# Patient Record
Sex: FEMALE | Race: WHITE | Employment: OTHER
[De-identification: names, ages, dates, MRNs, and addresses within clinical notes are randomized per-mention and may not be internally consistent; named-entity substitution may affect disease eponyms.]

---

## 2018-06-07 ENCOUNTER — RECORDS - HEALTHEAST (OUTPATIENT)
Dept: ADMINISTRATIVE | Facility: OTHER | Age: 43
End: 2018-06-07

## 2018-06-11 ENCOUNTER — RECORDS - HEALTHEAST (OUTPATIENT)
Dept: ADMINISTRATIVE | Facility: OTHER | Age: 43
End: 2018-06-11

## 2018-06-11 ENCOUNTER — OFFICE VISIT (OUTPATIENT)
Dept: FAMILY MEDICINE | Facility: CLINIC | Age: 43
End: 2018-06-11
Payer: MEDICARE

## 2018-06-11 VITALS
WEIGHT: 215.6 LBS | RESPIRATION RATE: 22 BRPM | DIASTOLIC BLOOD PRESSURE: 72 MMHG | BODY MASS INDEX: 43.46 KG/M2 | HEIGHT: 59 IN | SYSTOLIC BLOOD PRESSURE: 110 MMHG | HEART RATE: 75 BPM | OXYGEN SATURATION: 100 % | TEMPERATURE: 98.6 F

## 2018-06-11 DIAGNOSIS — M25.531 RIGHT WRIST PAIN: Primary | ICD-10-CM

## 2018-06-11 DIAGNOSIS — K21.9 GASTROESOPHAGEAL REFLUX DISEASE WITHOUT ESOPHAGITIS: ICD-10-CM

## 2018-06-11 DIAGNOSIS — D32.9 MENINGIOMA (H): ICD-10-CM

## 2018-06-11 DIAGNOSIS — E87.6 HYPOKALEMIA: ICD-10-CM

## 2018-06-11 LAB
BUN SERPL-MCNC: 5 MG/DL (ref 5–24)
CALCIUM SERPL-MCNC: 9.7 MG/DL (ref 8.5–10.4)
CHLORIDE SERPLBLD-SCNC: 100 MMOL/L (ref 94–109)
CO2 SERPL-SCNC: 33 MMOL/L (ref 20–32)
CREAT SERPL-MCNC: 0.7 MG/DL (ref 0.6–1.3)
EGFR CALCULATED (BLACK REFERENCE): >90 ML/MIN
EGFR CALCULATED (NON BLACK REFERENCE): >90 ML/MIN
GLUCOSE SERPL-MCNC: 79 MG/DL (ref 60–109)
POTASSIUM SERPL-SCNC: 3.8 MMOL/L (ref 3.4–5.3)
SODIUM SERPL-SCNC: 140 MMOL/L (ref 133–144)

## 2018-06-11 NOTE — PROGRESS NOTES
"HPI    Rohan Prince is 42 year old yo female presenting for:    1.Wrist pain   - started 4 days ago  - was in midst of a lot of activities that day - doing dishes, fixing garage door, heavy lifting during the day, mowing the lawn   - patient states that somewhere in the middle of all these activities, she began having pain  - cannot recall whether or not she had any trauma, but states that \"I probably did\"  - description of pain:   - lateral surface of wrist   - sharp/shooting pain - 9/10 at its worst   - exacerbating factors: brushing her hair, cooking dinner, any type of activity    - alleviating factors: \"nothing has helped\"  - is unable to hold heavy objects - \"probably cannot hold anything over 3 lbs, which is unusual for me\"  - dorsal surface of hand and all 5 fingers feel numb, 2nd/3rd/4th feel the most numb      OBJECTIVE    Vitals  /72  Pulse 75  Temp 98.6  F (37  C)  Resp 22  Ht 4' 10.5\" (148.6 cm)  Wt 215 lb 9.6 oz (97.8 kg)  SpO2 100%  BMI 44.29 kg/m2      Physical Exam  General: No acute distress  CV: RRR  Respiratory: CTA bilaterally, no wheezes/rhonchi/rhales appreciated, no respiratory distress  Extremities: no cyanosis, no edema, capillary refill <2 seconds, well perfused   - right wrist: mild swelling on right wrist; ROM wnl; no bony tenderness; mild soreness to palpation     ASSESSMENT/PLAN    # Wrist pain  - no fracture noted on XR, will await official radiology read   - likely 2/2 to local soft tissue swelling from overuse  - naproxen 500 mg BID with food (patient already has some at home)  - follow up in 1-2 weeks     #Meningiomas  - mild increase in size noted on CT imaging  - MR referral + neurosurgery referral given     Precepted with Dr. Drew    "

## 2018-06-11 NOTE — PROGRESS NOTES
Preceptor Attestation:   Patient seen, evaluated and discussed with the resident. I have verified the content of the note, which accurately reflects my assessment of the patient and the plan of care.   Supervising Physician:  Campbell Drew MD

## 2018-06-11 NOTE — PATIENT INSTRUCTIONS
Referral for ( TEST )  :      MRI Brain w Contrast   LOCATION/PLACE/Provider :    [facility]   DATE & TIME :     6- at 6:30pm  PHONE :     [phone]  FAX :     [phone]  Appointment made by clinic staff/:    Nany     no

## 2018-06-11 NOTE — MR AVS SNAPSHOT
After Visit Summary   2018    Rohan Prince    MRN: [id number]           Patient Information     Date Of Birth          1975        Visit Information        Provider Department      2018 3:20 PM Palla, Misbah Yousuf, MD [facility]        Today's Diagnoses     Right wrist pain    -  1    Hypokalemia        Meningioma (H)           Follow-ups after your visit        Additional Services     NEUROSURGERY REFERRAL       Patient to stop at the [facility] Desk    Reason for Referral: Meningioma, increasing in size. Vision changes, increased frequency of headaches.      needed: No  Language: English    May leave message on voicemail: Yes    ([facility] Only) Referral should be tracked (Yes/No)?                  Future tests that were ordered for you today     Open Future Orders        Priority Expected Expires Ordered    NEUROSURGERY REFERRAL Routine  2019    MRI BRAIN W CONTRAST Routine  2019            Who to contact     Please call your clinic at [phone] to:    Ask questions about your health    Make or cancel appointments    Discuss your medicines    Learn about your test results    Speak to your doctor            Additional Information About Your Visit        [employer] Information     hyaqu is an electronic gateway that provides easy, online access to your medical records. With hyaqu, you can request a clinic appointment, read your test results, renew a prescription or communicate with your care team.     To sign up for hyaqu visit the website at [email or web address]   You will be asked to enter the access code listed below, as well as some personal information. Please follow the directions to create your username and password.     Your access code is: [email or web address]  Expires: 2018 10:59 AM     Your access code will  in 90 days. If you need help or a new code, please contact your [facility] or  "call [phone] for assistance.        Care EveryWhere ID     This is your Care EveryWhere ID. This could be used by other organizations to access your [place] medical records  [id number]        Your Vitals Were     Pulse Temperature Respirations Height Pulse Oximetry BMI (Body Mass Index)    75 98.6  F (37  C) 22 4' 10.5\" (148.6 cm) 100% 44.29 kg/m2       Blood Pressure from Last 3 Encounters:   06/11/18 110/72    Weight from Last 3 Encounters:   06/11/18 215 lb 9.6 oz (97.8 kg)              We Performed the Following     Basic Metabolic Panel ([facility]) - Results < 1 hr     XR Wrist Right 2 Views        Primary Care Provider Office Phone # Fax #    Ryder Yousuf Palla, [phone]       [address] [address]        Equal Access to Services     [facility] : Hadii aad ku hadasho Soomaali, waaxda luqadaha, qaybta kaalmada adeegyada, waxay idiin hayaan marci merinoaraarun springern . So [facility] [phone].    ATENCIÓN: Si habla español, tiene a aponte disposición servicios gratuitos de asistencia lingüística. Llame al [phone].    We comply with applicable federal civil rights laws and [place] laws. We do not discriminate on the basis of race, color, national origin, age, disability, sex, sexual orientation, or gender identity.            Thank you!     Thank you for choosing [facility]  for your care. Our goal is always to provide you with excellent care. Hearing back from our patients is one way we can continue to improve our services. Please take a few minutes to complete the written survey that you may receive in the mail after your visit with us. Thank you!             Your Updated Medication List - Protect others around you: Learn how to safely use, store and throw away your medicines at www.disposemymeds.org.      Notice  As of 6/11/2018  3:58 PM    You have not been prescribed any medications.      "

## 2018-06-12 ENCOUNTER — HEALTH MAINTENANCE LETTER (OUTPATIENT)
Age: 43
End: 2018-06-12

## 2018-06-14 ENCOUNTER — OFFICE VISIT - HEALTHEAST (OUTPATIENT)
Dept: NEUROSURGERY | Facility: CLINIC | Age: 43
End: 2018-06-14

## 2018-06-14 ENCOUNTER — HOSPITAL ENCOUNTER (OUTPATIENT)
Dept: PHYSICAL MEDICINE AND REHAB | Facility: CLINIC | Age: 43
Discharge: HOME | End: 2018-06-14
Attending: NEUROLOGICAL SURGERY

## 2018-06-14 DIAGNOSIS — M54.2 CERVICAL SPINE PAIN: ICD-10-CM

## 2018-06-14 DIAGNOSIS — M79.18 MYOFASCIAL PAIN: ICD-10-CM

## 2018-06-14 DIAGNOSIS — D32.9 MENINGIOMA (H): ICD-10-CM

## 2018-06-14 DIAGNOSIS — M79.7 FIBROMYALGIA: ICD-10-CM

## 2018-06-14 DIAGNOSIS — Z98.1 HISTORY OF FUSION OF CERVICAL SPINE: ICD-10-CM

## 2018-06-14 DIAGNOSIS — M54.16 LUMBAR RADICULITIS: ICD-10-CM

## 2018-06-14 DIAGNOSIS — M54.50 BILATERAL LOW BACK PAIN WITHOUT SCIATICA: ICD-10-CM

## 2018-06-14 DIAGNOSIS — M51.369 DEGENERATION OF LUMBAR INTERVERTEBRAL DISC: ICD-10-CM

## 2018-06-14 DIAGNOSIS — M47.816 LUMBAR FACET ARTHROPATHY: ICD-10-CM

## 2018-06-14 ASSESSMENT — MIFFLIN-ST. JEOR
SCORE: 1706.05
SCORE: 1706.05

## 2018-06-15 ENCOUNTER — HOSPITAL ENCOUNTER (OUTPATIENT)
Dept: MRI IMAGING | Facility: HOSPITAL | Age: 43
Discharge: HOME | End: 2018-06-15

## 2018-06-25 ENCOUNTER — OFFICE VISIT (OUTPATIENT)
Dept: FAMILY MEDICINE | Facility: CLINIC | Age: 43
End: 2018-06-25
Payer: MEDICARE

## 2018-06-25 VITALS
RESPIRATION RATE: 16 BRPM | WEIGHT: 219 LBS | HEART RATE: 65 BPM | OXYGEN SATURATION: 99 % | HEIGHT: 70 IN | DIASTOLIC BLOOD PRESSURE: 76 MMHG | TEMPERATURE: 97.6 F | SYSTOLIC BLOOD PRESSURE: 113 MMHG | BODY MASS INDEX: 31.35 KG/M2

## 2018-06-25 DIAGNOSIS — M51.369 DDD (DEGENERATIVE DISC DISEASE), LUMBAR: Primary | ICD-10-CM

## 2018-06-25 DIAGNOSIS — M79.7 FIBROMYALGIA: ICD-10-CM

## 2018-06-25 RX ORDER — AMITRIPTYLINE HYDROCHLORIDE 10 MG/1
TABLET ORAL
Qty: 90 TABLET | Refills: 0 | Status: SHIPPED | OUTPATIENT
Start: 2018-06-25 | End: 2018-06-25 | Stop reason: DRUGHIGH

## 2018-06-25 RX ORDER — TRAMADOL HYDROCHLORIDE 50 MG/1
50 TABLET ORAL EVERY 12 HOURS PRN
Qty: 14 TABLET | Refills: 0 | Status: SHIPPED | OUTPATIENT
Start: 2018-06-25 | End: 2018-07-03

## 2018-06-25 NOTE — MR AVS SNAPSHOT
"              After Visit Summary   6/25/2018    Rohan Prince    MRN: [id number]           Patient Information     Date Of Birth          1975        Visit Information        Provider Department      6/25/2018 2:20 PM Palla, Misbah Yousuf, MD [facility]        Today's Diagnoses     DDD (degenerative disc disease), lumbar    -  1    Fibromyalgia           Follow-ups after your visit        Your next 10 appointments already scheduled     Jul 03, 2018  9:20 AM CDT   RETURN EXTENDED with Misbah Yousuf Palla, MD   [facility] ([facility])    [address] [place] [id number]   [phone]              Who to contact     Please call your clinic at [phone] to:    Ask questions about your health    Make or cancel appointments    Discuss your medicines    Learn about your test results    Speak to your doctor            Additional Information About Your Visit        MyChart Information     The[employer]ance gives you secure access to your electronic health record. If you see a primary care provider, you can also send messages to your care team and make appointments. If you have questions, please call your primary care clinic.  If you do not have a primary care provider, please call [phone] and they will assist you.      Theravance is an electronic gateway that provides easy, online access to your medical records. With [employer], you can request a clinic appointment, read your test results, renew a prescription or communicate with your care team.     To access your existing account, please contact your [facility] or call [phone] for assistance.        Care EveryWhere ID     This is your Care EveryWhere ID. This could be used by other organizations to access your [place] medical records  [id number]        Your Vitals Were     Pulse Temperature Respirations Height Pulse Oximetry BMI (Body Mass Index)    65 97.6  F (36.4  C) (Oral) 16 5' 10.08\" (178 cm) " 99% 31.35 kg/m2       Blood Pressure from Last 3 Encounters:   06/25/18 113/76   06/11/18 110/72    Weight from Last 3 Encounters:   06/25/18 219 lb (99.3 kg)   06/11/18 215 lb 9.6 oz (97.8 kg)              Today, you had the following     No orders found for display         Today's Medication Changes          These changes are accurate as of 6/25/18  3:48 PM.  If you have any questions, ask your nurse or doctor.               Start taking these medicines.        Dose/Directions    amitriptyline 25 MG tablet   Commonly known as:  ELAVIL   Used for:  DDD (degenerative disc disease), lumbar, Fibromyalgia   Started by:  Palla, Misbah Yousuf, MD        Dose:  25 mg   Take 1 tablet (25 mg) by mouth At Bedtime   Quantity:  90 tablet   Refills:  1       traMADol 50 MG tablet   Commonly known as:  ULTRAM   Used for:  DDD (degenerative disc disease), lumbar   Started by:  Palla, Misbah Yousuf, MD        Dose:  50 mg   Take 1 tablet (50 mg) by mouth every 12 hours as needed for severe pain   Quantity:  14 tablet   Refills:  0            Where to get your medicines      [facility] [address] [place] [address] [place]  AT [facility] & [address], [place] [address]     Phone:  [phone]     amitriptyline 25 MG tablet         Some of these will need a paper prescription and others can be bought over the counter.  Ask your nurse if you have questions.     Bring a paper prescription for each of these medications     traMADol 50 MG tablet               Information about OPIOIDS     PRESCRIPTION OPIOIDS: WHAT YOU NEED TO KNOW   We gave you an opioid (narcotic) pain medicine. It is important to manage your pain, but opioids are not always the best choice. You should first try all the other options your care team gave you. Take this medicine for as short a time (and as few doses) as possible.     These medicines have risks:    DO NOT drive when on new or  higher doses of pain medicine. These medicines can affect your alertness and reaction times, and you could be arrested for driving under the influence (DUI). If you need to use opioids long-term, talk to your care team about driving.    DO NOT operate heave machinery    DO NOT do any other dangerous activities while taking these medicines.     DO NOT drink any alcohol while taking these medicines.      If the opioid prescribed includes acetaminophen, DO NOT take with any other medicines that contain acetaminophen. Read all labels carefully. Look for the word  acetaminophen  or  Tylenol.  Ask your pharmacist if you have questions or are unsure.    You can get addicted to pain medicines, especially if you have a history of addiction (chemical, alcohol or substance dependence). Talk to your care team about ways to reduce this risk.    Store your pills in a secure place, locked if possible. We will not replace any lost or stolen medicine. If you don t finish your medicine, please throw away (dispose) as directed by your pharmacist. The [place] Pollution Control Agency has more information about safe disposal: https://www.pca.[facility].[place].us/living-green/managing-unwanted-medications.     All opioids tend to cause constipation. Drink plenty of water and eat foods that have a lot of fiber, such as fruits, vegetables, prune juice, apple juice and high-fiber cereal. Take a laxative (Miralax, milk of magnesia, Colace, Senna) if you don t move your bowels at least every other day.          Primary Care Provider Office Phone # Fax #    Ryder Yousuf Palla, [phone]       [address] [address]        Equal Access to Services     [facility] : Hadii justus vazquez Sobrie, waaxda luqadaha, qaybta kaalmalizbeth hermosillo. [facility] [phone].    ATENCIÓN: Si habla español, tiene a aponte disposición servicios gratuitos de asistencia  lingüística. Rigo al [phone].    We comply with applicable federal civil rights laws and [place] laws. We do not discriminate on the basis of race, color, national origin, age, disability, sex, sexual orientation, or gender identity.            Thank you!     Thank you for choosing [facility]  for your care. Our goal is always to provide you with excellent care. Hearing back from our patients is one way we can continue to improve our services. Please take a few minutes to complete the written survey that you may receive in the mail after your visit with us. Thank you!             Your Updated Medication List - Protect others around you: Learn how to safely use, store and throw away your medicines at www.disposemymeds.org.          This list is accurate as of 6/25/18  3:48 PM.  Always use your most recent med list.                   Brand Name Dispense Instructions for use Diagnosis    amitriptyline 25 MG tablet    ELAVIL    90 tablet    Take 1 tablet (25 mg) by mouth At Bedtime    DDD (degenerative disc disease), lumbar, Fibromyalgia       traMADol 50 MG tablet    ULTRAM    14 tablet    Take 1 tablet (50 mg) by mouth every 12 hours as needed for severe pain    DDD (degenerative disc disease), lumbar

## 2018-06-25 NOTE — PROGRESS NOTES
"HPI    Rohan Prince is 42 year old yo female presenting for:    1. Back pain  - has been having this for \"years, since I was a little girl\"  - description:   - low back, midline   - worst at night   - sharp, stabbing   - no radiation  - does have a history of DDD and disc herniations  - no red flag symptoms  - patient has been frustrated because \"everyone just says it is fibromyalgia, but I am in real pain and no one is doing anything to help\"  - patient states pain prevents her from sleeping and all she wants to be able to do is get a good night's sleep    OBJECTIVE    Vitals  /76  Pulse 65  Temp 97.6  F (36.4  C) (Oral)  Resp 16  Ht 5' 10.08\" (178 cm)  Wt 219 lb (99.3 kg)  SpO2 99%  BMI 31.35 kg/m2      Physical Exam  General: No acute distress  CV: RRR  Respiratory: CTA bilaterally, no wheezes/rhonchi/rhales appreciated, no respiratory distress  Chest wall: No chest wall tenderness  Back: paraspinal tenderness over lower back, mild; bilateral 30 degree leg raise test negative   Abdomen: soft, non-distended, non-tender  Extremities: no cyanosis, no edema, capillary refill <2 seconds, well perfused  Neuro: moves all 4 extremities, normal gate, sensation to touch in tact in BUE, BLE; 5/5 strength in BUE  Trigger point check: bilateral upper anterior chest wall pain on palpation, medial knee TTP, anterior elbow bilaterally TTP, anterior and posterior neck TTP    ASSESSMENT/PLAN      1. Fibromyalgia  - increase lyrica to 150 mg BID  - start amitryptyline 10 mg qHS    2. Degen disc disease pain  - trial of tramadol 50 mg q12h  - return in 1 week  - if helps signficantly, will need pain in take        Precepted with Dr. Donnelly      "

## 2018-06-28 ENCOUNTER — COMMUNICATION - HEALTHEAST (OUTPATIENT)
Dept: NEUROSURGERY | Facility: CLINIC | Age: 43
End: 2018-06-28

## 2018-06-28 DIAGNOSIS — D32.9 MENINGIOMA (H): ICD-10-CM

## 2018-07-03 ENCOUNTER — OFFICE VISIT (OUTPATIENT)
Dept: FAMILY MEDICINE | Facility: CLINIC | Age: 43
End: 2018-07-03
Payer: MEDICARE

## 2018-07-03 VITALS
WEIGHT: 212.4 LBS | DIASTOLIC BLOOD PRESSURE: 60 MMHG | TEMPERATURE: 97.6 F | HEART RATE: 70 BPM | BODY MASS INDEX: 30.41 KG/M2 | OXYGEN SATURATION: 99 % | SYSTOLIC BLOOD PRESSURE: 93 MMHG | HEIGHT: 70 IN

## 2018-07-03 DIAGNOSIS — M51.369 DEGENERATIVE DISC DISEASE, LUMBAR: Primary | ICD-10-CM

## 2018-07-03 DIAGNOSIS — M79.7 FIBROMYALGIA: ICD-10-CM

## 2018-07-03 DIAGNOSIS — G89.4 CHRONIC PAIN SYNDROME: ICD-10-CM

## 2018-07-03 DIAGNOSIS — M51.369 DDD (DEGENERATIVE DISC DISEASE), LUMBAR: ICD-10-CM

## 2018-07-03 RX ORDER — TRAMADOL HYDROCHLORIDE 50 MG/1
50 TABLET ORAL EVERY 12 HOURS PRN
Qty: 14 TABLET | Refills: 0 | Status: SHIPPED | OUTPATIENT
Start: 2018-07-03 | End: 2018-07-11

## 2018-07-03 NOTE — PROGRESS NOTES
Preceptor Attestation:  Patient's case reviewed and discussed with  Patient seen and discussed with the resident.  I agree with written assessment and plan of care.  Supervising Physician:  Nichole Hutnley MD  [facility]

## 2018-07-03 NOTE — MR AVS SNAPSHOT
After Visit Summary   7/3/2018    Rohan Prince    MRN: [id number]           Patient Information     Date Of Birth          1975        Visit Information        Provider Department      7/3/2018 9:20 AM Palla, Misbah Yousuf, MD [facility]        Today's Diagnoses     Degenerative disc disease, lumbar    -  1    DDD (degenerative disc disease), lumbar           Follow-ups after your visit        Additional Services     [facility] SPINE CARE REFERRAL       [facility] Referral  Phone:  [phone]  Fax:  [phone]     Patient name: Rohan Prince  Patient :  1975     needed: No  Language: English  May leave message on voicemail Yes    Condition/Diagnosis/Specific request:  Evaluation of medical vs surgical management?    2.  Marked bilateral L5-S1 foraminal narrowing.  3.  Right lateral recess narrowing L5-S1 may have progressed since the previous MRI, could impinge on the right S1 nerve.  4.  Marked L5-S1 degenerative disc disease.  5.  Mild to moderate thoracic spondylosis.        Referred to:   [facility]                  Future tests that were ordered for you today     Open Future Orders        Priority Expected Expires Ordered    [facility] SPINE CARE REFERRAL Routine  7/3/2019 7/3/2018            Who to contact     Please call your clinic at [phone] to:    Ask questions about your health    Make or cancel appointments    Discuss your medicines    Learn about your test results    Speak to your doctor            Additional Information About Your Visit        MyChart Information     Pogo[employer]hart gives you secure access to your electronic health record. If you see a primary care provider, you can also send messages to your care team and make appointments. If you have questions, please call your primary care clinic.  If you do not have a primary care provider, please call [phone] and they will assist you.      [employer] is an electronic  "gateway that provides easy, online access to your medical records. With [employer], you can request a clinic appointment, read your test results, renew a prescription or communicate with your care team.     To access your existing account, please contact your [facility] or call [phone] for assistance.        Care EveryWhere ID     This is your Care EveryWhere ID. This could be used by other organizations to access your [place] medical records  [id number]        Your Vitals Were     Pulse Temperature Height Pulse Oximetry BMI (Body Mass Index)       70 97.6  F (36.4  C) (Oral) 5' 9.92\" (177.6 cm) 99% 30.55 kg/m2        Blood Pressure from Last 3 Encounters:   07/03/18 93/60   06/25/18 113/76   06/11/18 110/72    Weight from Last 3 Encounters:   07/03/18 212 lb 6.4 oz (96.3 kg)   06/25/18 219 lb (99.3 kg)   06/11/18 215 lb 9.6 oz (97.8 kg)                 Where to get your medicines      Some of these will need a paper prescription and others can be bought over the counter.  Ask your nurse if you have questions.     Bring a paper prescription for each of these medications     traMADol 50 MG tablet         Information about OPIOIDS     PRESCRIPTION OPIOIDS: WHAT YOU NEED TO KNOW   We gave you an opioid (narcotic) pain medicine. It is important to manage your pain, but opioids are not always the best choice. You should first try all the other options your care team gave you. Take this medicine for as short a time (and as few doses) as possible.     These medicines have risks:    DO NOT drive when on new or higher doses of pain medicine. These medicines can affect your alertness and reaction times, and you could be arrested for driving under the influence (DUI). If you need to use opioids long-term, talk to your care team about driving.    DO NOT operate heave machinery    DO NOT do any other dangerous activities while taking these medicines.     DO NOT drink any alcohol while taking " these medicines.      If the opioid prescribed includes acetaminophen, DO NOT take with any other medicines that contain acetaminophen. Read all labels carefully. Look for the word  acetaminophen  or  Tylenol.  Ask your pharmacist if you have questions or are unsure.    You can get addicted to pain medicines, especially if you have a history of addiction (chemical, alcohol or substance dependence). Talk to your care team about ways to reduce this risk.    Store your pills in a secure place, locked if possible. We will not replace any lost or stolen medicine. If you don t finish your medicine, please throw away (dispose) as directed by your pharmacist. The [place] Pollution Control Agency has more information about safe disposal: https://www.pca.[facility].[place].us/living-green/managing-unwanted-medications.     All opioids tend to cause constipation. Drink plenty of water and eat foods that have a lot of fiber, such as fruits, vegetables, prune juice, apple juice and high-fiber cereal. Take a laxative (Miralax, milk of magnesia, Colace, Senna) if you don t move your bowels at least every other day.          Primary Care Provider Office Phone # Fax #    Ryder Yousuf Palla, [phone]       [place] [id number]        Equal Access to Services     RAE ROBERSON AH: Hadii aad ku hadasho Soomaali, waaxda luqadaha, qaybta kaalmada adeegyada, lizbeth gonsalez. So [facility] [phone].    ATENCIÓN: Si habla español, tiene a aponte disposición servicios gratuitos de asistencia lingüística. Llame al [phone].    We comply with applicable federal civil rights laws and [place] laws. We do not discriminate on the basis of race, color, national origin, age, disability, sex, sexual orientation, or gender identity.            Thank you!     Thank you for choosing [facility]  for your care. Our goal is always to provide you with excellent care.  Hearing back from our patients is one way we can continue to improve our services. Please take a few minutes to complete the written survey that you may receive in the mail after your visit with us. Thank you!             Your Updated Medication List - Protect others around you: Learn how to safely use, store and throw away your medicines at www.disposemymeds.org.          This list is accurate as of 7/3/18  9:45 AM.  Always use your most recent med list.                   Brand Name Dispense Instructions for use Diagnosis    amitriptyline 25 MG tablet    ELAVIL    90 tablet    Take 1 tablet (25 mg) by mouth At Bedtime    DDD (degenerative disc disease), lumbar, Fibromyalgia       traMADol 50 MG tablet    ULTRAM    14 tablet    Take 1 tablet (50 mg) by mouth every 12 hours as needed for severe pain    DDD (degenerative disc disease), lumbar

## 2018-07-03 NOTE — PROGRESS NOTES
"HPI    Rohan Prince is 42 year old yo female presenting for:    1. Fibromyalgia, degenerative disc disease follow up  - last seen on 6/25/2018 for chronic back pain - refer to note for detail   - was discussed that we will increase her fibromyalgia treatment but will also treat her pain from degenerative disc disease   - lyrica was increased to 150 mg BID, amitryptyline 10 mg qHS was started   - tramadaol 50 mg q12h was started  - today:   - pain has improved significantly   - \"I feel like a better mom, because I am able to get more done\"   - patient was able to take her kids to the beach, she was able to get more done around the house   - patient feels more functional now because pain is controlled   - patient is able to sleep much better        OBJECTIVE    Vitals  BP 93/60  Pulse 70  Temp 97.6  F (36.4  C) (Oral)  Ht 5' 9.92\" (177.6 cm)  Wt 212 lb 6.4 oz (96.3 kg)  SpO2 99%  BMI 30.55 kg/m2      Physical Exam  General: No acute distress  CV: RRR, distal/peripheral pulses in tact  Respiratory: CTA bilaterally, no wheezes/rhonchi/rhales appreciated, no respiratory distress  Abdomen: soft, non-distended, non-tender, normoactive bowel sounds  Extremities: no cyanosis, no edema, capillary refill <2 seconds, well perfused  Neuro: moves all 4 extremities, normal gate, sensation to touch in tact in BUE, BLE; 5/5 strength in BUE    ASSESSMENT/PLAN    # Fibromyalgia  - continue amitryptyline 10 mg qHS  - continue lyrica 150 mg BID    #Chronic pain 2/2 to degenerative disc disease   - has been offered surgery in the past and wanted to opt for medical therapy  - will give 1 more week of tramadol  - will schedule patient in 1 week for chronic pain intake  - patient now wanting to see spine care for evaluation of medical vs surgical management  - spine care referral sent     Precepted with Dr. Huntley     "

## 2018-07-11 ENCOUNTER — OFFICE VISIT (OUTPATIENT)
Dept: FAMILY MEDICINE | Facility: CLINIC | Age: 43
End: 2018-07-11
Payer: MEDICARE

## 2018-07-11 VITALS
SYSTOLIC BLOOD PRESSURE: 100 MMHG | DIASTOLIC BLOOD PRESSURE: 66 MMHG | WEIGHT: 210 LBS | OXYGEN SATURATION: 99 % | HEART RATE: 63 BPM | RESPIRATION RATE: 16 BRPM | HEIGHT: 70 IN | BODY MASS INDEX: 30.06 KG/M2 | TEMPERATURE: 97.5 F

## 2018-07-11 DIAGNOSIS — G89.4 CHRONIC PAIN SYNDROME: Primary | ICD-10-CM

## 2018-07-11 DIAGNOSIS — M51.369 DDD (DEGENERATIVE DISC DISEASE), LUMBAR: ICD-10-CM

## 2018-07-11 LAB
AMPHETAMINES QUAL: NEGATIVE
AMPHETAMINES QUAL: POSITIVE
BARBITURATES QUAL URINE: NEGATIVE
BARBITURATES QUAL URINE: NEGATIVE
BENZODIAZEPINE QUAL URINE: NEGATIVE
BENZODIAZEPINE QUAL URINE: NEGATIVE
BUPRENORPHINE QUAL URINE: NEGATIVE
BUPRENORPHINE QUAL URINE: NEGATIVE
CANNABINOIDS UR QL SCN: NEGATIVE
CANNABINOIDS UR QL SCN: NEGATIVE
COCAINE QUAL URINE: NEGATIVE
COCAINE QUAL URINE: NEGATIVE
METHAMPHETAMINE: NEGATIVE
METHAMPHETAMINE: POSITIVE
METHODONE QUAL: NEGATIVE
METHODONE QUAL: NEGATIVE
MORPHINE QUAL: NEGATIVE
MORPHINE QUAL: NEGATIVE
OXYCODONE QUAL: NEGATIVE
OXYCODONE QUAL: NEGATIVE
PHENCYCLIDINE: NEGATIVE
PHENCYCLIDINE: NEGATIVE
PROPOXYPHENE: NEGATIVE
PROPOXYPHENE: NEGATIVE
TEMPERATURE OF URINE WAS BETWEEN 90-100 DEGREES F: NO
TEMPERATURE OF URINE WAS BETWEEN 90-100 DEGREES F: YES
TRICYCLIC ANTIDEPRESSANTS: NEGATIVE
TRICYCLIC ANTIDEPRESSANTS: NEGATIVE

## 2018-07-11 RX ORDER — TRAMADOL HYDROCHLORIDE 50 MG/1
50 TABLET ORAL EVERY 12 HOURS PRN
Qty: 28 TABLET | Refills: 0 | Status: SHIPPED | OUTPATIENT
Start: 2018-07-11 | End: 2018-07-25

## 2018-07-11 NOTE — MR AVS SNAPSHOT
"              After Visit Summary   7/11/2018    Rohan Prince    MRN: [id number]           Patient Information     Date Of Birth          1975        Visit Information        Provider Department      7/11/2018 9:40 AM Palla, Misbah Yousuf, MD [facility]        Today's Diagnoses     Chronic pain syndrome    -  1    DDD (degenerative disc disease), lumbar           Follow-ups after your visit        Your next 10 appointments already scheduled     Jul 30, 2018 10:40 AM CDT   Return Visit with Misbah Yousuf Palla, MD   [facility] ([facility])    [address] [place] [address]   [phone]              Who to contact     Please call your clinic at [phone] to:    Ask questions about your health    Make or cancel appointments    Discuss your medicines    Learn about your test results    Speak to your doctor            Additional Information About Your Visit        MyChart Information     Monogram gives you secure access to your electronic health record. If you see a primary care provider, you can also send messages to your care team and make appointments. If you have questions, please call your primary care clinic.  If you do not have a primary care provider, please call [phone] and they will assist you.      Monogram is an electronic gateway that provides easy, online access to your medical records. With Monogram, you can request a clinic appointment, read your test results, renew a prescription or communicate with your care team.     To access your existing account, please contact your [facility] or call [phone] for assistance.        Care EveryWhere ID     This is your Care EveryWhere ID. This could be used by other organizations to access your [place] medical records  [id number]        Your Vitals Were     Pulse Temperature Respirations Height Pulse Oximetry BMI (Body Mass Index)    63 97.5  F (36.4  C) (Oral) 16 5' 9.88\" " (177.5 cm) 99% 30.23 kg/m2       Blood Pressure from Last 3 Encounters:   07/25/18 104/72   07/11/18 100/66   07/03/18 93/60    Weight from Last 3 Encounters:   07/25/18 208 lb 9.6 oz (94.6 kg)   07/11/18 210 lb (95.3 kg)   07/03/18 212 lb 6.4 oz (96.3 kg)              We Performed the Following     Rapid Urine Drug Screen ([facility])     Rapid Urine Drug Screen ([facility])          Where to get your medicines      Some of these will need a paper prescription and others can be bought over the counter.  Ask your nurse if you have questions.     Bring a paper prescription for each of these medications     traMADol 50 MG tablet          Primary Care Provider Office Phone # Fax #    Ryder Yousuf Palla, [phone]       [place] [address]        Equal Access to Services     RAE ROBERSON AH: Hadii aad ku hadasho Soomaali, waaxda luqadaha, qaybta kaalmada adeegyada, lizbeth chandra . So [facility] [phone].    ATENCIÓN: Si habla esprosi, tiene a aponte disposición servicios gratuitos de asistencia lingüística. Llame al [phone].    We comply with applicable federal civil rights laws and [place] laws. We do not discriminate on the basis of race, color, national origin, age, disability, sex, sexual orientation, or gender identity.            Thank you!     Thank you for choosing [facility]  for your care. Our goal is always to provide you with excellent care. Hearing back from our patients is one way we can continue to improve our services. Please take a few minutes to complete the written survey that you may receive in the mail after your visit with us. Thank you!             Your Updated Medication List - Protect others around you: Learn how to safely use, store and throw away your medicines at www.disposemymeds.org.          This list is accurate as of 7/11/18 11:59 PM.  Always use your most recent med list.                    Brand Name Dispense Instructions for use Diagnosis    amitriptyline 25 MG tablet    ELAVIL    90 tablet    Take 1 tablet (25 mg) by mouth At Bedtime    DDD (degenerative disc disease), lumbar, Fibromyalgia       traMADol 50 MG tablet    ULTRAM    28 tablet    Take 1 tablet (50 mg) by mouth every 12 hours as needed for severe pain    DDD (degenerative disc disease), lumbar

## 2018-07-17 NOTE — PROGRESS NOTES
Preceptor Attestation:   Patient seen, evaluated and discussed with the resident. I have verified the content of the note, which accurately reflects my assessment of the patient and the plan of care.    Supervising Physician:Campbell Donnelly MD    [facility]

## 2018-07-17 NOTE — PATIENT INSTRUCTIONS
Referral for [facility] along with OV notes have been faxed  [phone]  [phone]  New patient  will contact patient to set up appointment.

## 2018-07-18 ENCOUNTER — AMBULATORY - HEALTHEAST (OUTPATIENT)
Dept: PHYSICAL MEDICINE AND REHAB | Facility: CLINIC | Age: 43
End: 2018-07-18

## 2018-07-23 ENCOUNTER — HOSPITAL ENCOUNTER (OUTPATIENT)
Dept: PHYSICAL MEDICINE AND REHAB | Facility: CLINIC | Age: 43
Discharge: HOME | End: 2018-07-23
Attending: PHYSICIAN ASSISTANT

## 2018-07-23 DIAGNOSIS — M54.16 LUMBAR RADICULITIS: ICD-10-CM

## 2018-07-25 ENCOUNTER — OFFICE VISIT (OUTPATIENT)
Dept: FAMILY MEDICINE | Facility: CLINIC | Age: 43
End: 2018-07-25
Payer: MEDICARE

## 2018-07-25 VITALS
TEMPERATURE: 97.5 F | OXYGEN SATURATION: 98 % | HEIGHT: 71 IN | WEIGHT: 208.6 LBS | DIASTOLIC BLOOD PRESSURE: 72 MMHG | BODY MASS INDEX: 29.2 KG/M2 | SYSTOLIC BLOOD PRESSURE: 104 MMHG | HEART RATE: 62 BPM

## 2018-07-25 DIAGNOSIS — M79.7 FIBROMYALGIA: ICD-10-CM

## 2018-07-25 DIAGNOSIS — M51.369 DDD (DEGENERATIVE DISC DISEASE), LUMBAR: ICD-10-CM

## 2018-07-25 DIAGNOSIS — G89.4 CHRONIC PAIN SYNDROME: Primary | ICD-10-CM

## 2018-07-25 LAB
AMPHETAMINES QUAL: NEGATIVE
BARBITURATES QUAL URINE: NEGATIVE
BENZODIAZEPINE QUAL URINE: NEGATIVE
BUPRENORPHINE QUAL URINE: NEGATIVE
CANNABINOIDS UR QL SCN: NEGATIVE
COCAINE QUAL URINE: NEGATIVE
METHAMPHETAMINE: NEGATIVE
METHODONE QUAL: NEGATIVE
MORPHINE QUAL: NEGATIVE
OXYCODONE QUAL: NEGATIVE
PHENCYCLIDINE: NEGATIVE
PROPOXYPHENE: NEGATIVE
TEMPERATURE OF URINE WAS BETWEEN 90-100 DEGREES F: YES
TRICYCLIC ANTIDEPRESSANTS: NEGATIVE

## 2018-07-25 RX ORDER — PREGABALIN 75 MG/1
75 CAPSULE ORAL 4 TIMES DAILY
COMMUNITY
End: 2018-07-25

## 2018-07-25 RX ORDER — TRAMADOL HYDROCHLORIDE 50 MG/1
50 TABLET ORAL EVERY 8 HOURS PRN
Qty: 21 TABLET | Refills: 0 | Status: SHIPPED | OUTPATIENT
Start: 2018-07-25 | End: 2018-10-30

## 2018-07-25 RX ORDER — PREGABALIN 75 MG/1
150 CAPSULE ORAL 2 TIMES DAILY
Qty: 90 CAPSULE | Refills: 0 | Status: SHIPPED | OUTPATIENT
Start: 2018-07-25 | End: 2018-10-12

## 2018-07-25 NOTE — MR AVS SNAPSHOT
After Visit Summary   7/25/2018    Rohan Prince    MRN: [id number]           Patient Information     Date Of Birth          1975        Visit Information        Provider Department      7/25/2018 11:20 AM Palla, Misbah Yousuf, MD [facility]        Today's Diagnoses     Chronic pain syndrome    -  1    Fibromyalgia        DDD (degenerative disc disease), lumbar           Follow-ups after your visit        Your next 10 appointments already scheduled     Jul 30, 2018 10:40 AM CDT   Return Visit with Misbah Yousuf Palla, MD   [facility] ([facility])    [address] [place] [address]   [phone]              Who to contact     Please call your clinic at [phone] to:    Ask questions about your health    Make or cancel appointments    Discuss your medicines    Learn about your test results    Speak to your doctor            Additional Information About Your Visit        MyChart Information     riskmethodst gives you secure access to your electronic health record. If you see a primary care provider, you can also send messages to your care team and make appointments. If you have questions, please call your primary care clinic.  If you do not have a primary care provider, please call [phone] and they will assist you.      ReferralCandy is an electronic gateway that provides easy, online access to your medical records. With ReferralCandy, you can request a clinic appointment, read your test results, renew a prescription or communicate with your care team.     To access your existing account, please contact your [facility] or call [phone] for assistance.        Care EveryWhere ID     This is your Care EveryWhere ID. This could be used by other organizations to access your [place] medical records  [id number]        Your Vitals Were     Pulse Temperature Height Pulse Oximetry BMI (Body Mass Index)       62 97.5  F (36.4  C) (Oral) 5'  "10.5\" (179.1 cm) 98% 29.51 kg/m2        Blood Pressure from Last 3 Encounters:   07/25/18 104/72   07/11/18 100/66   07/03/18 93/60    Weight from Last 3 Encounters:   07/25/18 208 lb 9.6 oz (94.6 kg)   07/11/18 210 lb (95.3 kg)   07/03/18 212 lb 6.4 oz (96.3 kg)              We Performed the Following     Rapid Urine Drug Screen ([facility])          Today's Medication Changes          These changes are accurate as of 7/25/18 11:50 AM.  If you have any questions, ask your nurse or doctor.               These medicines have changed or have updated prescriptions.        Dose/Directions    pregabalin 75 MG capsule   Commonly known as:  LYRICA   This may have changed:    - how much to take  - when to take this   Used for:  Fibromyalgia, Chronic pain syndrome, DDD (degenerative disc disease), lumbar   Changed by:  Palla, Misbah Yousuf, MD        Dose:  150 mg   Take 2 capsules (150 mg) by mouth 2 times daily   Quantity:  90 capsule   Refills:  0       traMADol 50 MG tablet   Commonly known as:  ULTRAM   This may have changed:  when to take this   Used for:  DDD (degenerative disc disease), lumbar, Chronic pain syndrome, Fibromyalgia   Changed by:  Palla, Misbah Yousuf, MD        Dose:  50 mg   Take 1 tablet (50 mg) by mouth every 8 hours as needed for severe pain   Quantity:  21 tablet   Refills:  0            Where to get your medicines      Some of these will need a paper prescription and others can be bought over the counter.  Ask your nurse if you have questions.     Bring a paper prescription for each of these medications     pregabalin 75 MG capsule    traMADol 50 MG tablet               Information about OPIOIDS     PRESCRIPTION OPIOIDS: WHAT YOU NEED TO KNOW   We gave you an opioid (narcotic) pain medicine. It is important to manage your pain, but opioids are not always the best choice. You should first try all the other options your care team gave you. Take this medicine for as short a time (and as few doses) as " possible.     These medicines have risks:    DO NOT drive when on new or higher doses of pain medicine. These medicines can affect your alertness and reaction times, and you could be arrested for driving under the influence (DUI). If you need to use opioids long-term, talk to your care team about driving.    DO NOT operate heave machinery    DO NOT do any other dangerous activities while taking these medicines.     DO NOT drink any alcohol while taking these medicines.      If the opioid prescribed includes acetaminophen, DO NOT take with any other medicines that contain acetaminophen. Read all labels carefully. Look for the word  acetaminophen  or  Tylenol.  Ask your pharmacist if you have questions or are unsure.    You can get addicted to pain medicines, especially if you have a history of addiction (chemical, alcohol or substance dependence). Talk to your care team about ways to reduce this risk.    Store your pills in a secure place, locked if possible. We will not replace any lost or stolen medicine. If you don t finish your medicine, please throw away (dispose) as directed by your pharmacist. The [place] Pollution Control Agency has more information about safe disposal: https://www.pca.[facility].mn.us/living-green/managing-unwanted-medications.     All opioids tend to cause constipation. Drink plenty of water and eat foods that have a lot of fiber, such as fruits, vegetables, prune juice, apple juice and high-fiber cereal. Take a laxative (Miralax, milk of magnesia, Colace, Senna) if you don t move your bowels at least every other day.          Primary Care Provider Office Phone # Fax #    Ryder Yousuf Palla, [phone]       [address] [address]        Equal Access to Services     [facility] KAROL : [address], iron chavira, lizbeth castelan. So [facility] [phone].    ATENCIÓN:  Si habla raj, tiene a aponte disposición servicios gratuitos de asistencia lingüística. Rigo rosas [phone].    We comply with applicable federal civil rights laws and [place] laws. We do not discriminate on the basis of race, color, national origin, age, disability, sex, sexual orientation, or gender identity.            Thank you!     Thank you for choosing [facility]  for your care. Our goal is always to provide you with excellent care. Hearing back from our patients is one way we can continue to improve our services. Please take a few minutes to complete the written survey that you may receive in the mail after your visit with us. Thank you!             Your Updated Medication List - Protect others around you: Learn how to safely use, store and throw away your medicines at www.disposemymeds.org.          This list is accurate as of 7/25/18 11:50 AM.  Always use your most recent med list.                   Brand Name Dispense Instructions for use Diagnosis    amitriptyline 25 MG tablet    ELAVIL    90 tablet    Take 1 tablet (25 mg) by mouth At Bedtime    DDD (degenerative disc disease), lumbar, Fibromyalgia       pregabalin 75 MG capsule    LYRICA    90 capsule    Take 2 capsules (150 mg) by mouth 2 times daily    Fibromyalgia, Chronic pain syndrome, DDD (degenerative disc disease), lumbar       traMADol 50 MG tablet    ULTRAM    21 tablet    Take 1 tablet (50 mg) by mouth every 8 hours as needed for severe pain    DDD (degenerative disc disease), lumbar, Chronic pain syndrome, Fibromyalgia

## 2018-07-30 ENCOUNTER — OFFICE VISIT (OUTPATIENT)
Dept: FAMILY MEDICINE | Facility: CLINIC | Age: 43
End: 2018-07-30
Payer: MEDICARE

## 2018-07-30 VITALS
HEART RATE: 52 BPM | RESPIRATION RATE: 18 BRPM | DIASTOLIC BLOOD PRESSURE: 77 MMHG | BODY MASS INDEX: 29.99 KG/M2 | TEMPERATURE: 97.6 F | SYSTOLIC BLOOD PRESSURE: 117 MMHG | OXYGEN SATURATION: 99 % | WEIGHT: 212 LBS

## 2018-07-30 DIAGNOSIS — G89.4 CHRONIC PAIN SYNDROME: Primary | ICD-10-CM

## 2018-07-30 RX ORDER — TRAMADOL HYDROCHLORIDE 50 MG/1
50 TABLET ORAL EVERY 6 HOURS PRN
Qty: 90 TABLET | Refills: 0 | Status: SHIPPED | OUTPATIENT
Start: 2018-09-02 | End: 2018-10-02

## 2018-07-30 RX ORDER — TRAMADOL HYDROCHLORIDE 50 MG/1
50 TABLET ORAL EVERY 6 HOURS PRN
Qty: 90 TABLET | Refills: 0 | Status: SHIPPED | OUTPATIENT
Start: 2018-10-03 | End: 2018-10-30

## 2018-07-30 RX ORDER — TRAMADOL HYDROCHLORIDE 50 MG/1
50 TABLET ORAL 3 TIMES DAILY PRN
Qty: 90 TABLET | Refills: 0 | Status: SHIPPED | OUTPATIENT
Start: 2018-08-02 | End: 2018-09-01

## 2018-07-30 NOTE — PROGRESS NOTES
Preceptor Attestation:   Patient seen, evaluated and discussed with the resident. I have verified the content of the note, which accurately reflects my assessment of the patient and the plan of care.  Supervising Physician:Rick Thayer MD  [facility]

## 2018-07-30 NOTE — MR AVS SNAPSHOT
After Visit Summary   7/30/2018    Rohan Prince    MRN: [id number]           Patient Information     Date Of Birth          1975        Visit Information        Provider Department      7/30/2018 10:40 AM Palla, Misbah Yousuf, MD [facility]        Today's Diagnoses     Chronic pain syndrome    -  1      Care Instructions      OPIOID OVERDOSE SAFETY PLAN  Patients taking prescription opioids are at risk for accidental overdose. Overdose from prescription opioid pain medications is a national epidemic. Opioids include: Vicodin (hydrocodone), OxyContin (oxycodone), Dilaudid (hydromorphone), MS Contin (morphine), Fentanyl, Percocet, Methadone, Suboxone, heroin, and others.    Steps to Avoid Overdose  1. Only take medication prescribed to you  2. Don t take more medication than instructed  3. NEVER mix pain medications with alcohol  4. Avoid sleeping pills when taking pain medications  5. Dispose of unused medications  6. Store your medication in a secure place  7. Teach your family and friends how to respond to an overdose      Narcan is being prescribed as part of your opioid overdose safety plan. Narcan is a medication that reverses opioid overdose and saves lives. Opioid overdoses are life threatening and must be handled right away. Narcan reverses overdose for 30-90 minutes, and you must call 911 immediately if you suspect overdose.     STEP 1: RECOGNIZE OVERDOSE  Not breathing or breathing very slowly (less than 1 breath every 5 seconds)  Snoring, gasping, or gurgling sounds  Lips or fingertips turning blue  Very limp body and pale face  Not responding to hard rub of the chest or yelling their name     STEP 2: CALL FOR HELP (DIAL 911)  Always call 911 and tell them  someone is not breathing   You are legally protected when calling for help in [place]     STEP 3: SUPPORT BREATHING  1. Check airway - make sure there is nothing inside their mouth stopping breathing  2. One hand on  chin, tilt head back, pinch nose closed  3. Make a seal over mouth and give 2 slow breaths. You should see the chest rise, not stomach.  4. Keep going with one breath every 5 seconds     STEP 4: GIVE NARCAN  Give Narcan if you can give it quickly enough so that the person won t go for too long without your breathing assistance  Follow directions on the package  Spray Narcan into one nostril     STEP 5: MONITOR  Continue rescue breathing until they are breathing on their own  Give another Narcan spray if they are not breathing on their own or still unresponsive within 3 minutes of the first spray  Narcan wears off within 30-90 minutes and the person can overdose again once it wears off because the opioids are still in their system! Be sure to get them medical care right away.                Follow-ups after your visit        Your next 10 appointments already scheduled     Aug 06, 2018 11:40 AM CDT   Return Visit with CATHIE Berman   [facility] ([facility])    [address] [place] [address]   [phone]              Who to contact     Please call your clinic at [phone] to:    Ask questions about your health    Make or cancel appointments    Discuss your medicines    Learn about your test results    Speak to your doctor            Additional Information About Your Visit        Thotz Information     Thotz gives you secure access to your electronic health record. If you see a primary care provider, you can also send messages to your care team and make appointments. If you have questions, please call your primary care clinic.  If you do not have a primary care provider, please call [phone] and they will assist you.      Thotz is an electronic gateway that provides easy, online access to your medical records. With Thotz, you can request a clinic appointment, read your test results, renew a prescription or communicate with your care team.     To access your  existing account, please contact your [facility] or call [phone] for assistance.        Care EveryWhere ID     This is your Care EveryWhere ID. This could be used by other organizations to access your [facility] medical records  [id number]        Your Vitals Were     Pulse Temperature Respirations Pulse Oximetry BMI (Body Mass Index)       52 97.6  F (36.4  C) (Oral) 18 99% 29.99 kg/m2        Blood Pressure from Last 3 Encounters:   07/30/18 117/77   07/25/18 104/72   07/11/18 100/66    Weight from Last 3 Encounters:   07/30/18 212 lb (96.2 kg)   07/25/18 208 lb 9.6 oz (94.6 kg)   07/11/18 210 lb (95.3 kg)              We Performed the Following     Rapid Urine Drug Screen ([facility])          Today's Medication Changes          These changes are accurate as of 7/30/18 11:59 PM.  If you have any questions, ask your nurse or doctor.               These medicines have changed or have updated prescriptions.        Dose/Directions    * traMADol 50 MG tablet   Commonly known as:  ULTRAM   This may have changed:  Another medication with the same name was added. Make sure you understand how and when to take each.   Used for:  DDD (degenerative disc disease), lumbar, Chronic pain syndrome, Fibromyalgia   Changed by:  Palla, Misbah Yousuf, MD        Dose:  50 mg   Take 1 tablet (50 mg) by mouth every 8 hours as needed for severe pain   Quantity:  21 tablet   Refills:  0       * traMADol 50 MG tablet   Commonly known as:  ULTRAM   This may have changed:  You were already taking a medication with the same name, and this prescription was added. Make sure you understand how and when to take each.   Used for:  Chronic pain syndrome   Changed by:  Palla, Misbah Yousuf, MD        Dose:  50 mg   Take 1 tablet (50 mg) by mouth 3 times daily as needed for severe pain   Quantity:  90 tablet   Refills:  0       * traMADol 50 MG tablet   Commonly known as:  ULTRAM   This may have changed:  You were  already taking a medication with the same name, and this prescription was added. Make sure you understand how and when to take each.   Used for:  Chronic pain syndrome   Changed by:  Palla, Misbah Yousuf, MD        Dose:  50 mg   Start taking on:  9/2/2018   Take 1 tablet (50 mg) by mouth every 6 hours as needed for severe pain   Quantity:  90 tablet   Refills:  0       * traMADol 50 MG tablet   Commonly known as:  ULTRAM   This may have changed:  You were already taking a medication with the same name, and this prescription was added. Make sure you understand how and when to take each.   Used for:  Chronic pain syndrome   Changed by:  Palla, Misbah Yousuf, MD        Dose:  50 mg   Start taking on:  10/3/2018   Take 1 tablet (50 mg) by mouth every 6 hours as needed for severe pain   Quantity:  90 tablet   Refills:  0       * Notice:  This list has 4 medication(s) that are the same as other medications prescribed for you. Read the directions carefully, and ask your doctor or other care provider to review them with you.         Where to get your medicines      Some of these will need a paper prescription and others can be bought over the counter.  Ask your nurse if you have questions.     Bring a paper prescription for each of these medications     traMADol 50 MG tablet    traMADol 50 MG tablet    traMADol 50 MG tablet               Information about OPIOIDS     PRESCRIPTION OPIOIDS: WHAT YOU NEED TO KNOW   We gave you an opioid (narcotic) pain medicine. It is important to manage your pain, but opioids are not always the best choice. You should first try all the other options your care team gave you. Take this medicine for as short a time (and as few doses) as possible.     These medicines have risks:    DO NOT drive when on new or higher doses of pain medicine. These medicines can affect your alertness and reaction times, and you could be arrested for driving under the influence (DUI). If you need to use opioids  long-term, talk to your care team about driving.    DO NOT operate heave machinery    DO NOT do any other dangerous activities while taking these medicines.     DO NOT drink any alcohol while taking these medicines.      If the opioid prescribed includes acetaminophen, DO NOT take with any other medicines that contain acetaminophen. Read all labels carefully. Look for the word  acetaminophen  or  Tylenol.  Ask your pharmacist if you have questions or are unsure.    You can get addicted to pain medicines, especially if you have a history of addiction (chemical, alcohol or substance dependence). Talk to your care team about ways to reduce this risk.    Store your pills in a secure place, locked if possible. We will not replace any lost or stolen medicine. If you don t finish your medicine, please throw away (dispose) as directed by your pharmacist. The [place] Pollution Control Agency has more information about safe disposal: https://www.pca.[facility].[place].us/living-green/managing-unwanted-medications.     All opioids tend to cause constipation. Drink plenty of water and eat foods that have a lot of fiber, such as fruits, vegetables, prune juice, apple juice and high-fiber cereal. Take a laxative (Miralax, milk of magnesia, Colace, Senna) if you don t move your bowels at least every other day.          Primary Care Provider Office Phone # Fax #    Ryder Yousuf Palla, [phone]       [address] [address]        Equal Access to Services     RAE ROBERSON : Hadii aad ku hadasho Sobrie, waaxda luqadaha, qaybta kaalmada adesemajyada, lizbeth gonsalez. So [facility] [phone].    ATENCIÓN: Si habla español, tiene a aponte disposición servicios gratuitos de asistencia lingüística. Llame al [phone].    We comply with applicable federal civil rights laws and [place] laws. We do not discriminate on the basis of race, color, national origin, age,  disability, sex, sexual orientation, or gender identity.            Thank you!     Thank you for choosing [facility]  for your care. Our goal is always to provide you with excellent care. Hearing back from our patients is one way we can continue to improve our services. Please take a few minutes to complete the written survey that you may receive in the mail after your visit with us. Thank you!             Your Updated Medication List - Protect others around you: Learn how to safely use, store and throw away your medicines at www.disposemymeds.org.          This list is accurate as of 7/30/18 11:59 PM.  Always use your most recent med list.                   Brand Name Dispense Instructions for use Diagnosis    amitriptyline 25 MG tablet    ELAVIL    90 tablet    Take 1 tablet (25 mg) by mouth At Bedtime    DDD (degenerative disc disease), lumbar, Fibromyalgia       pregabalin 75 MG capsule    LYRICA    90 capsule    Take 2 capsules (150 mg) by mouth 2 times daily    Fibromyalgia, Chronic pain syndrome, DDD (degenerative disc disease), lumbar       * traMADol 50 MG tablet    ULTRAM    21 tablet    Take 1 tablet (50 mg) by mouth every 8 hours as needed for severe pain    DDD (degenerative disc disease), lumbar, Chronic pain syndrome, Fibromyalgia       * traMADol 50 MG tablet    ULTRAM    90 tablet    Take 1 tablet (50 mg) by mouth 3 times daily as needed for severe pain    Chronic pain syndrome       * traMADol 50 MG tablet   Start taking on:  9/2/2018    ULTRAM    90 tablet    Take 1 tablet (50 mg) by mouth every 6 hours as needed for severe pain    Chronic pain syndrome       * traMADol 50 MG tablet   Start taking on:  10/3/2018    ULTRAM    90 tablet    Take 1 tablet (50 mg) by mouth every 6 hours as needed for severe pain    Chronic pain syndrome       * Notice:  This list has 4 medication(s) that are the same as other medications prescribed for you. Read the directions carefully, and ask your doctor  or other care provider to review them with you.

## 2018-07-31 ENCOUNTER — AMBULATORY - HEALTHEAST (OUTPATIENT)
Dept: PHYSICAL MEDICINE AND REHAB | Facility: CLINIC | Age: 43
End: 2018-07-31

## 2018-07-31 ENCOUNTER — RECORDS - HEALTHEAST (OUTPATIENT)
Dept: ADMINISTRATIVE | Facility: OTHER | Age: 43
End: 2018-07-31

## 2018-08-02 ENCOUNTER — AMBULATORY - HEALTHEAST (OUTPATIENT)
Dept: PHYSICAL MEDICINE AND REHAB | Facility: CLINIC | Age: 43
End: 2018-08-02

## 2018-08-02 NOTE — PATIENT INSTRUCTIONS
OPIOID OVERDOSE SAFETY PLAN  Patients taking prescription opioids are at risk for accidental overdose. Overdose from prescription opioid pain medications is a national epidemic. Opioids include: Vicodin (hydrocodone), OxyContin (oxycodone), Dilaudid (hydromorphone), MS Contin (morphine), Fentanyl, Percocet, Methadone, Suboxone, heroin, and others.    Steps to Avoid Overdose  1. Only take medication prescribed to you  2. Don t take more medication than instructed  3. NEVER mix pain medications with alcohol  4. Avoid sleeping pills when taking pain medications  5. Dispose of unused medications  6. Store your medication in a secure place  7. Teach your family and friends how to respond to an overdose      Narcan is being prescribed as part of your opioid overdose safety plan. Narcan is a medication that reverses opioid overdose and saves lives. Opioid overdoses are life threatening and must be handled right away. Narcan reverses overdose for 30-90 minutes, and you must call 911 immediately if you suspect overdose.     STEP 1: RECOGNIZE OVERDOSE  Not breathing or breathing very slowly (less than 1 breath every 5 seconds)  Snoring, gasping, or gurgling sounds  Lips or fingertips turning blue  Very limp body and pale face  Not responding to hard rub of the chest or yelling their name     STEP 2: CALL FOR HELP (DIAL 911)  Always call 911 and tell them  someone is not breathing   You are legally protected when calling for help in [place]     STEP 3: SUPPORT BREATHING  1. Check airway - make sure there is nothing inside their mouth stopping breathing  2. One hand on chin, tilt head back, pinch nose closed  3. Make a seal over mouth and give 2 slow breaths. You should see the chest rise, not stomach.  4. Keep going with one breath every 5 seconds     STEP 4: GIVE NARCAN  Give Narcan if you can give it quickly enough so that the person won t go for too long without your breathing assistance  Follow directions on the  package  Spray Narcan into one nostril     STEP 5: MONITOR  Continue rescue breathing until they are breathing on their own  Give another Narcan spray if they are not breathing on their own or still unresponsive within 3 minutes of the first spray  Narcan wears off within 30-90 minutes and the person can overdose again once it wears off because the opioids are still in their system! Be sure to get them medical care right away.

## 2018-08-02 NOTE — PROGRESS NOTES
"  CHRONIC PAIN FOLLOW UP          Rohan Prince is a 42 year old year old who is  here for evaluation and treatment of chronic pain.     Rohan is here for evaluation and to develop a multifaceted chronic pain plan that may or may not include chronic opiate therapy.       Previously been on a pain contract? No  Previous pain diagnosis:No    Pain location: Lower back is where it is worst; Neck pain as well  Has history of L5-S1 foraminal narrowing  Has history of L5-S1 degenerative disc disease  Has history of modrate thoracic spondylosis   Has  History of C4-C5 fusion and C5-C6 fusion and narrow spinal canal   Has history of C3-C7 ACDF  Pain onset: Upper and lower back pain since age of 17  Pain is described as Burning, Numb, Sharp and Stabbing   Aggravating factors include: Prolonged household activities - cleaning, doing dishes; prolonged immobilization in one position   Relieving factors include: Physical therapy, meditation, Details      Previous medication treatments:  Opiates - none, oxycodone /acetaminophen (Percocet), oxycodone ER (Oxycontin) and tramadol ER (Ultram ER)  Antiepileptics - Neurontin (Gabapentin)  Antidepressants - Amitryptyline   NSAIDs - ibuprofen (Motrin) and naproxen (Anaprox, Naprosyn, Naprelan)  Muscle relaxants - none, baclofen and cyclobenzaprine (Flexeril)  Topicals - none      Past pain Treatments  Pain Clinic:  No  Physical Therapy:  Yes - last year was attempted  Psychologist:  Saw a psychologist when she was younger - is currently seeing a counselor at her [facility]  Relaxation techniques/biofeedback:  Yes - music, meditation   Chiropractor:  No  Acupuncture:  No  TENs Unit:  No  Injections:  Yes - medial branch block   Formal self-care education:  No    Current Exercise: Activity \"steps\" 3 times a day, tries to stay active everyday,  6-7 days/week for an average of less than 15 minutes        Substance Use History    reports that she has never smoked. She has never used smokeless " "tobacco.  Alcohol Use:Never used / No history  History of chemical dependency:  No   Current use of recreational substances N/A  Any substance abuse in household? No      Family History:Substance use  Family History of alcohol abuse? No   Family History of Illicit substance use? No   Family History of prescription medication  abuse? No       Social History  Who lives in your household? 14 year old daughter, 5 year old daughter   Recent family or social stressors:  No recent changes - single mom supporting both daughters has been chronic stress  Who is in your support network? [employer] - 1 block from patient's house      Are you currently working ? No, on disability   What do you or did you do? 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 Poor   How many hours do you need? 6-7 How many do you get? 2-3      Mental Health  Diagnosis of Depression : History of depression   Other MH Diagnosis?:  No   History of Preadolescent Sexual Abuse No           Legal Issues   Are you currently involved in litigation related to your pain complaint? No  Have you ever been arrested or had other legal problems? No  Have you filed a Workers' Compensation/SSI/MVA claim related to your pain complaint?  YES - is on SSI/disability income       --TOYA/CareEverywhere signed for other providers,  Yes  --[place] Prescriber s Database reviewed:Yes      What are you hoping to do when your pain is more controlled?   - \"I am hoping that I can sleep better\"  - \"I am hoping I can do more activities with the girls\"  - \"I am hoping I can not struggle to do the dishes\"                  Opioid Use Evaluation Tools         DIRE Score: 15 - done on initial evaluation  No flowsheet data found.       Source: [facility]   2005    Opioid Risk Tool Score: 1- done on " initial evaluation   No flowsheet data found.   Total Score Risk Category  0 - 3 = Low Risk  of future problems with Opioid     Functional Assessment  Questionnaire 45 - conducted on 7/25/2018   Problem, Medication and Allergy Lists were reviewed and updated if needed..    Patient is an established patient of this clinic..         Review of Systems:   CONSTITUTIONAL: no fatigue, no unexpected change in weight  SKIN: no worrisome rashes, no worrisome moles, no worrisome lesions  EYES: no acute vision problems or changes  ENT: no ear problems, no mouth problems, no throat problems  RESP: no significant cough, no shortness of breath  CV: no chest pain, no palpitations, no new or worsening peripheral edema  GI: no nausea, no vomiting, no constipation, no diarrhea         Physical Exam:     Vitals:    07/30/18 1042   BP: 117/77   Pulse: 52   Resp: 18   Temp: 97.6  F (36.4  C)   TempSrc: Oral   SpO2: 99%   Weight: 212 lb (96.2 kg)     Body mass index is 29.99 kg/(m^2).  Vitals were reviewed and were normal  GENERAL: healthy, alert, well nourished, well hydrated, no distress  RESP: lungs clear to auscultation - no rales, no rhonchi, no wheezes  CV: regular rates and rhythm, normal S1 S2, no S3 or S4 and no murmur, no click or rub -  ABDOMEN: soft, no tenderness, no  hepatosplenomegaly, no masses, normal bowel sounds        Results:      Results from the last 24 hoursNo results found for this or any previous visit (from the past 24 hour(s)).   Rapid urine drug screen obtained today: Yes    Assessment and Plan    Rohan Prince is here for follow up of chronic pain caused by foraminal stenosis and degenerative disc disease.  Pt currently has a functional status FAQ 5  of 45.  Patient is being prescribed 150 mg of tramadol IR per day. 15 mg MEDD   Naloxone WILL NOT be prescribed.  Opioid medical management: Tramadol, 50 mg TID PRN Opioid Treatment Agreement  completed? YES, 7/30/2018  Care Plan Date: August/2018    Exercise  discussed and patient     If opioids prescribed patient was asked to bring pill bottle to each appointment and was informed that refills would only be provided at office visits.   Asked patient to F/U in 3 month(s) with same provider for 20 minute  Visit.     Misbah Y. Palla, MD

## 2018-08-06 ENCOUNTER — OFFICE VISIT (OUTPATIENT)
Dept: PSYCHOLOGY | Facility: CLINIC | Age: 43
End: 2018-08-06
Payer: MEDICARE

## 2018-08-06 DIAGNOSIS — G89.4 CHRONIC PAIN SYNDROME: Primary | ICD-10-CM
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
## 2018-08-06 NOTE — MR AVS SNAPSHOT
After Visit Summary   8/6/2018    Rohan Prince    MRN: [id number]           Patient Information     Date Of Birth          1975        Visit Information        Provider Department      8/6/2018 11:40 AM Nany Klein, LMFT [facility]        Today's Diagnoses     Chronic pain syndrome    -  1       Follow-ups after your visit        Who to contact     Please call your clinic at [phone] to:    Ask questions about your health    Make or cancel appointments    Discuss your medicines    Learn about your test results    Speak to your doctor            Additional Information About Your Visit        MyChart Information     [employer] gives you secure access to your electronic health record. If you see a primary care provider, you can also send messages to your care team and make appointments. If you have questions, please call your primary care clinic.  If you do not have a primary care provider, please call [phone] and they will assist you.      [employer] is an electronic gateway that provides easy, online access to your medical records. With [employer], you can request a clinic appointment, read your test results, renew a prescription or communicate with your care team.     To access your existing account, please contact your [facility] or call [phone] for assistance.        Care EveryWhere ID     This is your Care EveryWhere ID. This could be used by other organizations to access your [facility] medical records  [id number]         Blood Pressure from Last 3 Encounters:   07/30/18 117/77   07/25/18 104/72   07/11/18 100/66    Weight from Last 3 Encounters:   07/30/18 212 lb (96.2 kg)   07/25/18 208 lb 9.6 oz (94.6 kg)   07/11/18 210 lb (95.3 kg)              Today, you had the following     No orders found for display       Primary Care Provider Office Phone # Fax #    Ryder Yousuf Palla, [phone]       [facility]  [place] [address] [facility] [address]        Equal Access to Services     KARENJAE KAROL : Hadii aad ku hadanamhoda Sobrie, waaxda luqadaha, qaybta kakristyyeimi duque, lizbeth merinokavonarun gonsalez. So [facility] [phone].    ATENCIÓN: Si habla español, tiene a aponte disposición servicios gratuitos de asistencia lingüística. Llame al [phone].    We comply with applicable federal civil rights laws and [place] laws. We do not discriminate on the basis of race, color, national origin, age, disability, sex, sexual orientation, or gender identity.            Thank you!     Thank you for choosing [facility]  for your care. Our goal is always to provide you with excellent care. Hearing back from our patients is one way we can continue to improve our services. Please take a few minutes to complete the written survey that you may receive in the mail after your visit with us. Thank you!             Your Updated Medication List - Protect others around you: Learn how to safely use, store and throw away your medicines at www.disposemymeds.org.          This list is accurate as of 8/6/18 11:59 PM.  Always use your most recent med list.                   Brand Name Dispense Instructions for use Diagnosis    amitriptyline 25 MG tablet    ELAVIL    90 tablet    Take 1 tablet (25 mg) by mouth At Bedtime    DDD (degenerative disc disease), lumbar, Fibromyalgia       pregabalin 75 MG capsule    LYRICA    90 capsule    Take 2 capsules (150 mg) by mouth 2 times daily    Fibromyalgia, Chronic pain syndrome, DDD (degenerative disc disease), lumbar       * traMADol 50 MG tablet    ULTRAM    21 tablet    Take 1 tablet (50 mg) by mouth every 8 hours as needed for severe pain    DDD (degenerative disc disease), lumbar, Chronic pain syndrome, Fibromyalgia       * traMADol 50 MG tablet    ULTRAM    90 tablet    Take 1 tablet (50 mg) by mouth 3 times daily as needed for severe pain    Chronic pain syndrome       *  traMADol 50 MG tablet   Start taking on:  9/2/2018    ULTRAM    90 tablet    Take 1 tablet (50 mg) by mouth every 6 hours as needed for severe pain    Chronic pain syndrome       * traMADol 50 MG tablet   Start taking on:  10/3/2018    ULTRAM    90 tablet    Take 1 tablet (50 mg) by mouth every 6 hours as needed for severe pain    Chronic pain syndrome       * Notice:  This list has 4 medication(s) that are the same as other medications prescribed for you. Read the directions carefully, and ask your doctor or other care provider to review them with you.

## 2018-08-07 ENCOUNTER — MYC REFILL (OUTPATIENT)
Dept: FAMILY MEDICINE | Facility: CLINIC | Age: 43
End: 2018-08-07

## 2018-08-07 DIAGNOSIS — G89.4 CHRONIC PAIN SYNDROME: ICD-10-CM

## 2018-08-07 DIAGNOSIS — M79.7 FIBROMYALGIA: ICD-10-CM

## 2018-08-07 DIAGNOSIS — M51.369 DDD (DEGENERATIVE DISC DISEASE), LUMBAR: ICD-10-CM

## 2018-08-10 DIAGNOSIS — M79.7 FIBROMYALGIA: Primary | ICD-10-CM

## 2018-08-10 RX ORDER — PREGABALIN 75 MG/1
150 CAPSULE ORAL 2 TIMES DAILY
Qty: 90 CAPSULE | Refills: 0 | OUTPATIENT
Start: 2018-08-10

## 2018-08-10 RX ORDER — PREGABALIN 75 MG/1
150 CAPSULE ORAL 2 TIMES DAILY
Qty: 120 CAPSULE | Refills: 0 | Status: SHIPPED | OUTPATIENT
Start: 2018-08-17 | End: 2018-09-12

## 2018-08-15 NOTE — PROGRESS NOTES
Preceptor Attestation:  Patient's case reviewed and discussed with Misbah Y. Palla, MD resident and I evaluated the patient. I agree with written assessment and plan of care.  Supervising Physician:  Nicola Serrano MD MD  [facility]

## 2018-08-27 ENCOUNTER — RECORDS - HEALTHEAST (OUTPATIENT)
Dept: RADIOLOGY | Facility: CLINIC | Age: 43
End: 2018-08-27

## 2018-09-11 DIAGNOSIS — M51.369 DDD (DEGENERATIVE DISC DISEASE), LUMBAR: ICD-10-CM

## 2018-09-11 DIAGNOSIS — G89.4 CHRONIC PAIN SYNDROME: ICD-10-CM

## 2018-09-11 DIAGNOSIS — M79.7 FIBROMYALGIA: ICD-10-CM

## 2018-09-14 RX ORDER — PREGABALIN 75 MG/1
150 CAPSULE ORAL 2 TIMES DAILY
Qty: 120 CAPSULE | OUTPATIENT
Start: 2018-09-14

## 2018-09-20 DIAGNOSIS — M51.369 DDD (DEGENERATIVE DISC DISEASE), LUMBAR: ICD-10-CM

## 2018-09-20 DIAGNOSIS — M79.7 FIBROMYALGIA: ICD-10-CM

## 2018-09-20 NOTE — TELEPHONE ENCOUNTER
Message to physician:     Date of last visit: 7/30/2018     Date of next visit if scheduled: Visit date 09/27/18    Last Comprehensive Metabolic Panel:  Sodium   Date Value Ref Range Status   06/11/2018 140.0 133.0 - 144.0 mmol/L Final     Potassium   Date Value Ref Range Status   06/11/2018 3.8 3.4 - 5.3 mmol/L Final     Chloride   Date Value Ref Range Status   06/11/2018 100.0 94.0 - 109.0 mmol/L Final     Carbon Dioxide   Date Value Ref Range Status   06/11/2018 33.0 (H) 20.0 - 32.0 mmol/L Final     Glucose   Date Value Ref Range Status   06/11/2018 79.0 60.0 - 109.0 mg/dL Final     Urea Nitrogen   Date Value Ref Range Status   06/11/2018 5.0 5.0 - 24.0 mg/dL Final     Creatinine   Date Value Ref Range Status   06/11/2018 0.7 0.6 - 1.3 mg/dL Final     Calcium   Date Value Ref Range Status   06/11/2018 9.7 8.5 - 10.4 mg/dL Final       BP Readings from Last 3 Encounters:   07/30/18 117/77   07/25/18 104/72   07/11/18 100/66       No results found for: A1C             Please complete refill and CLOSE ENCOUNTER.  Closing the encounter signifies the refill is complete.

## 2018-10-12 DIAGNOSIS — M79.7 FIBROMYALGIA: ICD-10-CM

## 2018-10-12 DIAGNOSIS — M51.369 DDD (DEGENERATIVE DISC DISEASE), LUMBAR: ICD-10-CM

## 2018-10-12 DIAGNOSIS — G89.4 CHRONIC PAIN SYNDROME: ICD-10-CM

## 2018-10-12 RX ORDER — PREGABALIN 75 MG/1
150 CAPSULE ORAL 2 TIMES DAILY
Qty: 120 CAPSULE | Refills: 0
Start: 2018-10-12 | End: 2019-01-14

## 2018-10-12 NOTE — TELEPHONE ENCOUNTER
Message to physician:     Date of last visit: 7/30/2018     Date of next visit if scheduled: Visit date 10/30/18    Last Comprehensive Metabolic Panel:  Sodium   Date Value Ref Range Status   06/11/2018 140.0 133.0 - 144.0 mmol/L Final     Potassium   Date Value Ref Range Status   06/11/2018 3.8 3.4 - 5.3 mmol/L Final     Chloride   Date Value Ref Range Status   06/11/2018 100.0 94.0 - 109.0 mmol/L Final     Carbon Dioxide   Date Value Ref Range Status   06/11/2018 33.0 (H) 20.0 - 32.0 mmol/L Final     Glucose   Date Value Ref Range Status   06/11/2018 79.0 60.0 - 109.0 mg/dL Final     Urea Nitrogen   Date Value Ref Range Status   06/11/2018 5.0 5.0 - 24.0 mg/dL Final     Creatinine   Date Value Ref Range Status   06/11/2018 0.7 0.6 - 1.3 mg/dL Final     Calcium   Date Value Ref Range Status   06/11/2018 9.7 8.5 - 10.4 mg/dL Final       BP Readings from Last 3 Encounters:   07/30/18 117/77   07/25/18 104/72   07/11/18 100/66       No results found for: A1C             Please complete refill and CLOSE ENCOUNTER.  Closing the encounter signifies the refill is complete.

## 2018-10-12 NOTE — TELEPHONE ENCOUNTER
[facility] phone call message- patient requesting a refill:    Full Medication Name: pregabalin (LYRICA) 75 MG    Dose:     Pharmacy confirmed as   [id number] - [place], [place] - [facility] [address] [facility] [address]  [address] [address]  Phone: [phone] Fax: [phone]  : Yes    Additional Comments: Pt called regarding a refill for medication listed above. Please call and advise.      OK to leave a message on voice mail? Yes    Primary language: English      needed? No    Call taken on October 12, 2018 at 4:02 PM by Danya Varma

## 2018-10-16 ENCOUNTER — TELEPHONE (OUTPATIENT)
Dept: FAMILY MEDICINE | Facility: CLINIC | Age: 43
End: 2018-10-16

## 2018-10-16 NOTE — TELEPHONE ENCOUNTER
RX called in to [facility] [phone] for Lyrica 75 mg was approved with local print 10/16/18 by DR Rosenstein and can not local the RX.Patient has appointment in 10/30/18 for follow up

## 2018-10-16 NOTE — TELEPHONE ENCOUNTER
[facility] phone call message- patient requesting a refill:    Full Medication Name: Lyrica    Dose: 75 MG     Pharmacy confirmed as   [id number] - [place], [place] - [address] [facility] [address]  [address]  [address] [place] [facility] [address]  Phone: [phone] Fax: [phone]  : Yes    Additional Comments: Pt stated needing a refill on medication listed above. Please call and advise once refill is available.      OK to leave a message on voice mail? Yes    Primary language: English      needed? No    Call taken on October 16, 2018 at 2:34 PM by Danya Varma

## 2018-10-30 ENCOUNTER — OFFICE VISIT (OUTPATIENT)
Dept: FAMILY MEDICINE | Facility: CLINIC | Age: 43
End: 2018-10-30
Payer: MEDICARE

## 2018-10-30 VITALS
WEIGHT: 199 LBS | TEMPERATURE: 97.5 F | BODY MASS INDEX: 28.15 KG/M2 | DIASTOLIC BLOOD PRESSURE: 70 MMHG | SYSTOLIC BLOOD PRESSURE: 108 MMHG | HEART RATE: 79 BPM | RESPIRATION RATE: 16 BRPM | OXYGEN SATURATION: 99 %

## 2018-10-30 DIAGNOSIS — R52 PAIN MANAGEMENT: Primary | ICD-10-CM

## 2018-10-30 DIAGNOSIS — M79.7 FIBROMYALGIA: ICD-10-CM

## 2018-10-30 DIAGNOSIS — G89.4 CHRONIC PAIN SYNDROME: ICD-10-CM

## 2018-10-30 DIAGNOSIS — M51.369 DDD (DEGENERATIVE DISC DISEASE), LUMBAR: ICD-10-CM

## 2018-10-30 LAB
AMPHETAMINES QUAL: NEGATIVE
BARBITURATES QUAL URINE: NEGATIVE
BENZODIAZEPINE QUAL URINE: NEGATIVE
BUPRENORPHINE QUAL URINE: NEGATIVE
CANNABINOIDS UR QL SCN: NEGATIVE
COCAINE QUAL URINE: NEGATIVE
METHAMPHETAMINE: NEGATIVE
METHODONE QUAL: NEGATIVE
MORPHINE QUAL: NEGATIVE
OXYCODONE QUAL: NEGATIVE
PHENCYCLIDINE: NEGATIVE
PROPOXYPHENE: NEGATIVE
TEMPERATURE OF URINE WAS BETWEEN 90-100 DEGREES F: NO
TRICYCLIC ANTIDEPRESSANTS: NEGATIVE

## 2018-10-30 RX ORDER — TRAMADOL HYDROCHLORIDE 50 MG/1
50 TABLET ORAL 3 TIMES DAILY PRN
Qty: 90 TABLET | Refills: 0 | Status: SHIPPED | OUTPATIENT
Start: 2018-10-30 | End: 2018-11-26

## 2018-10-30 NOTE — PROGRESS NOTES
CHRONIC PAIN FOLLOW UP         Rohan Prince is a 43 year old year old female who is  here treatment of chronic pain.   Rohan is here for evaluation and to develop a multifaceted chronic pain plan that may or may not include chronic opiate therapy.       Previously been on a pain contract? Completed 7/30/18  Previous pain diagnosis:No    Pain location: Lower back is where it is worst; Neck pain as well  Has history of L5-S1 foraminal narrowing  Has history of L5-S1 degenerative disc disease  Has history of modrate thoracic spondylosis   Has  History of C4-C5 fusion and C5-C6 fusion and narrow spinal canal   Has history of C3-C7 ACDF  Pain onset: Upper and lower back pain since age of 17  Pain is described as Burning, Numb, Sharp and Stabbing   Aggravating factors include: Prolonged household activities - cleaning, doing dishes; prolonged immobilization in one position   Relieving factors include: Physical therapy, meditation, Details      Previous medication treatments:  Opiates - oxycodone /acetaminophen (Percocet), oxycodone ER (Oxycontin) and tramadol ER (Ultram ER)  Antiepileptics - Neurontin (Gabapentin)  Antidepressants - Amitryptyline (now stopped) -- said caused hallucinations  NSAIDs - ibuprofen (Motrin) and naproxen (Anaprox, Naprosyn, Naprelan)  Muscle relaxants - none, baclofen and cyclobenzaprine (Flexeril)  Topicals - none      Past pain Treatments  Pain Clinic:  No  Physical Therapy:  Yes - last year was attempted  Psychologist:  Saw a psychologist when she was younger - is currently seeing a counselor at [facility]  Relaxation techniques/biofeedback:  Yes - music, meditation   Chiropractor:  No  Acupuncture:  No  TENs Unit:  No  Injections:  Yes - medial branch block   Formal self-care education:  No    Current Exercise: Activity: chores and excercises 3 times a day, tries to stay active everyday,  6-7 days/week for an average of 30 minutes        Substance Use History    reports that she has never  "smoked. She has never used smokeless tobacco.  Alcohol Use:Never used / No history  History of chemical dependency:  No   Current use of recreational substances N/A  Any substance abuse in household? No      Family History:Substance use  Family History of alcohol abuse? No   Family History of Illicit substance use? No   Family History of prescription medication  abuse? No       Social History  Who lives in your household? 14 year old daughter, 5 year old daughter   Recent family or social stressors:  No recent changes - single mom supporting both daughters has been chronic stress  Who is in your support network? Responsa - 1 block from patient's house      Are you currently working ? No, on disability   What do you or did you do? 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 Poor - used to work third shift, trouble with insomnia. Running thoughts when tries to fall asleep  How many hours do you need? 6-7 How many do you get? 3-4      Mental Health  Diagnosis of Depression : History of depression   Other MH Diagnosis?:  No   History of Preadolescent Sexual Abuse No           Legal Issues   Are you currently involved in litigation related to your pain complaint? No  Have you ever been arrested or had other legal problems? No  Have you filed a Workers' Compensation/SSI/MVA claim related to your pain complaint?  YES - is on SSI/disability income       --TOYA/CareEverywhere signed for other providers,  Yes  --[place] Prescriber s Database reviewed:Yes      What are you hoping to do when your pain is more controlled?   - \"I am hoping that I can sleep better\"  - \"I am hoping I can do more activities with the girls\"  - \"I am hoping I can not struggle to do the dishes\"                  Opioid Use Evaluation Tools     DIRE Score: 15 - done on initial evaluation  No flowsheet data " found.       Source: [facility]   2005    Opioid Risk Tool Score: 1- done on initial evaluation   No flowsheet data found.   Total Score Risk Category  0 - 3 = Low Risk  of future problems with Opioid     Functional Assessment  Questionnaire 45 - conducted on 7/25/2018  Problem, Medication and Allergy Lists were reviewed and updated if needed..    Patient is an established patient of this clinic..         Review of Systems:   CONSTITUTIONAL: no fatigue, no unexpected change in weight  SKIN: no worrisome rashes, no worrisome moles, no worrisome lesions  EYES: no acute vision problems or changes  ENT: no ear problems, no mouth problems, no throat problems  RESP: no significant cough, no shortness of breath  CV: no chest pain, no palpitations, no new or worsening peripheral edema  GI: no nausea, no vomiting, no constipation, no diarrhea         Physical Exam:     Vitals:    10/30/18 1403   BP: 108/70   Pulse: 79   Resp: 16   Temp: 97.5  F (36.4  C)   TempSrc: Oral   SpO2: 99%   Weight: 199 lb (90.3 kg)     Body mass index is 28.15 kg/(m^2).  Vitals were reviewed and were normal  GENERAL: healthy, alert, well nourished, well hydrated, no distress  RESP: lungs clear to auscultation - no rales, no rhonchi, no wheezes  CV: regular rates and rhythm, normal S1 S2, no S3 or S4 and no murmur, no click or rub -  ABDOMEN: Obese, soft, no tenderness, no  hepatosplenomegaly, no masses, normal bowel sounds  NEURO: Alert, oriented, answering questions appropriately. Gait normal.  PSYCH: Mood anxious with bright affect        Results:       Results from the last 24 hours  Results for orders placed or performed in visit on 10/30/18   Rapid Urine Drug Screen ([facility])   Result Value Ref Range    Phencyclidine NEGATIVE NEGATIVE    Propoxyphene NEGATIVE NEGATIVE    Tricyclic Antidepressants NEGATIVE NEGATIVE    Amphetamines Qual NEGATIVE NEGATIVE    Barbiturates Qual Urine NEGATIVE NEGATIVE     Buprenorphine Qual Urine NEGATIVE NEGATIVE    Benzodiazepine Qual Urine NEGATIVE NEGATIVE    Cocaine Qual Urine NEGATIVE NEGATIVE    Cannabinoids Qual Urine NEGATIVE NEGATIVE    Methamphetamine Qual NEGATIVE NEGATIVE    Methadone Qual NEGATIVE NEGATIVE    Morphine Qual NEGATIVE NEGATIVE    Oxycodone Qual NEGATIVE NEGATIVE    Temperature of Urine was Between  Degrees F NO (A) YES    Narrative    Uirne temp measured >100 degrees F, provider informed. KIRT Milton       Rapid urine drug screen obtained today: Yes    Assessment and Plan     Rohan Prince is here for follow up of chronic pain caused by foraminal stenosis, degenerative disc disease, and fibromyalgia.  -She has been using medications appropriately with good improvement in function.  She states she uses pregabalin for her fibromyalgia and tramadol for her other pains.  Pt currently has a functional status FAQ 5  of 45.    Patient is being prescribed 150 mg of tramadol IR per day. 15 MME/day  Naloxone WILL NOT be prescribed.  Opioid medical management: Tramadol, 50 mg TID PRN   Opioid Treatment Agreement  completed? YES, 7/30/2018  Care Plan Date: August/2018    Exercise discussed and patient willing to continue with activity  Plan to follow up in 1 month.  Has plan for knee surgery in January, will plan pre-op and discuss surgical pain management at next visit    If opioids prescribed patient was asked to bring pill bottle to each appointment and was informed that refills would only be provided at office visits.     Benjamin Rosenstein, MD, MA  [facility] [place]  P: [phone]    Precepted today with: Jane Falk MD

## 2018-10-30 NOTE — MR AVS SNAPSHOT
After Visit Summary   10/30/2018    Rohan Prince    MRN: [id number]           Patient Information     Date Of Birth          1975        Visit Information        Provider Department      10/30/2018 2:00 PM Rosenstein, Benjamin, MD [facility]        Today's Diagnoses     Pain management    -  1    DDD (degenerative disc disease), lumbar        Chronic pain syndrome        Fibromyalgia          Care Instructions      Chronic Pain Management Visit After Visit Summary     Goals of Chronic Pain therapy:     Increase function     Decrease suffering     This process may not relieve pain                          Chronic Pain Self-care Plan   Name: Rohan Prince   Date of Birth 1975  Date: 10/30/2018    My doctor: Rosenstein, Benjamin   My clinic: [facility] [address] [place] [address]  [phone]          GREEN    ZONE     Good Control    What it looks like:     Things are going generally well.    You have normal up s and down s in your pain level.    You have bad moods but they usually last less than a day, and overall you don t feel depressed or, if you have depression, it is well managed.     You are able to do all aspects of your self-care plan.   What you need to do:  1. Continue to care for yourself (see self-care plan).  2. Follow your physician s recommendations including any medication.  3. Do not stop taking medication unless you consult with your physician first.         YELLOW         ZONE     Getting Worse    What it looks like:     Your pain is starting to interfere more with your life:  you hesitate to plan leisure/fun activities and find yourself saying  no  to invitations from friends and/or family.    Most days your pain is worse than it used to be and your medications aren t working as well.    You feel like you are more irritable or discouraged in general and you wonder if you need help with depression.     You are having more trouble  completing your usual exercises and stretching program, daily relaxation or other parts of your self-care program.  OR    You are starting to notice a new kind of pain.     What you need to do:     1. Call your care team, your response to treatment will improve if you keep your care team informed of your progress. Yellow periods are signs an adjustment may need to be made.     2. Continue your self-care.    3. Talk to someone in your support network.    4. Do not adjust or stop taking medication unless you consult with your physician first.           RED    ZONE     Medical Alert - Get Help    What it looks like:     Pain is seriously interfering with your life: You are taking time off of work or cancelling other obligations, you need to spend extra time in bed, and/or your friends or family are urging you to seek medical care.    Your pain seems out of control.     You are feeling depressed or your depression is worsening.    You are doing hardly any self-care.     OR    You have a severe, NEW pain.     What you need to do:  1. Call your care team and request a same-day appointment. If they are not available (weekends or after hours) call your local crisis line, emergency room or 911.    2. Do not adjust or stop taking medication unless you consult with your physician first.        Electronically signed by: Benjamin Rosenstein, October 30, 2018        Chronic Pain Self Care Plan  There are many benefits of managing pain flares with self-care.  Self-care does not have the side effects found with medicines.  Medicines do not always help.  Some of their side effects may make you more sensitive to pain.  Pain can increase depression and self-care can help manage depression.  Depressed patients report more pain and report less pain when depression is managed.  Relaxation techniques can undo the harmful effects of stress on the body.  When you are stressed, your body releases a hormone called cortisol, which can increase  anxiety and disturb sleep.    How can you better manage pain flares?     Ways to relax:    Muscle stretching    Shoulder shrugs, head circles, shoulder rolls, arm and fist tightening and release, arms overhead with chest tightening, PT exercises    General muscle relaxation    Breathing exercises    To a set count:  breathe in, counting 1-2-3-4.  Breath out, counting 1-2-3-4.  OR:  Breathe in for 6 counts and breathe out for 3 counts.    Color breathing:  breath as a soft relaxing color you breathe into all parts of your body.    Music    To relax, listen to music slower than your heart rate; To energize, music faster than your heart rate.    Try to hear the sounds or patterns in music.    Use music with breathing or relaxation exercises.    Dance, sing, hum or play an instrument.    Visual imagery    Imagine your ideal place or a safe, peaceful place, relax.    Empty the sandbag:  fill it up, make a slit, and let the pain flow out with the sand.    Red ball:  gather pains and aches into a ball.  Change the size of the ball--from really big to the size of a grain of sand.  Move it out of your body and farther away each time you breath out.    Red light to green:  scan your body and see the areas of pain as red  stoplights.   Slowly scan again turning each red light to green, letting go of pain and tension.    Activity:    Exercise is good.  It will make you feel more cheerful, improve blood flow and muscle endurance, reduce pain, and help release emotions.  Talk to your provider about appropriate exercises.   In most instances, activity will not cause more harm even if it is painful.    Pace yourself.  Plan to take breaks, but stick to a schedule.  If you can t take a break, stop and change your position.  Change positions before you feel tired or sore.  Stop and stretch often.    Save energy.  Don t do what does not need to be done.  Avoid using shoulder bags and pack grocery bags lightly or use a rolling cart.   Keep items you use often within reach.    Move safely.  Be aware of your posture at rest and during activity.  Don t let one part of your body bear most of the force.  Break difficult activities into smaller movements.    Spiritual Living:    Thoughts can increase stress.  When you relive painful memories or have sad or angry thoughts, the body responds as if it is under threat.      Grow in spiritual awareness.   o Spiritual beliefs can change how you experience and respond to an event.  They can give hope and comfort that you are not alone.  A spiritual life is not necessarily about Latter day.  It is the connection to life energy.  o Ways to grow in spiritual awareness:  chanting, meditating, repetitious prayer, silent conversation or focusing on a sacred object.  Connecting to spirit helps us stay in the present and detach from past and future.  Pain will not take over.    Humor and Stress Reduction    Humor as medicine:  Collect funny TV programs or jokes.  Make daily laughs a habit.    Practice mindfulness:  be aware of the world around you--sunlight, movement of fish, leaves in the wind.  Connect to the world.  The world is not defined by pain or suffering.    Share your stress:  Talk with a friend, family, therapist and know when to ask for help.    Keep a journal:  Write about your dreams, disappointments, plans and concerns.  Let go of things that frustrate you.  Look for patterns of feelings and thoughts.  There is more to your life than pain.    Distraction:  When an activity causes your body discomfort, change what you re doing.  If your task requires sitting, do something active.  If you re moving about, sit and rest for a change.    You re Flaring.  Now what do you do?      Eat healthy meals.    Laugh, do an activity you enjoy.    Use cold and hot packs (a rice sock is one type).  Use cold for 24 to 48 hrs on red, hot or swollen joints and muscles.  Cold reduces inflammation, relaxes muscles and numbs  the area.  Use heat for 48 to 72 hrs on stiff joints, muscle spasms and pain.  Use either one for 15 to 20 minutes at a time.    Acupressure    Hand massage    Stop.  Take a break.  Lie down.    Distract yourself with music or by looking forward to something you enjoy.    Talk to your care team regarding other options that may work for you.    Follow up in 4 weeks.    Important Reminders about Opioid Medications    Opioid Medications are only prescribed during a clinic visit    Keep them locked up-we will not replace lost or stolen opioid medications     Bring all your opioid medications in their original bottles to each appointment   Thank you for coming to Clinic today.  Lab Testing:  **If you had lab testing today and your results are reassuring or normal they will be mailed to you or sent through [employer] within 7 days.   **If the lab tests need quick action we will call you with the results.  The phone number we will call with results is # [phone] (home) . If this is not the best number please call our clinic and change the number.   Medication Refills:  If you need any refills please call your pharmacy and they will contact us. Opioid refills will only be refilled at visits, any lost opioid prescriptions will not be replaced.  If you need to  your refill at a new pharmacy, please contact the new pharmacy directly. The new pharmacy will help you get your medications transferred faster.   Scheduling:  If you have any concerns about today's visit or wish to schedule another appointment please call our office during normal business hours   (8-5:00 M-F)    Medical Concerns:  If you have urgent medical concerns please call [phone] at any time of the day.  If you have a medical emergency please call [phone].  Again thank you for choosing [facility] [address] [place] [address] [phone]  and please let us know how we can best partner with you to  improve you and your family's health.            Follow-ups after your visit        Your next 10 appointments already scheduled     Nov 26, 2018  9:40 AM CST   Return Visit with Benjamin Rosenstein, MD   [facility] ([facility])    [address] [place] [id number]   [phone]              Who to contact     Please call your clinic at [phone] to:    Ask questions about your health    Make or cancel appointments    Discuss your medicines    Learn about your test results    Speak to your doctor            Additional Information About Your Visit        [employer] Information     GO[employer] gives you secure access to your electronic health record. If you see a primary care provider, you can also send messages to your care team and make appointments. If you have questions, please call your primary care clinic.  If you do not have a primary care provider, please call [phone] and they will assist you.      GOOD is an electronic gateway that provides easy, online access to your medical records. With GOOD, you can request a clinic appointment, read your test results, renew a prescription or communicate with your care team.     To access your existing account, please contact your [facility] or call [phone] for assistance.        Care EveryWhere ID     This is your Care EveryWhere ID. This could be used by other organizations to access your [facility] medical records  [id number]        Your Vitals Were     Pulse Temperature Respirations Pulse Oximetry BMI (Body Mass Index)       79 97.5  F (36.4  C) (Oral) 16 99% 28.15 kg/m2        Blood Pressure from Last 3 Encounters:   10/30/18 108/70   07/30/18 117/77   07/25/18 104/72    Weight from Last 3 Encounters:   10/30/18 199 lb (90.3 kg)   07/30/18 212 lb (96.2 kg)   07/25/18 208 lb 9.6 oz (94.6 kg)              We Performed the Following     Rapid Urine Drug Screen ([facility])          Today's Medication  Changes          These changes are accurate as of 10/30/18  3:08 PM.  If you have any questions, ask your nurse or doctor.               These medicines have changed or have updated prescriptions.        Dose/Directions    traMADol 50 MG tablet   Commonly known as:  ULTRAM   This may have changed:  when to take this   Used for:  DDD (degenerative disc disease), lumbar, Chronic pain syndrome, Fibromyalgia   Changed by:  Rosenstein, Benjamin, MD        Dose:  50 mg   Take 1 tablet (50 mg) by mouth 3 times daily as needed for severe pain   Quantity:  90 tablet   Refills:  0            Where to get your medicines      Some of these will need a paper prescription and others can be bought over the counter.  Ask your nurse if you have questions.     Bring a paper prescription for each of these medications     traMADol 50 MG tablet               Information about OPIOIDS     PRESCRIPTION OPIOIDS: WHAT YOU NEED TO KNOW   We gave you an opioid (narcotic) pain medicine. It is important to manage your pain, but opioids are not always the best choice. You should first try all the other options your care team gave you. Take this medicine for as short a time (and as few doses) as possible.    Some activities can increase your pain, such as bandage changes or therapy sessions. It may help to take your pain medicine 30 to 60 minutes before these activities. Reduce your stress by getting enough sleep, working on hobbies you enjoy and practicing relaxation or meditation. Talk to your care team about ways to manage your pain beyond prescription opioids.    These medicines have risks:    DO NOT drive when on new or higher doses of pain medicine. These medicines can affect your alertness and reaction times, and you could be arrested for driving under the influence (DUI). If you need to use opioids long-term, talk to your care team about driving.    DO NOT operate heavy machinery    DO NOT do any other dangerous activities while taking  these medicines.    DO NOT drink any alcohol while taking these medicines.     If the opioid prescribed includes acetaminophen, DO NOT take with any other medicines that contain acetaminophen. Read all labels carefully. Look for the word  acetaminophen  or  Tylenol.  Ask your pharmacist if you have questions or are unsure.    You can get addicted to pain medicines, especially if you have a history of addiction (chemical, alcohol or substance dependence). Talk to your care team about ways to reduce this risk.    All opioids tend to cause constipation. Drink plenty of water and eat foods that have a lot of fiber, such as fruits, vegetables, prune juice, apple juice and high-fiber cereal. Take a laxative (Miralax, milk of magnesia, Colace, Senna) if you don t move your bowels at least every other day. Other side effects include upset stomach, sleepiness, dizziness, throwing up, tolerance (needing more of the medicine to have the same effect), physical dependence and slowed breathing.    Store your pills in a secure place, locked if possible. We will not replace any lost or stolen medicine. If you don t finish your medicine, please throw away (dispose) as directed by your pharmacist. The [place] [employer]ution [employer] has more information about safe disposal: https://www.pca.[facility].[place]/living-green/managing-unwanted-medications         Primary Care Provider Office Phone # Fax #    Benjamin Rosenstein, [phone]       [facility] [address] [place] [address]        Equal Access to Services     RAE ROBERSON : Hadtim vazquez Sobrie, waaxda luqadaha, qaybta kaalmada dunia, lizbeth chandra . So [facility] [phone].    ATENCIÓN: Si habla español, tiene a aponte disposición servicios gratuitos de asistencia lingüística. Llame al [phone].    We comply with applicable federal civil rights laws and [place] laws. We do not discriminate on the basis of race, color,  national origin, age, disability, sex, sexual orientation, or gender identity.            Thank you!     Thank you for choosing [facility]  for your care. Our goal is always to provide you with excellent care. Hearing back from our patients is one way we can continue to improve our services. Please take a few minutes to complete the written survey that you may receive in the mail after your visit with us. Thank you!             Your Updated Medication List - Protect others around you: Learn how to safely use, store and throw away your medicines at www.disposemymeds.org.          This list is accurate as of 10/30/18  3:08 PM.  Always use your most recent med list.                   Brand Name Dispense Instructions for use Diagnosis    amitriptyline 25 MG tablet    ELAVIL    90 tablet    Take 1 tablet (25 mg) by mouth At Bedtime    DDD (degenerative disc disease), lumbar, Fibromyalgia       pregabalin 75 MG capsule    LYRICA    120 capsule    Take 2 capsules (150 mg) by mouth 2 times daily    Fibromyalgia, Chronic pain syndrome, DDD (degenerative disc disease), lumbar       traMADol 50 MG tablet    ULTRAM    90 tablet    Take 1 tablet (50 mg) by mouth 3 times daily as needed for severe pain    DDD (degenerative disc disease), lumbar, Chronic pain syndrome, Fibromyalgia

## 2018-10-30 NOTE — NURSING NOTE
10/29/2018 [facility] Previsit Plan   DUE FOR:  Tdap at pharmacy, medicare ins  HIV screen  Flu shot-declined  NELDA Ware    UDS on arrival  -Thanks, BR

## 2018-10-30 NOTE — PATIENT INSTRUCTIONS
Chronic Pain Management Visit After Visit Summary     Goals of Chronic Pain therapy:     Increase function     Decrease suffering     This process may not relieve pain                          Chronic Pain Self-care Plan   Name: Rohan Prince   Date of Birth 1975  Date: 10/30/2018    My doctor: Rosenstein, Benjamin   My clinic: [facility] [address] [place] [address]  [phone]          GREEN    ZONE     Good Control    What it looks like:     Things are going generally well.    You have normal up s and down s in your pain level.    You have bad moods but they usually last less than a day, and overall you don t feel depressed or, if you have depression, it is well managed.     You are able to do all aspects of your self-care plan.   What you need to do:  1. Continue to care for yourself (see self-care plan).  2. Follow your physician s recommendations including any medication.  3. Do not stop taking medication unless you consult with your physician first.         YELLOW         ZONE     Getting Worse    What it looks like:     Your pain is starting to interfere more with your life:  you hesitate to plan leisure/fun activities and find yourself saying  no  to invitations from friends and/or family.    Most days your pain is worse than it used to be and your medications aren t working as well.    You feel like you are more irritable or discouraged in general and you wonder if you need help with depression.     You are having more trouble completing your usual exercises and stretching program, daily relaxation or other parts of your self-care program.  OR    You are starting to notice a new kind of pain.     What you need to do:     1. Call your care team, your response to treatment will improve if you keep your care team informed of your progress. Yellow periods are signs an adjustment may need to be made.     2. Continue your self-care.    3. Talk to someone in your support  network.    4. Do not adjust or stop taking medication unless you consult with your physician first.           RED    ZONE     Medical Alert - Get Help    What it looks like:     Pain is seriously interfering with your life: You are taking time off of work or cancelling other obligations, you need to spend extra time in bed, and/or your friends or family are urging you to seek medical care.    Your pain seems out of control.     You are feeling depressed or your depression is worsening.    You are doing hardly any self-care.     OR    You have a severe, NEW pain.     What you need to do:  1. Call your care team and request a same-day appointment. If they are not available (weekends or after hours) call your local crisis line, emergency room or 911.    2. Do not adjust or stop taking medication unless you consult with your physician first.        Electronically signed by: Benjamin Rosenstein, October 30, 2018        Chronic Pain Self Care Plan  There are many benefits of managing pain flares with self-care.  Self-care does not have the side effects found with medicines.  Medicines do not always help.  Some of their side effects may make you more sensitive to pain.  Pain can increase depression and self-care can help manage depression.  Depressed patients report more pain and report less pain when depression is managed.  Relaxation techniques can undo the harmful effects of stress on the body.  When you are stressed, your body releases a hormone called cortisol, which can increase anxiety and disturb sleep.    How can you better manage pain flares?     Ways to relax:    Muscle stretching    Shoulder shrugs, head circles, shoulder rolls, arm and fist tightening and release, arms overhead with chest tightening, PT exercises    General muscle relaxation    Breathing exercises    To a set count:  breathe in, counting 1-2-3-4.  Breath out, counting 1-2-3-4.  OR:  Breathe in for 6 counts and breathe out for 3  counts.    Color breathing:  breath as a soft relaxing color you breathe into all parts of your body.    Music    To relax, listen to music slower than your heart rate; To energize, music faster than your heart rate.    Try to hear the sounds or patterns in music.    Use music with breathing or relaxation exercises.    Dance, sing, hum or play an instrument.    Visual imagery    Imagine your ideal place or a safe, peaceful place, relax.    Empty the sandbag:  fill it up, make a slit, and let the pain flow out with the sand.    Red ball:  gather pains and aches into a ball.  Change the size of the ball--from really big to the size of a grain of sand.  Move it out of your body and farther away each time you breath out.    Red light to green:  scan your body and see the areas of pain as red  stoplights.   Slowly scan again turning each red light to green, letting go of pain and tension.    Activity:    Exercise is good.  It will make you feel more cheerful, improve blood flow and muscle endurance, reduce pain, and help release emotions.  Talk to your provider about appropriate exercises.   In most instances, activity will not cause more harm even if it is painful.    Pace yourself.  Plan to take breaks, but stick to a schedule.  If you can t take a break, stop and change your position.  Change positions before you feel tired or sore.  Stop and stretch often.    Save energy.  Don t do what does not need to be done.  Avoid using shoulder bags and pack grocery bags lightly or use a rolling cart.  Keep items you use often within reach.    Move safely.  Be aware of your posture at rest and during activity.  Don t let one part of your body bear most of the force.  Break difficult activities into smaller movements.    Spiritual Living:    Thoughts can increase stress.  When you relive painful memories or have sad or angry thoughts, the body responds as if it is under threat.      Grow in spiritual awareness.   o Spiritual  beliefs can change how you experience and respond to an event.  They can give hope and comfort that you are not alone.  A spiritual life is not necessarily about Holiness.  It is the connection to life energy.  o Ways to grow in spiritual awareness:  chanting, meditating, repetitious prayer, silent conversation or focusing on a sacred object.  Connecting to spirit helps us stay in the present and detach from past and future.  Pain will not take over.    Humor and Stress Reduction    Humor as medicine:  Collect funny TV programs or jokes.  Make daily laughs a habit.    Practice mindfulness:  be aware of the world around you--sunlight, movement of fish, leaves in the wind.  Connect to the world.  The world is not defined by pain or suffering.    Share your stress:  Talk with a friend, family, therapist and know when to ask for help.    Keep a journal:  Write about your dreams, disappointments, plans and concerns.  Let go of things that frustrate you.  Look for patterns of feelings and thoughts.  There is more to your life than pain.    Distraction:  When an activity causes your body discomfort, change what you re doing.  If your task requires sitting, do something active.  If you re moving about, sit and rest for a change.    You re Flaring.  Now what do you do?      Eat healthy meals.    Laugh, do an activity you enjoy.    Use cold and hot packs (a rice sock is one type).  Use cold for 24 to 48 hrs on red, hot or swollen joints and muscles.  Cold reduces inflammation, relaxes muscles and numbs the area.  Use heat for 48 to 72 hrs on stiff joints, muscle spasms and pain.  Use either one for 15 to 20 minutes at a time.    Acupressure    Hand massage    Stop.  Take a break.  Lie down.    Distract yourself with music or by looking forward to something you enjoy.    Talk to your care team regarding other options that may work for you.    Follow up in 4 weeks.    Important Reminders about Opioid Medications    Opioid  Medications are only prescribed during a clinic visit    Keep them locked up-we will not replace lost or stolen opioid medications     Bring all your opioid medications in their original bottles to each appointment   Thank you for coming to Clinic today.  Lab Testing:  **If you had lab testing today and your results are reassuring or normal they will be mailed to you or sent through [employer] within 7 days.   **If the lab tests need quick action we will call you with the results.  The phone number we will call with results is # [phone] (home) . If this is not the best number please call our clinic and change the number.   Medication Refills:  If you need any refills please call your pharmacy and they will contact us. Opioid refills will only be refilled at visits, any lost opioid prescriptions will not be replaced.  If you need to  your refill at a new pharmacy, please contact the new pharmacy directly. The new pharmacy will help you get your medications transferred faster.   Scheduling:  If you have any concerns about today's visit or wish to schedule another appointment please call our office during normal business hours   (8-5:00 M-F)    Medical Concerns:  If you have urgent medical concerns please call [phone] at any time of the day.  If you have a medical emergency please call [phone].  Again thank you for choosing [facility] [address] [place] [address] [phone]  and please let us know how we can best partner with you to improve you and your family's health.

## 2018-11-04 PROBLEM — M51.369 LUMBAR DEGENERATIVE DISC DISEASE: Status: ACTIVE | Noted: 2017-02-28

## 2018-11-04 PROBLEM — Z12.4 SCREENING FOR CERVICAL CANCER: Status: ACTIVE | Noted: 2017-03-23

## 2018-11-13 NOTE — PROGRESS NOTES
Preceptor Attestation:   Patient seen, evaluated and discussed with the resident, Dr. Ben Rosenstein. I have verified the content of the note, which accurately reflects my assessment of the patient and the plan of care.  Supervising Physician:Jane Falk MD  [facility]

## 2018-11-26 ENCOUNTER — OFFICE VISIT (OUTPATIENT)
Dept: FAMILY MEDICINE | Facility: CLINIC | Age: 43
End: 2018-11-26
Payer: MEDICARE

## 2018-11-26 VITALS
HEIGHT: 70 IN | TEMPERATURE: 98 F | BODY MASS INDEX: 27 KG/M2 | HEART RATE: 91 BPM | SYSTOLIC BLOOD PRESSURE: 125 MMHG | RESPIRATION RATE: 20 BRPM | OXYGEN SATURATION: 100 % | DIASTOLIC BLOOD PRESSURE: 84 MMHG | WEIGHT: 188.6 LBS

## 2018-11-26 DIAGNOSIS — G93.89 BRAIN MASS: ICD-10-CM

## 2018-11-26 DIAGNOSIS — G89.4 CHRONIC PAIN SYNDROME: ICD-10-CM

## 2018-11-26 DIAGNOSIS — G89.4 CHRONIC PAIN DISORDER: Primary | ICD-10-CM

## 2018-11-26 DIAGNOSIS — M17.0 OSTEOARTHRITIS OF BOTH KNEES, UNSPECIFIED OSTEOARTHRITIS TYPE: ICD-10-CM

## 2018-11-26 DIAGNOSIS — M51.369 DDD (DEGENERATIVE DISC DISEASE), LUMBAR: ICD-10-CM

## 2018-11-26 DIAGNOSIS — M79.7 FIBROMYALGIA: ICD-10-CM

## 2018-11-26 RX ORDER — TRAMADOL HYDROCHLORIDE 50 MG/1
50 TABLET ORAL 3 TIMES DAILY PRN
Qty: 90 TABLET | Refills: 0 | Status: SHIPPED | OUTPATIENT
Start: 2018-11-26 | End: 2018-12-21

## 2018-11-26 NOTE — PROGRESS NOTES
I have personally reviewed the history and examination as documented by Dr. Rosenstein.  I was present during key portions of the visit and agree with the assessment and plan as documented for 43 yr old female with chronic pain here for follow-up.  Pt stable/ appropriate w/ chronic pain mgmt plan. Precautions given. Anticipatory guidance given.     Marco Bardales MD  November 26, 2018  10:17 AM

## 2018-11-26 NOTE — PROGRESS NOTES
CHRONIC PAIN FOLLOW UP         Rohan Prince is a 43 year old year old female who is  here treatment of chronic pain.   Rohan is here for evaluation and to develop a multifaceted chronic pain plan that may or may not include chronic opiate therapy.       Previously been on a pain contract? Completed 7/30/18  Previous pain diagnosis:No    Pain location: Lower back is where it is worst; Neck pain as well  Has history of L5-S1 foraminal narrowing  Has history of L5-S1 degenerative disc disease  Has history of modrate thoracic spondylosis   Has  History of C4-C5 fusion and C5-C6 fusion and narrow spinal canal   Has history of C3-C7 ACDF  Pain onset: Upper and lower back pain since age of 17  Pain is described as Burning, Numb, Sharp and Stabbing   Aggravating factors include: Prolonged household activities - cleaning, doing dishes; prolonged immobilization in one position   Relieving factors include: Physical therapy, meditation, Details      Previous medication treatments:  Opiates - oxycodone /acetaminophen (Percocet), oxycodone ER (Oxycontin) and tramadol ER (Ultram ER)  Antiepileptics - Neurontin (Gabapentin)  Antidepressants - Amitryptyline (now stopped) -- said caused hallucinations  NSAIDs - ibuprofen (Motrin) and naproxen (Anaprox, Naprosyn, Naprelan)  Muscle relaxants - none, baclofen and cyclobenzaprine (Flexeril)  Topicals - none      Past pain Treatments  Pain Clinic:  No  Physical Therapy:  Yes - last year was attempted  Psychologist:  Saw a psychologist when she was younger - is currently seeing a counselor at her [facility]  Relaxation techniques/biofeedback:  Yes - music, meditation   Chiropractor:  No  Acupuncture:  No  TENs Unit:  No  Injections:  Yes - medial branch block   Formal self-care education:  No    Current Exercise: Activity: Trying to walk every day. Is painful, but is able to get through it     Changing diet. Eating much more vegetables, getting more daily and with more meals. Cutting back on  "sodas. Had a good thanksgiving holiday.     Substance Use History    reports that she has never smoked. She has never used smokeless tobacco.  Alcohol Use:Never used / No history  History of chemical dependency:  No   Current use of recreational substances N/A  Any substance abuse in household? No      Family History:Substance use  Family History of alcohol abuse? No   Family History of Illicit substance use? No   Family History of prescription medication  abuse? No       Social History  Who lives in your household? 14 year old daughter, 5 year old daughter   Recent family or social stressors:  No recent changes - single mom supporting both daughters has been chronic stress  Who is in your support network? [employer] - 1 block from patient's house      Are you currently working ? No, currently on disability   What do you or did you do? 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 Poor - used to work third shift, trouble with insomnia. Running thoughts when tries to fall asleep  How many hours do you need? 6-7 How many do you get? 3-4      Mental Health  Diagnosis of Depression : History of depression   Other MH Diagnosis?:  No   History of Preadolescent Sexual Abuse No           Legal Issues   Are you currently involved in litigation related to your pain complaint? No  Have you ever been arrested or had other legal problems? No  Have you filed a Workers' Compensation/SSI/MVA claim related to your pain complaint?  YES - is on SSI/disability income       --TOYA/CareEverywhere signed for other providers,  Yes  --[place] Prescriber s Database reviewed:Yes      What are you hoping to do when your pain is more controlled?   - \"I am hoping that I can sleep better\"  - \"I am hoping I can do more activities with the girls\"  - \"I am hoping I can not struggle to do the " "dishes\"                  Opioid Use Evaluation Tools     DIRE Score: 15 - done on initial evaluation  No flowsheet data found.       Source: [facility]   2005    Opioid Risk Tool Score: 1- done on initial evaluation   No flowsheet data found.   Total Score Risk Category  0 - 3 = Low Risk  of future problems with Opioid     Functional Assessment  Questionnaire 45 - conducted on 7/25/2018  Problem, Medication and Allergy Lists were reviewed and updated if needed.    Patient is an established patient of this clinic.         Review of Systems:   CONSTITUTIONAL: no fatigue, no unexpected change in weight  SKIN: no worrisome rashes, no worrisome moles, no worrisome lesions  EYES: Some worsening vision, and Headaches (meningiomas)  ENT: no ear problems, no mouth problems, no throat problems  RESP: no significant cough, no shortness of breath  CV: no chest pain, no palpitations, no new or worsening peripheral edema  GI: no nausea, no vomiting, no constipation, no diarrhea         Physical Exam:     Vitals:    11/26/18 0903   BP: 125/84   Pulse: 91   Resp: 20   Temp: 98  F (36.7  C)   TempSrc: Oral   SpO2: 100%   Weight: 188 lb 9.6 oz (85.5 kg)   Height: 5' 10\" (177.8 cm)     Body mass index is 27.06 kg/(m^2).  Vitals were reviewed and were normal  GENERAL: healthy, alert, well nourished, well hydrated, no distress  RESP: lungs clear to auscultation - no rales, no rhonchi, no wheezes  CV: regular rates and rhythm, normal S1 S2, no S3 or S4 and no murmur, no click or rub -  ABDOMEN: Obese, soft, no tenderness, no  hepatosplenomegaly, no masses, normal bowel sounds  MSK: No gross deformities, crepitus of both knees  NEURO: Alert, oriented, answering questions appropriately. Gait normal.  PSYCH: Mood anxious with bright affect        Results:       Results from the last 24 hours  Results for orders placed or performed in visit on 11/26/18   Rapid Urine Drug Screen ([facility])   Result " Value Ref Range    Phencyclidine NEGATIVE NEGATIVE    Propoxyphene NEGATIVE NEGATIVE    Tricyclic Antidepressants NEGATIVE NEGATIVE    Amphetamines Qual NEGATIVE NEGATIVE    Barbiturates Qual Urine NEGATIVE NEGATIVE    Buprenorphine Qual Urine NEGATIVE NEGATIVE    Benzodiazepine Qual Urine NEGATIVE NEGATIVE    Cocaine Qual Urine NEGATIVE NEGATIVE    Cannabinoids Qual Urine NEGATIVE NEGATIVE    Methamphetamine Qual NEGATIVE NEGATIVE    Methadone Qual NEGATIVE NEGATIVE    Morphine Qual NEGATIVE NEGATIVE    Oxycodone Qual NEGATIVE NEGATIVE    Temperature of Urine was Between  Degrees F YES YES       Rapid urine drug screen obtained today: Yes    Assessment and Plan     Rohan Prince is here for follow up of chronic pain caused by foraminal stenosis, degenerative disc disease, and fibromyalgia.    1. Chronic pain disorder  2. Chronic pain syndrome  3. DDD (degenerative disc disease), lumbar  4. Fibromyalgia  -She has been using medications appropriately with good improvement in function. Has been increasing her activity and exercise capacity which is encouraging and congratulated on this. She states she uses pregabalin for her fibromyalgia and tramadol for her other pains, particularly her bck.  Pt currently has a functional status FAQ 5  of 45.    Patient is being prescribed 150 mg of tramadol IR per day. 15 MME/day  Naloxone WILL NOT be prescribed.  Opioid medical management: Tramadol, 50 mg TID PRN   Opioid Treatment Agreement  completed? YES, 7/30/2018  Care Plan Date: August/2018    Exercise discussed and patient willing to continue with activity  Plan to follow up in 1 month.    5. Osteoarthritis of both knees, unspecified osteoarthritis type  Has plans for TKA of left knee in January. Will see for pre-op visit in approximately 1 month. Sheet from surgeons provided for required lab work.     6. Brain mass  Notes increased frequency of headaches and recent imaging showed increasing size of known meningiomas.  Plans to see Neurosurgery in Jan 2019.     If opioids prescribed patient was asked to bring pill bottle to each appointment and was informed that refills would only be provided at office visits.     Benjamin Rosenstein, MD, MA  [facility]  P: [phone]    Precepted today with: Marco Bardales MD

## 2018-11-26 NOTE — MR AVS SNAPSHOT
After Visit Summary   11/26/2018    Rohan Prince    MRN: [id number]           Patient Information     Date Of Birth          1975        Visit Information        Provider Department      11/26/2018 9:40 AM Rosenstein, Benjamin, MD [facility]        Today's Diagnoses     Chronic pain disorder    -  1    Chronic pain syndrome        DDD (degenerative disc disease), lumbar        Fibromyalgia        Osteoarthritis of both knees, unspecified osteoarthritis type        Brain mass          Care Instructions    I have refilled your prescription  We'll see you back in about 1 month for your pre-op exam          Follow-ups after your visit        Follow-up notes from your care team     Return in about 4 weeks (around 12/24/2018) for Pre-op (CPP).      Your next 10 appointments already scheduled     Dec 21, 2018  9:20 AM CST   PRE-OP with Benjamin Rosenstein, MD   [facility] ([facility])    [address] [place] [id number]   [phone]              Who to contact     Please call your clinic at [phone] to:    Ask questions about your health    Make or cancel appointments    Discuss your medicines    Learn about your test results    Speak to your doctor            Additional Information About Your Visit        MyChart Information     [employer] gives you secure access to your electronic health record. If you see a primary care provider, you can also send messages to your care team and make appointments. If you have questions, please call your primary care clinic.  If you do not have a primary care provider, please call [phone] and they will assist you.      [employer] is an electronic gateway that provides easy, online access to your medical records. With [employer], you can request a clinic appointment, read your test results, renew a prescription or communicate with your care team.     To access your existing account, please contact your [facility]  [facility] or call [phone] for assistance.        Care EveryWhere ID     This is your Care EveryWhere ID. This could be used by other organizations to access your [facility] medical records  [id number]        Your Vitals Were     Pulse Temperature Respirations Height Pulse Oximetry BMI (Body Mass Index)    91 98  F (36.7  C) (Oral) 20 5' 10\" (177.8 cm) 100% 27.06 kg/m2       Blood Pressure from Last 3 Encounters:   11/26/18 125/84   10/30/18 108/70   07/30/18 117/77    Weight from Last 3 Encounters:   11/26/18 188 lb 9.6 oz (85.5 kg)   10/30/18 199 lb (90.3 kg)   07/30/18 212 lb (96.2 kg)              We Performed the Following     Rapid Urine Drug Screen ([facility])          Where to get your medicines      Some of these will need a paper prescription and others can be bought over the counter.  Ask your nurse if you have questions.     Bring a paper prescription for each of these medications     traMADol 50 MG tablet         Information about OPIOIDS     PRESCRIPTION OPIOIDS: WHAT YOU NEED TO KNOW   We gave you an opioid (narcotic) pain medicine. It is important to manage your pain, but opioids are not always the best choice. You should first try all the other options your care team gave you. Take this medicine for as short a time (and as few doses) as possible.    Some activities can increase your pain, such as bandage changes or therapy sessions. It may help to take your pain medicine 30 to 60 minutes before these activities. Reduce your stress by getting enough sleep, working on hobbies you enjoy and practicing relaxation or meditation. Talk to your care team about ways to manage your pain beyond prescription opioids.    These medicines have risks:    DO NOT drive when on new or higher doses of pain medicine. These medicines can affect your alertness and reaction times, and you could be arrested for driving under the influence (DUI). If you need to use opioids long-term, talk to your care team about " driving.    DO NOT operate heavy machinery    DO NOT do any other dangerous activities while taking these medicines.    DO NOT drink any alcohol while taking these medicines.     If the opioid prescribed includes acetaminophen, DO NOT take with any other medicines that contain acetaminophen. Read all labels carefully. Look for the word  acetaminophen  or  Tylenol.  Ask your pharmacist if you have questions or are unsure.    You can get addicted to pain medicines, especially if you have a history of addiction (chemical, alcohol or substance dependence). Talk to your care team about ways to reduce this risk.    All opioids tend to cause constipation. Drink plenty of water and eat foods that have a lot of fiber, such as fruits, vegetables, prune juice, apple juice and high-fiber cereal. Take a laxative (Miralax, milk of magnesia, Colace, Senna) if you don t move your bowels at least every other day. Other side effects include upset stomach, sleepiness, dizziness, throwing up, tolerance (needing more of the medicine to have the same effect), physical dependence and slowed breathing.    Store your pills in a secure place, locked if possible. We will not replace any lost or stolen medicine. If you don t finish your medicine, please throw away (dispose) as directed by your pharmacist. The [place] Pollution Control Agency has more information about safe disposal: https://www.pca.[facility].mn.us/living-green/managing-unwanted-medications         Primary Care Provider Office Phone # Fax #    Benjamin Rosenstein, [phone]       [facility] [place] [address]        Equal Access to Services     RAE ROBERSON : Abilio vazquez Sobrie, waaxda luqadaha, qaybta kaalmada lizbeth duque . [facility] [phone].    ATENCIÓN: Si habla español, tiene a aponte disposición servicios gratuitos de asistencia lingüística. Llame al [phone].    We comply with applicable  federal civil rights laws and [place] laws. We do not discriminate on the basis of race, color, national origin, age, disability, sex, sexual orientation, or gender identity.            Thank you!     Thank you for choosing [facility]  for your care. Our goal is always to provide you with excellent care. Hearing back from our patients is one way we can continue to improve our services. Please take a few minutes to complete the written survey that you may receive in the mail after your visit with us. Thank you!             Your Updated Medication List - Protect others around you: Learn how to safely use, store and throw away your medicines at www.disposemymeds.org.          This list is accurate as of 11/26/18 11:59 PM.  Always use your most recent med list.                   Brand Name Dispense Instructions for use Diagnosis    amitriptyline 25 MG tablet    ELAVIL    90 tablet    Take 1 tablet (25 mg) by mouth At Bedtime    DDD (degenerative disc disease), lumbar, Fibromyalgia       pregabalin 75 MG capsule    LYRICA    120 capsule    Take 2 capsules (150 mg) by mouth 2 times daily    Fibromyalgia, Chronic pain syndrome, DDD (degenerative disc disease), lumbar       traMADol 50 MG tablet    ULTRAM    90 tablet    Take 1 tablet (50 mg) by mouth 3 times daily as needed for severe pain    DDD (degenerative disc disease), lumbar, Chronic pain syndrome, Fibromyalgia

## 2018-11-30 NOTE — PROGRESS NOTES
I have reviewed the completed documentation and agree with the original attestation on the day of the visit without change.     Marco Bardales MD  November 30, 2018  9:03 AM

## 2018-12-17 ENCOUNTER — HOSPITAL ENCOUNTER (OUTPATIENT)
Dept: MRI IMAGING | Facility: HOSPITAL | Age: 43
Discharge: HOME | End: 2018-12-17
Attending: NEUROLOGICAL SURGERY

## 2018-12-17 DIAGNOSIS — D32.9 MENINGIOMA (H): ICD-10-CM

## 2018-12-17 DIAGNOSIS — M79.7 FIBROMYALGIA: ICD-10-CM

## 2018-12-19 ENCOUNTER — COMMUNICATION - HEALTHEAST (OUTPATIENT)
Dept: NEUROSURGERY | Facility: CLINIC | Age: 43
End: 2018-12-19

## 2018-12-19 DIAGNOSIS — D32.9 MENINGIOMA (H): ICD-10-CM

## 2018-12-20 RX ORDER — PREGABALIN 75 MG/1
150 CAPSULE ORAL 2 TIMES DAILY
Qty: 120 CAPSULE | Refills: 0 | Status: SHIPPED | OUTPATIENT
Start: 2018-12-20 | End: 2019-05-31

## 2018-12-21 ENCOUNTER — OFFICE VISIT (OUTPATIENT)
Dept: FAMILY MEDICINE | Facility: CLINIC | Age: 43
End: 2018-12-21
Payer: MEDICARE

## 2018-12-21 VITALS
HEART RATE: 69 BPM | SYSTOLIC BLOOD PRESSURE: 119 MMHG | OXYGEN SATURATION: 100 % | BODY MASS INDEX: 27.28 KG/M2 | HEIGHT: 68 IN | DIASTOLIC BLOOD PRESSURE: 74 MMHG | RESPIRATION RATE: 22 BRPM | WEIGHT: 180 LBS | TEMPERATURE: 97.8 F

## 2018-12-21 DIAGNOSIS — M51.369 DDD (DEGENERATIVE DISC DISEASE), LUMBAR: ICD-10-CM

## 2018-12-21 DIAGNOSIS — Z01.818 PRE-OP EXAM: Primary | ICD-10-CM

## 2018-12-21 DIAGNOSIS — Z01.818 PREOP GENERAL PHYSICAL EXAM: ICD-10-CM

## 2018-12-21 DIAGNOSIS — G89.29 ENCOUNTER FOR CHRONIC PAIN MANAGEMENT: ICD-10-CM

## 2018-12-21 DIAGNOSIS — M79.7 FIBROMYALGIA: ICD-10-CM

## 2018-12-21 DIAGNOSIS — G89.4 CHRONIC PAIN SYNDROME: ICD-10-CM

## 2018-12-21 LAB
AMPHETAMINES QUAL: NEGATIVE
BARBITURATES QUAL URINE: NEGATIVE
BENZODIAZEPINE QUAL URINE: NEGATIVE
BUN SERPL-MCNC: 5 MG/DL (ref 5–24)
BUPRENORPHINE QUAL URINE: NEGATIVE
CALCIUM SERPL-MCNC: 9.5 MG/DL (ref 8.5–10.4)
CANNABINOIDS UR QL SCN: NEGATIVE
CHLORIDE SERPLBLD-SCNC: 100 MMOL/L (ref 94–109)
CO2 SERPL-SCNC: 31 MMOL/L (ref 20–32)
COCAINE QUAL URINE: NEGATIVE
CREAT SERPL-MCNC: 0.6 MG/DL (ref 0.6–1.3)
EGFR CALCULATED (BLACK REFERENCE): >90 ML/MIN
EGFR CALCULATED (NON BLACK REFERENCE): >90 ML/MIN
GLUCOSE SERPL-MCNC: 95 MG/DL (ref 60–109)
HCT VFR BLD AUTO: 45.1 % (ref 35–47)
HEMOGLOBIN: 14.6 G/DL (ref 11.7–15.7)
INR PPP: 1.09 (ref 0.9–1.1)
MCH RBC QN AUTO: 30.2 PG (ref 26.5–35)
MCHC RBC AUTO-ENTMCNC: 32.4 G/DL (ref 32–36)
MCV RBC AUTO: 93.2 FL (ref 78–100)
METHAMPHETAMINE: NEGATIVE
METHODONE QUAL: NEGATIVE
MORPHINE QUAL: NEGATIVE
OXYCODONE QUAL: NEGATIVE
PHENCYCLIDINE: NEGATIVE
PLATELET # BLD AUTO: 248 K/UL (ref 150–450)
POTASSIUM SERPL-SCNC: 3.4 MMOL/L (ref 3.4–5.3)
PROPOXYPHENE: NEGATIVE
RBC # BLD AUTO: 4.84 M/UL (ref 3.8–5.2)
SODIUM SERPL-SCNC: 142 MMOL/L (ref 133–144)
TEMPERATURE OF URINE WAS BETWEEN 90-100 DEGREES F: YES
TRICYCLIC ANTIDEPRESSANTS: NEGATIVE
WBC # BLD AUTO: 5.9 K/UL (ref 4–11)

## 2018-12-21 RX ORDER — TRAMADOL HYDROCHLORIDE 50 MG/1
50 TABLET ORAL 3 TIMES DAILY PRN
Qty: 90 TABLET | Refills: 0 | Status: SHIPPED | OUTPATIENT
Start: 2018-12-26 | End: 2019-04-18

## 2018-12-21 ASSESSMENT — MIFFLIN-ST. JEOR: SCORE: 1524.97

## 2018-12-21 NOTE — PATIENT INSTRUCTIONS
Pre-op faxed to fax number : [phone]  Location : [facility]  Date of Surgery : 1/17/19  By: NELDA Disla      Prior to your surgery, you can take the lyrica. Hold your tramadol day of surgery  Presurgery Checklist  You are scheduled to have surgery. The healthcare staff will try to make your stay comfortable. Use the guidelines below to remind yourself what to do before surgery. Be sure to follow any specific pre-op instructions from your surgeon or nurse.   Preparing for Surgery  Ask your surgeon if you ll need a blood transfusion during surgery and if so, how to prepare for it. In some cases, you can donate blood before surgery. If needed, this blood can be given back (transfused) to you during or after surgery.  If you are having abdominal surgery, ask what you need to do to clear your bowel.  Tell your surgeon if you have allergies to any medications or foods.  Arrange for an adult family member or friend to drive you home after surgery. If possible, have someone ready to help you at home as you recover.  Call the surgeon if you get a cold, fever, sore throat, diarrhea, or other health problem just before surgery. Your surgeon can decide whether or not to postpone the surgery.  Medications  Tell your surgeon about all medications you take, including prescription and over-the-counter products such as herbal remedies and vitamins. Ask if you should continue taking them.  If you take ibuprofen, naproxen, or  blood thinners  such as aspirin, clopidogrel (Plavix), or warfarin (Coumadin), ask your surgeon whether you should stop taking them and how long before surgery you should stop.  You may be told to take antibiotics just before surgery to prevent infection. If so, follow instructions carefully on how to take them.  If you are told to take medications called anticoagulants to prevent blood clots after surgery, be sure to follow the instructions on how to take them.  Stop Smoking  If you smoke,  healing may take longer. So at least 2 week(s) before surgery, stop smoking.  Bathing or Showering Before Surgery  If instructed, wash with antibacterial soap. Afterward, do not use lotions or powders.  If you are having surgery on the head, you may be asked to shampoo with antibacterial soap. Follow instructions for doing so.  Do Not Remove Hair from the Surgery Site  Do not shave hair from the incision site, unless you are given specific instructions to do so. Usually, if hair needs to be removed, it will be done at the hospital right before surgery.  Don t Eat or Drink  Your doctor will tell you when to stop eating and drinking. If you do not follow your doctor's instructions, your procedure may be postponed or rescheduled for another day.  If your surgeon tells you to continue any medications, take them with small sips of water.  You can brush your teeth and rinse your mouth, but don t swallow any water.  Day of Surgery  Do not wear makeup. Do not use perfume, deodorant, or hairspray. Remove nail polish and artificial nails.  Leave jewelry (including rings), watches, and other valuables at home.  Be sure to bring health insurance cards or forms and a photo ID.  Bring a list of your medications (include the name, dose, how often you take them, and the time last dose was taken).  Arrive on time at the hospital or surgery facility.

## 2018-12-21 NOTE — PROGRESS NOTES
[facility] [address] [place] [address]  Phone: [phone]  Fax: [phone]    12/21/2018    Adult PRE-OP Evaluation:    Rohan Prince, 1975 presents for pre-operative evaluation and assessment as requested by Dr. Moss, prior to undergoing surgery/procedure for treatment of  Knee pain .    Proposed procedure: TKA or right knee    Date of Surgery/ Procedure: Jan 17, 2019  Hospital/Surgical Facility: [facility], Fax: [phone]; Team Ajay Fax: [phone]     Primary Physician: Rosenstein, Benjamin  Type of Anesthesia Anticipated: General  History of anesthesia complications: NONE  History of  abnormal bleeding: NONE   History of blood transfusions: NO  Patient has a Health Care Directive or Living Will:  NO  -Desires Full Code Status    Preoperative Questions   1. NO - Do you have a history of heart attack, stroke, stent, bypass or surgery on an artery in the head, neck, heart or legs?   -Did have C1-C7 fusion  2. NO - Do you ever have any pain or discomfort in your chest?  3. NO - Have you ever had a severe pain across the front of your chest lasting for half an hour or more?  4. NO - Do you have a history of Congestive Heart Failure?  5. YES - Are you troubled by shortness of breath when: walking on the level/ up a slight hill/ at night?   -Related to pain, deconditioning. No associated chest discomfort  6. NO - Does your chest ever sound wheezy or whistling?  7. NO - Do you currently have a cold, bronchitis or other respiratory infection?  8. NO - Have you had a cold, bronchitis or other respiratory infection within the last 2 weeks?  9. NO - Do you usually have a cough?  10. YES - Do you sometimes get pains in the calves of your legs when you walk?   -Chronic pain related to fibromyalgia  11. NO - Do you or anyone in your family have previous history of blood clots?  12. NO - Do you or does anyone in your family have a serious bleeding problem such as  prolonged bleeding following surgeries or cuts?  13. NO - Have you ever had problems with anemia or been told to take iron pills?  14. NO - Have you had any abnormal blood loss such as black, tarry or bloody stools, or abnormal vaginal bleeding?  15. NO - Have you ever had a blood transfusion?  16. NO - Have you or any of your relatives ever had problems with anesthesia?  17. NO - Do you have sleep apnea, excessive snoring or daytime drowsiness?  18. NO - Do you have any prosthetic heart valves?  19. NO - Do you have prosthetic joints?  20. NO - Is there any chance that you may be pregnant?    -NO - total hysterectomy    Patient Active Problem List   Diagnosis     Chronic pain disorder     Lumbar degenerative disc disease     Screening for cervical cancer     Chronic low back pain     Anxiety disorder, unspecified     Brain mass     Chronic pain of right knee     Osteoarthritis of both knees, unspecified osteoarthritis type         Current Outpatient Medications on File Prior to Visit:  pregabalin (LYRICA) 75 MG capsule Take 2 capsules (150 mg) by mouth 2 times daily   pregabalin (LYRICA) 75 MG capsule Take 2 capsules (150 mg) by mouth 2 times daily   traMADol (ULTRAM) 50 MG tablet Take 1 tablet (50 mg) by mouth 3 times daily as needed for severe pain   amitriptyline (ELAVIL) 25 MG tablet Take 1 tablet (25 mg) by mouth At Bedtime (Patient not taking: Reported on 11/26/2018)     No current facility-administered medications on file prior to visit.     OTC products: Advil occasionally but not recently    Allergies   Allergen Reactions     Chlorhexidine Rash     Latex Allergy: NO    Social History     Socioeconomic History     Marital status: Single     Spouse name: None     Number of children: None     Years of education: None     Highest education level: None   Social Needs     Financial resource strain: None     Food insecurity - worry: None     Food insecurity - inability: None     Transportation needs - medical:  "None     Transportation needs - non-medical: None   Occupational History     None   Tobacco Use     Smoking status: Never Smoker     Smokeless tobacco: Never Used   Substance and Sexual Activity     Alcohol use: No     Drug use: No     Sexual activity: None   Other Topics Concern     Parent/sibling w/ CABG, MI or angioplasty before 65F 55M? Not Asked   Social History Narrative     None       REVIEW OF SYSTEMS:   Complete 10 point review of systems Constitutional, HEENT, cardiovascular, pulmonary, gi, MSK, Skin, gu, and neuro systems are negative, except as otherwise noted.    EXAM:     Patient Vitals for the past 24 hrs:   BP Temp Pulse Resp SpO2 Height Weight   12/21/18 0950 119/74 97.8  F (36.6  C) 69 22 100 % 1.727 m (5' 8\") 81.6 kg (180 lb)     Body mass index is 27.37 kg/m .  GENERAL: healthy, alert and no distress  EYES: Eyes grossly normal to inspection, extraocular movements - intact, and PERRL  HENT: ear canals- normal; TMs- normal; Nose- normal; Mouth- no ulcers, no lesions  NECK: Mild tenderness r/t chronic pain, no adenopathy, no asymmetry, no masses, no stiffness; thyroid- normal to palpation  RESP: lungs clear to auscultation - no rales, no rhonchi, no wheezes  CV: regular rates and rhythm, normal S1 S2, no S3 or S4 and no murmur, no click or rub -  ABDOMEN: soft, no tenderness, no  hepatosplenomegaly, no masses, normal bowel sounds  MS: extremities- no gross deformities noted, no edema  SKIN: no suspicious lesions, no rashes  NEURO: strength and tone- normal, sensory exam- grossly normal, mentation- intact, speech- normal, reflexes- symmetric  BACK: no CVA tenderness, mild paralumbar tenderness (chronic)  PSYCH: Alert and oriented times 3; speech- coherent , normal rate and volume; able to articulate logical thoughts  LYMPHATICS: ant. cervical- normal, post. cervical- normal    DIAGNOSTICS:      EKG: Normal Sinus Rhythm, normal axis, normal intervals,nonspecific ST-T changes V3-V4 not seen in previous " EKG 1/2017, no acute ST/T changes c/w ischemia, no LVH by voltage criteria    Results for orders placed or performed in visit on 12/21/18   Basic Metabolic Panel ([facility]) - Results < 1 hr   Result Value Ref Range    Glucose 95.0 60.0 - 109.0 mg/dL    Urea Nitrogen 5.0 5.0 - 24.0 mg/dL    Creatinine 0.6 0.6 - 1.3 mg/dL    Sodium 142.0 133.0 - 144.0 mmol/L    Potassium 3.4 3.4 - 5.3 mmol/L    Chloride 100.0 94.0 - 109.0 mmol/L    Carbon Dioxide 31.0 20.0 - 32.0 mmol/L    Calcium 9.5 8.5 - 10.4 mg/dL    eGFR Calculated (Non Black Reference) >90 >60.0 mL/min    eGFR Calculated (Black Reference) >90 >60.0 mL/min   CBC with Plt ([facility])   Result Value Ref Range    WBC 5.9 4.0 - 11.0 K/uL    RBC 4.84 3.80 - 5.20 M/uL    Hemoglobin 14.6 11.7 - 15.7 g/dL    Hematocrit 45.1 35.0 - 47.0 %    MCV 93.2 78.0 - 100.0 fL    MCH 30.2 26.5 - 35.0 pg    MCHC 32.4 32.0 - 36.0 g/dL    Platelets 248.0 150.0 - 450.0 K/uL   Rapid Urine Drug Screen ([facility])   Result Value Ref Range    Phencyclidine NEGATIVE NEGATIVE    Propoxyphene NEGATIVE NEGATIVE    Tricyclic Antidepressants NEGATIVE NEGATIVE    Amphetamines Qual NEGATIVE NEGATIVE    Barbiturates Qual Urine NEGATIVE NEGATIVE    Buprenorphine Qual Urine NEGATIVE NEGATIVE    Benzodiazepine Qual Urine NEGATIVE NEGATIVE    Cocaine Qual Urine NEGATIVE NEGATIVE    Cannabinoids Qual Urine NEGATIVE NEGATIVE    Methamphetamine Qual NEGATIVE NEGATIVE    Methadone Qual NEGATIVE NEGATIVE    Morphine Qual NEGATIVE NEGATIVE    Oxycodone Qual NEGATIVE NEGATIVE    Temperature of Urine was Between  Degrees F YES YES        RISK ASSESSMENT:     Cardiovascular Risk:  -Patient is able to participate in strenuous activities without chest pain.  -The patient does not have chest pain with exertion.  -Patient does not have a history of congestive heart failure.    -The patient does not have a history of stroke and does not have a history of valvular disease.    Pulmonary Risk:  -In terms of risk factors  for pulmonary complication, the patient has no risk factors    Perioperative Complications:  -The patient does not have a history of bleeding or clotting problems in the past.    -The patient has not had complications from surgeries.    -The patient does not have a family history of any anesthesia or surgical complications.      IMPRESSION:   Rohan Prince is a 43 year old female with pmhx of chronic pain syndromes with primary care by Rosenstein, Benjamin who presents today for pre-op physical for TKA of right knee.    Reason for surgery/procedure: History of chronic pain with osteoarthritis of the right knee    The proposed surgical procedure is considered INTERMEDIATE risk.    For above listed surgery and anesthesia:   Patient is at moderate risk for surgery/procedure and perioperative/procedure complications.    RECOMMENDATIONS:     Labs: Completed today  -CBC  -BMP  -INR  -UPT not needed with history of total hysterectomy  -EKG    No concerning findings with BMP, CBC, or EKG. INR pending.    Fasting:  NPO for 12 hours prior to surgery    Preop Plan:  --Approval given to proceed with proposed procedure, without further diagnostic evaluation  --Hold tramadol day of surgery, may take pregabalin  --No NSAIDs or ASA within 1 week prior to surgery. Patient has not been taking these recently    Encounter for chronic pain management  - Rapid Urine Drug Screen ([facility])      DDD (degenerative disc disease), lumbar  Chronic pain syndrome  Fibromyalgia   reviewed and medications filled appropriately. Will see again in 1 month, after surgery, for regular chronic pain follow up. Pregabalin called in prior and tramadol refill for 1 month today.   - traMADol (ULTRAM) 50 MG tablet; Take 1 tablet (50 mg) by mouth 3 times daily as needed for severe pain  Dispense: 90 tablet; Refill: 0      Benjamin Rosenstein, MD, MA  [facility]  P: [phone]    Precepted today with: Karolina Bustillos MD     Please contact our  office if there are any further questions or information required about this patient.

## 2018-12-21 NOTE — PROGRESS NOTES
Preceptor Attestation:   Patient seen, evaluated and discussed with the resident. I personally viewed the EKG and agree with the interpretation documented by the resident. I have verified the content of the note, which accurately reflects my assessment of the patient and the plan of care.  Supervising Physician:Karolina Bustillos MD  [facility]

## 2018-12-21 NOTE — RESULT ENCOUNTER NOTE
Results discussed directly with patient while patient was present. Any further details documented in the note.   Benjamin Rosenstein, MD

## 2018-12-26 DIAGNOSIS — G89.4 CHRONIC PAIN SYNDROME: ICD-10-CM

## 2018-12-26 DIAGNOSIS — M51.369 DDD (DEGENERATIVE DISC DISEASE), LUMBAR: ICD-10-CM

## 2018-12-26 DIAGNOSIS — M79.7 FIBROMYALGIA: ICD-10-CM

## 2018-12-26 RX ORDER — PREGABALIN 75 MG/1
150 CAPSULE ORAL 2 TIMES DAILY
Qty: 120 CAPSULE | OUTPATIENT
Start: 2018-12-26

## 2019-01-14 DIAGNOSIS — G89.4 CHRONIC PAIN SYNDROME: ICD-10-CM

## 2019-01-14 DIAGNOSIS — M79.7 FIBROMYALGIA: ICD-10-CM

## 2019-01-14 DIAGNOSIS — M51.369 DDD (DEGENERATIVE DISC DISEASE), LUMBAR: ICD-10-CM

## 2019-01-14 RX ORDER — PREGABALIN 75 MG/1
150 CAPSULE ORAL 2 TIMES DAILY
Qty: 120 CAPSULE | Refills: 2
Start: 2019-01-14 | End: 2019-05-31

## 2019-01-14 ASSESSMENT — MIFFLIN-ST. JEOR: SCORE: 1546.72

## 2019-01-14 NOTE — TELEPHONE ENCOUNTER
Message to physician:     Date of last visit: 12/21/2018     Date of next visit if scheduled: Visit date 01/23/2019    Last Comprehensive Metabolic Panel:  Sodium   Date Value Ref Range Status   12/21/2018 142.0 133.0 - 144.0 mmol/L Final     Potassium   Date Value Ref Range Status   12/21/2018 3.4 3.4 - 5.3 mmol/L Final     Chloride   Date Value Ref Range Status   12/21/2018 100.0 94.0 - 109.0 mmol/L Final     Carbon Dioxide   Date Value Ref Range Status   12/21/2018 31.0 20.0 - 32.0 mmol/L Final     Glucose   Date Value Ref Range Status   12/21/2018 95.0 60.0 - 109.0 mg/dL Final     Urea Nitrogen   Date Value Ref Range Status   12/21/2018 5.0 5.0 - 24.0 mg/dL Final     Creatinine   Date Value Ref Range Status   12/21/2018 0.6 0.6 - 1.3 mg/dL Final     Calcium   Date Value Ref Range Status   12/21/2018 9.5 8.5 - 10.4 mg/dL Final       BP Readings from Last 3 Encounters:   12/21/18 119/74   11/26/18 125/84   10/30/18 108/70       No results found for: A1C             Please complete refill and CLOSE ENCOUNTER.  Closing the encounter signifies the refill is complete.

## 2019-01-15 NOTE — TELEPHONE ENCOUNTER
Rx Called in for Pregabalin (LYRICA) 75 mg to [facility] [phone] was approved by DR Rosenstein 01/14/2019 with no print out.

## 2019-01-16 ENCOUNTER — ANESTHESIA - HEALTHEAST (OUTPATIENT)
Dept: SURGERY | Facility: CLINIC | Age: 44
End: 2019-01-16

## 2019-01-17 ENCOUNTER — SURGERY - HEALTHEAST (OUTPATIENT)
Dept: SURGERY | Facility: CLINIC | Age: 44
End: 2019-01-17

## 2019-01-17 ASSESSMENT — MIFFLIN-ST. JEOR
SCORE: 1546.72
SCORE: 1546.72

## 2019-01-21 ENCOUNTER — OFFICE VISIT - HEALTHEAST (OUTPATIENT)
Dept: GERIATRICS | Facility: CLINIC | Age: 44
End: 2019-01-21

## 2019-01-21 ENCOUNTER — RECORDS - HEALTHEAST (OUTPATIENT)
Dept: LAB | Facility: CLINIC | Age: 44
End: 2019-01-21

## 2019-01-21 DIAGNOSIS — G89.4 CHRONIC PAIN SYNDROME: ICD-10-CM

## 2019-01-21 DIAGNOSIS — Z96.651 STATUS POST TOTAL RIGHT KNEE REPLACEMENT: ICD-10-CM

## 2019-01-21 DIAGNOSIS — M17.9 OSTEOARTHRITIS OF KNEE, UNSPECIFIED LATERALITY, UNSPECIFIED OSTEOARTHRITIS TYPE: ICD-10-CM

## 2019-01-21 DIAGNOSIS — M79.7 FIBROMYALGIA: ICD-10-CM

## 2019-01-21 DIAGNOSIS — R52 PAIN MANAGEMENT: ICD-10-CM

## 2019-01-21 LAB
ANION GAP SERPL CALCULATED.3IONS-SCNC: 11 MMOL/L (ref 5–18)
BUN SERPL-MCNC: 9 MG/DL (ref 8–22)
CALCIUM SERPL-MCNC: 8.5 MG/DL (ref 8.5–10.5)
CHLORIDE BLD-SCNC: 100 MMOL/L (ref 98–107)
CO2 SERPL-SCNC: 26 MMOL/L (ref 22–31)
CREAT SERPL-MCNC: 0.56 MG/DL (ref 0.6–1.1)
ERYTHROCYTE [DISTWIDTH] IN BLOOD BY AUTOMATED COUNT: 12.9 % (ref 11–14.5)
GFR SERPL CREATININE-BSD FRML MDRD: >60 ML/MIN/1.73M2
GLUCOSE BLD-MCNC: 81 MG/DL (ref 70–125)
HCT VFR BLD AUTO: 30.9 % (ref 35–47)
HGB BLD-MCNC: 10.2 G/DL (ref 12–16)
MCH RBC QN AUTO: 29.5 PG (ref 27–34)
MCHC RBC AUTO-ENTMCNC: 33 G/DL (ref 32–36)
MCV RBC AUTO: 89 FL (ref 80–100)
PLATELET # BLD AUTO: 353 THOU/UL (ref 140–440)
PMV BLD AUTO: 10.7 FL (ref 8.5–12.5)
POTASSIUM BLD-SCNC: 4.3 MMOL/L (ref 3.5–5)
RBC # BLD AUTO: 3.46 MILL/UL (ref 3.8–5.4)
SODIUM SERPL-SCNC: 137 MMOL/L (ref 136–145)
WBC: 8.2 THOU/UL (ref 4–11)

## 2019-01-22 ENCOUNTER — OFFICE VISIT - HEALTHEAST (OUTPATIENT)
Dept: GERIATRICS | Facility: CLINIC | Age: 44
End: 2019-01-22

## 2019-01-22 DIAGNOSIS — Z96.651 STATUS POST TOTAL RIGHT KNEE REPLACEMENT: ICD-10-CM

## 2019-01-22 DIAGNOSIS — M17.9 OSTEOARTHRITIS OF KNEE, UNSPECIFIED LATERALITY, UNSPECIFIED OSTEOARTHRITIS TYPE: ICD-10-CM

## 2019-01-22 DIAGNOSIS — R30.0 DYSURIA: ICD-10-CM

## 2019-01-22 DIAGNOSIS — M79.7 FIBROMYALGIA: ICD-10-CM

## 2019-01-22 DIAGNOSIS — G89.4 CHRONIC PAIN SYNDROME: ICD-10-CM

## 2019-01-23 ENCOUNTER — RECORDS - HEALTHEAST (OUTPATIENT)
Dept: LAB | Facility: CLINIC | Age: 44
End: 2019-01-23

## 2019-01-23 ENCOUNTER — OFFICE VISIT - HEALTHEAST (OUTPATIENT)
Dept: GERIATRICS | Facility: CLINIC | Age: 44
End: 2019-01-23

## 2019-01-23 DIAGNOSIS — G89.4 CHRONIC PAIN SYNDROME: ICD-10-CM

## 2019-01-23 DIAGNOSIS — K59.03 DRUG-INDUCED CONSTIPATION: ICD-10-CM

## 2019-01-23 DIAGNOSIS — D62 ACUTE BLOOD LOSS ANEMIA: ICD-10-CM

## 2019-01-23 DIAGNOSIS — R30.0 DYSURIA: ICD-10-CM

## 2019-01-23 DIAGNOSIS — Z96.651 STATUS POST TOTAL RIGHT KNEE REPLACEMENT: ICD-10-CM

## 2019-01-23 LAB
ALBUMIN UR-MCNC: ABNORMAL MG/DL
APPEARANCE UR: ABNORMAL
BACTERIA #/AREA URNS HPF: ABNORMAL HPF
BILIRUB UR QL STRIP: NEGATIVE
COLOR UR AUTO: YELLOW
GLUCOSE UR STRIP-MCNC: NEGATIVE MG/DL
HGB UR QL STRIP: ABNORMAL
KETONES UR STRIP-MCNC: ABNORMAL MG/DL
LEUKOCYTE ESTERASE UR QL STRIP: ABNORMAL
MUCOUS THREADS #/AREA URNS LPF: ABNORMAL LPF
NITRATE UR QL: POSITIVE
PH UR STRIP: 6 [PH] (ref 4.5–8)
RBC #/AREA URNS AUTO: ABNORMAL HPF
SP GR UR STRIP: 1.03 (ref 1–1.03)
SQUAMOUS #/AREA URNS AUTO: ABNORMAL LPF
UROBILINOGEN UR STRIP-ACNC: ABNORMAL
WBC #/AREA URNS AUTO: >100 HPF
WBC CLUMPS #/AREA URNS HPF: PRESENT /[HPF]

## 2019-01-24 ENCOUNTER — OFFICE VISIT - HEALTHEAST (OUTPATIENT)
Dept: GERIATRICS | Facility: CLINIC | Age: 44
End: 2019-01-24

## 2019-01-24 DIAGNOSIS — Z96.651 STATUS POST TOTAL RIGHT KNEE REPLACEMENT: ICD-10-CM

## 2019-01-24 DIAGNOSIS — M79.7 FIBROMYALGIA: ICD-10-CM

## 2019-01-24 DIAGNOSIS — M17.9 OSTEOARTHRITIS OF KNEE, UNSPECIFIED LATERALITY, UNSPECIFIED OSTEOARTHRITIS TYPE: ICD-10-CM

## 2019-01-24 DIAGNOSIS — F41.9 ANXIETY: ICD-10-CM

## 2019-01-24 DIAGNOSIS — R13.10 DYSPHAGIA, UNSPECIFIED TYPE: ICD-10-CM

## 2019-01-24 DIAGNOSIS — G89.4 CHRONIC PAIN SYNDROME: ICD-10-CM

## 2019-01-24 DIAGNOSIS — N39.0 URINARY TRACT INFECTION WITHOUT HEMATURIA, SITE UNSPECIFIED: ICD-10-CM

## 2019-01-24 LAB
ANION GAP SERPL CALCULATED.3IONS-SCNC: 10 MMOL/L (ref 5–18)
BNP SERPL-MCNC: 50 PG/ML (ref 0–64)
BUN SERPL-MCNC: 11 MG/DL (ref 8–22)
CALCIUM SERPL-MCNC: 9.4 MG/DL (ref 8.5–10.5)
CHLORIDE BLD-SCNC: 100 MMOL/L (ref 98–107)
CO2 SERPL-SCNC: 29 MMOL/L (ref 22–31)
CREAT SERPL-MCNC: 0.64 MG/DL (ref 0.6–1.1)
ERYTHROCYTE [DISTWIDTH] IN BLOOD BY AUTOMATED COUNT: 13.2 % (ref 11–14.5)
GFR SERPL CREATININE-BSD FRML MDRD: >60 ML/MIN/1.73M2
GLUCOSE BLD-MCNC: 91 MG/DL (ref 70–125)
HCT VFR BLD AUTO: 32.5 % (ref 35–47)
HGB BLD-MCNC: 10.3 G/DL (ref 12–16)
MCH RBC QN AUTO: 29.3 PG (ref 27–34)
MCHC RBC AUTO-ENTMCNC: 31.7 G/DL (ref 32–36)
MCV RBC AUTO: 92 FL (ref 80–100)
PLATELET # BLD AUTO: 481 THOU/UL (ref 140–440)
PMV BLD AUTO: 10.4 FL (ref 8.5–12.5)
POTASSIUM BLD-SCNC: 4.2 MMOL/L (ref 3.5–5)
RBC # BLD AUTO: 3.52 MILL/UL (ref 3.8–5.4)
SODIUM SERPL-SCNC: 139 MMOL/L (ref 136–145)
WBC: 7.6 THOU/UL (ref 4–11)

## 2019-01-25 LAB — BACTERIA SPEC CULT: ABNORMAL

## 2019-01-29 ENCOUNTER — OFFICE VISIT - HEALTHEAST (OUTPATIENT)
Dept: GERIATRICS | Facility: CLINIC | Age: 44
End: 2019-01-29

## 2019-01-29 DIAGNOSIS — S82.891A CLOSED FRACTURE OF RIGHT ANKLE, INITIAL ENCOUNTER: ICD-10-CM

## 2019-01-29 DIAGNOSIS — M79.7 FIBROMYALGIA: ICD-10-CM

## 2019-01-29 DIAGNOSIS — R13.10 DYSPHAGIA, UNSPECIFIED TYPE: ICD-10-CM

## 2019-01-29 DIAGNOSIS — F41.9 ANXIETY: ICD-10-CM

## 2019-01-29 DIAGNOSIS — R52 PAIN MANAGEMENT: ICD-10-CM

## 2019-01-29 DIAGNOSIS — M17.9 OSTEOARTHRITIS OF KNEE, UNSPECIFIED LATERALITY, UNSPECIFIED OSTEOARTHRITIS TYPE: ICD-10-CM

## 2019-01-29 DIAGNOSIS — N39.0 URINARY TRACT INFECTION WITHOUT HEMATURIA, SITE UNSPECIFIED: ICD-10-CM

## 2019-01-29 DIAGNOSIS — G89.4 CHRONIC PAIN SYNDROME: ICD-10-CM

## 2019-01-29 DIAGNOSIS — Z96.651 STATUS POST TOTAL RIGHT KNEE REPLACEMENT: ICD-10-CM

## 2019-01-30 ENCOUNTER — AMBULATORY - HEALTHEAST (OUTPATIENT)
Dept: GERIATRICS | Facility: CLINIC | Age: 44
End: 2019-01-30

## 2019-01-31 ENCOUNTER — DOCUMENTATION ONLY (OUTPATIENT)
Dept: FAMILY MEDICINE | Facility: CLINIC | Age: 44
End: 2019-01-31

## 2019-01-31 NOTE — PROGRESS NOTES
01/31/19 9:41 AM     Returned call to Chantal Cheng RN for home care update and orders. She called my direct number, I also gave her our clinic's phone number.    She will be receiving the following:  -Nursing care 1x/week for the next 6 weeks  -Will be evaluated by PT/OT this week for both post-operative rehab as well as fall at TCU that resulted in right ankle fracture. She is currently in a boot for this.    I have received and reviewed [facility] documentation via inpatient chart and noted the increased pain control needs (she was switched to oxycodone from Tramadol) as well as increased anxiety for which she was given short course of alprazolam.     Will need tapering plan when seen in clinical follow up. Has not been on medication for anxiety recently and will review this as well.    Benjamin Rosenstein, MD, MA  [facility]  P: [phone]

## 2019-02-14 ENCOUNTER — MEDICAL CORRESPONDENCE (OUTPATIENT)
Dept: HEALTH INFORMATION MANAGEMENT | Facility: CLINIC | Age: 44
End: 2019-02-14

## 2019-02-20 ENCOUNTER — MEDICAL CORRESPONDENCE (OUTPATIENT)
Dept: HEALTH INFORMATION MANAGEMENT | Facility: CLINIC | Age: 44
End: 2019-02-20

## 2019-03-13 ENCOUNTER — MEDICAL CORRESPONDENCE (OUTPATIENT)
Dept: HEALTH INFORMATION MANAGEMENT | Facility: CLINIC | Age: 44
End: 2019-03-13

## 2019-03-20 DIAGNOSIS — M79.7 FIBROMYALGIA: ICD-10-CM

## 2019-03-20 DIAGNOSIS — M51.369 DDD (DEGENERATIVE DISC DISEASE), LUMBAR: ICD-10-CM

## 2019-03-20 DIAGNOSIS — G89.4 CHRONIC PAIN SYNDROME: ICD-10-CM

## 2019-03-20 RX ORDER — TRAMADOL HYDROCHLORIDE 50 MG/1
50 TABLET ORAL 3 TIMES DAILY PRN
Qty: 90 TABLET | OUTPATIENT
Start: 2019-03-20

## 2019-03-20 NOTE — TELEPHONE ENCOUNTER
Patient being seen with home care and currently receiving Norco. Would need to be seen prior to refill.

## 2019-04-18 ENCOUNTER — OFFICE VISIT (OUTPATIENT)
Dept: FAMILY MEDICINE | Facility: CLINIC | Age: 44
End: 2019-04-18
Payer: MEDICARE

## 2019-04-18 VITALS
BODY MASS INDEX: 26 KG/M2 | OXYGEN SATURATION: 99 % | HEIGHT: 70 IN | TEMPERATURE: 97.7 F | DIASTOLIC BLOOD PRESSURE: 85 MMHG | WEIGHT: 181.6 LBS | SYSTOLIC BLOOD PRESSURE: 123 MMHG | RESPIRATION RATE: 18 BRPM | HEART RATE: 77 BPM

## 2019-04-18 DIAGNOSIS — G89.4 CHRONIC PAIN SYNDROME: ICD-10-CM

## 2019-04-18 DIAGNOSIS — F41.9 ANXIETY: ICD-10-CM

## 2019-04-18 DIAGNOSIS — T74.91XA DOMESTIC VIOLENCE OF ADULT, INITIAL ENCOUNTER: Primary | ICD-10-CM

## 2019-04-18 DIAGNOSIS — M79.7 FIBROMYALGIA: ICD-10-CM

## 2019-04-18 DIAGNOSIS — M51.369 DDD (DEGENERATIVE DISC DISEASE), LUMBAR: ICD-10-CM

## 2019-04-18 RX ORDER — TRAMADOL HYDROCHLORIDE 50 MG/1
50 TABLET ORAL 3 TIMES DAILY PRN
Qty: 30 TABLET | Refills: 0 | Status: SHIPPED | OUTPATIENT
Start: 2019-04-18 | End: 2019-05-09

## 2019-04-18 RX ORDER — ESCITALOPRAM OXALATE 10 MG/1
10 TABLET ORAL DAILY
Qty: 30 TABLET | Refills: 1 | Status: SHIPPED | OUTPATIENT
Start: 2019-04-18 | End: 2019-05-31

## 2019-04-18 ASSESSMENT — MIFFLIN-ST. JEOR: SCORE: 1563.98

## 2019-04-18 NOTE — PROGRESS NOTES
Preceptor Attestation:   Patient seen, evaluated and discussed with the resident. I have verified the content of the note, which accurately reflects my assessment of the patient and the plan of care.  Supervising Physician:Arley Bose MD  [facility]

## 2019-04-19 LAB
C TRACH RRNA SPEC QL NAA+PROBE: NEGATIVE
N GONORRHOEA RRNA SPEC QL NAA+PROBE: NEGATIVE

## 2019-05-09 ENCOUNTER — OFFICE VISIT (OUTPATIENT)
Dept: FAMILY MEDICINE | Facility: CLINIC | Age: 44
End: 2019-05-09
Payer: MEDICARE

## 2019-05-09 VITALS
OXYGEN SATURATION: 98 % | HEART RATE: 91 BPM | WEIGHT: 180 LBS | BODY MASS INDEX: 25.83 KG/M2 | RESPIRATION RATE: 16 BRPM | SYSTOLIC BLOOD PRESSURE: 110 MMHG | DIASTOLIC BLOOD PRESSURE: 72 MMHG | TEMPERATURE: 97.6 F

## 2019-05-09 DIAGNOSIS — M51.369 DDD (DEGENERATIVE DISC DISEASE), LUMBAR: ICD-10-CM

## 2019-05-09 DIAGNOSIS — G89.4 CHRONIC PAIN SYNDROME: ICD-10-CM

## 2019-05-09 DIAGNOSIS — F41.9 ANXIETY: Primary | ICD-10-CM

## 2019-05-09 DIAGNOSIS — M79.7 FIBROMYALGIA: ICD-10-CM

## 2019-05-09 RX ORDER — TRAMADOL HYDROCHLORIDE 50 MG/1
50 TABLET ORAL 3 TIMES DAILY PRN
Qty: 90 TABLET | Refills: 0 | Status: SHIPPED | OUTPATIENT
Start: 2019-05-09 | End: 2019-05-31

## 2019-05-09 RX ORDER — HYDROXYZINE PAMOATE 25 MG/1
25 CAPSULE ORAL AT BEDTIME
Qty: 90 CAPSULE | Refills: 1 | Status: SHIPPED | OUTPATIENT
Start: 2019-05-09 | End: 2019-05-17 | Stop reason: ALTCHOICE

## 2019-05-09 ASSESSMENT — PATIENT HEALTH QUESTIONNAIRE - PHQ9
SUM OF ALL RESPONSES TO PHQ QUESTIONS 1-9: 3
5. POOR APPETITE OR OVEREATING: SEVERAL DAYS

## 2019-05-09 ASSESSMENT — ANXIETY QUESTIONNAIRES
1. FEELING NERVOUS, ANXIOUS, OR ON EDGE: NOT AT ALL
IF YOU CHECKED OFF ANY PROBLEMS ON THIS QUESTIONNAIRE, HOW DIFFICULT HAVE THESE PROBLEMS MADE IT FOR YOU TO DO YOUR WORK, TAKE CARE OF THINGS AT HOME, OR GET ALONG WITH OTHER PEOPLE: SOMEWHAT DIFFICULT
5. BEING SO RESTLESS THAT IT IS HARD TO SIT STILL: NOT AT ALL
2. NOT BEING ABLE TO STOP OR CONTROL WORRYING: SEVERAL DAYS
6. BECOMING EASILY ANNOYED OR IRRITABLE: NOT AT ALL
GAD7 TOTAL SCORE: 2
7. FEELING AFRAID AS IF SOMETHING AWFUL MIGHT HAPPEN: NOT AT ALL
3. WORRYING TOO MUCH ABOUT DIFFERENT THINGS: NOT AT ALL

## 2019-05-09 NOTE — PROGRESS NOTES
Assessment and Plan       Rohan Prince is a 43 year old female with past medical hx including chronic pain syndrome, anxiety, IPV who presented to clinic today for follow up of anxiety and pain syndrome.    Anxiety  Doing well with Lexapro overall, would like to continue at her current dose.  Add Vistaril at night to help with racing thoughts.  - hydrOXYzine (VISTARIL) 25 MG capsule  Dispense: 90 capsule; Refill: 1    Chronic pain syndrome  DDD (degenerative disc disease), lumbar  Knee pain is improving, continues to experience significant pain of her back.   reviewed and appropriate, drug screen negative. Continues to maintain good function with medication. Refill tramadol today, plan for focus chronic pain visit at next follow-up appointment.  - Rapid Urine Drug Screen ([facility])  - traMADol (ULTRAM) 50 MG tablet  Dispense: 90 tablet; Refill: 0    Options for treatment and follow-up care were reviewed with the patient. Rohan Prince engaged in the decision making process and verbalized understanding of the options discussed and agreed with the final plan.    Benjamin Rosenstein, MD, MA  [facility] [place]  P: [phone]    Precepted today with: Campbell Donnelly MD         HPI:       Chief Complaint   Patient presents with     Refill Request     tramadol,      Medication Reconciliation     complete       Rohan Prince is a 43 year old  female with pmhx of chronic pain syndrome, anxiety, recent IPV who presents for follow up:    Follow up anxiety  Since starting the Lexapro, she feels her anxiety is doing better.  She does note she still having significant anxiety at night when she tries to sleep.  She states she has thoughts racing through her head and is unable to get to sleep.  She does states she feels like she may have PTSD related to historical events from her childhood, not just most recent events.  She does have a counselor coming out for her family this week which she is looking forward to.  She does  have her own counselor who has been helpful.  She denies any significant side effects from the Lexapro including dizziness, lightheadedness, feeling drowsy.lexapro    Pain  She continues to have significant pain in her knee though it is improving with the PT exercises at home.  She continues to do chores around her house.  She does feel like the mobility in her knee is improving.  She does still deal with significant back pain.  She is agreeable to follow-up again for chronic pain visit focus on this further.           Review of Systems:     5 point ROS negative except as noted above in HPI, including Gen., Resp, CV, GI &  system review.             PFSH:   Problem List   Patient Active Problem List   Diagnosis     Chronic pain disorder     Lumbar degenerative disc disease     Screening for cervical cancer     Chronic low back pain     Anxiety disorder, unspecified     Brain mass     Chronic pain of right knee     Osteoarthritis of both knees, unspecified osteoarthritis type        Medications   Current Outpatient Medications   Medication Sig Dispense Refill     escitalopram (LEXAPRO) 10 MG tablet Take 1 tablet (10 mg) by mouth daily 30 tablet 1     pregabalin (LYRICA) 75 MG capsule Take 2 capsules (150 mg) by mouth 2 times daily 120 capsule 2     pregabalin (LYRICA) 75 MG capsule Take 2 capsules (150 mg) by mouth 2 times daily 120 capsule 0     traMADol (ULTRAM) 50 MG tablet Take 1 tablet (50 mg) by mouth 3 times daily as needed for severe pain 30 tablet 0        Social History      History   Smoking Status     Never Smoker   Smokeless Tobacco     Never Used             Allergies   Allergen Reactions     Chlorhexidine Rash     Physical Exam        Physical Exam:     Vitals:    05/09/19 0951   BP: 110/72   Pulse: 91   Resp: 16   Temp: 97.6  F (36.4  C)   TempSrc: Oral   SpO2: 98%   Weight: 81.6 kg (180 lb)     Body mass index is 25.83 kg/m .    She continues to have significant pain in her knee though it is  improving with the PT exercises at home.  She continues to do chores around her house.  She does feel like the mobility in her knee is improving.  She does still deal with significant back pain.  She is agreeable to follow-up again for chronic pain visit focus on this further. in case he is sitting comfortably appears well, no acute distress.  Her heart regular rate and rhythm, normal S1-S2, no murmur/rub/gallops.  Her lungs are clear to auscultation bilaterally.  Abdomen is soft, nontender, with active bowel sounds.  She has a scar over her right knee from recent surgery that is healing.  Her mood is anxious, with congruent affect.  Her speech is rapid and at times tangential though is usually able to return to topics at hand.    Results for orders placed or performed in visit on 05/09/19   Rapid Urine Drug Screen ([facility])   Result Value Ref Range    Phencyclidine NEGATIVE -ATIVE    Propoxyphene NEGATIVE -ATIVE    Tricyclic Antidepressants NEGATIVE -ATIVE    Amphetamines Qual NEGATIVE -ATIVE    Barbiturates Qual Urine NEGATIVE -ATIVE    Buprenorphine Qual Urine NEGATIVE -ATIVE    Benzodiazepine Qual Urine NEGATIVE -ATIVE    Cocaine Qual Urine NEGATIVE -ATIVE    Cannabinoids Qual Urine NEGATIVE -ATIVE    Methamphetamine Qual NEGATIVE -ATIVE    Methadone Qual NEGATIVE -ATIVE    Morphine Qual NEGATIVE -ATIVE    Oxycodone Qual NEGATIVE -ATIVE    Temperature of Urine was Between  Degrees F YES YES       Medications Discontinued During This Encounter   Medication Reason     traMADol (ULTRAM) 50 MG tablet Reorder       Patient Instructions   There are no Patient Instructions on file for this visit.        This note was completed with the assistance of dictation software. Typos and word substitution-errors are expected and unintended.

## 2019-05-10 ASSESSMENT — ANXIETY QUESTIONNAIRES: GAD7 TOTAL SCORE: 2

## 2019-05-15 NOTE — TELEPHONE ENCOUNTER
Message to physician:HYDROXYZINE CAPSULE IS BACK ORDER. PLEASE SWITCH TO TABLETS INSTEAD     Date of last visit: 5/9/2019     Date of next visit if scheduled: Visit date 05/31/2019    Last Comprehensive Metabolic Panel:  Sodium   Date Value Ref Range Status   12/21/2018 142.0 133.0 - 144.0 mmol/L Final     Potassium   Date Value Ref Range Status   12/21/2018 3.4 3.4 - 5.3 mmol/L Final     Chloride   Date Value Ref Range Status   12/21/2018 100.0 94.0 - 109.0 mmol/L Final     Carbon Dioxide   Date Value Ref Range Status   12/21/2018 31.0 20.0 - 32.0 mmol/L Final     Glucose   Date Value Ref Range Status   12/21/2018 95.0 60.0 - 109.0 mg/dL Final     Urea Nitrogen   Date Value Ref Range Status   12/21/2018 5.0 5.0 - 24.0 mg/dL Final     Creatinine   Date Value Ref Range Status   12/21/2018 0.6 0.6 - 1.3 mg/dL Final     Calcium   Date Value Ref Range Status   12/21/2018 9.5 8.5 - 10.4 mg/dL Final       BP Readings from Last 3 Encounters:   05/09/19 110/72   04/18/19 123/85   12/21/18 119/74       No results found for: A1C             Please complete refill and CLOSE ENCOUNTER.  Closing the encounter signifies the refill is complete.

## 2019-05-17 DIAGNOSIS — F41.9 ANXIETY: Primary | ICD-10-CM

## 2019-05-17 RX ORDER — HYDROXYZINE HYDROCHLORIDE 25 MG/1
25 TABLET, FILM COATED ORAL AT BEDTIME
Qty: 90 TABLET | Refills: 1 | Status: SHIPPED | OUTPATIENT
Start: 2019-05-17 | End: 2019-11-08

## 2019-05-17 RX ORDER — HYDROXYZINE HYDROCHLORIDE 25 MG/1
25 TABLET, FILM COATED ORAL 3 TIMES DAILY PRN
Qty: 90 TABLET | OUTPATIENT
Start: 2019-05-17
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
## 2019-05-31 ENCOUNTER — OFFICE VISIT (OUTPATIENT)
Dept: FAMILY MEDICINE | Facility: CLINIC | Age: 44
End: 2019-05-31
Payer: MEDICARE

## 2019-05-31 VITALS
WEIGHT: 181.8 LBS | TEMPERATURE: 98.3 F | RESPIRATION RATE: 18 BRPM | HEIGHT: 69 IN | BODY MASS INDEX: 26.93 KG/M2 | SYSTOLIC BLOOD PRESSURE: 115 MMHG | OXYGEN SATURATION: 99 % | HEART RATE: 78 BPM | DIASTOLIC BLOOD PRESSURE: 75 MMHG

## 2019-05-31 DIAGNOSIS — M51.369 DDD (DEGENERATIVE DISC DISEASE), LUMBAR: ICD-10-CM

## 2019-05-31 DIAGNOSIS — M25.561 CHRONIC PAIN OF RIGHT KNEE: ICD-10-CM

## 2019-05-31 DIAGNOSIS — F41.9 ANXIETY: ICD-10-CM

## 2019-05-31 DIAGNOSIS — M79.7 FIBROMYALGIA: ICD-10-CM

## 2019-05-31 DIAGNOSIS — G89.4 CHRONIC PAIN SYNDROME: Primary | ICD-10-CM

## 2019-05-31 DIAGNOSIS — G89.29 CHRONIC PAIN OF RIGHT KNEE: ICD-10-CM

## 2019-05-31 LAB
AMPHETAMINES QUAL: NEGATIVE
BARBITURATES QUAL URINE: NEGATIVE
BENZODIAZEPINE QUAL URINE: NEGATIVE
BUPRENORPHINE QUAL URINE: NEGATIVE
CANNABINOIDS UR QL SCN: NEGATIVE
COCAINE QUAL URINE: NEGATIVE
METHAMPHETAMINE: NEGATIVE
METHODONE QUAL: NEGATIVE
MORPHINE QUAL: NEGATIVE
OXYCODONE QUAL: NEGATIVE
PHENCYCLIDINE: NEGATIVE
PROPOXYPHENE: NEGATIVE
TEMPERATURE OF URINE WAS BETWEEN 90-100 DEGREES F: YES
TRICYCLIC ANTIDEPRESSANTS: POSITIVE

## 2019-05-31 RX ORDER — PREGABALIN 75 MG/1
150 CAPSULE ORAL 2 TIMES DAILY
Qty: 148 CAPSULE | Refills: 0 | Status: SHIPPED | OUTPATIENT
Start: 2019-06-04 | End: 2019-09-09

## 2019-05-31 RX ORDER — TRAMADOL HYDROCHLORIDE 50 MG/1
50 TABLET ORAL 3 TIMES DAILY PRN
Qty: 180 TABLET | Refills: 0 | Status: SHIPPED | OUTPATIENT
Start: 2019-06-11 | End: 2019-05-31

## 2019-05-31 RX ORDER — TRAMADOL HYDROCHLORIDE 50 MG/1
50 TABLET ORAL 3 TIMES DAILY PRN
Qty: 90 TABLET | Refills: 0 | Status: SHIPPED | OUTPATIENT
Start: 2019-07-11 | End: 2019-07-10

## 2019-05-31 RX ORDER — TRAMADOL HYDROCHLORIDE 50 MG/1
50 TABLET ORAL 3 TIMES DAILY PRN
Qty: 90 TABLET | Refills: 0 | Status: SHIPPED | OUTPATIENT
Start: 2019-06-11 | End: 2019-07-10

## 2019-05-31 RX ORDER — ESCITALOPRAM OXALATE 20 MG/1
20 TABLET ORAL DAILY
Qty: 60 TABLET | Refills: 1 | Status: SHIPPED | OUTPATIENT
Start: 2019-05-31 | End: 2019-06-20

## 2019-05-31 RX ORDER — PREGABALIN 75 MG/1
150 CAPSULE ORAL 2 TIMES DAILY
Qty: 268 CAPSULE | Refills: 0 | Status: SHIPPED | OUTPATIENT
Start: 2019-06-04 | End: 2019-05-31

## 2019-05-31 RX ORDER — PREGABALIN 75 MG/1
150 CAPSULE ORAL 2 TIMES DAILY
Qty: 120 CAPSULE | Refills: 0 | Status: SHIPPED | OUTPATIENT
Start: 2019-07-11 | End: 2019-10-08

## 2019-05-31 ASSESSMENT — MIFFLIN-ST. JEOR: SCORE: 1541.14

## 2019-05-31 NOTE — PROGRESS NOTES
Preceptor Attestation:   Patient seen, evaluated and discussed with the resident. I have verified the content of the note, which accurately reflects my assessment of the patient and the plan of care.  Supervising Physician:Karolina Bustillos MD  [facility]

## 2019-05-31 NOTE — PATIENT INSTRUCTIONS
See me again by August 10    Take 2 tabs of lexapro until they are gone and refill will be a higher dose

## 2019-06-17 ENCOUNTER — TELEPHONE (OUTPATIENT)
Dept: FAMILY MEDICINE | Facility: CLINIC | Age: 44
End: 2019-06-17

## 2019-06-18 ENCOUNTER — HOSPITAL ENCOUNTER (OUTPATIENT)
Dept: MRI IMAGING | Facility: CLINIC | Age: 44
Discharge: HOME | End: 2019-06-18

## 2019-06-18 DIAGNOSIS — D32.9 MENINGIOMA (H): ICD-10-CM

## 2019-06-20 DIAGNOSIS — F41.9 ANXIETY: ICD-10-CM

## 2019-06-20 RX ORDER — ESCITALOPRAM OXALATE 20 MG/1
20 TABLET ORAL DAILY
Qty: 60 TABLET | Refills: 1 | Status: SHIPPED | OUTPATIENT
Start: 2019-06-20 | End: 2021-07-23

## 2019-07-08 ENCOUNTER — COMMUNICATION - HEALTHEAST (OUTPATIENT)
Dept: NEUROSURGERY | Facility: CLINIC | Age: 44
End: 2019-07-08

## 2019-07-10 ENCOUNTER — TELEPHONE (OUTPATIENT)
Dept: FAMILY MEDICINE | Facility: CLINIC | Age: 44
End: 2019-07-10

## 2019-07-10 DIAGNOSIS — G89.4 CHRONIC PAIN SYNDROME: ICD-10-CM

## 2019-07-10 DIAGNOSIS — M79.7 FIBROMYALGIA: ICD-10-CM

## 2019-07-10 DIAGNOSIS — M51.369 DDD (DEGENERATIVE DISC DISEASE), LUMBAR: ICD-10-CM

## 2019-07-10 RX ORDER — TRAMADOL HYDROCHLORIDE 50 MG/1
50 TABLET ORAL 3 TIMES DAILY PRN
Qty: 90 TABLET | Refills: 1 | Status: SHIPPED | OUTPATIENT
Start: 2019-07-11 | End: 2019-08-09

## 2019-07-10 NOTE — TELEPHONE ENCOUNTER
Tramadol Rx written , to fill tomorrow  now considered  and will be unable to be filled at pharmacy. Need new prescription. Routed Rx to Dr. Rosenstein to sign so can be signed and faxed.     Green team - please call and inform patient that prescription will be available for  and to bring prescription dated 19 to be shredded. Encourage patient to schedule follow up appointment for August refills.     Michelle Sommers, PharmD, CDE  [facility]  Phone: [phone]  July 10, 2019 at 9:46 AM

## 2019-07-10 NOTE — TELEPHONE ENCOUNTER
New prescription printed and signed.     Benjamin Rosenstein, MD, MA  [facility]  P: [phone]

## 2019-08-08 ENCOUNTER — OFFICE VISIT - HEALTHEAST (OUTPATIENT)
Dept: NEUROSURGERY | Facility: CLINIC | Age: 44
End: 2019-08-08

## 2019-08-08 DIAGNOSIS — D32.9 MENINGIOMA (H): ICD-10-CM

## 2019-08-08 ASSESSMENT — MIFFLIN-ST. JEOR: SCORE: 1601.16

## 2019-08-09 ENCOUNTER — OFFICE VISIT (OUTPATIENT)
Dept: FAMILY MEDICINE | Facility: CLINIC | Age: 44
End: 2019-08-09
Payer: MEDICARE

## 2019-08-09 VITALS
HEIGHT: 69 IN | SYSTOLIC BLOOD PRESSURE: 107 MMHG | BODY MASS INDEX: 27.08 KG/M2 | WEIGHT: 182.8 LBS | HEART RATE: 74 BPM | DIASTOLIC BLOOD PRESSURE: 67 MMHG | OXYGEN SATURATION: 97 % | RESPIRATION RATE: 18 BRPM | TEMPERATURE: 97.7 F

## 2019-08-09 DIAGNOSIS — M51.369 DDD (DEGENERATIVE DISC DISEASE), LUMBAR: ICD-10-CM

## 2019-08-09 DIAGNOSIS — D42.9 MENINGIOMA, MULTIPLE (H): ICD-10-CM

## 2019-08-09 DIAGNOSIS — G89.4 CHRONIC PAIN SYNDROME: Primary | ICD-10-CM

## 2019-08-09 DIAGNOSIS — M79.7 FIBROMYALGIA: ICD-10-CM

## 2019-08-09 DIAGNOSIS — F41.1 GENERALIZED ANXIETY DISORDER: ICD-10-CM

## 2019-08-09 RX ORDER — TRAMADOL HYDROCHLORIDE 50 MG/1
50 TABLET ORAL 3 TIMES DAILY PRN
Qty: 90 TABLET | Refills: 1 | Status: SHIPPED | OUTPATIENT
Start: 2019-08-09 | End: 2019-09-10

## 2019-08-09 ASSESSMENT — MIFFLIN-ST. JEOR: SCORE: 1553.56

## 2019-08-09 NOTE — PROGRESS NOTES
Assessment and Plan       Rohan Prince is a 43 year old female with past medical hx including chronic pain syndrome, DDD, fibromyalgia who presented to clinic today for follow up    Chronic pain syndrome  DDD (degenerative disc disease), lumbar  Continues to have improved function with appropriate use of medication and decreased function without.  reviewed and appropriate. Tramadol refilled for 1 month.   - Rapid Urine Drug Screen ([facility])  - traMADol (ULTRAM) 50 MG tablet  Dispense: 90 tablet; Refill: 1    Fibromyalgia  Symptoms well controlled with lyrica. Not needing refill at this time    Generalized anxiety disorder  Stable. Continues of 10mg lexapro. Also receiving counseling and using other modalities for therapy.     Meningioma, multiple (H)  Recently seen by neurosurgery for repeat evaluation. Most recent MRI reviewed and shows slight interval increase in likely meningiomas. Referred to radiation oncology and neurology for plans of possible radiation therapy.     Options for treatment and follow-up care were reviewed with the patient. Rohan Prince engaged in the decision making process and verbalized understanding of the options discussed and agreed with the final plan.    Benjamin Rosenstein, MD, MA  [facility]  P: [phone]      Precepted today with: Patricia Hwang MD             HPI:       Chief Complaint   Patient presents with     Refill Request     tramadol,     Medication Reconciliation     needs attention       Rohan Prince is a 43 year old  female with pmhx of chronic pain due to multiple joints who presents for follow up today    -Prior prescription  after she had lost it. Found it but had .  -Has had lots of pain because has been without medication.  -Multiple days she was unable to do usual activities and things around the house  -Had to have older daughter watch younger daughter for a few days with this also  -That was mainly due to her back pain. Lyrica  still helpful with burning pain from fibromyalgia.    Anxiety  -Taking lyrica most of the time, misses it once in a while. Less than once a week  -Anxiety is doing ok, was more anxious with court proceedings recently  -Trying to do more music for her anxiety  -Still seeing a counselor as well which is helpuf    Saw neurosurgery yesterday due to headaches and her meningioma hx. Says they are considering surgery vs radiation for the meningiomas as they have grown in size. Also referred from them to neurology and oncology (for possible of radiation).         Review of Systems:     5 point ROS negative except as noted above in HPI, including Gen., Resp, CV, GI &  system review.             PFSH:   Problem List   Patient Active Problem List   Diagnosis     Chronic pain syndrome     Lumbar degenerative disc disease     Screening for cervical cancer     Chronic low back pain     Anxiety disorder, unspecified     Meningioma, multiple (H)     Chronic pain of right knee     Osteoarthritis of both knees, unspecified osteoarthritis type        Medications   Current Outpatient Medications   Medication Sig Dispense Refill     escitalopram (LEXAPRO) 20 MG tablet Take 1 tablet (20 mg) by mouth daily 60 tablet 1     pregabalin (LYRICA) 75 MG capsule Take 2 capsules (150 mg) by mouth 2 times daily 120 capsule 0     traMADol (ULTRAM) 50 MG tablet Take 1 tablet (50 mg) by mouth 3 times daily as needed for severe pain 90 tablet 1     hydrOXYzine (ATARAX) 25 MG tablet Take 1 tablet (25 mg) by mouth At Bedtime (Patient not taking: Reported on 8/9/2019) 90 tablet 1     pregabalin (LYRICA) 75 MG capsule Take 2 capsules (150 mg) by mouth 2 times daily 148 capsule 0        Social History      History   Smoking Status     Never Smoker   Smokeless Tobacco     Never Used             Allergies   Allergen Reactions     Chlorhexidine Rash     Physical Exam        Physical Exam:     Vitals:    08/09/19 1627   BP: 107/67   Pulse: 74   Resp: 18  "  Temp: 97.7  F (36.5  C)   TempSrc: Oral   SpO2: 97%   Weight: 82.9 kg (182 lb 12.8 oz)   Height: 1.753 m (5' 9\")     Body mass index is 26.99 kg/m .    General: Awake, seated comfortably, appears well and NAD   CV: RRR, normal S1/S2, no murmurs/rubs/gallops, Radial and DP pulses 2/4   Pulm: Normal WOB, lungs CTAB, no wheezes or crackles, good air movement   GI: Abdomen soft, not tender, not distended, BS normal and active throughout  Pscyh: Mood anxious, affect bright. Speech rapid but not pressured. Thoughts can be circuitous.  Neuro: Gait normal    Results for orders placed or performed in visit on 08/09/19   Rapid Urine Drug Screen ([facility])   Result Value Ref Range    Phencyclidine NEGATIVE NEGATIVE    Propoxyphene NEGATIVE NEGATIVE    Tricyclic Antidepressants NEGATIVE NEGATIVE    Amphetamines Qual NEGATIVE NEGATIVE    Barbiturates Qual Urine NEGATIVE NEGATIVE    Buprenorphine Qual Urine NEGATIVE NEGATIVE    Benzodiazepine Qual Urine NEGATIVE NEGATIVE    Cocaine Qual Urine NEGATIVE NEGATIVE    Cannabinoids Qual Urine NEGATIVE NEGATIVE    Methamphetamine Qual NEGATIVE NEGATIVE    Methadone Qual NEGATIVE NEGATIVE    Morphine Qual NEGATIVE NEGATIVE    Oxycodone Qual NEGATIVE NEGATIVE    Temperature of Urine was Between  Degrees F YES YES       Medications Discontinued During This Encounter   Medication Reason     traMADol (ULTRAM) 50 MG tablet Reorder       Patient Instructions   There are no Patient Instructions on file for this visit.        This note was completed with the assistance of dictation software. Typos and word substitution-errors are expected and unintended.          "

## 2019-08-12 NOTE — PROGRESS NOTES
Preceptor Attestation:  Patient's case reviewed and discussed with Benjamin Rosenstein, MD resident and I evaluated the patient. I agree with written assessment and plan of care.  Supervising Physician:  ELIE ROSAS MD  [facility]

## 2019-08-16 ENCOUNTER — COMMUNICATION - HEALTHEAST (OUTPATIENT)
Dept: ADMINISTRATIVE | Facility: HOSPITAL | Age: 44
End: 2019-08-16

## 2019-08-19 ENCOUNTER — OFFICE VISIT - HEALTHEAST (OUTPATIENT)
Dept: RADIATION ONCOLOGY | Facility: CLINIC | Age: 44
End: 2019-08-19

## 2019-08-19 DIAGNOSIS — D32.9 MENINGIOMA (H): ICD-10-CM

## 2019-08-23 ENCOUNTER — AMBULATORY - HEALTHEAST (OUTPATIENT)
Dept: RADIATION ONCOLOGY | Facility: HOSPITAL | Age: 44
End: 2019-08-23

## 2019-08-27 ENCOUNTER — RECORDS - HEALTHEAST (OUTPATIENT)
Dept: ADMINISTRATIVE | Facility: OTHER | Age: 44
End: 2019-08-27

## 2019-08-28 ENCOUNTER — RECORDS - HEALTHEAST (OUTPATIENT)
Dept: ADMINISTRATIVE | Facility: OTHER | Age: 44
End: 2019-08-28

## 2019-08-28 ENCOUNTER — AMBULATORY - HEALTHEAST (OUTPATIENT)
Dept: NEUROLOGY | Facility: CLINIC | Age: 44
End: 2019-08-28

## 2019-09-03 ENCOUNTER — AMBULATORY - HEALTHEAST (OUTPATIENT)
Dept: NEUROLOGY | Facility: CLINIC | Age: 44
End: 2019-09-03

## 2019-09-03 ENCOUNTER — AMBULATORY - HEALTHEAST (OUTPATIENT)
Dept: RADIATION ONCOLOGY | Facility: HOSPITAL | Age: 44
End: 2019-09-03

## 2019-09-03 DIAGNOSIS — D32.0 BENIGN MENINGIOMA OF BRAIN (H): ICD-10-CM

## 2019-09-03 DIAGNOSIS — R25.2 SPASTICITY: ICD-10-CM

## 2019-09-03 DIAGNOSIS — Z82.0 FAMILY HISTORY OF TIAS: ICD-10-CM

## 2019-09-03 DIAGNOSIS — Z82.0 FAMILY HISTORY OF EPILEPSY IN MOTHER: ICD-10-CM

## 2019-09-03 DIAGNOSIS — R41.3 MEMORY LOSS: ICD-10-CM

## 2019-09-03 DIAGNOSIS — R29.898 LEFT LEG WEAKNESS: ICD-10-CM

## 2019-09-03 DIAGNOSIS — R29.810 LOWER FACIAL WEAKNESS: ICD-10-CM

## 2019-09-06 ENCOUNTER — AMBULATORY - HEALTHEAST (OUTPATIENT)
Dept: RADIATION ONCOLOGY | Facility: HOSPITAL | Age: 44
End: 2019-09-06

## 2019-09-09 ENCOUNTER — AMBULATORY - HEALTHEAST (OUTPATIENT)
Dept: RADIATION ONCOLOGY | Facility: HOSPITAL | Age: 44
End: 2019-09-09

## 2019-09-09 DIAGNOSIS — G89.4 CHRONIC PAIN SYNDROME: ICD-10-CM

## 2019-09-09 DIAGNOSIS — M51.369 DDD (DEGENERATIVE DISC DISEASE), LUMBAR: ICD-10-CM

## 2019-09-09 DIAGNOSIS — M79.7 FIBROMYALGIA: ICD-10-CM

## 2019-09-10 RX ORDER — TRAMADOL HYDROCHLORIDE 50 MG/1
50 TABLET ORAL 3 TIMES DAILY PRN
Qty: 90 TABLET | Refills: 0 | Status: SHIPPED | OUTPATIENT
Start: 2019-09-10 | End: 2019-10-08

## 2019-09-10 RX ORDER — PREGABALIN 75 MG/1
150 CAPSULE ORAL 2 TIMES DAILY
Qty: 120 CAPSULE | Refills: 0 | Status: SHIPPED | OUTPATIENT
Start: 2019-09-10 | End: 2019-11-08

## 2019-09-10 NOTE — TELEPHONE ENCOUNTER
Rx has been filed on September 10, 2019 4:16 PM  By: Joselyn Lechuga CMA  Patient has been notified Rx ready for . And will  09/11/19 by a friend

## 2019-09-11 ENCOUNTER — AMBULATORY - HEALTHEAST (OUTPATIENT)
Dept: RADIATION ONCOLOGY | Facility: HOSPITAL | Age: 44
End: 2019-09-11

## 2019-09-11 ENCOUNTER — OFFICE VISIT - HEALTHEAST (OUTPATIENT)
Dept: RADIATION ONCOLOGY | Facility: HOSPITAL | Age: 44
End: 2019-09-11

## 2019-09-11 DIAGNOSIS — D32.9 MENINGIOMA (H): ICD-10-CM

## 2019-09-11 NOTE — TELEPHONE ENCOUNTER
Rx picked up on September 11, 2019 11:02 AM by Krissy Frausto (friend)  Rx given by Dallin Saxena  Photo ID verified and signature obtained?: Yes

## 2019-09-13 ENCOUNTER — AMBULATORY - HEALTHEAST (OUTPATIENT)
Dept: RADIATION ONCOLOGY | Facility: HOSPITAL | Age: 44
End: 2019-09-13

## 2019-09-16 ENCOUNTER — AMBULATORY - HEALTHEAST (OUTPATIENT)
Dept: RADIATION ONCOLOGY | Facility: HOSPITAL | Age: 44
End: 2019-09-16

## 2019-09-24 ENCOUNTER — COMMUNICATION - HEALTHEAST (OUTPATIENT)
Dept: RADIATION ONCOLOGY | Facility: HOSPITAL | Age: 44
End: 2019-09-24

## 2019-09-25 ENCOUNTER — HOSPITAL ENCOUNTER (OUTPATIENT)
Dept: NEUROLOGY | Facility: HOSPITAL | Age: 44
Discharge: HOME | End: 2019-09-25
Attending: PSYCHIATRY & NEUROLOGY

## 2019-09-25 DIAGNOSIS — R41.3 MEMORY DIFFICULTY: ICD-10-CM

## 2019-10-07 ENCOUNTER — HOSPITAL ENCOUNTER (OUTPATIENT)
Dept: NEUROLOGY | Facility: CLINIC | Age: 44
Setting detail: THERAPIES SERIES
Discharge: STILL ADMITTED OR ADMINISTRATIVE | End: 2019-10-07
Attending: PSYCHIATRY & NEUROLOGY

## 2019-10-07 ENCOUNTER — TRANSFERRED RECORDS (OUTPATIENT)
Dept: HEALTH INFORMATION MANAGEMENT | Facility: CLINIC | Age: 44
End: 2019-10-07

## 2019-10-07 DIAGNOSIS — R41.3 MEMORY LOSS: ICD-10-CM

## 2019-10-07 DIAGNOSIS — R25.2 SPASTICITY: ICD-10-CM

## 2019-10-07 DIAGNOSIS — D32.9 MENINGIOMA (H): ICD-10-CM

## 2019-10-07 DIAGNOSIS — G89.4 CHRONIC PAIN SYNDROME: ICD-10-CM

## 2019-10-07 DIAGNOSIS — Z82.0 FAMILY HISTORY OF TIAS: ICD-10-CM

## 2019-10-07 DIAGNOSIS — R29.898 LEFT LEG WEAKNESS: ICD-10-CM

## 2019-10-07 DIAGNOSIS — Z82.0 FAMILY HISTORY OF EPILEPSY IN MOTHER: ICD-10-CM

## 2019-10-07 DIAGNOSIS — D32.0 BENIGN MENINGIOMA OF BRAIN (H): ICD-10-CM

## 2019-10-07 DIAGNOSIS — R29.810 LOWER FACIAL WEAKNESS: ICD-10-CM

## 2019-10-07 DIAGNOSIS — M79.7 FIBROMYALGIA: ICD-10-CM

## 2019-10-07 DIAGNOSIS — G45.9 TIA (TRANSIENT ISCHEMIC ATTACK): ICD-10-CM

## 2019-10-08 ENCOUNTER — OFFICE VISIT (OUTPATIENT)
Dept: FAMILY MEDICINE | Facility: CLINIC | Age: 44
End: 2019-10-08
Payer: MEDICARE

## 2019-10-08 VITALS
SYSTOLIC BLOOD PRESSURE: 115 MMHG | OXYGEN SATURATION: 99 % | RESPIRATION RATE: 18 BRPM | DIASTOLIC BLOOD PRESSURE: 74 MMHG | HEART RATE: 58 BPM | BODY MASS INDEX: 30.48 KG/M2 | TEMPERATURE: 97.6 F | HEIGHT: 69 IN | WEIGHT: 205.8 LBS

## 2019-10-08 DIAGNOSIS — G89.4 CHRONIC PAIN SYNDROME: Primary | ICD-10-CM

## 2019-10-08 DIAGNOSIS — M51.369 DDD (DEGENERATIVE DISC DISEASE), LUMBAR: ICD-10-CM

## 2019-10-08 DIAGNOSIS — D42.9 MENINGIOMA, MULTIPLE (H): ICD-10-CM

## 2019-10-08 RX ORDER — TRAMADOL HYDROCHLORIDE 50 MG/1
50 TABLET ORAL 3 TIMES DAILY PRN
Qty: 90 TABLET | Refills: 0 | Status: SHIPPED | OUTPATIENT
Start: 2019-10-10 | End: 2019-10-08

## 2019-10-08 RX ORDER — TRAMADOL HYDROCHLORIDE 50 MG/1
50 TABLET ORAL 3 TIMES DAILY PRN
Qty: 90 TABLET | Refills: 1 | Status: SHIPPED | OUTPATIENT
Start: 2019-10-10 | End: 2019-11-09

## 2019-10-08 ASSESSMENT — MIFFLIN-ST. JEOR: SCORE: 1652.88

## 2019-10-08 NOTE — PATIENT INSTRUCTIONS
Thank you for seeing us at [facility] for your care!     See me again by Dec 10  Request a refill before November 9, that should be ok    Call the neurosurgeons about your side effects     We will help you arrange a referral to the pain clinic    Referral for :     Pain and interventional    LOCATION/PLACE/Provider :    [facility]   DATE & TIME : Faxed referral, location will call patient.      PHONE :     [phone]  FAX :    [phone]  Appointment made by clinic staff/:    Rhea

## 2019-10-08 NOTE — PROGRESS NOTES
Assessment and Plan       Rohan Prince is a 44 year old female with past medical hx including chronic pain syndrome r/t DDD and fibromyalgia, meningiomas, and PTSD who presented to clinic today for chronic pain follow-up    Chronic pain syndrome  DDD (degenerative disc disease), lumbar  Does continue to have improved function with regimen of tramadol and lyrica. Breakthrough headaches may be related to recent radiation therapy. However, she does continue to have multiple days of significant decreased function though uses good effort to continue to do some regular activities. She would like to avoid increasing her tramadol dose which I feel is appropriate. Also with hydroxyzine and serotonin specific reuptake inhibitor to assist with mood and pain. We discussed referral to pain center for recommendations to augment her current regimen but that I would continue to follow her primarily.   -  reviewed and appropriate, UDS as expected  - Rapid Urine Drug Screen ([facility])  [facility]  - traMADol (ULTRAM) 50 MG tablet  Dispense: 90 tablet; Refill: 1    Meningioma, multiple (H)  Recent evaluation with neurosurgery and oncology and received radiation therapy. She is also being evaluated by neurology given memory difficulties, suspect related to history of anxiety and trauma, possibly directly mass related. Worsening headaches with migraine character likely radiation side-effect and/or effect of therapy on masses. No weakness, no visual changes, no vertigo to suggest more concerning etiology. Advised to call oncology clinic given her side-effects.     Options for treatment and follow-up care were reviewed with the patient. Rohan Prince engaged in the decision making process and verbalized understanding of the options discussed and agreed with the final plan.    Benjamin Rosenstein, MD, MA  [facility]  P: [phone]    Precepted today with: Rick Thayer MD          HPI:       Chief Complaint   Patient presents with     Medication Reconciliation     Completed Need refills       Rohan Prince is a 44 year old  female with pmhx of chronic pain syndrome, fibromyalgia, meningiomas who presents for follow-up    Since last time, has been evaluated by neurosurgery and oncology due to worsening headaches and hx of meningiomas. Given evaluation, she received and completed radiation therapy. Completed on 9/25. Having some side effects including worsening headaches, nausea, dizziness, and fatigue. Will drop things from right hand, though this has been ongoing issue.     Headaches have been worse and taking generic version of excedrin. Does use ibuprofen 800mg at times when joints swells, doesn't believe she's ever told me this, but it upsets her stomach.    Also saw neurology recently and underwent EEG and neuropsych testing (results not yet available) due to memory difficulties.     She has had more difficulty getting out of bed at times due to fatigue, especially in past week, which has worsened pain. Tramadol continues to assist with back pain and being able to function. She is able to complete activities at home but will take breaks fairly often. Lyrica helpful with burning symptoms related to fibromyalgia.    Continues with home exercises from PT, has not been to physical therapy since surgery. Does not feel like she would be able to take time to do formal therapy again but she emphasizes she is diligent with her home exercises. She also has been doing more walking since prior visit with taking daughter to school. She notes she has gained weight recently. Says often fluctuates.    Refilled lyrica yesterday. Still has few tramadol         Review of Systems:       5 point ROS negative except as noted above in HPI, including Gen., Resp, CV, GI &  system review.             PFSH:   Problem List   Patient Active Problem List   Diagnosis     Chronic pain syndrome     Lumbar degenerative  "disc disease     Screening for cervical cancer     Chronic low back pain     Anxiety disorder, unspecified     Meningioma, multiple (H)     Chronic pain of right knee     Osteoarthritis of both knees, unspecified osteoarthritis type        Medications   Current Outpatient Medications   Medication Sig Dispense Refill     escitalopram (LEXAPRO) 20 MG tablet Take 1 tablet (20 mg) by mouth daily 60 tablet 1     pregabalin (LYRICA) 75 MG capsule Take 2 capsules (150 mg) by mouth 2 times daily 120 capsule 0     traMADol (ULTRAM) 50 MG tablet Take 1 tablet (50 mg) by mouth 3 times daily as needed for severe pain 90 tablet 0     hydrOXYzine (ATARAX) 25 MG tablet Take 1 tablet (25 mg) by mouth At Bedtime (Patient not taking: Reported on 8/9/2019) 90 tablet 1     pregabalin (LYRICA) 75 MG capsule Take 2 capsules (150 mg) by mouth 2 times daily 120 capsule 0        Social History      History   Smoking Status     Never Smoker   Smokeless Tobacco     Never Used             Allergies   Allergen Reactions     Chlorhexidine Rash     Physical Exam        Physical Exam:     Vitals:    10/08/19 1416   BP: 115/74   Pulse: 58   Resp: 18   Temp: 97.6  F (36.4  C)   TempSrc: Oral   SpO2: 99%   Weight: 93.4 kg (205 lb 12.8 oz)   Height: 1.753 m (5' 9\")     Body mass index is 30.39 kg/m .    General: Awake, seated comfortably, appears well and NAD   CV: RRR, normal S1/S2, no murmurs/rubs/gallops, Radial and DP pulses 2/4   Pulm: Normal WOB, lungs CTAB, no wheezes or crackles, good air movement   GI: Abdomen overweight, soft, not tender, not distended, no organomegaly, BS normal and active throughout   Neuro: Alert, answering questions appropriately, normal thought processes; Stiff gait with short step  Psych: Mood normal, affect appropriate, thoughts circuitous but redirectable    Results for orders placed or performed in visit on 10/08/19 (from the past 24 hour(s))   Rapid Urine Drug Screen ([facility])   Result Value Ref Range    " Cannabinoids Qual Urine Negative NEGATIVE    Phencyclidine Negative NEGATIVE    Cocaine Qual Urine Negative NEGATIVE    Methamphetamine Qual Negative NEGATIVE    Morphine Qual Negative NEGATIVE    Amphetamines Qual Negative NEGATIVE    Benzodiazepine Qual Urine Negative NEGATIVE    Tricyclic Antidepressants Negative NEGATIVE    Methadone Qual Negative NEGATIVE    Barbiturates Qual Urine Negative NEGATIVE    Oxycodone Qual Negative NEGATIVE    Propoxyphene Negative NEGATIVE    Buprenorphine Qual Urine Negative NEGATIVE    Temperature of Urine was Between  Degrees F YES YES     Medications Discontinued During This Encounter   Medication Reason     pregabalin (LYRICA) 75 MG capsule Therapy completed     traMADol (ULTRAM) 50 MG tablet Reorder     traMADol (ULTRAM) 50 MG tablet Reorder       Patient Instructions   There are no Patient Instructions on file for this visit.        This note was completed with the assistance of dictation software. Typos and word substitution-errors are expected and unintended.

## 2019-10-09 ENCOUNTER — AMBULATORY - HEALTHEAST (OUTPATIENT)
Dept: PALLIATIVE MEDICINE | Facility: OTHER | Age: 44
End: 2019-10-09

## 2019-10-09 DIAGNOSIS — G93.32 CHRONIC FATIGUE SYNDROME WITH FIBROMYALGIA: ICD-10-CM

## 2019-10-09 DIAGNOSIS — M51.369 DDD (DEGENERATIVE DISC DISEASE), LUMBAR: ICD-10-CM

## 2019-10-09 DIAGNOSIS — M79.7 CHRONIC FATIGUE SYNDROME WITH FIBROMYALGIA: ICD-10-CM

## 2019-10-09 DIAGNOSIS — G89.4 CHRONIC PAIN SYNDROME: ICD-10-CM

## 2019-10-10 ENCOUNTER — COMMUNICATION - HEALTHEAST (OUTPATIENT)
Dept: RADIATION ONCOLOGY | Facility: HOSPITAL | Age: 44
End: 2019-10-10

## 2019-10-25 ENCOUNTER — HOSPITAL ENCOUNTER (OUTPATIENT)
Dept: NEUROLOGY | Facility: CLINIC | Age: 44
Setting detail: THERAPIES SERIES
Discharge: STILL ADMITTED OR ADMINISTRATIVE | End: 2019-10-25
Attending: PSYCHIATRY & NEUROLOGY

## 2019-10-25 DIAGNOSIS — F02.80 MAJOR NEUROCOGNITIVE DISORDER DUE TO MULTIPLE ETIOLOGIES WITHOUT BEHAVIORAL DISTURBANCE (H): ICD-10-CM

## 2019-10-25 DIAGNOSIS — F41.1 GAD (GENERALIZED ANXIETY DISORDER): ICD-10-CM

## 2019-10-25 DIAGNOSIS — D32.9 MENINGIOMA (H): ICD-10-CM

## 2019-10-25 DIAGNOSIS — R29.810 LOWER FACIAL WEAKNESS: ICD-10-CM

## 2019-10-25 DIAGNOSIS — F43.10 POSTTRAUMATIC STRESS DISORDER: ICD-10-CM

## 2019-10-25 DIAGNOSIS — F32.A DEPRESSION, UNSPECIFIED DEPRESSION TYPE: ICD-10-CM

## 2019-10-25 DIAGNOSIS — G45.9 TIA (TRANSIENT ISCHEMIC ATTACK): ICD-10-CM

## 2019-10-25 DIAGNOSIS — D32.0 BENIGN MENINGIOMA OF BRAIN (H): ICD-10-CM

## 2019-10-25 DIAGNOSIS — M79.7 FIBROMYALGIA: ICD-10-CM

## 2019-10-25 DIAGNOSIS — R25.2 SPASTICITY: ICD-10-CM

## 2019-10-25 DIAGNOSIS — G89.4 CHRONIC PAIN SYNDROME: ICD-10-CM

## 2019-11-04 ENCOUNTER — HOSPITAL ENCOUNTER (OUTPATIENT)
Dept: SPEECH THERAPY | Age: 44
Setting detail: THERAPIES SERIES
Discharge: STILL ADMITTED OR ADMINISTRATIVE | End: 2019-11-04
Attending: PSYCHOLOGIST

## 2019-11-04 ENCOUNTER — HOSPITAL ENCOUNTER (OUTPATIENT)
Dept: NEUROLOGY | Facility: CLINIC | Age: 44
Setting detail: THERAPIES SERIES
Discharge: STILL ADMITTED OR ADMINISTRATIVE | End: 2019-11-04
Attending: PSYCHOLOGIST

## 2019-11-04 DIAGNOSIS — R41.89 IMPAIRED COGNITION: ICD-10-CM

## 2019-11-04 DIAGNOSIS — F41.1 GAD (GENERALIZED ANXIETY DISORDER): ICD-10-CM

## 2019-11-08 ENCOUNTER — OFFICE VISIT (OUTPATIENT)
Dept: FAMILY MEDICINE | Facility: CLINIC | Age: 44
End: 2019-11-08
Payer: MEDICARE

## 2019-11-08 VITALS
SYSTOLIC BLOOD PRESSURE: 117 MMHG | HEART RATE: 73 BPM | RESPIRATION RATE: 18 BRPM | OXYGEN SATURATION: 98 % | BODY MASS INDEX: 31.4 KG/M2 | WEIGHT: 207.2 LBS | DIASTOLIC BLOOD PRESSURE: 75 MMHG | HEIGHT: 68 IN | TEMPERATURE: 98 F

## 2019-11-08 DIAGNOSIS — M79.7 FIBROMYALGIA: ICD-10-CM

## 2019-11-08 DIAGNOSIS — D24.1 PHYLLODES TUMOR, BENIGN, RIGHT: ICD-10-CM

## 2019-11-08 DIAGNOSIS — M51.369 DDD (DEGENERATIVE DISC DISEASE), LUMBAR: ICD-10-CM

## 2019-11-08 DIAGNOSIS — G89.4 CHRONIC PAIN SYNDROME: Primary | ICD-10-CM

## 2019-11-08 PROBLEM — N83.202 LEFT OVARIAN CYST: Status: ACTIVE | Noted: 2017-04-19

## 2019-11-08 PROBLEM — Z12.4 SCREENING FOR CERVICAL CANCER: Status: RESOLVED | Noted: 2017-03-23 | Resolved: 2019-11-08

## 2019-11-08 PROBLEM — N63.10 BREAST MASS, RIGHT: Status: ACTIVE | Noted: 2017-03-24

## 2019-11-08 PROBLEM — N87.1 CIN II (CERVICAL INTRAEPITHELIAL NEOPLASIA II): Status: ACTIVE | Noted: 2019-01-21

## 2019-11-08 PROBLEM — G45.9 TIA (TRANSIENT ISCHEMIC ATTACK): Status: ACTIVE | Noted: 2019-01-21

## 2019-11-08 PROBLEM — N80.9 ENDOMETRIOSIS: Status: ACTIVE | Noted: 2017-03-13

## 2019-11-08 PROBLEM — Z96.651 STATUS POST TOTAL KNEE REPLACEMENT, RIGHT: Status: ACTIVE | Noted: 2019-01-17

## 2019-11-08 PROBLEM — C53.9 CERVICAL CANCER (H): Status: ACTIVE | Noted: 2019-01-21

## 2019-11-08 PROBLEM — N80.9 ENDOMETRIOSIS: Status: RESOLVED | Noted: 2017-03-13 | Resolved: 2019-11-08

## 2019-11-08 RX ORDER — PREGABALIN 75 MG/1
150 CAPSULE ORAL 2 TIMES DAILY
Qty: 128 CAPSULE | Refills: 0 | Status: SHIPPED | OUTPATIENT
Start: 2019-11-08 | End: 2021-07-23

## 2019-11-08 ASSESSMENT — MIFFLIN-ST. JEOR: SCORE: 1647.32

## 2019-11-08 NOTE — NURSING NOTE
Due: Flu shot-declined, get more sick with flu shot  Tdap-pharmacy to get it due to Medicare  Mammogram-partial mastectomy on right breast 2017, do she need mammogram anymore  HIV screening-done per pt, not at this office

## 2019-11-08 NOTE — PATIENT INSTRUCTIONS
~Tdap, can get at pharmacy at next prescription     Thank you for coming in    We'll see you next month for your usual pain management. Keep your appointment with the pain clinic    We will start yearly mammograms when you are 46 unless you want to start sooner.
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
## 2019-11-18 ENCOUNTER — HOSPITAL ENCOUNTER (OUTPATIENT)
Dept: NEUROLOGY | Facility: CLINIC | Age: 44
Setting detail: THERAPIES SERIES
Discharge: STILL ADMITTED OR ADMINISTRATIVE | End: 2019-11-18
Attending: PSYCHOLOGIST

## 2019-11-18 ENCOUNTER — HOSPITAL ENCOUNTER (OUTPATIENT)
Dept: SPEECH THERAPY | Age: 44
Setting detail: THERAPIES SERIES
Discharge: STILL ADMITTED OR ADMINISTRATIVE | End: 2019-11-18
Attending: PSYCHOLOGIST

## 2019-11-18 DIAGNOSIS — R41.89 IMPAIRED COGNITION: ICD-10-CM

## 2019-11-18 DIAGNOSIS — F40.01 PANIC DISORDER WITH AGORAPHOBIA: ICD-10-CM

## 2019-11-25 ENCOUNTER — HOSPITAL ENCOUNTER (OUTPATIENT)
Dept: NEUROLOGY | Facility: CLINIC | Age: 44
Setting detail: THERAPIES SERIES
Discharge: STILL ADMITTED OR ADMINISTRATIVE | End: 2019-11-25
Attending: PSYCHOLOGIST

## 2019-11-25 ENCOUNTER — HOSPITAL ENCOUNTER (OUTPATIENT)
Dept: SPEECH THERAPY | Age: 44
Setting detail: THERAPIES SERIES
Discharge: STILL ADMITTED OR ADMINISTRATIVE | End: 2019-11-25
Attending: PSYCHOLOGIST

## 2019-11-25 DIAGNOSIS — R41.89 IMPAIRED COGNITION: ICD-10-CM

## 2019-11-25 DIAGNOSIS — F31.81 BIPOLAR II DISORDER (H): ICD-10-CM

## 2019-11-26 ENCOUNTER — RECORDS - HEALTHEAST (OUTPATIENT)
Dept: ADMINISTRATIVE | Facility: OTHER | Age: 44
End: 2019-11-26

## 2019-11-26 ENCOUNTER — HOSPITAL ENCOUNTER (OUTPATIENT)
Dept: PALLIATIVE MEDICINE | Facility: OTHER | Age: 44
Discharge: HOME | End: 2019-11-26
Attending: FAMILY MEDICINE

## 2019-11-26 DIAGNOSIS — G93.32 CHRONIC FATIGUE SYNDROME WITH FIBROMYALGIA: ICD-10-CM

## 2019-11-26 DIAGNOSIS — M51.369 DDD (DEGENERATIVE DISC DISEASE), LUMBAR: ICD-10-CM

## 2019-11-26 DIAGNOSIS — G89.4 CHRONIC PAIN SYNDROME: ICD-10-CM

## 2019-11-26 DIAGNOSIS — M79.7 CHRONIC FATIGUE SYNDROME WITH FIBROMYALGIA: ICD-10-CM

## 2019-11-26 ASSESSMENT — MIFFLIN-ST. JEOR: SCORE: 1671.92

## 2019-11-28 LAB

## 2019-12-09 ENCOUNTER — HOSPITAL ENCOUNTER (OUTPATIENT)
Dept: NEUROLOGY | Facility: CLINIC | Age: 44
Setting detail: THERAPIES SERIES
Discharge: STILL ADMITTED OR ADMINISTRATIVE | End: 2019-12-09
Attending: PSYCHOLOGIST

## 2019-12-09 ENCOUNTER — HOSPITAL ENCOUNTER (OUTPATIENT)
Dept: SPEECH THERAPY | Age: 44
Setting detail: THERAPIES SERIES
Discharge: STILL ADMITTED OR ADMINISTRATIVE | End: 2019-12-09
Attending: PSYCHOLOGIST

## 2019-12-09 DIAGNOSIS — R41.89 IMPAIRED COGNITION: ICD-10-CM

## 2019-12-09 DIAGNOSIS — F31.81 BIPOLAR II DISORDER, MOST RECENT EPISODE MAJOR DEPRESSIVE (H): ICD-10-CM

## 2019-12-11 ENCOUNTER — HOSPITAL ENCOUNTER (OUTPATIENT)
Dept: PALLIATIVE MEDICINE | Facility: OTHER | Age: 44
Discharge: HOME | End: 2019-12-11
Attending: NURSE PRACTITIONER

## 2019-12-11 DIAGNOSIS — G89.29 CHRONIC MIDLINE LOW BACK PAIN, UNSPECIFIED WHETHER SCIATICA PRESENT: ICD-10-CM

## 2019-12-11 DIAGNOSIS — M54.50 CHRONIC MIDLINE LOW BACK PAIN, UNSPECIFIED WHETHER SCIATICA PRESENT: ICD-10-CM

## 2019-12-11 DIAGNOSIS — E55.9 VITAMIN D DEFICIENCY, UNSPECIFIED: ICD-10-CM

## 2019-12-11 DIAGNOSIS — G89.4 CHRONIC PAIN SYNDROME: ICD-10-CM

## 2019-12-11 DIAGNOSIS — M79.7 FIBROMYALGIA: ICD-10-CM

## 2019-12-11 ASSESSMENT — MIFFLIN-ST. JEOR: SCORE: 1660.12

## 2019-12-12 LAB
25(OH)D3 SERPL-MCNC: 15 NG/ML (ref 30–80)
25(OH)D3 SERPL-MCNC: 15 NG/ML (ref 30–80)

## 2019-12-13 ENCOUNTER — HOSPITAL ENCOUNTER (OUTPATIENT)
Dept: MRI IMAGING | Facility: CLINIC | Age: 44
Discharge: HOME | End: 2019-12-13
Attending: RADIOLOGY

## 2019-12-13 DIAGNOSIS — D32.9 MENINGIOMA (H): ICD-10-CM

## 2019-12-16 ENCOUNTER — HOSPITAL ENCOUNTER (OUTPATIENT)
Dept: SPEECH THERAPY | Age: 44
Setting detail: THERAPIES SERIES
Discharge: STILL ADMITTED OR ADMINISTRATIVE | End: 2019-12-16
Attending: PSYCHOLOGIST

## 2019-12-16 DIAGNOSIS — R41.89 IMPAIRED COGNITION: ICD-10-CM

## 2019-12-17 ENCOUNTER — OFFICE VISIT - HEALTHEAST (OUTPATIENT)
Dept: RADIATION ONCOLOGY | Facility: HOSPITAL | Age: 44
End: 2019-12-17

## 2019-12-17 DIAGNOSIS — D32.9 MENINGIOMA (H): ICD-10-CM

## 2019-12-18 ENCOUNTER — HOSPITAL ENCOUNTER (OUTPATIENT)
Dept: NEUROLOGY | Facility: CLINIC | Age: 44
Setting detail: THERAPIES SERIES
Discharge: STILL ADMITTED OR ADMINISTRATIVE | End: 2019-12-18
Attending: PSYCHOLOGIST

## 2019-12-18 DIAGNOSIS — F31.81 BIPOLAR II DISORDER (H): ICD-10-CM

## 2019-12-30 ENCOUNTER — HOSPITAL ENCOUNTER (OUTPATIENT)
Dept: NEUROLOGY | Facility: CLINIC | Age: 44
Setting detail: THERAPIES SERIES
Discharge: STILL ADMITTED OR ADMINISTRATIVE | End: 2019-12-30
Attending: PSYCHOLOGIST

## 2019-12-30 DIAGNOSIS — F31.81 BIPOLAR II DISORDER (H): ICD-10-CM

## 2020-01-06 ENCOUNTER — HOSPITAL ENCOUNTER (OUTPATIENT)
Dept: NEUROLOGY | Facility: CLINIC | Age: 45
Setting detail: THERAPIES SERIES
Discharge: STILL ADMITTED OR ADMINISTRATIVE | End: 2020-01-06
Attending: PSYCHOLOGIST

## 2020-01-06 DIAGNOSIS — F31.81 BIPOLAR II DISORDER (H): ICD-10-CM

## 2020-01-13 ENCOUNTER — HOSPITAL ENCOUNTER (OUTPATIENT)
Dept: NEUROLOGY | Facility: CLINIC | Age: 45
Setting detail: THERAPIES SERIES
Discharge: STILL ADMITTED OR ADMINISTRATIVE | End: 2020-01-13
Attending: PSYCHOLOGIST

## 2020-01-13 DIAGNOSIS — F31.81 BIPOLAR II DISORDER (H): ICD-10-CM

## 2020-01-20 ENCOUNTER — HOSPITAL ENCOUNTER (OUTPATIENT)
Dept: NEUROLOGY | Facility: CLINIC | Age: 45
Setting detail: THERAPIES SERIES
Discharge: STILL ADMITTED OR ADMINISTRATIVE | End: 2020-01-20
Attending: PSYCHOLOGIST

## 2020-01-20 DIAGNOSIS — F31.81 BIPOLAR II DISORDER (H): ICD-10-CM

## 2020-01-22 ENCOUNTER — HOSPITAL ENCOUNTER (OUTPATIENT)
Dept: PALLIATIVE MEDICINE | Facility: OTHER | Age: 45
Discharge: HOME | End: 2020-01-22
Attending: NURSE PRACTITIONER

## 2020-01-22 DIAGNOSIS — E55.9 VITAMIN D DEFICIENCY, UNSPECIFIED: ICD-10-CM

## 2020-01-22 DIAGNOSIS — G89.4 CHRONIC PAIN SYNDROME: ICD-10-CM

## 2020-01-22 DIAGNOSIS — M54.50 CHRONIC MIDLINE LOW BACK PAIN, UNSPECIFIED WHETHER SCIATICA PRESENT: ICD-10-CM

## 2020-01-22 DIAGNOSIS — M79.7 FIBROMYALGIA: ICD-10-CM

## 2020-01-22 DIAGNOSIS — G89.29 CHRONIC MIDLINE LOW BACK PAIN, UNSPECIFIED WHETHER SCIATICA PRESENT: ICD-10-CM

## 2020-01-22 ASSESSMENT — MIFFLIN-ST. JEOR: SCORE: 1702.76

## 2020-01-27 ENCOUNTER — HOSPITAL ENCOUNTER (OUTPATIENT)
Dept: NEUROLOGY | Facility: CLINIC | Age: 45
Setting detail: THERAPIES SERIES
Discharge: STILL ADMITTED OR ADMINISTRATIVE | End: 2020-01-27
Attending: PSYCHOLOGIST

## 2020-01-27 DIAGNOSIS — F31.81 BIPOLAR II DISORDER (H): ICD-10-CM

## 2020-02-03 ENCOUNTER — HOSPITAL ENCOUNTER (OUTPATIENT)
Dept: NEUROLOGY | Facility: CLINIC | Age: 45
Setting detail: THERAPIES SERIES
Discharge: STILL ADMITTED OR ADMINISTRATIVE | End: 2020-02-03
Attending: PSYCHOLOGIST

## 2020-02-03 DIAGNOSIS — F31.81 BIPOLAR II DISORDER (H): ICD-10-CM

## 2020-02-10 ENCOUNTER — HOSPITAL ENCOUNTER (OUTPATIENT)
Dept: NEUROLOGY | Facility: CLINIC | Age: 45
Setting detail: THERAPIES SERIES
Discharge: STILL ADMITTED OR ADMINISTRATIVE | End: 2020-02-10
Attending: PSYCHOLOGIST

## 2020-02-10 DIAGNOSIS — F31.81 BIPOLAR II DISORDER (H): ICD-10-CM

## 2020-02-17 ENCOUNTER — HOSPITAL ENCOUNTER (OUTPATIENT)
Dept: NEUROLOGY | Facility: CLINIC | Age: 45
Setting detail: THERAPIES SERIES
Discharge: STILL ADMITTED OR ADMINISTRATIVE | End: 2020-02-17
Attending: PSYCHOLOGIST

## 2020-02-17 DIAGNOSIS — F31.81 BIPOLAR II DISORDER (H): ICD-10-CM

## 2020-02-18 ENCOUNTER — COMMUNICATION - HEALTHEAST (OUTPATIENT)
Dept: PALLIATIVE MEDICINE | Facility: OTHER | Age: 45
End: 2020-02-18

## 2020-02-18 DIAGNOSIS — M54.50 CHRONIC MIDLINE LOW BACK PAIN, UNSPECIFIED WHETHER SCIATICA PRESENT: ICD-10-CM

## 2020-02-18 DIAGNOSIS — G89.29 CHRONIC MIDLINE LOW BACK PAIN, UNSPECIFIED WHETHER SCIATICA PRESENT: ICD-10-CM

## 2020-02-18 DIAGNOSIS — M79.7 FIBROMYALGIA: ICD-10-CM

## 2020-02-18 DIAGNOSIS — G89.4 CHRONIC PAIN SYNDROME: ICD-10-CM

## 2020-02-18 DIAGNOSIS — E55.9 VITAMIN D DEFICIENCY, UNSPECIFIED: ICD-10-CM

## 2020-02-24 ENCOUNTER — HOSPITAL ENCOUNTER (OUTPATIENT)
Dept: NEUROLOGY | Facility: CLINIC | Age: 45
Setting detail: THERAPIES SERIES
Discharge: STILL ADMITTED OR ADMINISTRATIVE | End: 2020-02-24
Attending: PSYCHOLOGIST

## 2020-02-24 DIAGNOSIS — F31.81 BIPOLAR II DISORDER (H): ICD-10-CM

## 2020-03-02 ENCOUNTER — HOSPITAL ENCOUNTER (OUTPATIENT)
Dept: NEUROLOGY | Facility: CLINIC | Age: 45
Setting detail: THERAPIES SERIES
Discharge: STILL ADMITTED OR ADMINISTRATIVE | End: 2020-03-02
Attending: PSYCHOLOGIST

## 2020-03-02 DIAGNOSIS — F41.1 GAD (GENERALIZED ANXIETY DISORDER): ICD-10-CM

## 2020-03-09 ENCOUNTER — HOSPITAL ENCOUNTER (OUTPATIENT)
Dept: NEUROLOGY | Facility: CLINIC | Age: 45
Setting detail: THERAPIES SERIES
Discharge: STILL ADMITTED OR ADMINISTRATIVE | End: 2020-03-09
Attending: PSYCHOLOGIST

## 2020-03-09 DIAGNOSIS — F41.1 GAD (GENERALIZED ANXIETY DISORDER): ICD-10-CM

## 2020-03-11 ENCOUNTER — HEALTH MAINTENANCE LETTER (OUTPATIENT)
Age: 45
End: 2020-03-11

## 2020-03-16 ENCOUNTER — HOSPITAL ENCOUNTER (OUTPATIENT)
Dept: NEUROLOGY | Facility: CLINIC | Age: 45
Setting detail: THERAPIES SERIES
Discharge: STILL ADMITTED OR ADMINISTRATIVE | End: 2020-03-16
Attending: PSYCHOLOGIST

## 2020-03-16 DIAGNOSIS — F41.1 GAD (GENERALIZED ANXIETY DISORDER): ICD-10-CM

## 2020-03-18 ENCOUNTER — COMMUNICATION - HEALTHEAST (OUTPATIENT)
Dept: PALLIATIVE MEDICINE | Facility: OTHER | Age: 45
End: 2020-03-18

## 2020-03-18 DIAGNOSIS — E55.9 VITAMIN D DEFICIENCY, UNSPECIFIED: ICD-10-CM

## 2020-03-18 DIAGNOSIS — G89.4 CHRONIC PAIN SYNDROME: ICD-10-CM

## 2020-03-18 DIAGNOSIS — G89.29 CHRONIC MIDLINE LOW BACK PAIN, UNSPECIFIED WHETHER SCIATICA PRESENT: ICD-10-CM

## 2020-03-18 DIAGNOSIS — M79.7 FIBROMYALGIA: ICD-10-CM

## 2020-03-18 DIAGNOSIS — M54.50 CHRONIC MIDLINE LOW BACK PAIN, UNSPECIFIED WHETHER SCIATICA PRESENT: ICD-10-CM

## 2020-03-23 ENCOUNTER — HOSPITAL ENCOUNTER (OUTPATIENT)
Dept: NEUROLOGY | Facility: CLINIC | Age: 45
Setting detail: THERAPIES SERIES
Discharge: STILL ADMITTED OR ADMINISTRATIVE | End: 2020-03-23
Attending: PSYCHOLOGIST

## 2020-03-23 DIAGNOSIS — F41.1 GAD (GENERALIZED ANXIETY DISORDER): ICD-10-CM

## 2020-03-24 ENCOUNTER — HOSPITAL ENCOUNTER (OUTPATIENT)
Dept: PALLIATIVE MEDICINE | Facility: OTHER | Age: 45
Discharge: HOME | End: 2020-03-24
Attending: NURSE PRACTITIONER

## 2020-03-24 DIAGNOSIS — G89.29 CHRONIC MIDLINE LOW BACK PAIN, UNSPECIFIED WHETHER SCIATICA PRESENT: ICD-10-CM

## 2020-03-24 DIAGNOSIS — M79.7 FIBROMYALGIA: ICD-10-CM

## 2020-03-24 DIAGNOSIS — G89.4 CHRONIC PAIN SYNDROME: ICD-10-CM

## 2020-03-24 DIAGNOSIS — E55.9 VITAMIN D DEFICIENCY, UNSPECIFIED: ICD-10-CM

## 2020-03-24 DIAGNOSIS — M54.50 CHRONIC MIDLINE LOW BACK PAIN, UNSPECIFIED WHETHER SCIATICA PRESENT: ICD-10-CM

## 2020-03-25 ENCOUNTER — AMBULATORY - HEALTHEAST (OUTPATIENT)
Dept: NEUROLOGY | Facility: CLINIC | Age: 45
End: 2020-03-25

## 2020-03-30 ENCOUNTER — HOSPITAL ENCOUNTER (OUTPATIENT)
Dept: NEUROLOGY | Facility: CLINIC | Age: 45
Setting detail: THERAPIES SERIES
Discharge: STILL ADMITTED OR ADMINISTRATIVE | End: 2020-03-30
Attending: PSYCHOLOGIST

## 2020-03-30 DIAGNOSIS — F41.1 GAD (GENERALIZED ANXIETY DISORDER): ICD-10-CM

## 2020-04-06 ENCOUNTER — AMBULATORY - HEALTHEAST (OUTPATIENT)
Dept: NEUROLOGY | Facility: CLINIC | Age: 45
End: 2020-04-06

## 2020-04-06 ENCOUNTER — HOSPITAL ENCOUNTER (OUTPATIENT)
Dept: NEUROLOGY | Facility: CLINIC | Age: 45
Setting detail: THERAPIES SERIES
Discharge: STILL ADMITTED OR ADMINISTRATIVE | End: 2020-04-06
Attending: PSYCHOLOGIST

## 2020-04-06 DIAGNOSIS — F41.1 GENERALIZED ANXIETY DISORDER: ICD-10-CM

## 2020-04-13 ENCOUNTER — HOSPITAL ENCOUNTER (OUTPATIENT)
Dept: NEUROLOGY | Facility: CLINIC | Age: 45
Setting detail: THERAPIES SERIES
Discharge: STILL ADMITTED OR ADMINISTRATIVE | End: 2020-04-13
Attending: PSYCHOLOGIST

## 2020-04-13 DIAGNOSIS — F41.1 GAD (GENERALIZED ANXIETY DISORDER): ICD-10-CM

## 2020-04-27 ENCOUNTER — HOSPITAL ENCOUNTER (OUTPATIENT)
Dept: NEUROLOGY | Facility: CLINIC | Age: 45
Setting detail: THERAPIES SERIES
Discharge: STILL ADMITTED OR ADMINISTRATIVE | End: 2020-04-27
Attending: PSYCHOLOGIST

## 2020-04-27 DIAGNOSIS — F41.1 GAD (GENERALIZED ANXIETY DISORDER): ICD-10-CM

## 2020-04-29 ENCOUNTER — HOSPITAL ENCOUNTER (OUTPATIENT)
Dept: NEUROLOGY | Facility: CLINIC | Age: 45
Setting detail: THERAPIES SERIES
Discharge: STILL ADMITTED OR ADMINISTRATIVE | End: 2020-04-29
Attending: PSYCHOLOGIST

## 2020-04-29 DIAGNOSIS — F41.1 GAD (GENERALIZED ANXIETY DISORDER): ICD-10-CM

## 2020-05-04 ENCOUNTER — COMMUNICATION - HEALTHEAST (OUTPATIENT)
Dept: PALLIATIVE MEDICINE | Facility: OTHER | Age: 45
End: 2020-05-04

## 2020-05-04 DIAGNOSIS — G89.29 CHRONIC MIDLINE LOW BACK PAIN, UNSPECIFIED WHETHER SCIATICA PRESENT: ICD-10-CM

## 2020-05-04 DIAGNOSIS — G89.4 CHRONIC PAIN SYNDROME: ICD-10-CM

## 2020-05-04 DIAGNOSIS — M79.7 FIBROMYALGIA: ICD-10-CM

## 2020-05-04 DIAGNOSIS — E55.9 VITAMIN D DEFICIENCY, UNSPECIFIED: ICD-10-CM

## 2020-05-04 DIAGNOSIS — M54.50 CHRONIC MIDLINE LOW BACK PAIN, UNSPECIFIED WHETHER SCIATICA PRESENT: ICD-10-CM

## 2020-05-08 ENCOUNTER — HOSPITAL ENCOUNTER (OUTPATIENT)
Dept: NEUROLOGY | Facility: CLINIC | Age: 45
Setting detail: THERAPIES SERIES
Discharge: STILL ADMITTED OR ADMINISTRATIVE | End: 2020-05-08
Attending: PSYCHOLOGIST

## 2020-05-08 DIAGNOSIS — F41.1 GAD (GENERALIZED ANXIETY DISORDER): ICD-10-CM

## 2020-05-19 ENCOUNTER — HOSPITAL ENCOUNTER (OUTPATIENT)
Dept: PALLIATIVE MEDICINE | Facility: OTHER | Age: 45
Discharge: HOME | End: 2020-05-19
Attending: NURSE PRACTITIONER

## 2020-05-19 DIAGNOSIS — M54.50 CHRONIC MIDLINE LOW BACK PAIN, UNSPECIFIED WHETHER SCIATICA PRESENT: ICD-10-CM

## 2020-05-19 DIAGNOSIS — M79.7 FIBROMYALGIA: ICD-10-CM

## 2020-05-19 DIAGNOSIS — G89.29 CHRONIC MIDLINE LOW BACK PAIN, UNSPECIFIED WHETHER SCIATICA PRESENT: ICD-10-CM

## 2020-05-19 DIAGNOSIS — G89.4 CHRONIC PAIN SYNDROME: ICD-10-CM

## 2020-05-19 DIAGNOSIS — E55.9 VITAMIN D DEFICIENCY, UNSPECIFIED: ICD-10-CM

## 2020-05-20 ENCOUNTER — HOSPITAL ENCOUNTER (OUTPATIENT)
Dept: NEUROLOGY | Facility: CLINIC | Age: 45
Setting detail: THERAPIES SERIES
Discharge: STILL ADMITTED OR ADMINISTRATIVE | End: 2020-05-20
Attending: PSYCHOLOGIST

## 2020-05-20 DIAGNOSIS — F41.1 GAD (GENERALIZED ANXIETY DISORDER): ICD-10-CM

## 2020-05-27 ENCOUNTER — HOSPITAL ENCOUNTER (OUTPATIENT)
Dept: NEUROLOGY | Facility: CLINIC | Age: 45
Setting detail: THERAPIES SERIES
Discharge: STILL ADMITTED OR ADMINISTRATIVE | End: 2020-05-27
Attending: PSYCHOLOGIST

## 2020-05-27 DIAGNOSIS — F41.1 GAD (GENERALIZED ANXIETY DISORDER): ICD-10-CM

## 2020-06-03 ENCOUNTER — HOSPITAL ENCOUNTER (OUTPATIENT)
Dept: NEUROLOGY | Facility: CLINIC | Age: 45
Setting detail: THERAPIES SERIES
Discharge: STILL ADMITTED OR ADMINISTRATIVE | End: 2020-06-03
Attending: PSYCHOLOGIST

## 2020-06-03 DIAGNOSIS — F41.1 GAD (GENERALIZED ANXIETY DISORDER): ICD-10-CM

## 2020-06-24 ENCOUNTER — HOSPITAL ENCOUNTER (OUTPATIENT)
Dept: NEUROLOGY | Facility: CLINIC | Age: 45
Setting detail: THERAPIES SERIES
Discharge: STILL ADMITTED OR ADMINISTRATIVE | End: 2020-06-24
Attending: PSYCHOLOGIST

## 2020-06-24 DIAGNOSIS — F41.1 GAD (GENERALIZED ANXIETY DISORDER): ICD-10-CM

## 2020-07-01 ENCOUNTER — HOSPITAL ENCOUNTER (OUTPATIENT)
Dept: NEUROLOGY | Facility: CLINIC | Age: 45
Setting detail: THERAPIES SERIES
Discharge: STILL ADMITTED OR ADMINISTRATIVE | End: 2020-07-01
Attending: PSYCHOLOGIST

## 2020-07-01 DIAGNOSIS — F41.1 GAD (GENERALIZED ANXIETY DISORDER): ICD-10-CM

## 2020-07-06 ENCOUNTER — COMMUNICATION - HEALTHEAST (OUTPATIENT)
Dept: PALLIATIVE MEDICINE | Facility: OTHER | Age: 45
End: 2020-07-06

## 2020-07-06 DIAGNOSIS — G89.29 CHRONIC MIDLINE LOW BACK PAIN, UNSPECIFIED WHETHER SCIATICA PRESENT: ICD-10-CM

## 2020-07-06 DIAGNOSIS — E55.9 VITAMIN D DEFICIENCY, UNSPECIFIED: ICD-10-CM

## 2020-07-06 DIAGNOSIS — M79.7 FIBROMYALGIA: ICD-10-CM

## 2020-07-06 DIAGNOSIS — G89.4 CHRONIC PAIN SYNDROME: ICD-10-CM

## 2020-07-06 DIAGNOSIS — M54.50 CHRONIC MIDLINE LOW BACK PAIN, UNSPECIFIED WHETHER SCIATICA PRESENT: ICD-10-CM

## 2020-07-07 ENCOUNTER — HOSPITAL ENCOUNTER (OUTPATIENT)
Dept: PALLIATIVE MEDICINE | Facility: OTHER | Age: 45
Discharge: HOME | End: 2020-07-07
Attending: NURSE PRACTITIONER

## 2020-07-07 DIAGNOSIS — E55.9 VITAMIN D DEFICIENCY, UNSPECIFIED: ICD-10-CM

## 2020-07-07 DIAGNOSIS — G89.29 CHRONIC MIDLINE LOW BACK PAIN, UNSPECIFIED WHETHER SCIATICA PRESENT: ICD-10-CM

## 2020-07-07 DIAGNOSIS — M54.50 CHRONIC MIDLINE LOW BACK PAIN, UNSPECIFIED WHETHER SCIATICA PRESENT: ICD-10-CM

## 2020-07-07 DIAGNOSIS — M79.7 FIBROMYALGIA: ICD-10-CM

## 2020-07-07 DIAGNOSIS — G89.4 CHRONIC PAIN SYNDROME: ICD-10-CM

## 2020-07-08 ENCOUNTER — HOSPITAL ENCOUNTER (OUTPATIENT)
Dept: NEUROLOGY | Facility: CLINIC | Age: 45
Setting detail: THERAPIES SERIES
Discharge: STILL ADMITTED OR ADMINISTRATIVE | End: 2020-07-08
Attending: PSYCHOLOGIST

## 2020-07-08 DIAGNOSIS — F41.1 GAD (GENERALIZED ANXIETY DISORDER): ICD-10-CM

## 2020-07-22 ENCOUNTER — HOSPITAL ENCOUNTER (OUTPATIENT)
Dept: NEUROLOGY | Facility: CLINIC | Age: 45
Setting detail: THERAPIES SERIES
Discharge: STILL ADMITTED OR ADMINISTRATIVE | End: 2020-07-22
Attending: PSYCHOLOGIST

## 2020-07-22 DIAGNOSIS — F41.1 GAD (GENERALIZED ANXIETY DISORDER): ICD-10-CM

## 2020-07-29 ENCOUNTER — HOSPITAL ENCOUNTER (OUTPATIENT)
Dept: NEUROLOGY | Facility: CLINIC | Age: 45
Setting detail: THERAPIES SERIES
Discharge: STILL ADMITTED OR ADMINISTRATIVE | End: 2020-07-29
Attending: PSYCHOLOGIST

## 2020-07-29 DIAGNOSIS — F31.5 BIPOLAR I DISORDER, MOST RECENT EPISODE (OR CURRENT) DEPRESSED, SEVERE, SPECIFIED AS WITH PSYCHOTIC BEHAVIOR (H): ICD-10-CM

## 2020-08-03 ENCOUNTER — HOSPITAL ENCOUNTER (OUTPATIENT)
Dept: NEUROLOGY | Facility: CLINIC | Age: 45
Setting detail: THERAPIES SERIES
Discharge: STILL ADMITTED OR ADMINISTRATIVE | End: 2020-08-03
Attending: PSYCHOLOGIST

## 2020-08-03 DIAGNOSIS — F31.30 BIPOLAR I DISORDER, MOST RECENT EPISODE DEPRESSED (H): ICD-10-CM

## 2020-08-07 ENCOUNTER — COMMUNICATION - HEALTHEAST (OUTPATIENT)
Dept: PALLIATIVE MEDICINE | Facility: OTHER | Age: 45
End: 2020-08-07

## 2020-08-07 DIAGNOSIS — G89.4 CHRONIC PAIN SYNDROME: ICD-10-CM

## 2020-08-07 DIAGNOSIS — M79.7 FIBROMYALGIA: ICD-10-CM

## 2020-08-07 DIAGNOSIS — M54.50 CHRONIC MIDLINE LOW BACK PAIN, UNSPECIFIED WHETHER SCIATICA PRESENT: ICD-10-CM

## 2020-08-07 DIAGNOSIS — G89.29 CHRONIC MIDLINE LOW BACK PAIN, UNSPECIFIED WHETHER SCIATICA PRESENT: ICD-10-CM

## 2020-08-07 DIAGNOSIS — E55.9 VITAMIN D DEFICIENCY, UNSPECIFIED: ICD-10-CM

## 2020-08-12 ENCOUNTER — HOSPITAL ENCOUNTER (OUTPATIENT)
Dept: NEUROLOGY | Facility: CLINIC | Age: 45
Setting detail: THERAPIES SERIES
Discharge: STILL ADMITTED OR ADMINISTRATIVE | End: 2020-08-12
Attending: PSYCHOLOGIST

## 2020-08-12 DIAGNOSIS — F31.30 BIPOLAR I DISORDER, MOST RECENT EPISODE DEPRESSED (H): ICD-10-CM

## 2020-08-19 ENCOUNTER — HOSPITAL ENCOUNTER (OUTPATIENT)
Dept: NEUROLOGY | Facility: CLINIC | Age: 45
Setting detail: THERAPIES SERIES
Discharge: STILL ADMITTED OR ADMINISTRATIVE | End: 2020-08-19
Attending: PSYCHOLOGIST

## 2020-08-19 DIAGNOSIS — F31.30 BIPOLAR I DISORDER, MOST RECENT EPISODE DEPRESSED (H): ICD-10-CM

## 2020-08-26 ENCOUNTER — HOSPITAL ENCOUNTER (OUTPATIENT)
Dept: NEUROLOGY | Facility: CLINIC | Age: 45
Setting detail: THERAPIES SERIES
Discharge: STILL ADMITTED OR ADMINISTRATIVE | End: 2020-08-26
Attending: PSYCHOLOGIST

## 2020-08-26 DIAGNOSIS — F31.30 BIPOLAR I DISORDER, MOST RECENT EPISODE DEPRESSED (H): ICD-10-CM

## 2020-09-08 ENCOUNTER — HOSPITAL ENCOUNTER (OUTPATIENT)
Dept: PALLIATIVE MEDICINE | Facility: OTHER | Age: 45
Discharge: HOME | End: 2020-09-08
Attending: NURSE PRACTITIONER

## 2020-09-08 DIAGNOSIS — M79.7 FIBROMYALGIA: ICD-10-CM

## 2020-09-08 DIAGNOSIS — R60.9 EDEMA, UNSPECIFIED TYPE: ICD-10-CM

## 2020-09-08 DIAGNOSIS — G89.4 CHRONIC PAIN SYNDROME: ICD-10-CM

## 2020-09-08 DIAGNOSIS — E55.9 VITAMIN D DEFICIENCY, UNSPECIFIED: ICD-10-CM

## 2020-09-08 DIAGNOSIS — M54.50 CHRONIC MIDLINE LOW BACK PAIN, UNSPECIFIED WHETHER SCIATICA PRESENT: ICD-10-CM

## 2020-09-08 DIAGNOSIS — G89.29 CHRONIC MIDLINE LOW BACK PAIN, UNSPECIFIED WHETHER SCIATICA PRESENT: ICD-10-CM

## 2020-09-09 ENCOUNTER — HOSPITAL ENCOUNTER (OUTPATIENT)
Dept: ADMINISTRATIVE | Facility: OTHER | Age: 45
Discharge: HOME | End: 2020-09-09

## 2020-09-09 ENCOUNTER — HOSPITAL ENCOUNTER (OUTPATIENT)
Dept: NEUROLOGY | Facility: CLINIC | Age: 45
Setting detail: THERAPIES SERIES
Discharge: STILL ADMITTED OR ADMINISTRATIVE | End: 2020-09-09
Attending: PSYCHOLOGIST

## 2020-09-09 DIAGNOSIS — F31.30 BIPOLAR I DISORDER, MOST RECENT EPISODE DEPRESSED (H): ICD-10-CM

## 2020-09-12 LAB

## 2020-09-17 ENCOUNTER — OFFICE VISIT - HEALTHEAST (OUTPATIENT)
Dept: VASCULAR SURGERY | Facility: CLINIC | Age: 45
End: 2020-09-17

## 2020-09-17 DIAGNOSIS — M79.606 LEG PAIN: ICD-10-CM

## 2020-09-17 DIAGNOSIS — R60.9 EDEMA, UNSPECIFIED TYPE: ICD-10-CM

## 2020-09-17 DIAGNOSIS — E55.9 VITAMIN D DEFICIENCY: ICD-10-CM

## 2020-09-17 DIAGNOSIS — I73.9 PAD (PERIPHERAL ARTERY DISEASE) (H): ICD-10-CM

## 2020-09-17 DIAGNOSIS — M79.89 LEG SWELLING: ICD-10-CM

## 2020-09-18 LAB — 25(OH)D3 SERPL-MCNC: 18.2 NG/ML (ref 30–80)

## 2020-09-30 ENCOUNTER — HOSPITAL ENCOUNTER (OUTPATIENT)
Dept: NEUROLOGY | Facility: CLINIC | Age: 45
Setting detail: THERAPIES SERIES
Discharge: STILL ADMITTED OR ADMINISTRATIVE | End: 2020-09-30
Attending: PSYCHOLOGIST

## 2020-09-30 DIAGNOSIS — F31.4 SEVERE BIPOLAR I DISORDER, CURRENT OR MOST RECENT EPISODE DEPRESSED (H): ICD-10-CM

## 2020-10-05 ENCOUNTER — RECORDS - HEALTHEAST (OUTPATIENT)
Dept: VASCULAR ULTRASOUND | Facility: CLINIC | Age: 45
End: 2020-10-05

## 2020-10-05 DIAGNOSIS — I73.9 PERIPHERAL VASCULAR DISEASE, UNSPECIFIED (H): ICD-10-CM

## 2020-10-05 DIAGNOSIS — R60.9 EDEMA, UNSPECIFIED: ICD-10-CM

## 2020-10-05 DIAGNOSIS — M79.606 PAIN IN LEG, UNSPECIFIED: ICD-10-CM

## 2020-10-05 DIAGNOSIS — M79.89 OTHER SPECIFIED SOFT TISSUE DISORDERS: ICD-10-CM

## 2020-10-07 ENCOUNTER — HOSPITAL ENCOUNTER (OUTPATIENT)
Dept: NEUROLOGY | Facility: CLINIC | Age: 45
Setting detail: THERAPIES SERIES
Discharge: STILL ADMITTED OR ADMINISTRATIVE | End: 2020-10-07
Attending: PSYCHOLOGIST

## 2020-10-07 DIAGNOSIS — F31.5 BIPOLAR I DISORDER, MOST RECENT EPISODE (OR CURRENT) DEPRESSED, SEVERE, SPECIFIED AS WITH PSYCHOTIC BEHAVIOR (H): ICD-10-CM

## 2020-10-08 ENCOUNTER — OFFICE VISIT - HEALTHEAST (OUTPATIENT)
Dept: VASCULAR SURGERY | Facility: CLINIC | Age: 45
End: 2020-10-08

## 2020-10-08 DIAGNOSIS — M79.89 LEG SWELLING: ICD-10-CM

## 2020-10-14 ENCOUNTER — HOSPITAL ENCOUNTER (OUTPATIENT)
Dept: NEUROLOGY | Facility: CLINIC | Age: 45
Setting detail: THERAPIES SERIES
Discharge: STILL ADMITTED OR ADMINISTRATIVE | End: 2020-10-14
Attending: PSYCHOLOGIST

## 2020-10-14 DIAGNOSIS — F31.4 SEVERE BIPOLAR I DISORDER, CURRENT OR MOST RECENT EPISODE DEPRESSED (H): ICD-10-CM

## 2020-11-06 ENCOUNTER — COMMUNICATION - HEALTHEAST (OUTPATIENT)
Dept: PALLIATIVE MEDICINE | Facility: OTHER | Age: 45
End: 2020-11-06

## 2020-11-06 DIAGNOSIS — M79.7 FIBROMYALGIA: ICD-10-CM

## 2020-11-06 DIAGNOSIS — G89.4 CHRONIC PAIN SYNDROME: ICD-10-CM

## 2020-11-06 DIAGNOSIS — M54.50 CHRONIC MIDLINE LOW BACK PAIN, UNSPECIFIED WHETHER SCIATICA PRESENT: ICD-10-CM

## 2020-11-06 DIAGNOSIS — E55.9 VITAMIN D DEFICIENCY, UNSPECIFIED: ICD-10-CM

## 2020-11-06 DIAGNOSIS — G89.29 CHRONIC MIDLINE LOW BACK PAIN, UNSPECIFIED WHETHER SCIATICA PRESENT: ICD-10-CM

## 2020-11-11 ENCOUNTER — HOSPITAL ENCOUNTER (OUTPATIENT)
Dept: NEUROLOGY | Facility: CLINIC | Age: 45
Setting detail: THERAPIES SERIES
Discharge: STILL ADMITTED OR ADMINISTRATIVE | End: 2020-11-11
Attending: PSYCHOLOGIST

## 2020-11-11 DIAGNOSIS — F31.30 BIPOLAR I DISORDER, MOST RECENT EPISODE DEPRESSED (H): ICD-10-CM

## 2020-11-25 ENCOUNTER — HOSPITAL ENCOUNTER (OUTPATIENT)
Dept: NEUROLOGY | Facility: CLINIC | Age: 45
Setting detail: THERAPIES SERIES
Discharge: STILL ADMITTED OR ADMINISTRATIVE | End: 2020-11-25
Attending: PSYCHOLOGIST

## 2020-11-25 DIAGNOSIS — F31.30 BIPOLAR I DISORDER, MOST RECENT EPISODE DEPRESSED (H): ICD-10-CM

## 2020-11-27 ENCOUNTER — COMMUNICATION - HEALTHEAST (OUTPATIENT)
Dept: SCHEDULING | Facility: CLINIC | Age: 45
End: 2020-11-27

## 2020-12-01 ENCOUNTER — HOSPITAL ENCOUNTER (OUTPATIENT)
Dept: PALLIATIVE MEDICINE | Facility: OTHER | Age: 45
Discharge: HOME | End: 2020-12-01
Attending: NURSE PRACTITIONER

## 2020-12-01 DIAGNOSIS — M79.7 FIBROMYALGIA: ICD-10-CM

## 2020-12-01 DIAGNOSIS — E55.9 VITAMIN D DEFICIENCY, UNSPECIFIED: ICD-10-CM

## 2020-12-01 DIAGNOSIS — G89.29 CHRONIC MIDLINE LOW BACK PAIN, UNSPECIFIED WHETHER SCIATICA PRESENT: ICD-10-CM

## 2020-12-01 DIAGNOSIS — G89.29 OTHER CHRONIC PAIN: ICD-10-CM

## 2020-12-01 DIAGNOSIS — G89.4 CHRONIC PAIN SYNDROME: ICD-10-CM

## 2020-12-01 DIAGNOSIS — M54.50 CHRONIC MIDLINE LOW BACK PAIN, UNSPECIFIED WHETHER SCIATICA PRESENT: ICD-10-CM

## 2020-12-02 ENCOUNTER — AMBULATORY - HEALTHEAST (OUTPATIENT)
Dept: NEUROLOGY | Facility: CLINIC | Age: 45
End: 2020-12-02

## 2020-12-09 ENCOUNTER — HOSPITAL ENCOUNTER (OUTPATIENT)
Dept: NEUROLOGY | Facility: CLINIC | Age: 45
Setting detail: THERAPIES SERIES
Discharge: STILL ADMITTED OR ADMINISTRATIVE | End: 2020-12-09
Attending: PSYCHOLOGIST

## 2020-12-09 DIAGNOSIS — F31.4 SEVERE BIPOLAR I DISORDER, CURRENT OR MOST RECENT EPISODE DEPRESSED (H): ICD-10-CM

## 2020-12-23 ENCOUNTER — HOSPITAL ENCOUNTER (OUTPATIENT)
Dept: NEUROLOGY | Facility: CLINIC | Age: 45
Setting detail: THERAPIES SERIES
Discharge: STILL ADMITTED OR ADMINISTRATIVE | End: 2020-12-23
Attending: PSYCHOLOGIST

## 2020-12-23 DIAGNOSIS — F31.30 BIPOLAR I DISORDER, MOST RECENT EPISODE DEPRESSED (H): ICD-10-CM

## 2021-01-03 ENCOUNTER — HEALTH MAINTENANCE LETTER (OUTPATIENT)
Age: 46
End: 2021-01-03

## 2021-01-20 ENCOUNTER — HOSPITAL ENCOUNTER (OUTPATIENT)
Dept: NEUROLOGY | Facility: CLINIC | Age: 46
Setting detail: THERAPIES SERIES
Discharge: STILL ADMITTED OR ADMINISTRATIVE | End: 2021-01-20
Attending: PSYCHOLOGIST

## 2021-01-20 DIAGNOSIS — F31.4 SEVERE BIPOLAR I DISORDER, CURRENT OR MOST RECENT EPISODE DEPRESSED (H): ICD-10-CM

## 2021-01-25 ENCOUNTER — HOSPITAL ENCOUNTER (OUTPATIENT)
Dept: PALLIATIVE MEDICINE | Facility: OTHER | Age: 46
Discharge: HOME | End: 2021-01-25
Attending: NURSE PRACTITIONER

## 2021-01-25 DIAGNOSIS — M54.50 CHRONIC MIDLINE LOW BACK PAIN, UNSPECIFIED WHETHER SCIATICA PRESENT: ICD-10-CM

## 2021-01-25 DIAGNOSIS — G89.4 CHRONIC PAIN SYNDROME: ICD-10-CM

## 2021-01-25 DIAGNOSIS — M79.7 FIBROMYALGIA: ICD-10-CM

## 2021-01-25 DIAGNOSIS — E55.9 VITAMIN D DEFICIENCY, UNSPECIFIED: ICD-10-CM

## 2021-01-25 DIAGNOSIS — G89.29 CHRONIC MIDLINE LOW BACK PAIN, UNSPECIFIED WHETHER SCIATICA PRESENT: ICD-10-CM

## 2021-02-05 ENCOUNTER — HOSPITAL ENCOUNTER (OUTPATIENT)
Dept: NEUROLOGY | Facility: CLINIC | Age: 46
Setting detail: THERAPIES SERIES
Discharge: STILL ADMITTED OR ADMINISTRATIVE | End: 2021-02-05
Attending: PSYCHOLOGIST

## 2021-02-05 DIAGNOSIS — F31.30 BIPOLAR I DISORDER, MOST RECENT EPISODE DEPRESSED (H): ICD-10-CM

## 2021-02-19 ENCOUNTER — COMMUNICATION - HEALTHEAST (OUTPATIENT)
Dept: PALLIATIVE MEDICINE | Facility: OTHER | Age: 46
End: 2021-02-19

## 2021-02-19 DIAGNOSIS — G89.29 CHRONIC MIDLINE LOW BACK PAIN, UNSPECIFIED WHETHER SCIATICA PRESENT: ICD-10-CM

## 2021-02-19 DIAGNOSIS — G89.4 CHRONIC PAIN SYNDROME: ICD-10-CM

## 2021-02-19 DIAGNOSIS — M54.50 CHRONIC MIDLINE LOW BACK PAIN, UNSPECIFIED WHETHER SCIATICA PRESENT: ICD-10-CM

## 2021-02-19 DIAGNOSIS — E55.9 VITAMIN D DEFICIENCY, UNSPECIFIED: ICD-10-CM

## 2021-02-19 DIAGNOSIS — M79.7 FIBROMYALGIA: ICD-10-CM

## 2021-02-24 ENCOUNTER — HOSPITAL ENCOUNTER (OUTPATIENT)
Dept: NEUROLOGY | Facility: CLINIC | Age: 46
Setting detail: THERAPIES SERIES
Discharge: STILL ADMITTED OR ADMINISTRATIVE | End: 2021-02-24
Attending: PSYCHOLOGIST

## 2021-02-24 DIAGNOSIS — F31.4 SEVERE BIPOLAR I DISORDER, CURRENT OR MOST RECENT EPISODE DEPRESSED (H): ICD-10-CM

## 2021-03-03 ENCOUNTER — AMBULATORY - HEALTHEAST (OUTPATIENT)
Dept: NEUROLOGY | Facility: CLINIC | Age: 46
End: 2021-03-03

## 2021-03-17 ENCOUNTER — HOSPITAL ENCOUNTER (OUTPATIENT)
Dept: NEUROLOGY | Facility: CLINIC | Age: 46
Setting detail: THERAPIES SERIES
Discharge: STILL ADMITTED OR ADMINISTRATIVE | End: 2021-03-17
Attending: PSYCHOLOGIST

## 2021-03-17 DIAGNOSIS — F31.5 BIPOLAR I DISORDER, MOST RECENT EPISODE (OR CURRENT) DEPRESSED, SEVERE, SPECIFIED AS WITH PSYCHOTIC BEHAVIOR (H): ICD-10-CM

## 2021-03-26 ENCOUNTER — HOSPITAL ENCOUNTER (OUTPATIENT)
Dept: PALLIATIVE MEDICINE | Facility: OTHER | Age: 46
Discharge: HOME | End: 2021-03-26
Attending: NURSE PRACTITIONER

## 2021-03-26 DIAGNOSIS — M79.7 FIBROMYALGIA: ICD-10-CM

## 2021-03-26 DIAGNOSIS — G89.29 CHRONIC MIDLINE LOW BACK PAIN, UNSPECIFIED WHETHER SCIATICA PRESENT: ICD-10-CM

## 2021-03-26 DIAGNOSIS — G89.4 CHRONIC PAIN SYNDROME: ICD-10-CM

## 2021-03-26 DIAGNOSIS — M54.50 CHRONIC MIDLINE LOW BACK PAIN, UNSPECIFIED WHETHER SCIATICA PRESENT: ICD-10-CM

## 2021-03-26 DIAGNOSIS — E55.9 VITAMIN D DEFICIENCY, UNSPECIFIED: ICD-10-CM

## 2021-03-31 ENCOUNTER — AMBULATORY - HEALTHEAST (OUTPATIENT)
Dept: NEUROLOGY | Facility: CLINIC | Age: 46
End: 2021-03-31

## 2021-04-08 ENCOUNTER — HOSPITAL ENCOUNTER (OUTPATIENT)
Dept: NEUROLOGY | Facility: CLINIC | Age: 46
Setting detail: THERAPIES SERIES
Discharge: STILL ADMITTED OR ADMINISTRATIVE | End: 2021-04-08
Attending: PSYCHOLOGIST

## 2021-04-08 DIAGNOSIS — F31.5 BIPOLAR I DISORDER, MOST RECENT EPISODE (OR CURRENT) DEPRESSED, SEVERE, SPECIFIED AS WITH PSYCHOTIC BEHAVIOR (H): ICD-10-CM

## 2021-04-21 ENCOUNTER — HOSPITAL ENCOUNTER (OUTPATIENT)
Dept: NEUROLOGY | Facility: CLINIC | Age: 46
Setting detail: THERAPIES SERIES
Discharge: STILL ADMITTED OR ADMINISTRATIVE | End: 2021-04-21
Attending: PSYCHOLOGIST

## 2021-04-21 DIAGNOSIS — F31.5 BIPOLAR I DISORDER, MOST RECENT EPISODE (OR CURRENT) DEPRESSED, SEVERE, SPECIFIED AS WITH PSYCHOTIC BEHAVIOR (H): ICD-10-CM

## 2021-04-25 ENCOUNTER — HEALTH MAINTENANCE LETTER (OUTPATIENT)
Age: 46
End: 2021-04-25

## 2021-05-05 ENCOUNTER — HOSPITAL ENCOUNTER (OUTPATIENT)
Dept: NEUROLOGY | Facility: CLINIC | Age: 46
Setting detail: THERAPIES SERIES
Discharge: STILL ADMITTED OR ADMINISTRATIVE | End: 2021-05-05
Attending: PSYCHOLOGIST
Payer: MEDICARE

## 2021-05-05 DIAGNOSIS — F31.30 BIPOLAR I DISORDER, MOST RECENT EPISODE DEPRESSED (H): ICD-10-CM

## 2021-05-20 ENCOUNTER — OFFICE VISIT - HEALTHEAST (OUTPATIENT)
Dept: NEUROLOGY | Facility: CLINIC | Age: 46
End: 2021-05-20

## 2021-05-20 DIAGNOSIS — F31.30 BIPOLAR I DISORDER, MOST RECENT EPISODE DEPRESSED (H): ICD-10-CM

## 2021-05-21 ENCOUNTER — HOSPITAL ENCOUNTER (OUTPATIENT)
Dept: PALLIATIVE MEDICINE | Facility: OTHER | Age: 46
Discharge: HOME | End: 2021-05-21
Attending: NURSE PRACTITIONER
Payer: MEDICARE

## 2021-05-21 DIAGNOSIS — G89.4 CHRONIC PAIN SYNDROME: ICD-10-CM

## 2021-05-21 DIAGNOSIS — G89.29 CHRONIC MIDLINE LOW BACK PAIN, UNSPECIFIED WHETHER SCIATICA PRESENT: ICD-10-CM

## 2021-05-21 DIAGNOSIS — E55.9 VITAMIN D DEFICIENCY, UNSPECIFIED: ICD-10-CM

## 2021-05-21 DIAGNOSIS — M54.50 CHRONIC MIDLINE LOW BACK PAIN, UNSPECIFIED WHETHER SCIATICA PRESENT: ICD-10-CM

## 2021-05-21 DIAGNOSIS — M79.7 FIBROMYALGIA: ICD-10-CM

## 2021-05-30 NOTE — TELEPHONE ENCOUNTER
Former Rosalio had her 6 month annual MRI on 6/18/19 (results in Epic) and she would like someone to explain the results. Also, does she need to come in to have a consult with another surgeon, and who would be recommend based on her history?    Please call patient at [phone].

## 2021-05-30 NOTE — TELEPHONE ENCOUNTER
I let her know she should establish care with new provider. She states she has no preference.  Scheduled her with Dr. Luna on 8-1-19.  Her MRI shows the left middle cranial fossa meningioma is staple in size and appearance.  There has been slight interval increase in size of th avidly enhancing extra axial masll compatible with meningioma along the lateral inferior convexity.  Will present her imaging at [facility] on 8-1-19 and she will have appt with Dr. Luna after.   Michelle Dominguez RN   [facility]surgery Nurse Navigator

## 2021-05-31 NOTE — PROGRESS NOTES
Pt arrived ambulatory by herself for consultation with Dr. Galvez for SRS to a meningioma. Pt was given new patient folder which included welcome letter, Marge Understanding RT, SRS pathway and s/e sheet. Pathway was explained in detail and pt verbalized their understanding. We discussed the routine for RT including today's consult, simulation CT and mask at [facility] and likely treatment at [facility] as well. She was encouraged to call with any questions, concerns, or decisions. Pt has fibromyalgia and c/o body pain at best is 5/10, today 7/10. Headaches daily which she rates 8/10 currently, MD made aware. See flowsheet for rest of assessment.

## 2021-05-31 NOTE — PATIENT INSTRUCTIONS - HE
Orders Placed This Encounter   Procedures     Ambulatory referral to Neurology     Referral Priority:   Routine     Referral Type:   Consultation     Referral Reason:   Evaluation and Treatment     [facility] Location[facility]   [facility]     Requested Specialty:   Neurology     Number of Visits Requested:   1     Ambulatory referral to Radiation Oncology     Referral Priority:   Routine     Referral Type:   Consultation     Referral Reason:   Evaluation and Treatment     Referred to Provider:   Sandy Galvez MD     Requested Specialty:   Radiation Oncology     Number of Visits Requested:   1

## 2021-05-31 NOTE — PROGRESS NOTES
Rohan is here to establish care. She is a former pt of Dr. Santamaria, who was following her brain tumors. Pt states her HA/migraines have become worse in severity and more frequent.   Katie,KIRT

## 2021-05-31 NOTE — PROGRESS NOTES
[facility] Radiation Oncology Consult Note    Patient: Rohan Prince  MRN: [id number]  Date of Service: 08/19/2019    Assessment:     1. Meningioma (H)        Impression/Plan:   43 y.o. female with interval increase in right frontal meningioma, 10 x 5 x 8 mm. Stable anteromedial left middle cranial fossa meningioma.    1. We discussed risks and benefits, in detail, of radiation therapy including side effects as described below. The patient voices understanding of the information discussed and wishes to proceed forward with treatment. We will obtain CT sim to begin radiation planning. All questions and concerns addressed.    2. Patient has had a hysterectomy and not sexually active, so pregnancy not a risk during treatment.   3. She has fibromyalgia, and the radiation may make her fatigue temporarily worse.     Face to face time  60 minutes with > 75% spent on consultation, education and coordination of care.  Intent of Therapy: Curative     Side effects that may occur during or within weeks after Radiation Therapy      Fatigue and general weakness    Headache and scalp irritation    Loss of hair    Nausea, vomiting, and decrease in appetite    Darkening, irritation, itchiness, redness, dryness, peeling, thickening, scabbing, and ulceration of the scalp, neck and forehead    Radiation necrosis which may require urgent surgical intervention or short/long term steroid use    Side effects that may occur months or years after Radiation Therapy      Development of another tumor or cancer           Dizziness and balance problems    Decrease in hormone production    Brain inflammation or necrosis that may cause various neurologic symptoms    Seizures    Decrease in memory and thinking abilities    Decrease in hearing and feeling of ear congestion    Eye dryness and irritation    Loss of vision    Cataracts in the eyes    Poor healing after a trauma or surgery in the irradiated area    Facial numbness, pain and  weakness      The risks, benefits and alternatives to radiation therapy were outlined with the patient. All questions were answered and a consent was signed.      Subjective:      HPI: Rohan Prince is a 43 y.o. female with an enlarging meningioma along the lateral inferior right frontal convexity, 10 x 5 x 8 mm, and stable anteromedial left middle cranial fossa meningioma.     The patient initially presented to the ED on 5/29/2018 after a fall. CTA head and neck was performed which revealed a small left middle cranial fossa and right frontal convexity presumed meningiomas. She has been watched with imaging since that time. Slight interval increase noted on 6/15/2018 and neuro tumor board committee consensus to follow with short term imaging. MRI December 2018 showing stable meningiomas. Her most recent MRI head on 6/18/2019 shows stable left meningioma, measuring 11 x 7 x 9 mm, however the right sided meningioma now measures 10 x 5 x 8 mm, previously measured 4 x 1.5 x 5 mm in June 2018.     The patient presents today to discuss radiation treatment. She describes fatigue at baseline that she feels is due to taking care of her active 6 year old. The patient reports previous history of some kind of excisional biopsy for a large mass on her neck when she was a child but denies any previous radiation she can remember. Baseline right sided lip droop since TIA in 2016, no worsening. Baseline spastic gait which is stable. History of chronic pain and fibromyalgia for which she sees pain clinic, managed on Tramadol and Lyrica. History of migraines and headaches, currently experiencing headache which is not new and managed on OTC medications. History of hysterectomy + left salpingo oophorectomy, currently not sexually active.     Prior Radiation: No  Concurrent Chemotherapy: No    Current Outpatient Medications   Medication Sig Dispense Refill     escitalopram oxalate (LEXAPRO) 20 MG tablet Take 20 mg by mouth daily.        hydrOXYzine pamoate (VISTARIL) 25 MG capsule Take 1-2 capsules (25-50 mg total) by mouth every 6 (six) hours as needed for anxiety or other (pain). 60 capsule 0     pregabalin (LYRICA) 75 MG capsule Take 150 mg by mouth 2 (two) times a day. x2 capsules             traMADol (ULTRAM) 50 mg tablet Take 1 tablet by mouth 3 (three) times a day as needed.  1     No current facility-administered medications for this visit.      Past Medical History:   Diagnosis Date     Cancer (H)     cervical cancer     Chronic pain disorder      DJD (degenerative joint disease)      Endometriosis      Fibromyalgia      Kidney infection      Meningioma, recurrent of brain (H)      Migraine      TIA (transient ischemic attack)      Past Surgical History:   Procedure Laterality Date     CERVICAL FUSION      2016 C3-C7      HYSTERECTOMY       KNEE SURGERY       MASTECTOMY Right     partial     partial masetectomy       DE TOTAL KNEE ARTHROPLASTY Right 1/17/2019    Procedure: RIGHT TOTAL KNEE ARTHROPLASTY;  Surgeon: Pedro Moss DO;  Location: [facility] Main OR;  Service: Orthopedics     RIGHT OOPHORECTOMY       Chlorhexidine  Family History   Problem Relation Age of Onset     Cancer Mother      Stroke Mother      Other Mother         swelling     Bipolar disorder Mother      Social History     Socioeconomic History     Marital status: Single     Spouse name: Not on file     Number of children: Not on file     Years of education: Not on file     Highest education level: Not on file   Occupational History     Not on file   Social Needs     Financial resource strain: Not on file     Food insecurity:     Worry: Not on file     Inability: Not on file     Transportation needs:     Medical: Not on file     Non-medical: Not on file   Tobacco Use     Smoking status: Never Smoker     Smokeless tobacco: Never Used   Substance and Sexual Activity     Alcohol use: No     Drug use: No     Sexual activity: Not Currently   Lifestyle     Physical  activity:     Days per week: Not on file     Minutes per session: Not on file     Stress: Not on file   Relationships     Social connections:     Talks on phone: Not on file     Gets together: Not on file     Attends Taoist service: Not on file     Active member of club or organization: Not on file     Attends meetings of clubs or organizations: Not on file     Relationship status: Not on file     Intimate partner violence:     Fear of current or ex partner: Not on file     Emotionally abused: Not on file     Physically abused: Not on file     Forced sexual activity: Not on file   Other Topics Concern     Not on file   Social History Narrative     Not on file        Review of Systems:        General  Constitutional (WDL): Exceptions to WDL  Fatigue: Fatigue relieved by rest  EENT  Eye Disorder (WDL): Exceptions to WDL  Blurred Vision: Intervention not indicated  Ear Disorder (WDL): Exceptions to WDL(daughter claimes mom has hearing loss)  Respiratory       Respiratory (WDL): Within Defined Limits  Cardiovascular  Cardiovascular (WDL): All cardiovascular elements are within defined limits  Endocrine     Gastrointestinal  Gastrointestinal (WDL): Exceptions to WDL  Constipation: Occasional or intermittent symptoms, occasional use of stool softeners, laxatives, dietary modification, or enema  Musculoskeletal  Musculoskeletal and Connetive Tissue Disorders (WDL): Exceptions to WDL  Arthralgia: Mild pain  Muscle Weakness : Symptomatic, perceived by patient but not evident on physical exam  Myalgia: Moderate pain, limiting instrumental ADL(fibromyalgia pain)  Integumentary               Integumentary (WDL): All integumentary elements are within defined limits  Neurological  Neurosensory (WDL): Exceptions to WDL(regular migraines and increased HAs daily now)  Ataxia: Asymptomatic, clinical or diagnostic observations only, intervention not indicated  Dizziness: Mild unsteadiness or sensation of  "movement  Psychological/Emotional   Patient Coping: Accepting;Open/discussion  Hematological/Lymphatic  Lymph (WDL): All lymph disorder elements are within defined limits  Dermatologic     Genitourinary/Reproductive  Genitourinary (WDL): All genitourinary elements are within defined limits  Reproductive     Pain              Currently in Pain: Yes  Pain Score (Initial OR Reassessment): 7  Pain Frequency: Constant/continuous  Location: body pain 7/10, headache 8/10. \"Pain never goes below a 5/10\"  Pain Intervention(s): Home medication  Response to Interventions: usually meds keep pain under control (5/10)   AUA Assessment                    Accompanied by  Accompanied by: Alone      Objective:     Physical Exam    Vitals:    08/19/19 1326   BP: 112/67   Pulse: 77   Temp: 98.7  F (37.1  C)   TempSrc: Oral   SpO2: 97%       GENERAL: No acute distress. Cooperative in conversation.   HEENT: Pupils are equal, round and reactive. Oromucosa is clean and intact. No ulcerations or mucositis noted. No bleeding noted. Scars on right lateral neck from \"benign tumor\" removed when she was 4, and cervical fixation.   RESP: Normal respiratory rate.  CV: Regular, rate and rhythm.  ABD: Soft, nontender.  MUSCULOSKELETAL: No palpable points of tenderness.   PSYCH: Within normal limits. No depression or anxiety.  SKIN: Warm dry intact.   LYMPH: No cervical, supraclavicular lymphadenopathy.  EXTREMITIES: No edema .  NEUROLOGIC:Right facial asymmetry, otherwise, CNIII-XII symmetric, normal strength, gait impaired by knee discomfort from TKA       Recent Labs: No results found for this or any previous visit (from the past 168 hour(s)).    Imaging:   EXAM: MR BRAIN WITHOUT AND WITH CONTRAST  LOCATION: [facility]  DATE/TIME: 06/18/2019, 10:55 AM     INDICATION: Six-month follow-up meningiomas, due June 2019, CC Dr. Santamaria.  COMPARISON: 12/17/2010 brain MRI.  CONTRAST: Gadavist 8.7 mL IV.  TECHNIQUE: Multiplanar multisequence head " MRI without and with intravenous contrast including dynamic susceptibility contrast perfusion imaging.     FINDINGS:  INTRACRANIAL CONTENTS: Avidly enhancing extra-axial mass with an adjacent dural tail compatible with a small meningioma along the anteromedial left middle cranial fossa measures 11 x 7 x 9 mm in oblique AP by oblique transverse by craniocaudal   dimensions, respectively. When remeasured in the same planes, this is grossly stable. Mild mass effect on the anterior/medial left temporal lobe is unchanged. No associated parenchymal edema.     Avidly enhancing extra-axial mass compatible with meningioma along the lateral inferior right frontal convexity measures 10 x 5 x 8 mm. This has increased in size slightly since previous, when it measured 9 x 4 x 7 mm in the same plane. Mass effect on   the adjacent brain parenchyma is minimal with no parenchymal signal abnormality. No additional abnormally enhancing masses elsewhere. No associated perfusion abnormality.     No finding for acute infarct or intracranial hemorrhage. Nonspecific nonenhancing cystic focus at the anterior right temporal lobe is unchanged. No additional parenchymal signal abnormality elsewhere. Normal ventricular size. Major intracranial vascular   flow-voids are unchanged. Brainstem and cerebellum are unremarkable. Cerebellar tonsils are normally positioned. Prominence of the right endolymphatic sac is unchanged.     SELLA: No significant abnormality accounting for technique.     BONES/SOFT TISSUES: No aggressive osseous lesion involving the calvarium, skull base, or visualized upper cervical spine.     ORBITS: No significant abnormality accounting for technique.      SINUSES/MASTOIDS: Mild mucosal thickening in the ethmoid air cells. No significant middle ear or mastoid effusion.      IMPRESSION:   CONCLUSION:  1.  Avidly enhancing extra-axial mass compatible with meningioma along the anteromedial left middle cranial fossa is stable in  size and appearance.  2.  Slight interval increase in size of the avidly enhancing extra-axial mass compatible with meningioma along the lateral inferior right frontal convexity, now measuring 10 x 5 x 8 mm.  3.  Stable nonenhancing nonspecific cystic focus in the anterior right temporal lobe.  4.  Remainder unchanged.    Pathology:   No results found for this or any previous visit (from the past 8760 hour(s)).      I, Sandy Galvez MD personally performed the services described in this documentation, as scribed by All Fritz in my presence, and it is both accurate and complete.    Signed by: Sandy Galvez MD, MPH

## 2021-06-01 VITALS — HEIGHT: 70 IN | BODY MASS INDEX: 30.92 KG/M2 | WEIGHT: 216 LBS

## 2021-06-01 VITALS — WEIGHT: 216 LBS | HEIGHT: 70 IN | BODY MASS INDEX: 30.92 KG/M2

## 2021-06-01 VITALS — BODY MASS INDEX: 31.1 KG/M2 | WEIGHT: 216 LBS

## 2021-06-01 NOTE — PROGRESS NOTES
Out patient  EEG was performed that included photic stimulation; hyperventilation was deferred. The patient was awake and asleep throughout the recording.  EEG #   EEG [id number] system was used for this recording.

## 2021-06-01 NOTE — PROGRESS NOTES
Pt ambulatory to radiation for last tx. D/C instructions given. Informed to call with any questions or concerns. Follow up to be made on discharge.

## 2021-06-01 NOTE — PROGRESS NOTES
RADIATION ONCOLOGY WEEKLY TREATMENT VISIT NOTE      Assessment / Impression       1. Meningioma (H)  MR Brain With Without Contrast     Cancer Staging  No matching staging information was found for the patient.       Impression/Plan:   43 y.o. female with interval increase in right frontal meningioma, 10 x 5 x 8 mm. Stable anteromedial left middle cranial fossa meningioma. Scheduled to finish 2500 cGy/5 fx on 9/16/2019.     1. Continue radiation treatment as prescribed.  2. Follow up in 3 months for MRI brain w/wo contrast and routine office visit.  3. Activity modification PRN fatigue.   4. Work up with neurology for spasticity and memory loss. Seen by Dr Bashir at [facility]. Scheduled for EEG and neuropsych evaluation.      Radiation: Site: right frontal  Stereotactic Radiosurgery: Yes  Stereotactic Radiosurgery date: 09/11/19  Today's Dose: 1500  Total Dose for Brain: 2500  Today's Fraction/Total Fraction Brain: 3/5      Subjective:      Radiation Treatment Summary    HISTORY: Rohan Prince is a 44 y.o. female who was treated with radiation therapy for a lateral inferior right frontal meningioma.     The patient initially presented to the ED on 5/29/2018 after a fall. CTA head and neck was performed which revealed a small left middle cranial fossa and right frontal convexity presumed meningiomas. She has been watched with imaging since that time. Slight interval increase noted on 6/15/2018 and neuro tumor board committee consensus to follow with short term imaging. MRI December 2018 showing stable meningiomas. Her most recent MRI head on 6/18/2019 shows stable left meningioma, measuring 11 x 7 x 9 mm, however the right sided meningioma now measures 10 x 5 x 8 mm, previously measured 4 x 1.5 x 5 mm in June 2018.     SITE TREATED: Right frontal   TOTAL DOSE: 2500  NUMBER OF FRACTIONS: 5  DATES COMPLETED: 9/16/2019  CONCURRENT CHEMOTHERAPY: No  ADJUVANT THERAPY:No    She tolerated the treatment without  unexpected side effects.       The following portions of the patient's history were reviewed and updated as appropriate: allergies, current medications, past family history, past medical history, past social history, past surgical history and problem list.    Assessment                  Body Site: Abdomen                              Stereotactic Radiosurgery: Yes  Stereotactic Radiosurgery date: 19  Urinary Incontinence: 0: None                                            Sexuality Alteration                 Emotional Alteration Copin: Effective  Comfort Alteration KPS: 90% Can perform normal activity, minor signs of disease  Fatigue (ONS scale) : 3: Mild Fatigue  Pain Location: head, back, knee,  Pain Intensity. Rate degree of pain ranging from 0 (no pain) to 10 (severe pain) : 8  Pain Description: Ache - Muscular type ache  Pain Intervention: 2: Nonsteroidal anti-inflammatory agents or non-opiods;4: Adjunctant medications  Effectiveness of pain intervention: 2: Pain relieved 50%   Nutrition Alteration Anorexia: 1: Loss of appetite  Nausea: 1: Able to eat  Vomitin: None  Dyspepsia and/or Heartburn: 0: None  Skin Alteration Skin Sensation: 0: No problem  Skin Reaction: 0: None  Alopecia: 0: Normal  AUA Assessment                                  Accompanied by       Objective:     Exam:     Vitals:    19 1057   BP: 116/67   Pulse: 78   Temp: 97.8  F (36.6  C)   TempSrc: Oral   SpO2: 100%   Weight: 199 lb 3.2 oz (90.4 kg)       Wt Readings from Last 8 Encounters:   19 199 lb 3.2 oz (90.4 kg)   19 192 lb (87.1 kg)   19 192 lb (87.1 kg)   19 180 lb (81.6 kg)   18 216 lb (98 kg)   18 216 lb (98 kg)   18 216 lb (98 kg)   18 (!) 230 lb (104.3 kg)       General: Alert and oriented, in no acute distress  Rohan has no Erythema.    Treatment Summary to Date    Aria chart and setup information reviewed    Sandy BRIGHT MD personally  performed the services described in this documentation, as scribed by All Fritz in my presence, and it is both accurate and complete.    Signed by: Sandy Galvez MD, MPH

## 2021-06-01 NOTE — TELEPHONE ENCOUNTER
Called patient for routine follow up call s/p radiation for her meningioma.  Patient did not answer and unable to leave a message.  Patient is scheduled for follow up scans and appointments in December.

## 2021-06-02 VITALS — HEIGHT: 70 IN | WEIGHT: 180 LBS | BODY MASS INDEX: 25.77 KG/M2

## 2021-06-02 NOTE — PROGRESS NOTES
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 errors, severely impaired).    Visual attention and spatial localization skills were moderately impaired. Visual reasoning and pattern identification were average on a task that required her to identify the missing component part in a series of geometric designs. Her ability to recreate a series of 2-dimensional designs via 3-dimensional block arrays fell int he borderline impaired range. Two dimensional visuoconstruction of a geometric design was notable for an extremely disorganized approach and difficulties appreciating the spatial relationships between elements of the design. Her copy of the figure was severely impaired relative to peers.     With regard to learning and memory, Ms. Prince was also administered measure of rote auditory verbal list learning that required her to learn a series of 16 words over 5 trials and retain and recall them over a long (20 minute) delay.  Her initial rate of learning fell in the mildly impaired range of functioning (raw score recall over trials = 5, 6, 7, 9, 7).  Ms. Prince retained and recalled approximately 77% of the previously learned information over the long delay (mildly impaired performance), but her recall  improved to average with semantic cues (10 words recalled).  Recognition memory was notable for 9 hits (consistent wtih the amount originally recalled on learning trials and an elevated number of false positive errors. As a result, her discriminability was moderately impaired.  Contextual auditory verbal learning abilities were mildly impaired and she retained and recalled 67% of the previously learned information over a delay (mildly impaired).  Recognition memory performance was likewise mildly impaired.  Visual incidental learning was severely impaired, with essentially no benefit over learning trials, and severely impaired delayed recall.  Recognition memory discriminability was severely impaired as well.      Ms. Prince was also administered a task that required her to generate and flexibly alternate between card sorting strategies based on the feedback she received from the examiner.  She initially caught on to the task and generated an effective approach, but made an impulsive/inattentive error and then struggled to get back on track. For the remainder of the task, she struggled to use the examiner's feedback to refine her approach or use deductive reasoning to generate the desired strategy (or apply it consistently).  She flexibly and somewhat impulsively alternated between both effective and ineffective sorting approaches, but failed to consistently apply a principle to achieve a solution (nonperseverative errors, < 1st percentile).  She was not perseverative in her response style (14th percentile), but failed to generate an adequate solution on the task (0 categories, <1st percentile).    Motor testing was not clearly lateralizing. Gross motor speed (dominant hand) was average. Fine motor speed was bilaterally average; however, her performance declined to severely impaired in her dominant(right) hand and mildly impaired in her nondominant (left) hand on a measure of speeded motor dexterity.    Ms. Prince was  administered the Symptom Checklist - 90 Revised, a 90 item self-report inventory, which is designed to reflect the psychological symptom patterns of psychiatric and medical patients. The Global Severity Index is the most sensitive single indicator of the patient s distress level, combining information about numbers of symptoms and intensity of distress. On this measure, Ms. Prince obtained a t score of greater than 80, which indicated the presence of severe psychological distress. Ms. Prince also obtained elevated scores on all of the clinical dimensions: somatization, obsessive-compulsive symptoms, interpersonal sensitivity, depression, anxiety, hostility, phobic-anxiety, paranoia, and psychoticism.     EVALUATION SERVICES & TIME:   A clinical interview/neurobehavioral status examination was conducted with the patient and documented. I thoroughly reviewed the medical record, selected the neuropsychological test battery, provided supervision to the trained examiner/technician, interpreted/integrated patient data and test results, engaged in clinical decision making, treatment planning, report writing/preparation and provision of interactive feedback of test results on 10/25/19. I directly administered/scored 2+ of the neuropsychological tests.  A trained examiner/technician administered and scored the remainder of the neuropsychological tests (2 + tests).  Please note, all charges are filed at the completion of the Episode of Care and associated with the final encounter date (feedback session on 10/25/19). Please see below for a breakdown of time spent and the associated codes billed for these services. Any discrepancy between the codes listed below, and those filed on my behalf, reflect changes made by the [facility] billing/coding department.    Services   Time Spent  CPT Codes   Neurobehavioral Status Exam:  (e.g., face-to-face, interpretation, report)   71 minutes 1 x 96116  0 x 96121   Neuropsychological Evaluation  Services:   (e.g., integration, interpretation, report preparation, treatment planning, clinical decision making, feedback)   286 minutes   1 x 96132  4 x 96133   Neuropsychological Testing by Psychologist:  (e.g., test administration, scoring, 2+ tests administered)   16 minutes   1 x 96136  0 x 96137   Neuropsychological Testing by Trained Examiner/Technician:  (e.g., test administration, scoring, 2+ tests administered)   245 minutes   1 x 96138  7 x 96139     For diagnostic and coding purposes, Ms. Prince has a history of meningioma and stroke  and was referred for an evaluation of mild neurocognitive disorder.       Thank you for the opportunity to assist in the evaluation and care of Ms. Prinec.    Please do not hesitate to contact me with any questions regarding my findings or recommendations.    Shweta Phelan, PhD, LP, ABPP  Board Certified in Clinical [place] [address]  Phone: [phone]

## 2021-06-02 NOTE — PROGRESS NOTES
The patient was seen for a neuropsychological evaluation for the purposes of diagnostic clarification and treatment planning. 180 minutes of face-to-face testing were provided by this writer. An additional 65 minutes were spent scoring and compiling test results.The patient was cooperative with testing. No concerns were brought to my attention. Please see Dr. Phelan's report for a detailed description of the charges and interpretation and integration of the findings.

## 2021-06-02 NOTE — TELEPHONE ENCOUNTER
Pt called with continued headaches, nausea and extreme fatigue. Informed that fatigue is related to tx and will gradually improve over time. Pt has had migraines with nausea for a long time, not a lot of difference from before treatment. Informed she needed to follow up with neurology. Told pt if headaches unbearable to go to ER. Pt agreeable to plan.

## 2021-06-02 NOTE — PROGRESS NOTES
NEUROPSYCHOLOGY FEEDBACK NOTE    NAME: Rohan Prince  YOB: 1975     DATE OF EVALUATION: 10/25/2019      SUMMARY OF SESSION:  Rohan Prince is a 44 y.o.,  female with a history of stroke and meningioma, who was referred for a cognitive evaluation by Claudia Bashir MD.  Ms. Prince arrived early and unaccompanied. We began the session by discussing her experience during the neuropsychometric evaluation.  I provided Ms. Prince with detailed feedback regarding her performance on cognitive testing and her pattern of cognitive strengths and weaknesses.  I discussed my overall impressions and recommendations and provided the opportunity for Ms. Prince to ask any questions that she had about the evaluation.  At the end of the session, she indicated that she understood the results and that I had answered all of her questions.  She was provided with my contact information, should any further questions or concerns arise in the future.    Please contact me with any questions regarding the content of this note.     Shweta Phelan, PhD, LP, ABPP  Board Certified in [place] [address]  Phone: [phone]    For diagnostic and coding purposes, Ms. Prince was referred for an evaluation of Mild Neurocognitive Disorder.  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 Please see the 10/7/2019 evaluation for a detailed description of codes and  services, including services provided today.   In brief:   1 x 96116  0 x 96121  1 x 96132  4 x 96133  1 x 96136  0 x 96137  1 x 96138  7 x 96139

## 2021-06-03 VITALS — WEIGHT: 192 LBS | BODY MASS INDEX: 27.49 KG/M2 | HEIGHT: 70 IN

## 2021-06-03 VITALS — BODY MASS INDEX: 29.72 KG/M2 | WEIGHT: 207.6 LBS | HEIGHT: 70 IN

## 2021-06-03 VITALS
DIASTOLIC BLOOD PRESSURE: 67 MMHG | HEART RATE: 78 BPM | OXYGEN SATURATION: 100 % | BODY MASS INDEX: 29.42 KG/M2 | WEIGHT: 199.2 LBS | SYSTOLIC BLOOD PRESSURE: 116 MMHG | TEMPERATURE: 97.8 F

## 2021-06-03 VITALS — WEIGHT: 205 LBS | BODY MASS INDEX: 29.35 KG/M2 | HEIGHT: 70 IN

## 2021-06-03 VITALS
TEMPERATURE: 98 F | SYSTOLIC BLOOD PRESSURE: 119 MMHG | DIASTOLIC BLOOD PRESSURE: 71 MMHG | WEIGHT: 206.6 LBS | HEART RATE: 58 BPM | BODY MASS INDEX: 29.64 KG/M2 | OXYGEN SATURATION: 100 %

## 2021-06-03 NOTE — PATIENT INSTRUCTIONS - HE
Plan Interventions recommended today:  Assessment tool-        Follow up in 2-3 weeks, we will discuss a pain treatment plan at that time, which may include narcotics and alternative therapies. OVL at the next visit.       Sign a TOYA at the  so we can obtain your records for our next visit      Continue your current regimen of alleviating factors      Please see your current provider for any continued prescription, they may choose to provide you prescriptions of your narcotics until we have your urine screen back. They will continue to manage your general health and have requested you see the pain center for pain management. Please discuss any health concerns with your PCP       [facility] Precautions are taken with every patient which includes a  report and drug screen. A UA will be taken today for baseline screening.       Behavioral Therapy- continue what you are doing currently at [facility].       Physical and Occupational Therapy- will likely need to have myofascial release.       Injections- if needed        Acupuncture- you will need to check with your insurance for coverage, get an intake packet from the  to schedule an appointment.      Will take the UDT today as well and review for the next appointment to continue the use of the opioids. Historically you respond to percocet.       Non-opoid medical management includes- bring in your vitamins next time to look at the ingredients.       Some folks want to use CBD oil for pain control- it is made from the Hemp plant, that is ok, however it is difficult to find a reputable source, currently [employer] is a good resource. If you are employed check with your employer prior to starting.     Education was provided to the patient today in regards to their specific diagnosis.    As a reminder- chronic pain is generally stable, if you experience a new injury or new different pain it is expected that you present to the ED or  urgent care for evaluation. DO NOT use your chronic pain medications to treat any new or different pain other then what it is intended for. We do not replace any lost or stolen prescriptions or provide early refills for overtaking your medications.     Orders placed today   Orders Placed This Encounter   Procedures     Pain Management Drug Panel, Urine     Standing Status:   Standing     Number of Occurrences:   1       Patient reminders:   Diagnostics: UDT/SWAB collected 11/26/2019 and results are pending.  UDT/SWAB:  Patient required a random Urine Drug Testing, due to the need to comply with [facility] Model Policy Guidelines and CDC Guideline for the use of any controlled substances. This is to ensure that patient is compliant with treatment, and monitor for risks such as diversion, abuse, or any other aberrant behaviors. Patient is either being considered for or taking a controlled substance. Unexpected findings will be discussed and treatment decision may be adjusted. Testing is being implemented across the board randomly w/o bias related to age, race, gender, socioeconomic status or Catholic affiliation.     SAFETY REMINDERS  No alcohol while taking controlled substances. Alcohol is not an illegal substance, it is unsafe to use in combination. It is a build up of substances in the body that can be extremely hazardous and may cause respirations to slow to a dangerous rate resulting in hospitalization, brain damage, or death.    Opioid medications have been associated with sharp rise in unintentional overdose and death.  Overdose is a condition characterized by the consumption in excess of a particular drug causing adverse effects. This can happen b/c you are sick, accidentally or intentionally took an extra dose, are on multiple medication that can interact. Someone took your medication and they are not use to the medication.  Symptoms of overdose include:   !breathing slow and shallow, erratic or not at  all  !pinpoint pupils, hallucinations  !confusion  !muscle jerks, slack muscles   !extreme sleepiness or loss of alertness   !awake but not able to talk   !face pale or clammy, vomiting, for lighter skinned people, the skin tone turns bluish purple, for darker skinned people, it turns grayish or ashen   If in a situation where overdose is a concern engage the emergency response system (dial 911).    In one study it was noted that 80% of unintentional overdoses occurred in people who were taking a combination of opioids and benzodiazepines.    Do not sell, loan, borrow or share your opioid medication with anyone. Deaths have occurred as a result of this practice. It is illegal and patients are being prosecuted.     Prevent unexpected access/loss of medication: Keep medication locked. Only carry what you need with you.    The patient agrees to the plan and has no further questions, if questions arise the patient knows to call [phone].     11:05 AM        Thank you for this consult and opportunity to assist with this patients care.    Mary Muniz, [facility]  [address]  [place] [address]  Ph: [phone]  Fax: [phone]

## 2021-06-03 NOTE — PROGRESS NOTES
Psychology Progress Note    Date: November 25, 2019    Time length and type of treatment: 52 minutes (100 2 PM to 1:54 PM), individual therapy    Necessity: This session is necessary to explain the diagnostic assessment.    Intervention: This writer utilized motivational interviewing, active listening, reassurance and support in the context of cognitive behavioral therapy to address the above.      Mental Status:   Grooming: Within normal limits  Attire: Appropriate  Age: Appears Stated  Behavior Towards Examiner: Cooperative  Motor Activity: Within normal   Eye Contact: Appropriate  Mood: Sad  Affect: Blunted  Speech/Language: Soft  Attention: Within normal  Concentration: Within normal  Thought Process: Tangential  Thought Content: No evidence of delusions or hallucinations but patient reports hallucinations during episodes of hypomania  Orientation: Fully oriented to person, place, date, and time  Memory: No Evidence of Impairment  Judgement: No Evidence of Impairment  Estimated Intelligence: Average  Demonstrated Insight: Adequate  Fund of Knowledge: adequate      Progress:   The diagnostic assessment was explained, the patient's questions were answered, and she agreed that listed diagnoses were accurate.  However, she provided some additional information she believed might be relevant.  As noted in the diagnostic assessment, the patient's mother had Bipolar Disorder.  The patient stated that while her behavior was not as extreme as her mothers, she has had episodes in which she got very little sleep but would still have a lot of energy.  She stated these episodes have lasted as long as 5 days, but episodes of a couple of days are more common.  During these days she has inflated self-esteem, wears makeup, and feels very beautiful.  She described racing thoughts, pressured speech, distractibility, an increase in goal directed activity, and psychomotor agitation.  She denied engaging in high risk behavior, noting  that although this can be tempting, she always remembers that she is a single mom and her children need her.  She admitted that her oldest daughter can become concerned about her during these periods and will encourage her to go to bed.  She has never been hospitalized during one of these episodes, but admitted that she sometimes hears voices or sees white images during these times.  Given this new information and patient report of past episodes that meet criteria for a depressive episode, an diagnosis of Bipolar II Disorder will be added for the patient.  Her old diagnosis of an Adjustment Disorder with depressed mood will be deleted since her Bipolar II Disorder diagnosis adequately captures depressive elements of the patient's profile.  These changes will be explained to the patient at her next appointment and a treatment plan will be developed.      Plan: We will meet again in 2 weeks to explain the changes in the patient's diagnoses and develop a treatment plan.      Diagnosis:     Bipolar II Disorder  Panic Disorder  Agoraphobia  Generalized Anxiety Disorder  Posttraumatic Stress Disorder

## 2021-06-03 NOTE — PROGRESS NOTES
"Speech Language/Pathology  Speech Therapy Outpatient Daily Visit Note    Rohan Prince  YOB: 1975     [id number]    Session 3 of 7    Medicare Patient   [id number]  Certification Dates: 11/4/19 to 1/4/20     Subjective  Patient presents as alert and cooperative during this session.  An  was not applicable for this session.  Patient reports pain located in headache    Objective  Long term goals:   Patient will manage cognitive function by acquiring and implementing compensatory strategies      Short term goals:      Patient will recall x2 compensatory strategies to manage attention, memory, and executive function independently by end of POC   -Therapist extensively re-educated about compensatory strategy of \"stop-think-do\" with min verbal cues. Therapist educated and modeled about using an alarm to schedule breaks to maintain routing and manage fatigue.      Patient will achieve 80% accuracy independently with use of compensatory strategies during moderate level cognitive tasks to improve cognitive function by end of POC         Patient will report x2 functional use of compensatory strategies upon inquiry to improve use of compensatory strategies outside of therapy by end of POC  -Patient returns to therapy session with completed external aid with targeted goals to complete each day independently. Patient reviews with therapist how she utilizes the daily planner and managing her day.       Assessment  Patient is motivated to utilize compensatory strategies to manage executive function. She demonstrates ability to manage executive function via compensatory strategies post training. Patient continues to be appropriate for speech therapy to target training and implementation of cognitive compensatory strategies.        Plan   Current goals remain appropriate    Time: 48 speech/language minutes    Cheryl Miller MS, CCC-SLP    "

## 2021-06-03 NOTE — PROGRESS NOTES
[facility]  New patient consultation        CC-  Chief Complaint   Patient presents with     Consult     fibromyalgia, DDD     Rohanteto Prince 44 y.o. is here today, sent to me by Dr.Rosenstein to discuss the patients pain.  Associated symptoms with pain include to treat her current pain that is related to her low back as well as discuss her options. She presents from her primary for evaluation. She also has meningiomas that are present and being treated. She notes that she has anxiety and uses music to relax.     Alleviating factors: Include- music, PT and mindfulness techniques, hot and cold   Aggravating factors: activity including bending, standing, laying down or lifting  The patient denies using any assistive devices to help with mobilization.  Pain level today: On a scale of 1-10, the patient rates their pain at a 7/10 on average   Function Rating: patient is on disability but notes that she is limited in regards to function, she is currently raising her small children at home but notes that she is supposed to have an [employer] worker but she has declined this due to worries about them taking her children away.       HPI  Past Medical History:   Diagnosis Date     Anxiety      Cancer (H)     cervical cancer     Chronic pain disorder      Depression      DJD (degenerative joint disease)      Endometriosis      Fibromyalgia      Kidney infection      Meningioma, recurrent of brain (H)      Migraine      Osteoporosis      Stroke (H)      TIA (transient ischemic attack)      Past Surgical History:   Procedure Laterality Date     BREAST SURGERY       CERVICAL FUSION      2016 C3-C7      HYSTERECTOMY       JOINT REPLACEMENT       KNEE SURGERY       MASTECTOMY Right     partial     partial masetectomy       ND TOTAL KNEE ARTHROPLASTY Right 1/17/2019    Procedure: RIGHT TOTAL KNEE ARTHROPLASTY;  Surgeon: Pedro Moss DO;  Location: [facility];  Service: Orthopedics     RIGHT OOPHORECTOMY       Family  History   Problem Relation Age of Onset     Cancer Mother      Stroke Mother      Other Mother         swelling     Bipolar disorder Mother      Social History     Tobacco Use   Smoking Status Never Smoker   Smokeless Tobacco Never Used     Social History     Substance and Sexual Activity   Alcohol Use No     Social History     Substance and Sexual Activity   Drug Use No     Social History     Substance and Sexual Activity   Sexual Activity Not Currently       Chemical Dependency History: Patient Denies tobacco use, alcohol use, illicit substance use including THC.  Denies any chemical dependency evaluation or treatment in the past.  Denies any legal issues related to substance use. She notes that she does drinks mountain due voltage.     Psychiatric History:  Patient reports no current psychiatrist or psychologist.  Denies any suicidal ideation.  Denies any hospitalizations for mental illness.currently she sees a provider which is new- 3 sessions. Sees Cheyenne Sanchez at [facility].     ORT     low risk     Pertinent Pain Medications:    She currently takes tramadol currently but notes that this is not helpful like she used to. She used to take  mg, she takes excedrin migraine is assistive in reducing her migraines since was a young lady at 14 years of age.         Takes pregablin 75 mg     Current Outpatient Medications:      aspirin-acetaminophen-caffeine (EXCEDRIN MIGRAINE) 250-250-65 mg per tablet, Take 1 tablet by mouth every 6 (six) hours as needed for pain., Disp: , Rfl:      pregabalin (LYRICA) 75 MG capsule, Take 150 mg by mouth 2 (two) times a day. x2 capsules   , Disp: , Rfl:      traMADol (ULTRAM) 50 mg tablet, Take 1 tablet by mouth 3 (three) times a day as needed., Disp: , Rfl: 1    Therapeutic interventions previously tried-   Knee surgery    Dr. Luna is looking at cyber knife verses surgical intervention, she has undergone radiation therapy      Review of Systems:  12 point systems were  "reviewed with pt as documented on pt health form of 11/26/2019. ROS was positive follow back pain and head pain with small joint pain and fibromyalgia the rest of the systems were pertinent negative.    Exam  Vitals:    11/26/19 1041   BP: 126/84   Patient Site: Left Arm   Patient Position: Sitting   Pulse: 71   Resp: 18   Weight: 207 lb 9.6 oz (94.2 kg)   Height: 5' 10\" (1.778 m)     Constitutional-General:  Pleasant, straightforward, healthy appearing individual.   Psych-Mental Status: A & O in no acute distress. Speech is fluent.  Recent and remote memory are intact.  Attention span and concentration are normal. Displays appropriate mood and affect.   H,E,N,T- Symmetrical, Eyes- Perrla, Nares- patents bilaterally, throat- trachea is midline, airway is patent, unlabored respiratory effort.  Lymph-cervical lymph system (anterior and posterior) without palpable nodules or tenderness throughout. Supraclavicular chain without palpable nodules or tenderness throughout.   Cardiovascular- Regular S1, S2 rhythm without murmurs, gallops, clicks or rubs. legs and feet are warm.  Pulses are palpable and grade 2 at the posterior tibial arteries bilaterally, no edema noted.  Pulmonary- lungs are clear to auscultation throughout   Musckuloskeletal  Gait:  Gait is normal.   Gross Motor: Toe walking, heel walking, and Romberg tests are normal.   Strength:  Bilateral upper and lower extremity strength is normal and symmetric 5/5. No atrophy or tone abnormalities noted.   Sensation:  No loss of sensation noted to light touch in both upper and lower extremities. No dermatomal abnormal distributions noted.  Spine ROM:  Normal range of motion with no pain reproduction in the cervical, thoracic and lumbar spine.   Provocative Maneuvers:  Upper extremity, Hip, sacroiliac, and knee provocative maneuvers are negative,Tinel's test negative bilaterally, Facet loading test negative bilaterally. Impingement tests of rotator cuff pathology, " negative bilaterally. Knee exam: Ligaments test was negative, Anitra test was negative. SLR test was negative bilaterally.  Palpation:  Inspection and palpatory examination of the spine and upper/lower extremities is unremarkable. Tenderness is noted in the midlines lumbar spine as well as myalgia type pain throughout.   Neuro:  Bilateral upper and lower extremity coordination and muscle stretch reflexes are physiologic and symmetric 2/4.  Babinski responses are downgoing.  No clonus bilaterally. Negative hoffmans sign bilaterally.   Integumentary: no rashes or breaks in the skin, no open wounds.     Lab:  Last UDS on 11/26/2019 was taken today and is pending.      Imaging:  No new imaging available to review  INDICATION: Pain.  TECHNIQUE: Helical thin-section CT scan of the spine was performed using bone reconstruction algorithm. From the axial source data, sagittal and coronal thin reformats. Dose reduction techniques were used.   IV CONTRAST: None.  COMPARISON: MRI lumbar spine 01/30/2018.     FINDINGS: 5 lumbar-type vertebral bodies. Slight retrolisthesis L5 on S1 is stable. Marked degenerative disc disease L5-S1 with vacuum disc phenomenon. No acute fracture. No high-grade canal narrowing. Right paracentral disc osteophyte complex L5-S1   could impinge on the right S1 nerve and may be progressive in the interval. Moderate L5-S1 facet arthropathy. Small disc osteophyte complexes in each L5-S1 foramen results in marked bilateral foraminal narrowing. Visualized soft tissues are grossly   Normal.       IMPRESSION:   CONCLUSION:  1.  No acute fracture or high-grade canal narrowing.     2.  Marked bilateral L5-S1 foraminal narrowing.     3.  Right lateral recess narrowing L5-S1 may have progressed since the previous MRI, could impinge on the right S1 nerve.     4.  Marked L5-S1 degenerative disc disease    :  Dated 11/26/2019 was reviewed with the patient  Paper copy reviewed     Assessment -  Todays diagnosis  includes   1. Chronic fatigue syndrome with fibromyalgia    2. Chronic pain syndrome    3. DDD (degenerative disc disease), lumbar        After reviewing the patients chart and physical findings I agree that the patient would benefit from what the pain center has to offer. I have discussed with the  patient today the diagnosis and treatment recommendations.  We reviewed the natural progression of this problem at great length.  Some possible benefits and detriments of physical therapy, chiropractic care, medication management, behavorial therapy, anti-inflammatory diet and other alternative treatments were discussed.  We also discussed future possible treatment options in a stepwise fashion, including interventional pain procedures and injections and possible surgical referral if needed.      The patient understands that pain medication is not prescribed during the initial patient consultation at the pain center. If the patient is on pain medications for chronic pain, the PCP or prescribing provider may choose  to prescribe until the patient presents for follow up at the pain center. Medication prescriptions provided by the pain center, will depend on the UA results, safe prescribing practices established by the [facility] and patient response to their current treatment regimen at the follow up visit.      She has a PMH of TIA, meningiomas, fibromyalgia, low back pain, knee replacement, anxiety as well as ADHD. Patient notes that she is currently working with Dr. Luna through the spine center as well as her primary care provider and radiology oncology for the meningiomas x2.  Patient also notes that she is been taking tramadol for her low back pain and reports that it is no longer effective she is exquisitely tender to very light palpation midline lumbar spine, bilateral SI joints as well as fascia throughout demonstrating myalgia type pain likely related to her fibromyalgia as well as arthritic changes noted in the  joints previously the lumbar spine.  Patient also notes that the medial branch blocks previously done did not is her pain enough to proceed to the RFA.  Previous imaging has been reviewed patient is also established with mental health through [facility] and will continue to participate in her care with them.  Patient may benefit from myofascial release through physical therapy after better pain control.    Patient set goals today in the office to achieve with the pain centers help. They are:  1.reduce your pain so that you can increase your house cleaning and ADLS    Plan Interventions recommended today:  Assessment tool-        Follow up in 2-3 weeks, we will discuss a pain treatment plan at that time, which may include narcotics and alternative therapies. OVL at the next visit.       Sign a TOYA at the  so we can obtain your records for our next visit      Continue your current regimen of alleviating factors      Please see your current provider for any continued prescription, they may choose to provide you prescriptions of your narcotics until we have your urine screen back. They will continue to manage your general health and have requested you see the pain center for pain management. Please discuss any health concerns with your PCP       [place] Precautions are taken with every patient which includes a  report and drug screen. A UA will be taken today for baseline screening.       Behavioral Therapy- continue what you are doing currently at [facility].       Physical and Occupational Therapy- will likely need to have myofascial release.       Injections- if needed       Imaging may be ordered depending on your physical exam and symptoms, if this has been ordered obtain this prior to your next visit.     Acupuncture- you will need to check with your insurance for coverage, get an intake packet from the  to schedule an appointment.      Will take the UDT today as well and review for the next  appointment to continue the use of the opioids. Historically you respond to percocet.       Non-opoid medical management includes- bring in your vitamins next time to look at the ingredients.       Some folks want to use CBD oil for pain control- it is made from the Hemp plant, that is ok, however it is difficult to find a reputable source, currently [facility] is a good resource. If you are employed check with your employer prior to starting.     Education was provided to the patient today in regards to their specific diagnosis.    As a reminder- chronic pain is generally stable, if you experience a new injury or new different pain it is expected that you present to the ED or urgent care for evaluation. DO NOT use your chronic pain medications to treat any new or different pain other then what it is intended for. We do not replace any lost or stolen prescriptions or provide early refills for overtaking your medications.     Orders placed today   Orders Placed This Encounter   Procedures     Pain Management Drug Panel, Urine     Standing Status:   Standing     Number of Occurrences:   1       Patient reminders:   Diagnostics: UDT/SWAB collected 11/26/2019 and results are pending.  UDT/SWAB:  Patient required a random Urine Drug Testing, due to the need to comply with [place] [employer] Policy Guidelines and CDC Guideline for the use of any controlled substances. This is to ensure that patient is compliant with treatment, and monitor for risks such as diversion, abuse, or any other aberrant behaviors. Patient is either being considered for or taking a controlled substance. Unexpected findings will be discussed and treatment decision may be adjusted. Testing is being implemented across the board randomly w/o bias related to age, race, gender, socioeconomic status or Sikhism affiliation.     SAFETY REMINDERS  No alcohol while taking controlled substances. Alcohol is not an illegal substance, it is  unsafe to use in combination. It is a build up of substances in the body that can be extremely hazardous and may cause respirations to slow to a dangerous rate resulting in hospitalization, brain damage, or death.    Opioid medications have been associated with sharp rise in unintentional overdose and death.  Overdose is a condition characterized by the consumption in excess of a particular drug causing adverse effects. This can happen b/c you are sick, accidentally or intentionally took an extra dose, are on multiple medication that can interact. Someone took your medication and they are not use to the medication.  Symptoms of overdose include:   !breathing slow and shallow, erratic or not at all  !pinpoint pupils, hallucinations  !confusion  !muscle jerks, slack muscles   !extreme sleepiness or loss of alertness   !awake but not able to talk   !face pale or clammy, vomiting, for lighter skinned people, the skin tone turns bluish purple, for darker skinned people, it turns grayish or ashen   If in a situation where overdose is a concern engage the emergency response system (dial 911).    In one study it was noted that 80% of unintentional overdoses occurred in people who were taking a combination of opioids and benzodiazepines.    Do not sell, loan, borrow or share your opioid medication with anyone. Deaths have occurred as a result of this practice. It is illegal and patients are being prosecuted.     Prevent unexpected access/loss of medication: Keep medication locked. Only carry what you need with you.    The patient agrees to the plan and has no further questions, if questions arise the patient knows to call [phone].     11:05 AM        Thank you for this consult and opportunity to assist with this patients care.    Mary Muniz, [facility]  [address]  [place] [address]  Ph: [phone]  Fax: [phone]

## 2021-06-03 NOTE — PROGRESS NOTES
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 version   H1 = not completed by patient  H2 = not completed by patient  H3 = not completed by patient  Scores presented in qualifiers to represent level of disability.  NO problem - (none, absent, negligible,  ) - 0-4 %   MILD problem - (slight, low, ) - 5-24 %   MODERATE problem - (medium, fair,...) - 25-49 %   SEVERE problem - (high, extreme,  ) - 50-95 %   COMPLETE problem - (total, ) -  %    Assessment of client resolving presenting mental health concerns:  Ability  [] low     [x] average     [] high  Motivation [] low     [x] average     [] high  Willingness [] low     [x] average     [] high    Sources/references used in completing this assessment:   Individual interview  Medical record  Adult intake questionnaire  Measures completed: WHODAS, C-SSRS, CAGE, PHQ-9, ZAKIA-7, and PCL-5       Initial Therapy Plan     1. Patient and therapist will develop therapeutic relationship.  2. Patient to present for follow up appointment to initiate psychotherapy services.  3. Develop comprehensive treatment plan.       Is patient's family involved in the treatment?  [x] No     [] Yes    If no, Why?  Patient's family was not available at the time of this appointment and the patient did not express a desire to have family involved in her care.      Thank you,  Dr. Phelan, for requesting the participation of psychology service in the care of this patient.

## 2021-06-03 NOTE — PROGRESS NOTES
"Speech Language/Pathology  Speech Therapy Outpatient Daily Visit Note    Rohan Prince  YOB: 1975     [id number]    Session 2 of 7    Medicare Patient   [id number]  Certification Dates: 11/4/19 to 1/4/20     Subjective  Patient presents as alert and cooperative during this session.  An  was not applicable for this session.  Patient reports no pain   Patient reports difficulties with starting a task and staying organized.    Objective  Long term goals:   Patient will manage cognitive function by acquiring and implementing compensatory strategies      Short term goals:     Patient will recall x2 compensatory strategies to manage attention, memory, and executive function independently by end of POC   -Therapist extensively educated and modeled how to use an external aid and to use x2 strategies for executive function and organization; specifically \"stop-think-do\" and making a list of top 3 tasks in external aid to complete for the day. By the end of the session, patient recalled strategy of \"stop-think-do\" independently. Patient to target using an external aid to practice -stop-think-do strategy and making lists for next session.      Patient will achieve 80% accuracy independently with use of compensatory strategies during moderate level cognitive tasks to improve cognitive function by end of POC        Patient will report x2 functional use of compensatory strategies upon inquiry to improve use of compensatory strategies outside of therapy by end of POC      Assessment  Patient appears to be receptive and motivated to learn and implement cognitive compensatory strategies to manage executive function. She demonstrates ability to recall strategy by end of training. Patient continues to be appropriate for speech therapy to target training and implementation of cognitive compensatory strategies.     Plan   Current goals remain appropriate    Time: 47 speech/language " minutes    Chreyl Miller, MS, CCC-SLP

## 2021-06-03 NOTE — PROGRESS NOTES
Patient presents to the clinic today for a follow up with Mary Muniz CNP regarding DDD, and fibromyalgia. Patient describes their pain as sharp stabbing, aching, and burning. Her/His function score is 8.
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
## 2021-06-04 VITALS — BODY MASS INDEX: 30.85 KG/M2 | WEIGHT: 215 LBS

## 2021-06-04 VITALS
SYSTOLIC BLOOD PRESSURE: 100 MMHG | BODY MASS INDEX: 33.58 KG/M2 | DIASTOLIC BLOOD PRESSURE: 60 MMHG | RESPIRATION RATE: 16 BRPM | HEART RATE: 77 BPM | WEIGHT: 234 LBS

## 2021-06-04 VITALS — BODY MASS INDEX: 31.57 KG/M2 | WEIGHT: 220 LBS

## 2021-06-04 VITALS — BODY MASS INDEX: 30.69 KG/M2 | WEIGHT: 214.4 LBS | HEIGHT: 70 IN

## 2021-06-04 NOTE — PROGRESS NOTES
Psychology Progress Note    Date: December 30, 2019    Time length and type of treatment: 52 minutes (1:30 PM to 2:22 PM), individual therapy    Necessity: This session is necessary to address the patient's anxiety, PTSD, panic disorder, and bipolar II disorder.  Today we completed a new treatment plan for the patient due to her original treatment plan being missing.  The patient was provided some psychoeducation on PTSD and we explored thoughts and expectations about self and others. The reader is invited to review the patient's full treatment plan in the Media section of the patient's Epic medical record.    Intervention: This writer utilized motivational interviewing, active listening, reassurance and support in the context of cognitive behavioral therapy to address the above.      Mental Status:   Grooming: Within normal limits  Attire: Appropriate  Age: Appears Stated  Behavior Towards Examiner: Cooperative  Motor Activity: Within normal   Eye Contact: Appropriate  Mood: Sad  Affect: Blunted  Speech/Language: Soft  Attention: Within normal  Concentration: Within normal  Thought Process: Within normal  Thought Content: No evidence of delusions or hallucinations  Orientation: Appeared oriented to person, place, and time, though not formally established  Memory: No Evidence of Impairment  Judgement: No Evidence of Impairment  Estimated Intelligence: Average  Demonstrated Insight: Adequate  Fund of Knowledge: adequate      Progress:  Patient continues to struggle with feeling unworthy of love, and we explored how the patient's perception has influenced how she interacts with her daughters, with her family, and with romantic partners.  Patient was asked to practice acting as if she felt worthy and to see how this affects her interactions with others.  Patient expressed distress that she's not able to remember a lot of her childhood, including a couple of years of high school. This experience was normalized and she  was provided some psychoeducation regarding the impact of trauma.      Plan: We will meet again in 1 week to address the patient's bipolar II disorder, anxiety, panic, and PTSD.  Estimated duration of treatment is 10+ individual therapy sessions (34703) at weekly intervals. Treatment is expected to be completed by December of 2020.     Diagnosis:    Bipolar II Disorder  Panic Disorder  Agoraphobia  Generalized Anxiety Disorder  Posttraumatic Stress Disorder

## 2021-06-04 NOTE — PATIENT INSTRUCTIONS - HE
Plan:   Interventions-    Follow up in 4 weeks to evaluate the effectiveness of the treatment interventions ordered today. Scheduling your appointment prior to leaving assists in reduction of running out of medication prior to the next appointment.     Lab draw the vitamin D for increase in fibromyalgia    Good job on the fresh veggie intake increase- frozen is ok too.     Ok to start the Norco 5 mg up to 3 per day as needed, today as the tramadol is not effective for your back pain     Will continue the use of the lyrica 75 mg two times a day      As a reminder- chronic pain is generally stable, if you experience a new injury or new different pain it is expected that you present to the ED or urgent care for evaluation. DO NOT use your chronic pain medications to treat any new or different pain other then what it is intended for. We do not replace any lost or stolen prescriptions or provide early refills for overtaking your medications.        Orders placed today  Medications that were ordered today   Requested Prescriptions     Signed Prescriptions Disp Refills     pregabalin (LYRICA) 150 MG capsule 60 capsule 1     Sig: Take 1 capsule (150 mg total) by mouth 2 (two) times a day. x2 capsules     HYDROcodone-acetaminophen (NORCO) 5-325 mg per tablet 84 tablet 0     Sig: Take 1 tablet by mouth 3 (three) times a day as needed for pain.     Orders Placed This Encounter   Procedures     Vitamin D, Total (25-Hydroxy)       UDT/SWAB:  Patient required a random Urine Drug Testing, due to the need to comply with [place] Model Policy Guidelines and CDC Guideline for the use of any controlled substances. This is to ensure that patient is compliant with treatment, and monitor for risks such as diversion, abuse, or any other aberrant behaviors. Patient is either being considered for or taking a controlled substance. Unexpected findings will be discussed and treatment decision may be adjusted. Testing is being implemented  across the board randomly w/o bias related to age, race, gender, socioeconomic status or Jewish affiliation.    The patient understand todays plan and has their questions answered in regards to expectations and current treatment plan.     SAFETY REMINDERS  No alcohol while taking controlled substances. Alcohol is not an illegal substance, it is unsafe to use in combination. It is a build up of substances in the body that can be extremely hazardous and may cause respirations to slow to a dangerous rate resulting in hospitalization, brain damage, or death.    Opioid medications have been associated with sharp rise in unintentional overdose and death.  Overdose is a condition characterized by the consumption in excess of a particular drug causing adverse effects. This can happen b/c you are sick, accidentally or intentionally took an extra dose, are on multiple medication that can interact. Someone took your medication and they are not use to the medication.  Symptoms of overdose include:   !breathing slow and shallow, erratic or not at all  !pinpoint pupils, hallucinations  !confusion  !muscle jerks, slack muscles   !extreme sleepiness or loss of alertness   !awake but not able to talk   !face pale or clammy, vomiting, for lighter skinned people, the skin tone turns bluish purple, for darker skinned people, it turns grayish or ashen   If in a situation where overdose is a concern engage the emergency response system (dial 911).    In one study it was noted that 80% of unintentional overdoses occurred in people who were taking a combination of opioids and benzodiazepines.    Do not sell, loan, borrow or share your opioid medication with anyone. Deaths have occurred as a result of this practice. It is illegal and patients are being prosecuted.     Prevent unexpected access/loss of medication: Keep medication locked. Only carry what you need with you.    Mary Muniz, [facility]  [address]  [place] [address]  Ph: [phone]  Fax: [phone]

## 2021-06-04 NOTE — PROGRESS NOTES
Patient presents to the clinic today for a follow up with Mary Muniz CNP regarding back pain. Patient describes their pain as achy, burning, stabbing and throbbing. Her/His function score is 7.

## 2021-06-04 NOTE — PROGRESS NOTES
[facility] Radiation Oncology Follow Up Note    Patient: Rohan Prince  MRN: [id number]  Date of Service: 12/17/2019    Assessment:     No diagnosis found.   Cancer Staging  No matching staging information was found for the patient.  ECOG Peformance Status  ECOG Performance Status: 1     Body site: Brain    Impressed/Plan:   44 y.o. female with interval increase in right frontal meningioma, 10 x 5 x 8 mm. Finished 2500 cGy/5 fx on 9/16/2019. Stable anteromedial left middle cranial fossa meningioma    1. MRI brain w/wo contrast 12/13/2019 showing no change in previously treated right frontal meningioma.   2. Follow up in 1 year for F/U MRI head w/wo contrast.     Face to face time  25 minutes with > 75% spent on consultation, education and coordination of care.    Subjective:      HPI: Rohan Prince is a 44 y.o. female who was treated with radiation therapy for a lateral inferior right frontal meningioma.     The patient initially presented to the ED on 5/29/2018 after a fall. CTA head and neck was performed which revealed a small left middle cranial fossa and right frontal convexity presumed meningiomas. She has been watched with imaging since that time. Slight interval increase noted on 6/15/2018 and neuro tumor board committee consensus to follow with short term imaging. MRI December 2018 showing stable meningiomas. Her most recent MRI head on 6/18/2019 shows stable left meningioma, measuring 11 x 7 x 9 mm, however the right sided meningioma now measures 10 x 5 x 8 mm, previously measured 4 x 1.5 x 5 mm in June 2018.      SITE TREATED: Right frontal   TOTAL DOSE: 2500  NUMBER OF FRACTIONS: 5  DATES COMPLETED: 9/16/2019  CONCURRENT CHEMOTHERAPY: No  ADJUVANT THERAPY:No     She tolerated the treatment without unexpected side effects.     The patient presents today for routine office visit. She has been following up with her psychologist very closely. Otherwise she has no complaints.     Current Outpatient  Medications   Medication Sig Dispense Refill     aspirin-acetaminophen-caffeine (EXCEDRIN MIGRAINE) 250-250-65 mg per tablet Take 1 tablet by mouth every 6 (six) hours as needed for pain.       HYDROcodone-acetaminophen (NORCO) 5-325 mg per tablet Take 1 tablet by mouth 3 (three) times a day as needed for pain. 84 tablet 0     pregabalin (LYRICA) 150 MG capsule Take 1 capsule (150 mg total) by mouth 2 (two) times a day. x2 capsules 60 capsule 1     No current facility-administered medications for this visit.        The following portions of the patient's history were reviewed and updated as appropriate: allergies, current medications, past family history, past medical history, past social history, past surgical history and problem list.    Review of Systems    General  Constitutional (WDL): Exceptions to WDL  Fatigue: Fatigue relieved by rest  Hot flashes/Night Sweats: Mild symptoms, no intervention needed  EENT  Eye Disorder (WDL): Exceptions to WDL  Blurred Vision: Intervention not indicated  Ear Disorder (WDL): Exceptions to WDL(deaf in right ear)  Respiratory       Respiratory (WDL): Exceptions to WDL  Dyspnea: Shortness of breath with moderate exertion  Cardiovascular  Cardiovascular (WDL): All cardiovascular elements are within defined limits  Endocrine     Gastrointestinal  Gastrointestinal (WDL): Exceptions to WDL  Constipation: Occasional or intermittent symptoms, occasional use of stool softeners, laxatives, dietary modification, or enema  Musculoskeletal  Musculoskeletal and Connetive Tissue Disorders (WDL): Exceptions to WDL  Arthralgia: Mild pain  Muscle Weakness : Symptomatic, perceived by patient but not evident on physical exam  Myalgia: Moderate pain, limiting instrumental ADL(fibromyalgia)  Integumentary               Integumentary (WDL): All integumentary elements are within defined limits  Neurological  Neurosensory (WDL): Exceptions to WDL(regular migraines)  Ataxia: Asymptomatic, clinical or  "diagnostic observations only, intervention not indicated  Peripheral Sensory Neuropathy: Asymptomatic, loss of deep tendon reflexes or paresthesia(fingers and toes)  Dizziness: Mild unsteadiness or sensation of movement  Psychological/Emotional   Patient Coping: Open/discussion  Hematological/Lymphatic  Lymph (WDL): All lymph disorder elements are within defined limits  Dermatologic     Genitourinary/Reproductive  Genitourinary (WDL): All genitourinary elements are within defined limits  Reproductive     Pain              Currently in Pain: Yes  Pain Score (Initial OR Reassessment): 8  Pain Frequency: Constant/continuous  Location: \"all over\"  Pain Intervention(s): Medication (See MAR)  Response to Interventions: helps  AUA Assessment                                  Accompanied by  Accompanied by: Alone      Objective:     Physical Exam    Vitals:    12/17/19 1053   BP: 119/71   Pulse: (!) 58   Temp: 98  F (36.7  C)   TempSrc: Oral   SpO2: 100%   Weight: 206 lb 9.6 oz (93.7 kg)        GENERAL: No acute distress. Cooperative in conversation.   HEENT: Pupils are equal, round and reactive. Oromucosa is clean and intact. No ulcerations or mucositis noted. No bleeding noted.  RESP: Normal respiratory rate.  CV: Regular, rate and rhythm.  ABD: Soft, nontender.  MUSCULOSKELETAL: No palpable points of tenderness.   PSYCH: Within normal limits. No depression or anxiety.  SKIN: Warm dry intact.   LYMPH: No cervical, supraclavicular lymphadenopathy.  EXTREMITIES: No edema .  NEUROLOGIC: Stable facial asymmetry normal strength, sensation and reflexes throughout, gait normal     Recent Labs:   Recent Results (from the past 168 hour(s))   Vitamin D, Total (25-Hydroxy)   Result Value Ref Range    Vitamin D, Total (25-Hydroxy) 15.0 (L) 30.0 - 80.0 ng/mL       Imaging: Imaging results 30 days: Mr Brain With Without Contrast    Result Date: 12/13/2019  EXAM: MR BRAIN W WO CONTRAST LOCATION: [facility] DATE/TIME: " 12/13/2019 1:41 PM INDICATION: Right frontal meningioma. COMPARISON: 06/18/2019, 12/17/2018 and 12/19/2016. Head CT 12/10/2016. CONTRAST: Gadavist 10 mL. TECHNIQUE: Routine multiplanar multisequence head MRI without and with intravenous contrast. FINDINGS: INTRACRANIAL CONTENTS: No acute or subacute infarct. Again seen is a small enhancing meningioma along the squamosal portion of the right temporal bone, measuring 2.1 x 0.6 x 1.2 cm. This has no significant mass effect on the adjacent structures and is stable in size and appearance compared to the most recent study. No edema in the adjacent brain parenchyma. There is a paraclinoid meningioma along the medial and anterior aspect of the middle cranial fossa on the left side, measuring 1.4 x 0.7 x 1.1 cm.  This is along the outer aspect of the orbital apex and does not appear to involve the optic nerve or skull base foramina. No edema in the adjacent brain parenchyma. This is also stable in size compared to the most recent study. No new meningiomas identified. There is a small cyst in the anterior right temporal lobe measuring up to 11 mm and a small arachnoid cyst in the anterior cranial fossa on the right side measuring 6 mm in width. No significant mass effect on the adjacent structures. These are stable compared to the prior studies. There is enlargement of the vestibular aqueduct on the right side measuring up to 4 mm in width. Normal brain parenchymal signal. Normal ventricles and sulci. Normal position of the cerebellar tonsils. SELLA: No abnormality accounting for technique. OSSEOUS STRUCTURES/SOFT TISSUES: Normal marrow signal. The major intracranial vascular flow voids are maintained. ORBITS: No abnormality accounting for technique. SINUSES/MASTOIDS: No paranasal sinus mucosal disease. No middle ear or mastoid effusion.     1.  Small frontotemporal meningioma on the right side and small paraclinoid meningioma on the left side. These are stable compared to  the prior study with no significant mass effect upon the adjacent structures. 2.  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 3.  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 4.  Small arachnoid cyst in the middle cranial fossa on the right side. No significant mass effect.       I, Sandy Galvez MD personally performed the services described in this documentation, as scribed by All Fritz in my presence, and it is both accurate and complete.    Signed by: Sandy Galvez MD, MPH

## 2021-06-04 NOTE — PROGRESS NOTES
"Speech Language/Pathology  Speech Therapy Outpatient Daily Visit Note    Rohan Prince  YOB: 1975     [id number]    Session 4 of 7    Medicare Patient   [id number]  Certification Dates: 11/4/19 to 1/4/20     Subjective  Patient presents as alert and cooperative during this session.  An  was not applicable for this session.  Patient reports pain located in back and neck    Objective  Long term goals:   Patient will manage cognitive function by acquiring and implementing compensatory strategies      Short term goals:      Patient will recall x2 compensatory strategies to manage attention, memory, and executive function independently by end of POC   -Patient recalls compensatory strategy of \"stop-think-do\" upon inquiry. Therapist re-educated and modeled about using an alarm to schedule breaks to maintain routing and manage fatigue.   -Educated and provided functional examples of additional strategy      Patient will achieve 80% accuracy independently with use of compensatory strategies during moderate level cognitive tasks to improve cognitive function by end of POC   -not addressed this session       Patient will report x2 functional use of compensatory strategies upon inquiry to improve use of compensatory strategies outside of therapy by end of POC  -Patient returns to therapy session with a new daily planner and completed target goals to complete each day independently. Patient reviews and educates with therapist how she utilizes the daily planner and managing her day. Further education and models of how patient can you skip the due date for bills in monthly portion of aid. She is observed to write down a reminder in monthly portion of calendar independently during session.     Assessment  Patient continues to demonstrate ability to generalize trained strategies to functional tasks independently. Patient continues to be motivated to improve and learn new " compensatory strategies to manage cognitive function. Patient continues to be appropriate for speech therapy to target training and implementation of cognitive compensatory strategies.     Plan   Current goals remain appropriate    Time: 42 speech/language minutes    Cheryl Miller MS, CCC-SLP

## 2021-06-04 NOTE — PROGRESS NOTES
"Speech Language/Pathology  Speech Therapy Outpatient Daily Visit Note    Rohan Prince  YOB: 1975     [id number]    Session 5 of 7    Medicare Patient   [facility]   [id number]  Certification Dates: 11/4/19 to 1/4/20     Subjective  Patient presents as alert and cooperative during this session.  An  was not applicable for this session.  Patient reports no pain    Objective  Long term goals:   Patient will manage cognitive function by acquiring and implementing compensatory strategies      Short term goals:      Patient will recall x2 compensatory strategies to manage attention, memory, and executive function independently by end of POC   -Patient recalls compensatory strategy of \"stop-think-do\" and setting an alarm for breaks for management of fatigue upon inquiry.     Patient will achieve 80% accuracy independently with use of compensatory strategies during moderate level cognitive tasks to improve cognitive function by end of POC   -not addressed this session       Patient will report x2 functional use of compensatory strategies upon inquiry to improve use of compensatory strategies outside of therapy by end of POC  -Patient returns to therapy session with daily planner and is observed to complete target goals to complete each day independently. Patient reviews and educates with therapist how she utilizes the daily planner and managing her day. She is observed to return to therapy with appointments and upcoming events in the monthly portion of planner.   -Patient reports ongoing use of alarms last week to manage organization and fatigue. She reports that she feels that the alarms help her because it supports her to stop and ask herself how she is feeling.        Assessment  Patient has met goals that have a focus on managing cognitive function in functional, day to day basis. Patient has acquired and actively participated in education regarding compensatory strategies that " focus on executive function. Patient has generalized use of strategies independently to outside of therapy as evidenced by patient bringing daily planner to therapy with it filled out.     Plan    Discontinue speech therapy due to met goals    Time: 56 speech/language minutes    Cheryl Miller MS, CCC-SLP
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
## 2021-06-04 NOTE — PROGRESS NOTES
"Speech Language/Pathology   Discharge Summary    Rohan Prince  YOB: 1975      [id number]   Referring Provider: Shweta Phelan     Date of Onset: order received in [facility] in October 2019  Start of Care: 11/4/19   Date of Last Visit: 12/16/19    Medical Diagnosis: Major neurocognitive disorder  Treatment Diagnosis: cognition     Medicare Patient   Provider #:   [facility]ICN #: [id number]  Certification Dates: 11/4/19 to 1/4/20   The patient was seen for a total of 4 visits with 1 canceled/no show visits since the start of care.    Summary of Goals and Functional Progress  Long term goals:   Patient will manage cognitive function by acquiring and implementing compensatory strategies   -GOAL MET: Patient has met goals that have a focus on managing cognitive function in functional, day to day basis. Patient has acquired and actively participated in education regarding compensatory strategies that focus on executive function. Patient has generalized use of strategies independently      Short term goals:      Patient will recall x2 compensatory strategies to manage attention, memory, and executive function independently by end of POC   -GOAL MET: Patient has actively participated in education regarding functional compensatory strategies for executive function. At evaluation, patient was minimally aware of compensatory strategies. Now, patient recalls x2 strategies of \"stop-think-do\" and setting an alarm for breaks to manage executive function specifically organization and self-monitoring skills.     -12/16/19:Patient recalls compensatory strategy of \"stop-think-do\" and setting an alarm for breaks for management of fatigue upon inquiry.     Patient will achieve 80% accuracy independently with use of compensatory strategies during moderate level cognitive tasks to improve cognitive function by end of POC   -DC GOAL: goal not addressed as focused on calendar/daily planner use rather than structured " cognitive tasks.     -not addressed this session       Patient will report x2 functional use of compensatory strategies upon inquiry to improve use of compensatory strategies outside of therapy by end of POC  -GOAL MET: At initial evaluation, patient presented with minimal compensation for cognitive impairments. Now, patient demonstrates ability to implement compensatory strategy of stop, think and do by writing top 3 tasks in daily portion of planner each day independently as evidenced by patient bringing in daily planner consistently.     12/6/19:  -Patient returns to therapy session with daily planner and is observed to complete target goals to complete each day independently. Patient reviews and educates with therapist how she utilizes the daily planner and managing her day. She is observed to return to therapy with appointments and upcoming events in the monthly portion of planner.   -Patient reports ongoing use of alarms last week to manage organization and fatigue. She reports that she feels that the alarms help her because it supports her to stop and ask herself how she is feeling.       Patient participated in education regarding home program and discharge process and verbalized understanding.    Plan  Discontinue speech therapy  goals met and transfer to home program      Cheryl Miller MS, CCC-SLP      Physician Recommendation:  1. I certify the need for these services furnished within this jplan and while under my care.  I agree with the therapist's recommendtion for plan of care.  2. If there is any recommendation for modification of therapy plan, please indicate below.    Physician Signature: ____________________________________________
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
## 2021-06-05 NOTE — PROGRESS NOTES
Psychology Progress Note    Date: February 3, 2020    Time length and type of treatment: 50 minutes (10:40 AM to 11:30 AM), individual therapy    Necessity: This session is necessary to address the patient's Bipolar 2 Disorder, Panic Disorder, Agoraphobia, anxiety, and PTSD.  Today we focus on the patient's treatment plan, specifically exploring thoughts and expectations about self and others.  The reader is invited to review the patient's full treatment plan in the Media section of the patient's Epic medical record.    Intervention: This writer utilized motivational interviewing, active listening, reassurance and support in the context of cognitive behavioral therapy to address the above.      Mental Status:   Grooming: Within normal limits  Attire: Appropriate  Age: Appears Stated  Behavior Towards Examiner: Cooperative  Motor Activity: Patient ambulates slowly and with a limp  Eye Contact: Appropriate  Mood: Sad  Affect: Congruent w/content of speech  Speech/Language: Soft  Attention: Distractible  Concentration: Brief  Thought Process: Tangential  Thought Content: No evidence of delusions or hallucinations  Orientation: Appeared oriented to person, place, and time, though not formally established  Memory: No Evidence of Impairment  Judgement: No Evidence of Impairment  Estimated Intelligence: Average  Demonstrated Insight: Adequate  Fund of Knowledge: adequate      Progress:   Patient's stepfather called the patient and was very critical of the patient and compared the patient unfavorably to his biological children.  Patient found this conversation very painful and questioned why she continues to allow her stepfather into her life when he so frequently behaves like this.  Patient was able to identify that her mother was almost always critical of the patient, while her stepfather's behavior vacillated between critical and kind.  Because he was the only father figure the patient had ever known and because he was  kind more often than the patient's mom, he became an important source of validation for the patient.  Patient calmed a little as her own behavior began to make more sense to her. We also discussed the role frequent criticism played in contributing to the patient's anxiety.  Not wanting her children to struggle with anxiety is a significant motivating factor for the patient, and we committed to working on anxiety management strategies at our next appointment.    Plan: We will meet again in 1 week to address the patient's Bipolar II Disorder, anxiety, Panic Disorder, Agoraphobia, and PTSD.  Estimated duration of treatment is 10+ individual therapy sessions (69418) at weekly intervals. Treatment is expected to be completed by December 2020.     Diagnosis:   Bipolar II Disorder  Panic Disorder  Agoraphobia  Generalized Anxiety Disorder  Posttraumatic Stress Disorder

## 2021-06-05 NOTE — PROGRESS NOTES
PAIN CLINIC FOLLOW UP PROGRESS NOTE    CC:  Chief Complaint   Patient presents with     Fibromyalgia       HPI  Rohan Prince is a 44 y.o. female who presents for evaluation   Chief Complaint   Patient presents with     Fibromyalgia    that is causing continued pain. Since the last visit the patient denies any trips to the urgent care or ED specifically for their pain. The patient denies any new medications, diagnoses since the last visit. Specific questions indicated the patient wanted addressed today include: to follow up on her ongoing chronic pain as well medication management       Major issues:  1. Chronic pain syndrome    2. Fibromyalgia    3. Vitamin D deficiency, unspecified     4. Chronic midline low back pain, unspecified whether sciatica present        Alleviating factors: Include- music, PT and mindfulness techniques, hot and cold   Aggravating factors: activity including bending, standing, laying down or lifting  The patient denies using any assistive devices to help with mobilization.  Pain level today: On a scale of 1-10, the patient rates their pain at a 9/10 on average   Function Rating: patient is on disability but notes that she is limited in regards to function, she is currently raising her small children at home but notes that she is supposed to have an [employer] worker but she has declined this due to worries about them taking her children away.      Previous Medical History  Social History     Substance and Sexual Activity   Alcohol Use No     Social History     Substance and Sexual Activity   Drug Use No     Social History     Tobacco Use   Smoking Status Never Smoker   Smokeless Tobacco Never Used       Pertinent Pain Medications/interventions:    She currently takes tramadol currently but notes that this is not helpful like she used to. She used to take  mg, she takes excedrin migraine is assistive in reducing her migraines since was a young lady at 14 years of age.         Takes  "pregablin 75 mg     Sees Cheyenne Sanchez at [facility].     Review of Systems:  12 point systems were reviewed with pt as documented on pt health form and the patient denies any new diagnosis or changes in 12 point system review since the last visit.     Physical Exam  Vitals:    01/22/20 0843   BP: 108/59   Patient Site: Left Arm   Patient Position: Sitting   Pulse: 66   Resp: 18   Weight: 214 lb 6.4 oz (97.3 kg)   Height: 5' 10\" (1.778 m)     General- patient is alert and oriented, in NAD, well-groomed, well-nourished  Psych- Judgment and insight normal, AOx4, recent and remote memory normal, mood and affect normal  Eyes- pupils are equal and reactive, conjunctiva is clear bilaterally, no ptosis is noted.   Respiratory- breathing is non-labored  Cardiovascular- extremities warm and well perfused, no peripheral edema or varicosities.  Musculoskeletal- gait is normal, extremities with no joint swelling, erythema, or warmth. Lumbago with myalgia pain throughout.   Neuro- normal strength, no gait abnormalities, normal sensation to pain, temperature, light touch.  Integumentary- no rashes, dermatitis or discolorations noted throughout, no open wounds noted.       Medications    Current Outpatient Medications:      aspirin-acetaminophen-caffeine (EXCEDRIN MIGRAINE) 250-250-65 mg per tablet, Take 1 tablet by mouth every 6 (six) hours as needed for pain., Disp: , Rfl:      ergocalciferol (VITAMIN D2) 1,250 mcg (50,000 unit) capsule, Take 1 capsule (50,000 Units total) by mouth every 7 days., Disp: 4 capsule, Rfl: 11     HYDROcodone-acetaminophen (NORCO) 5-325 mg per tablet, Take 1 tablet by mouth 3 (three) times a day as needed for pain., Disp: 84 tablet, Rfl: 0     magnesium oxide (MAG-OX) 400 mg (241.3 mg magnesium) tablet, Take 1 tablet (400 mg total) by mouth daily., Disp: 30 tablet, Rfl: 1     pregabalin (LYRICA) 150 MG capsule, Take 1 capsule (150 mg total) by mouth 2 (two) times a day. x2 capsules, Disp: 56 capsule, " Rfl: 1    Lab:  Last UDS on 11/26/2019 had expected results. Any abnormal results have been discussed with the patient and may change the plan of care. Please see the scanned document from the outside lab under the media tab. This was reviewed with the patient.      Imaging:  No new imaging.    Ongoing  Known issue  IMPRESSION:   CONCLUSION:  1.  No acute fracture or high-grade canal narrowing.     2.  Marked bilateral L5-S1 foraminal narrowing.     3.  Right lateral recess narrowing L5-S1 may have progressed since the previous MRI, could impinge on the right S1 nerve.     4.  Marked L5-S1 degenerative disc disease.         Recent   Dated 1/22/2020 was reviewed with the patient today.   Paper copy reviewed     Assessment:   Rohan Prince is a 44 y.o. female seen in clinic today for   Chief Complaint   Patient presents with     Fibromyalgia     They are here for follow up and continued medical management of their pain. Today we reviewed the lab work of the UDT test and the results are expected because of the prescribed narcotics found in the urine drug screen.     I have also reviewed the information obtained from the last visit encounter after the TOYA was signed and have asked any pertintent questions needed from other healthcare providers/family/patient to continue care and formulate a treatment plan.     Mental health- Patient has been working with her psychologist and is diagnosed with bipolar type 2, panic disorder, agoraphobia and PTSD. She is following along with her weekly. She does not feel that she can tolerate a fascial release due to the touching.     She notes that she is unable to follow up on getting Vitamin D due to cost. Her lab work indicated significant D deficiency. Will send in a prescription for D 2 as this is what insurance covers. Magnesium needs to be replaced as well.     Opioid oral therapy- Currently using Norco and Lyrica and feels that her pain waxes and wanes.     Patients current  MME is 15  Patient to call clinic for next month's prescription 5 days in advance. Based on the patients response to their previous narcotic therapy and quality of life, which includes their participation in ADLs, I recommend that the narcotics therapy continue, however the changes listed below will be incorporated into their plan of care.     Patient set goals to   1. To treat her ongoing chronic pain     Plan:   Interventions-    Follow up in 4-8 weeks to evaluate the effectiveness of the treatment interventions ordered today. Scheduling your appointment prior to leaving assists in reduction of running out of medication prior to the next appointment.     Therapy- PT/OT- ok to do pool therapy when needed     Behavioral Health- following with her provider at [facility]     Agree with elimination of soda     Due to the vitamin D deficit replace with Vitamin D and magnesium     Ok to continue the use of the norco at this time 1/22-2/19, if you wait until 2 months for follow up- call for the second months prescription.     Continue the use of the lyrica at this time.     As a reminder- chronic pain is generally stable, if you experience a new injury or new different pain it is expected that you present to the ED or urgent care for evaluation. DO NOT use your chronic pain medications to treat any new or different pain other then what it is intended for. We do not replace any lost or stolen prescriptions or provide early refills for overtaking your medications.      Orders placed today  Medications that were ordered today   Requested Prescriptions     Signed Prescriptions Disp Refills     HYDROcodone-acetaminophen (NORCO) 5-325 mg per tablet 84 tablet 0     Sig: Take 1 tablet by mouth 3 (three) times a day as needed for pain.     pregabalin (LYRICA) 150 MG capsule 56 capsule 1     Sig: Take 1 capsule (150 mg total) by mouth 2 (two) times a day. x2 capsules     ergocalciferol (VITAMIN D2) 1,250 mcg (50,000 unit) capsule 4  capsule 11     Sig: Take 1 capsule (50,000 Units total) by mouth every 7 days.     magnesium oxide (MAG-OX) 400 mg (241.3 mg magnesium) tablet 30 tablet 1     Sig: Take 1 tablet (400 mg total) by mouth daily.     No orders of the defined types were placed in this encounter.      UDT/SWAB:  Patient required a random Urine Drug Testing, due to the need to comply with [place] Model Policy Guidelines and CDC Guideline for the use of any controlled substances. This is to ensure that patient is compliant with treatment, and monitor for risks such as diversion, abuse, or any other aberrant behaviors. Patient is either being considered for or taking a controlled substance. Unexpected findings will be discussed and treatment decision may be adjusted. Testing is being implemented across the board randomly w/o bias related to age, race, gender, socioeconomic status or Baptism affiliation.    The patient understand todays plan and has their questions answered in regards to expectations and current treatment plan.     SAFETY REMINDERS  No alcohol while taking controlled substances. Alcohol is not an illegal substance, it is unsafe to use in combination. It is a build up of substances in the body that can be extremely hazardous and may cause respirations to slow to a dangerous rate resulting in hospitalization, brain damage, or death.    Opioid medications have been associated with sharp rise in unintentional overdose and death.  Overdose is a condition characterized by the consumption in excess of a particular drug causing adverse effects. This can happen b/c you are sick, accidentally or intentionally took an extra dose, are on multiple medication that can interact. Someone took your medication and they are not use to the medication.  Symptoms of overdose include:   !breathing slow and shallow, erratic or not at all  !pinpoint pupils, hallucinations  !confusion  !muscle jerks, slack muscles   !extreme sleepiness or loss of  alertness   !awake but not able to talk   !face pale or clammy, vomiting, for lighter skinned people, the skin tone turns bluish purple, for darker skinned people, it turns grayish or ashen   If in a situation where overdose is a concern engage the emergency response system (dial 911).    In one study it was noted that 80% of unintentional overdoses occurred in people who were taking a combination of opioids and benzodiazepines.    Do not sell, loan, borrow or share your opioid medication with anyone. Deaths have occurred as a result of this practice. It is illegal and patients are being prosecuted.     Prevent unexpected access/loss of medication: Keep medication locked. Only carry what you need with you.    [facility]  [address]  [place] [address]  Ph: [phone]  Fax: [phone]

## 2021-06-05 NOTE — PROGRESS NOTES
Psychology Progress Note    Date: January 6, 2019    Time length and type of treatment: 52 minutes (2:04 PM to 2:56 PM), individual therapy    Necessity: This session is necessary to address the patient's anxiety, PTSD, panic disorder, and bipolar 2 disorder.  Today we focus on the patient's treatment plan, specifically exploring thoughts and expectations about self and others.  The reader is invited to review the patient's full treatment plan in the Media section of the patient's Epic medical record.    Intervention: This writer utilized motivational interviewing, active listening, reassurance and support in the context of cognitive behavioral therapy to address the above.      Mental Status:   Grooming: Within normal limits  Attire: Appropriate  Age: Appears Stated  Behavior Towards Examiner: Cooperative  Motor Activity: Agitated   Eye Contact: Appropriate  Mood: Anxious  Affect: Blunted and Anxious  Speech/Language: Soft  Attention: Distractible  Concentration: Brief  Thought Process: Tangential  Thought Content: No evidence of delusions or hallucinations  Orientation: Appeared oriented to person, place, and time, though not formally established  Memory: No Evidence of Impairment  Judgement: No Evidence of Impairment  Estimated Intelligence: Average  Demonstrated Insight: Adequate  Fund of Knowledge: adequate      Progress:   Patient stated that her youngest daughter's second in-home therapist has quit in less than two months.  Patient believes she is personally to blame for these decisions, and expressed frustration with how hard it is to trust and be open to people when they leave, let her down, don't reciprocate, or take advantage of her. Patient acknowledged her anxiety was very high.  A guided imagery exercise was not successful, but patient calmed as session progressed.   We spent time discussing how the patient is experiencing therapy, her fear about how much she has already said, her fear that her words  will be used against her in the future, and what would help the client to feel safe.      Plan: We will meet again in 1 week to address the patient's bipolar 2 disorder, anxiety, panic, and PTSD.  Estimated duration of treatment is 10+ individual therapy sessions (80357) at weekly intervals. Treatment is expected to be completed by December 2020.     Diagnosis:    Bipolar II Disorder  Panic Disorder  Agoraphobia  Generalized Anxiety Disorder  Posttraumatic Stress Disorder
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
## 2021-06-05 NOTE — PROGRESS NOTES
Patient presents to the clinic today for a follow up with Mary Muniz CNP regarding back pain. Patient describes their pain as aching, stabbing, and burning. Her/His function score is 6.

## 2021-06-05 NOTE — PATIENT INSTRUCTIONS - HE
Plan:   Interventions-    Follow up in 4-8 weeks to evaluate the effectiveness of the treatment interventions ordered today. Scheduling your appointment prior to leaving assists in reduction of running out of medication prior to the next appointment.     Therapy- PT/OT- ok to do pool therapy when needed     Behavioral Health- following with her provider at [facility]     Agree with elimination of soda     Due to the vitamin D deficit replace with Vitamin D and magnesium     Ok to continue the use of the norco at this time 1/22-2/19, if you wait until 2 months for follow up- call for the second months prescription.     Continue the use of the lyrica at this time.     As a reminder- chronic pain is generally stable, if you experience a new injury or new different pain it is expected that you present to the ED or urgent care for evaluation. DO NOT use your chronic pain medications to treat any new or different pain other then what it is intended for. We do not replace any lost or stolen prescriptions or provide early refills for overtaking your medications.      Orders placed today  Medications that were ordered today   Requested Prescriptions     Pending Prescriptions Disp Refills     HYDROcodone-acetaminophen (NORCO) 5-325 mg per tablet 84 tablet 0     Sig: Take 1 tablet by mouth 3 (three) times a day as needed for pain.     pregabalin (LYRICA) 150 MG capsule 56 capsule 1     Sig: Take 1 capsule (150 mg total) by mouth 2 (two) times a day. x2 capsules     ergocalciferol (VITAMIN D2) 1,250 mcg (50,000 unit) capsule 4 capsule 11     Sig: Take 1 capsule (50,000 Units total) by mouth every 7 days.     magnesium oxide (MAG-OX) 400 mg (241.3 mg magnesium) tablet 30 tablet 1     Sig: Take 1 tablet (400 mg total) by mouth daily.     No orders of the defined types were placed in this encounter.      UDT/SWAB:  Patient required a random Urine Drug Testing, due to the need to comply with [place] [employer] Policy Guidelines and  CDC Guideline for the use of any controlled substances. This is to ensure that patient is compliant with treatment, and monitor for risks such as diversion, abuse, or any other aberrant behaviors. Patient is either being considered for or taking a controlled substance. Unexpected findings will be discussed and treatment decision may be adjusted. Testing is being implemented across the board randomly w/o bias related to age, race, gender, socioeconomic status or [place] affiliation.    The patient understand todays plan and has their questions answered in regards to expectations and current treatment plan.     SAFETY REMINDERS  No alcohol while taking controlled substances. Alcohol is not an illegal substance, it is unsafe to use in combination. It is a build up of substances in the body that can be extremely hazardous and may cause respirations to slow to a dangerous rate resulting in hospitalization, brain damage, or death.    Opioid medications have been associated with sharp rise in unintentional overdose and death.  Overdose is a condition characterized by the consumption in excess of a particular drug causing adverse effects. This can happen b/c you are sick, accidentally or intentionally took an extra dose, are on multiple medication that can interact. Someone took your medication and they are not use to the medication.  Symptoms of overdose include:   !breathing slow and shallow, erratic or not at all  !pinpoint pupils, hallucinations  !confusion  !muscle jerks, slack muscles   !extreme sleepiness or loss of alertness   !awake but not able to talk   !face pale or clammy, vomiting, for lighter skinned people, the skin tone turns bluish purple, for darker skinned people, it turns grayish or ashen   If in a situation where overdose is a concern engage the emergency response system (dial 911).    In one study it was noted that 80% of unintentional overdoses occurred in people who were taking a combination of  opioids and benzodiazepines.    Do not sell, loan, borrow or share your opioid medication with anyone. Deaths have occurred as a result of this practice. It is illegal and patients are being prosecuted.     Prevent unexpected access/loss of medication: Keep medication locked. Only carry what you need with you.    Mary Muniz [facility]  [address]  [place] [address]  Ph: [phone]  Fax: [phone]
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
## 2021-06-06 NOTE — PROGRESS NOTES
Psychology Progress Note    Date: 2020    Time length and type of treatment: 50 minutes (11:02 AM to 11:52 AM), individual therapy    Necessity: This session is necessary to address the patient's bipolar 2 disorder, panic disorder, agoraphobia, anxiety, and PTSD.  Today we focus on the patient's treatment plan, specifically processing grief and further clarifying processing grief and further clarifying diagnoses.  The reader is invited to review the patient's full treatment plan in the Media section of the patient's Epic medical record.    Intervention: This writer utilized motivational interviewing, active listening, reassurance and support in the context of cognitive behavioral therapy to address the above.      Mental Status:   Grooming: Within normal limits  Attire: Appropriate  Age: Appears Stated  Behavior Towards Examiner: Cooperative  Motor Activity:  Patient ambulates slowly and with a slight limp  Eye Contact: Appropriate  Mood: Sad  Affect: Blunted  Speech/Language: Soft  Attention: Distractible  Concentration: Brief  Thought Process: Tangential  Thought Content: No evidence of delusions or hallucinations, though patient reports infrequent auditory hallucinations when under stress  Orientation: Appeared oriented to person, place, and time, though not formally established  Memory: No Evidence of Impairment  Judgement: No Evidence of Impairment  Estimated Intelligence: Average  Demonstrated Insight: Adequate  Fund of Knowledge: adequate      Progress:   Patient continues to mourn the loss of her former partner.  She was not able to attend the  because it was happening in [place], but expressed regret that the patient's mom articulated a preference to have the patient not come.  The patient noted she has always been close to her former partners mom and hurt that she wasn't wanted even though she knows her mother-in-law is grieving and does not handle large groups well.  The patient was  supported in her griev.      As rapport has improved patient shared additional information, including that she also struggles with obsessions and compulsions.  She described spending more than an hour each day on compulsions, and admitted that sometimes she spends significant portions of her day on these tasks.  She noted that the thought that someone might break in has her checking locks at least twice each night, and has to use a bottle of hand  every time she goes past it.  She keeps her curtains closed all day out of fear that someone will watch her, but opens them before her children get home so that they can have some light in the home.  But she spends a lot of time re-closing her curtains every night, making sure she closes them perfectly so that no light can penetrate, there are no gaps, and they are properly aligned to overlap the appropriate amount.  The patient stated that her daughters have commented on her compulsions, and sometimes intervene to stop her, but she feels her obsessions are reasonable and her compulsions are necessary.  The patient meets criteria for Obsessive Compulsive Disorder, with poor insight and this will be added to the patient's list of diagnoses.     Plan: We will meet again in 1 week to address the patient's bipolar 2 disorder, panic disorder, agoraphobia, anxiety, PTSD, and OCD.  Estimated duration of treatment is 10+ individual therapy sessions (25704) at weekly intervals. Treatment is expected to be completed by December 2020.     Diagnosis:    Bipolar II Disorder  Panic Disorder  Agoraphobia  Generalized Anxiety Disorder  Posttraumatic Stress Disorder  Obsessive Compulsive Disorder, with poor insight
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
## 2021-06-06 NOTE — PROGRESS NOTES
Psychology Progress Note    Date: March 9, 2020    Time length and type of treatment: 50 minutes (1:00 PM to 1:50 PM), individual therapy    Necessity: This session is necessary to address the patient's bipolar 2 disorder, panic disorder, agoraphobia, anxiety, and PTSD.  Today we focus on the patient's treatment plan, specifically exploring thoughts and expectations about self and others and refraining messages that exacerbate depression.  The reader is invited to review the patient's full treatment plan in the Media section of the patient's Epic medical record.    Intervention: This writer utilized motivational interviewing, active listening, reassurance and support in the context of cognitive behavioral therapy to address the above.      Mental Status:   Grooming: Within normal limits  Attire: Appropriate  Age: Appears Stated  Behavior Towards Examiner: Cooperative  Motor Activity: Within normal   Eye Contact: Appropriate  Mood: Sad  Affect: Blunted  Speech/Language: Soft  Attention: Distractible  Concentration: Brief  Thought Process: Tangential  Thought Content: No evidence of delusions or hallucinations  Orientation: Appeared oriented to person, place, and time, though not formally established  Memory: No Evidence of Impairment  Judgement: No Evidence of Impairment  Estimated Intelligence: Average  Demonstrated Insight: Adequate  Fund of Knowledge: adequate      Progress:   Patient continues to struggle with feelings of inadequacy, especially around parenting. She was reluctant to accept to reframe an interaction the patient had with her daughter after her daughter's boyfriend broke up with her as a recognition of the importance of teaching our children to manage painful emotions, preferring to see her behavior as a sign of a limit on her empathy and poor parenting.  Patient was asked to complete a list of 20 things she feels she does reasonably well as a parent and one or two things she feels she would like to  work on.  We will use this list to reinforce parenting strengths and to build a problem solving focus to areas the patient feels bad about.  Patient also expressed fear about getting her DNA test results, noting she is scared that if she tries to approach genetic relatives, she will have to deal with even more rejection.  Patient's right to contact - or not contact - genetic relatives in her own time was reinforced.    Plan: We will meet again in 1 week to address the patient's bipolar 2 disorder, panic disorder, agoraphobia, anxiety, PTSD, and OCD.  Estimated duration of treatment is 10+ individual therapy sessions (01582) at weekly intervals. Treatment is expected to be completed by December 2020.     Diagnosis:    Generalized Anxiety Disorder  Bipolar II Disorder  Panic Disorder  Agoraphobia  Posttraumatic Stress Disorder  Obsessive Compulsive Disorder, with poor insight

## 2021-06-06 NOTE — PROGRESS NOTES
Psychology Progress Note    Date: 16th 2020    Time length and type of treatment: 52 minutes (10:30 AM to 11:22 AM), individual therapy    Necessity: This session is necessary to address the patient's bipolar 2 disorder, panic disorder, agoraphobia, anxiety, and PTSD.  Today we focus on the patient's treatment plan, specifically exploring thoughts and expectations about self and others.  The reader is invited to review the patient's full treatment plan in the Media section of the patient's Epic medical record.    Intervention: This writer utilized motivational interviewing, active listening, reassurance and support in the context of cognitive behavioral therapy to address the above.      Mental Status:   Grooming: Within normal limits  Attire: Appropriate  Age: Appears Stated  Behavior Towards Examiner: Cooperative  Motor Activity: Within normal   Eye Contact: Appropriate  Mood: Anxious  Affect: Blunted, Tearful and Anxious  Speech/Language: Soft  Attention: Distractible  Concentration: Brief  Thought Process: Tangential  Thought Content: No evidence of delusions or hallucinations  Orientation: Appeared oriented to person, place, and time, though not formally established  Memory: No Evidence of Impairment  Judgement: No Evidence of Impairment  Estimated Intelligence: Average  Demonstrated Insight: Adequate  Fund of Knowledge: adequate      Progress:  Patient is anxious about her children's schools being closed for two weeks due to COVID-19, noting that she struggles with setting limits with her children because of how uncomfortable she is with arguments, tension, and disagreements, and because she feels afraid of them not loving her if she sets limits, even though she recognizes this isn't necessarily logical.  Patient explored how hurtful it was when her mom would tell her that she hated her, and how painful it has felt when her daughters have yelled that they hated her.  She was only able to come up with 10 items  for her list of things she liked about herself as a mom, and had limited ability to appreciate those items.  Patient was asked to create a list of 20 items about herself that are true and are at least neutral if not positive.  We will use both lists to continue to reinforce patient strengths.      Plan: We will meet again in 1 week to address the patient's bipolar 2 disorder, panic disorder, agoraphobia, anxiety, PTSD, and OCD.  Estimated duration of treatment is 10+ individual therapy sessions (78875) at weekly intervals. Treatment is expected to be completed by December 2020.     Diagnosis:    Generalized Anxiety Disorder  Bipolar II Disorder  Panic Disorder  Agoraphobia  Posttraumatic Stress Disorder  Obsessive Compulsive Disorder, with poor insight

## 2021-06-06 NOTE — PROGRESS NOTES
Psychology Progress Note    Date: July 10, 2020    Time length and type of treatment: 51 minutes (11:04 AM to 11:55 AM), individual therapy    Necessity: This session is necessary to address the patient's bipolar 2 disorder, panic disorder, agoraphobia, anxiety, and PTSD.  Today we focus on the patient's treatment plan, specifically exploring developing an awareness of irrational fear and learn reality based messages, and processing grief.  The reader is invited to review the patient's full treatment plan in the Media section of the patient's Epic medical record.    Intervention: This writer utilized motivational interviewing, active listening, reassurance and support in the context of cognitive behavioral therapy to address the above.      Mental Status:   Grooming: Within normal limits  Attire: Appropriate  Age: Appears Stated  Behavior Towards Examiner: Cooperative  Motor Activity: Patient ambulates slowly and with a slight limp  Eye Contact: Appropriate  Mood: Sad  Affect: Congruent w/content of speech  Speech/Language: Soft  Attention: Distractible  Concentration: Brief  Thought Process: Tangential  Thought Content: No evidence of delusions or hallucinations  Orientation: Appeared oriented to person, place, and time, though not formally established  Memory: No Evidence of Impairment  Judgement: No Evidence of Impairment  Estimated Intelligence: Average  Demonstrated Insight: Adequate  Fund of Knowledge: adequate      Progress:   Patient reported that she almost didn't come today, explaining that all of the people she has allowed to get close to her have , and she was afraid that if she came to therapy, I would die.  These fears have been heightened because a close friend and neighbor of the patient's is dying from cancer and is approaching the end of her life.  Patient stated that this friend was healthy when they met and the patient is very afraid of losing her.  Patient then detailed several other losses.   It was noted that in most of the cases the patient described, the people she became close to were already sick when they met the patient.  We explored the possibility that the patient's fear of being hurt and tendency to only open herself up to people she knew needed her help meant that rather than causing people to die, she was most likely to only open herself up to people who are at higher risk of dying.      Plan: We will meet again in 1 week to address the patient's bipolar II disorder, anxiety, panic disorder, agoraphobia, and PTSD.  Estimated duration of treatment is 10+ individual therapy sessions (10918) at weekly intervals. Treatment is expected to be completed by December 2020.     Diagnosis:    Bipolar II Disorder  Panic Disorder  Agoraphobia  Generalized Anxiety Disorder  Posttraumatic Stress Disorder

## 2021-06-07 NOTE — PROGRESS NOTES
"Rohan Prince is a 44 y.o. female who is being evaluated via a billable telephone visit.      Start time- 9:20 am   End time- 9:49 am    The patient has been notified of following:     \"This telephone visit will be conducted via a call between you and your physician/provider. We have found that certain health care needs can be provided without the need for a physical exam.  This service lets us provide the care you need with a short phone conversation.  If a prescription is necessary we can send it directly to your pharmacy.  If lab work is needed we can place an order for that and you can then stop by our lab to have the test done at a later time.    If during the course of the call the physician/provider feels a telephone visit is not appropriate, you will not be charged for this service.\"     Rohan Prince complains of    Chief Complaint   Patient presents with     Fibromyalgia       I have reviewed and updated the patient's Past Medical History, Social History, Family History and Medication List.    ALLERGIES  Chlorhexidine    Provider notes-    PAIN CLINIC FOLLOW UP PROGRESS NOTE    CC:  Chief Complaint   Patient presents with     Fibromyalgia       HPI  Rohan Prince is a 44 y.o. female who presents for evaluation   Chief Complaint   Patient presents with     Fibromyalgia    that is causing continued pain.  Specific questions indicated the patient wanted addressed today include:discussion of her ongoing chronic pain as well as  Medication refills and concerns.     Major issues:  1. Chronic pain syndrome    2. Fibromyalgia    3. Vitamin D deficiency, unspecified     4. Chronic midline low back pain, unspecified whether sciatica present      Alleviating factors: Include- music, PT and mindfulness techniques, hot and cold   Aggravating factors: activity including bending, standing, laying down or lifting  The patient denies using any assistive devices to help with mobilization.  Pain level today: On a scale of " 1-10, the patient rates their pain at a /10 on average   Function Rating: patient is on disability but notes that she is limited in regards to function, she is currently raising her small children at home but notes that she is supposed to have an [employer] worker but she has declined this due to worries about them taking her children away.      Previous Medical History  Social History     Substance and Sexual Activity   Alcohol Use No     Social History     Substance and Sexual Activity   Drug Use No     Social History     Tobacco Use   Smoking Status Never Smoker   Smokeless Tobacco Never Used       Pertinent Pain Medications/interventions:    Currently she is taking norco 3 times a day and lyrica- denies any side effects at this time.     She currently takes tramadol currently but notes that this is not helpful like she used to. She used to take  mg, she takes excedrin migraine is assistive in reducing her migraines since was a young lady at 14 years of age.             Takes pregablin 75 mg, Sees Cheyenne Sanchez at [facility].   Review of Systems:  12 point systems were reviewed with pt as documented on pt health form and the patient denies any new diagnosis or changes in 12 point system review since the last visit. No new visits tot he ED or urgent care.     Medications    Current Outpatient Medications:      aspirin-acetaminophen-caffeine (EXCEDRIN MIGRAINE) 250-250-65 mg per tablet, Take 1 tablet by mouth every 6 (six) hours as needed for pain., Disp: , Rfl:      ergocalciferol (VITAMIN D2) 1,250 mcg (50,000 unit) capsule, Take 1 capsule (50,000 Units total) by mouth every 7 days., Disp: 4 capsule, Rfl: 11     HYDROcodone-acetaminophen (NORCO) 5-325 mg per tablet, Take 1 tablet by mouth 3 (three) times a day as needed for pain., Disp: 84 tablet, Rfl: 0     magnesium oxide (MAG-OX) 400 mg (241.3 mg magnesium) tablet, Take 1 tablet (400 mg total) by mouth daily., Disp: 30 tablet, Rfl: 1     pregabalin (LYRICA)  150 MG capsule, Take 1 capsule (150 mg total) by mouth 2 (two) times a day. x2 capsules, Disp: 56 capsule, Rfl: 1    Lab:  Last UDS on 11/26/2019 had expected results. Last UDT on file was reviewed.    Imaging:  No new imaging reviewed with patient over the phone, no new orders placed       Recent   Dated 3/24/2020 was reviewed with the patient today.   Paper copy reviewed     Assessment:     Rohan Prince is a 44 y.o. female seen in clinic today for   Chief Complaint   Patient presents with     Fibromyalgia     They are here for follow up and continued medical management of their pain. Today we reviewed the plan of care for their chronic pain and determined that the patient will need a refill of the current prescription.      Due to the Covid 19 precautions all visits are converted to telephone encounters until the government restrictions are lifted and the precautions have been lifted.     Opioid oral therapy- Currently using Norco and Lyrica and feels that her pain waxes and wanes. she is taking approximately 3 per day.     Mental health- continue cares as she is. She notes that her stress is higher with her kids being home and feels like it is increasing her pain.     Vitamin D she is currently taking this as directed over the counter.     Patients current MME is 15    Patient set goals to   1. To treat her ongoing chronic pain     Plan:   Interventions-    Follow up in 8 weeks prn     Behavioral Health with [facility] provider as you are.     Continue the use of the pain regimen of the lyrica , norco and magnesium.  4/14-5/12, call your pharmacy by 4/12 for the refill on file.     She notes that she has right sided issues mostly in her hand and feels like she drops things. Will consider an EMG in the future if needed.       Orders placed today  Medications that were ordered today   Requested Prescriptions     Pending Prescriptions Disp Refills     HYDROcodone-acetaminophen (NORCO) 5-325 mg per tablet 84  tablet 0     Sig: Take 1 tablet by mouth 3 (three) times a day as needed for pain.     magnesium oxide (MAG-OX) 400 mg (241.3 mg magnesium) tablet 30 tablet 1     Sig: Take 1 tablet (400 mg total) by mouth daily.     pregabalin (LYRICA) 150 MG capsule 56 capsule 1     Sig: Take 1 capsule (150 mg total) by mouth 2 (two) times a day. x2 capsules     No orders of the defined types were placed in this encounter.    The patient understand todays plan and has their questions answered in regards to expectations and current treatment plan.     Prevent unexpected access/loss of medication: Keep medication locked. Only carry what you need with you    The plan of care will be adjusted to accommodate the issues discussed, discussing management of their care and follow up that is recommended. 3/24/2020         Mary Muniz [facility]  [address]  [place] [address]  Ph: [phone]  Fax: [phone]

## 2021-06-07 NOTE — PATIENT INSTRUCTIONS - HE
Plan:   Interventions-    Follow up in 8 weeks prn     Behavioral Health with [facility] provider as you are.     Continue the use of the pain regimen of the lyrica , norco and magnesium.  4/14-5/12, call your pharmacy by 4/12 for the refill on file.     She notes that she has right sided issues mostly in her hand and feels like she drops things. Will consider an EMG in the future if needed.       Orders placed today  Medications that were ordered today   Requested Prescriptions     Pending Prescriptions Disp Refills     HYDROcodone-acetaminophen (NORCO) 5-325 mg per tablet 84 tablet 0     Sig: Take 1 tablet by mouth 3 (three) times a day as needed for pain.     magnesium oxide (MAG-OX) 400 mg (241.3 mg magnesium) tablet 30 tablet 1     Sig: Take 1 tablet (400 mg total) by mouth daily.     pregabalin (LYRICA) 150 MG capsule 56 capsule 1     Sig: Take 1 capsule (150 mg total) by mouth 2 (two) times a day. x2 capsules     No orders of the defined types were placed in this encounter.    The patient understand todays plan and has their questions answered in regards to expectations and current treatment plan.     Prevent unexpected access/loss of medication: Keep medication locked. Only carry what you need with you    The plan of care will be adjusted to accommodate the issues discussed, discussing management of their care and follow up that is recommended. 3/24/2020       Mary Muniz [facility]  [address]  [place] [address]  Ph: [phone]  Fax: [phone]

## 2021-06-07 NOTE — PROGRESS NOTES
I called the patient at our scheduled time of 9:00 am, but the patient was not able to meet.  We agreed to reschedule for 4:00 pm today.

## 2021-06-07 NOTE — PROGRESS NOTES
Psychology Progress Note    Date: April 29, 2020    Time length and type of treatment: 48 minutes (11:04 AM to 11:52 AM), individual therapy    After review of the patient's situation, this visit was changed from an in-person visit to a  telephone visit to reduce the risk of COVID 19 exposure. Patient was informed that policies and procedures that govern in-person sessions would also apply to  telephone sessions. Patient was also informed that  telephone sessions would be discontinued when COVID 19 exposure is no longer a concern (as determined by [facility]).     Patient location: Patient home in [place], [place]  Provider location [facility]    A video visit using the Swish was attempted because last weeks efforts using [employer] were not successful.  Unfortunately, the patient made multiple attempts to join the Dox[employer] visit and was unable to do so.  Rather than continuing to work on making a video visit work, patient was in agreement with proceeding with a  telephone session.    Necessity: This session is necessary to address the patient's anxiety, bipolar 2 disorder, panic disorder, agoraphobia, and PTSD.  Today we focus on revising the patient's treatment plan.  The reader is invited to review the patient's full treatment plan in the Media section of the patient's Epic medical record.    Intervention: This writer utilized motivational interviewing, active listening, reassurance and support in the context of cognitive behavioral therapy to address the above.      Mental Status:   Grooming: Unable to observe due to telephone visit  Attire: Unable to observe due to telephone visit  Age: Appears Stated based on previous in person sessions  Behavior Towards Examiner: Cooperative  Motor Activity: Unable to observe due to telephone visit  Eye Contact: Unable to observe due to telephone visit  Mood: Sad  Affect: Blunted  Speech/Language: Soft  Attention:  "Distractible  Concentration: Brief  Thought Process: Tangential  Thought Content: No evidence of delusions or hallucinations  Orientation: Appeared oriented to person, place, and time, though not formally established  Memory: No Evidence of Impairment  Judgement: No Evidence of Impairment  Estimated Intelligence: Average  Demonstrated Insight: Adequate  Fund of Knowledge: adequate      Progress:   Patient completed the PHQ-9 and ZAKIA-7 to assess current mood changes as part of revising her treatment plan.  Her score on the PHQ 9 moved from a score of 9, which was suggestive of mild depression, to a score of 13, which is suggestive of moderate depression.  Her score on the ZAKIA-7 increased from 13-15, but remains in the moderate range.  Patient attributes increased scores to the difficulty she is having with having her children at home all the time due to the COVID-19 lockdown.  Patient noted that she is used to having the school day as time for herself to rest and recover so that she can parent effectively when her children come home from school.  The lack of any time to herself, and the difficulty of getting her youngest daughter in particular to complete online school tasks and go to bed at night, has been very difficult for the patient.  Patient discussed how struggling to parent makes her feel like a \"bad mom\" which negatively impacts self esteem.  Because this is a large theme for the patient which we would not have time to explore and still get the patient's treatment plan completed, it was agreed that wait with discussing this topic until a future session.    Patient also discussed a recent incident when she went to [facility] with a friend in order to  some necessary supplies and started to have a panic attack, but her friend was able to calm her sufficiently so that she was able to quickly finish her shopping.  We briefly explored what enabled patient to manage this situation and abort a panic attack.  "       Plan: We will meet again in 1 week to address the patient's bipolar 2 disorder, panic disorder, agoraphobia, anxiety, and PTSD.  Estimated duration of treatment is 10+ individual therapy sessions (24129) at weekly intervals. Treatment is expected to be completed by December 2020.     Diagnosis:    Generalized Anxiety Disorder  Bipolar II Disorder  Panic Disorder  Agoraphobia  Posttraumatic Stress Disorder  Obsessive Compulsive Disorder, with poor insight
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
## 2021-06-07 NOTE — PROGRESS NOTES
Psychology Progress Note    Date: March 23, 2020    Time length and type of treatment: 50 minutes (11:00 AM to 11:45 AM), individual therapy, telephone visit    Patient verbally consented to a telephone therapy session.    Necessity: This session is necessary to address the patient's bipolar 2 disorder, panic disorder, agoraphobia, anxiety, and PTSD.  Today we focus on the patient's treatment plan, specifically exploring thoughts and expectations about self and others.  The reader is invited to review the patient's full treatment plan in the Media section of the patient's Epic medical record.    Intervention: This writer utilized motivational interviewing, active listening, reassurance and support in the context of cognitive behavioral therapy to address the above.      Mental Status:   Grooming: Unknown due to phone visit  Attire: Unknown due to phone visit  Age: Appears Stated based on previous in person visits  Behavior Towards Examiner: Cooperative  Motor Activity: Unknown due to phone visit   Eye Contact: Unknown due to phone visit  Mood: Anxious  Affect: Blunted  Speech/Language: Soft  Attention: Distractible  Concentration: Brief  Thought Process: Tangential  Thought Content: No evidence of delusions or hallucinations  Orientation: Appeared oriented to person, place, and time, though not formally established  Memory: No Evidence of Impairment  Judgement: No Evidence of Impairment  Estimated Intelligence: Average  Demonstrated Insight: Adequate  Fund of Knowledge: adequate      Progress:   We discussed the need to switch to telephone therapy given COVID-19.  Patient expressed understanding, but admitted that this is difficult for her since she's not a telephone person. She also noted that her doctor appointments are the only time she leaves her house and even though she's a homebody, this degree of isolation has been difficult.  She was also unclear if she could leave her house at all, and we discussed what  restrictions people are currently being asked to follow.  Patient expressed relief that she is able to grocery shop.  Patient has also been having a difficult time managing her youngest daughter's behavior, and is concerned about how she will get her youngest daughter to complete online school work.  Patient was encouraged to talk with her school about her concerns and was provided supportive psychotherapy.  Patient had a difficult conversation with the brother of one of her daughter's dads in which his daughter's uncle made a disparaging comment about her looks, then another comment that suggested she wasn't intelligent.  Patient didn't respond because the man was intoxicated at the time, but was hurt by what he said and was contemplating having a conversation with him when he was sober.  We discussed boundaries, and explored when it's important to stand up for yourself to another person and when it feels okay to let something slide because it's not worth the stress of a difficult confrontation.          Plan: We will meet again in 1 week to address the patient's bipolar 2 disorder, panic disorder, agoraphobia, anxiety, PTSD, and OCD.  Estimated duration of treatment is 10+ individual therapy sessions (45392) at weekly intervals. Treatment is expected to be completed by December 2020.     Diagnosis:    Generalized Anxiety Disorder  Bipolar II Disorder  Panic Disorder  Agoraphobia  Posttraumatic Stress Disorder  Obsessive Compulsive Disorder, with poor insight

## 2021-06-07 NOTE — PROGRESS NOTES
Psychology Progress Note    Date: April 06, 2020    Time length and type of treatment: 52 minutes (4:01 PM to 4:53 PM), individual therapy    After review of the patient's situation, this visit was changed from an in-person visit to a  telephone visit via [employer] to reduce the risk of COVID 19 exposure. Patient was informed that policies and procedures that govern in-person sessions would also apply to phone sessions. Patient was also informed that  phone sessions would be discontinued when COVID 19 exposure is no longer a concern (as determined by [facility]).   Patient location: Patient home in [place], [place]  Provider location: [facility] concussion clinic in [facility]   Patient was in agreement with proceeding with a  phone session.      Necessity: This session is necessary to address the patient's bipolar II disorder, panic disorder, agoraphobia, anxiety, and PTSD. Today we focus on the patient's treatment plan, specifically exploring thoughts and feelings about self and others.  The reader is invited to review the patient's full treatment plan in the Media section of the patient's Epic medical record.    Intervention: This writer utilized motivational interviewing, active listening, reassurance and support in the context of cognitive behavioral therapy to address the above.      Mental Status:   Grooming: Unable to observe due to telephone session  Attire: To observe due to telephone session  Age: Appears Stated time previous in person sessions  Behavior Towards Examiner: Cooperative  Motor Activity: To observe due to telephone session  Eye Contact: Unable to observe due to telephone session  Mood: Irritable  Affect: Irritable  Speech/Language: Mildly pressured  Attention: Distractible  Concentration: Brief  Thought Process: Tangential  Thought Content: Evidence of delusions or hallucinations  Orientation: Appeared oriented to person, place, and time, though not formally  "established  Memory: No Evidence of Impairment  Judgement: No Evidence of Impairment  Estimated Intelligence: Average  Demonstrated Insight: Adequate  Fund of Knowledge: adequate      Progress:   Patient reported that being home with her daughters all the time has been very difficult for.  She particularly expressed frustrationthat in person therapy is no longer an option, noting that this was the time each week that she had for herself, and it is more difficult to focus on her own issues when she is also having to manage her children's behavior.  Patient's feelings were validated and we explored options to limit interference, including trying to schedule therapy sessions before her children wake up or when they are involved in their own therapy.      Patient got the results of her DNA testing which was important to her because her mom was adopted and she had never met her biological father so she felt strongly that she wanted to know \"who she was.\"  She stated it was nice to find out that the man she had been told was her father was, in fact, her father, even though the stories she had heard about him made it clear that he was not a kind person, and had not wanted her.  She knew he was , but said that she found some close relatives via the testing.  Patient noted that someone who is likely her aunt had seemed open to connecting with relatives, but the patient doesn't know if her dad's sister even knows she exists so she is afraid of contacting her and facing the possibility of even more rejection in her life.  Patient has decided that she will post some information about herself, and allow relatives on her dad's side of the family to decide for themselves if they'd like to contact her. Patient also found comfort in learning what countries her ancestors came from, which surprised her, but also helped her to feel more grounded.    Plan: We will meet again in 1 week to address  the patient's bipolar II " disorder, panic disorder, agoraphobia, anxiety, and PTSD..  Estimated duration of treatment is 10+ individual therapy sessions (15157) at weekly intervals. Treatment is expected to be completed by December 2020.     Diagnosis:    Generalized Anxiety Disorder  Bipolar II Disorder  Panic Disorder  Agoraphobia  Posttraumatic Stress Disorder  Obsessive Compulsive Disorder, with poor insight
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
## 2021-06-08 ENCOUNTER — RECORDS - HEALTHEAST (OUTPATIENT)
Dept: FAMILY MEDICINE | Facility: CLINIC | Age: 46
End: 2021-06-08

## 2021-06-08 DIAGNOSIS — D32.9 BENIGN NEOPLASM OF MENINGES, UNSPECIFIED (H): ICD-10-CM

## 2021-06-08 NOTE — PATIENT INSTRUCTIONS - HE
Plan:   Interventions-    Follow up in 8 weeks     Continue the use of the Lyrica   Continue the use of the Norco up to 3 per day ok to fill on 6/9/2020-7/7    Follow up with PCP about concerns in regards to her right carpel tunnel.     This limited telehealth encounter is due to the Covid 19,  as required by the governmental agencies at this time due to risk of transmission of the pandemic, therefore testing availability is limited as well as physical exams.      Prescription Drug Management will be continued by the [facility]    Orders placed today  Medications that were ordered today   Requested Prescriptions     Signed Prescriptions Disp Refills     HYDROcodone-acetaminophen (NORCO) 5-325 mg per tablet 84 tablet 0     Sig: Take 1 tablet by mouth 3 (three) times a day as needed for pain.     pregabalin (LYRICA) 150 MG capsule 56 capsule 1     Sig: Take 1 capsule (150 mg total) by mouth 2 (two) times a day.     No orders of the defined types were placed in this encounter.        The patient understand todays plan and has their questions answered in regards to expectations and current treatment plan.     Prevent unexpected access/loss of medication: Keep medication locked. Only carry what you need with you    The plan of care will be adjusted to accommodate the issues discussed, discussing management of their care and follow up that is recommended. 5/19/2020         DUNG Mendoza [facility]  [address]  [place] [address]  Ph: [phone]  Fax: [phone]
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
## 2021-06-08 NOTE — PROGRESS NOTES
Psychology Progress Note    Date: May 18, 2020    Time length and type of treatment: Minutes (10:10 AM to 10:55 AM), individual therapy    After review of the patient's situation, this visit was changed from an in-person visit to a  video visit via [employer] to reduce the risk of COVID 19 exposure. Patient was informed that policies and procedures that govern in-person sessions would also apply to  video sessions. Patient was also informed that  video sessions would be discontinued when COVID 19 exposure is no longer a concern (as determined by [facility]).     Patient location: Patient home in [place], [place]  Provider location:  [facility] Neurology - Concussion Clinic, [place], [place]    Patient was in agreement with proceeding with a  video session.      Necessity: This session is necessary to address the patient's anxiety, bipolar 2 disorder, panic disorder, agoraphobia, and PTSD.  Today we focus on the patient's treatment plan, specifically exploring thoughts and expectations about self and others, and assertive communication.  The reader is invited to review the patient's full treatment plan in the Media section of the patient's Epic medical record.    Intervention: This writer utilized motivational interviewing, active listening, reassurance and support in the context of cognitive behavioral therapy to address the above.      Mental Status:   Grooming: Within broad normal limits  Attire: Appropriate  Age: Appears Stated  Behavior Towards Examiner: Cooperative  Motor Activity: Within normal   Eye Contact: Appropriate  Mood: Euthymic  Affect: Congruent w/content of speech  Speech/Language: Within normal  Attention: Distractible  Concentration: Brief  Thought Process: Tangential  Thought Content: No evidence of delusions or hallucinations  Orientation: Oriented to person, place, and time, though not formally established  Memory: No Evidence of Impairment  Judgement: No Evidence of Impairment  Estimated  Intelligence: Average  Demonstrated Insight: Adequate  Fund of Knowledge: adequate      Progress:   Patient has been talking with her youngest daughter's dad, who is incarcerated.  He and his family are asking the patient to support his early release.  Patient noted that historically she would have done this, but this time she refused.  Patient discussed no longer feeling responsible for him and feeling strong enough to withstand his his family's pressure.  She noted he appeared to vacillate between respecting her limits and believing that she will change her mind.  Regardless, patient stated it felt good to refuse to say things she did not believe.  She noted that previously she would have felt guilty if she has not helped him, or would have responded to his same she was meeting or a bad person for not helping him, I promptly doing what he wanted in order to ensure he did not think she was mean.  Patient expressed pride in her newfound ability to set limits, this was celebrated with her.     Through her DNR testing and research, the patient has located her half-sister.  Patient was pretty sure she had a half sister, but her sister was unaware of the patient's existence.  They have been talking on a daily basis and her sister, who lives in [place], is talking about visiting the patient next summer when travel restrictions will presumably not be in place.  Patient is also talking to her aunt on a daily basis and has learned she has another, older half sister.  Patient is exploring what it feels like to go from the only child of someone who was adopted and did not know anything about her family, to being a member of an extended family that includes aunts, uncles, and sisters.    Plan:   We will meet again in 1 week to address the patient's bipolar 2 disorder, panic disorder, agoraphobia, anxiety, and PTSD.  Estimated duration of treatment is 10+ individual therapy sessions (94835) at weekly intervals. Treatment is  expected to be completed by December 2020.     Diagnosis:  Generalized Anxiety Disorder  Bipolar 2 Disorder  Panic Disorder  Agoraphobia  Posttraumatic Stress Disorder  Obsessive-Compulsive Disorder, with poor insight
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
## 2021-06-09 NOTE — PROGRESS NOTES
Psychology Progress Note    Date: July 8, 2020    Time length and type of treatment: 44 minutes (11:03 AM to 11:47 AM), individual therapy    After review of the patient's situation, this visit was changed from an in-person visit to a  video visit via [employer] to reduce the risk of COVID 19 exposure. Patient was informed that policies and procedures that govern in-person sessions would also apply to  video sessions. Patient was also informed that  video sessions would be discontinued when COVID 19 exposure is no longer a concern (as determined by [facility]).     Patient location: Patient home in [place], [place]  Provider location:  [facility], [place], [place]    Patient was in agreement with proceeding with a  video session.      Necessity: This session is necessary to address the patient's anxiety, bipolar 2 disorder, panic disorder, agoraphobia, and PTSD.  Today we focus on the patient's treatment plan, specifically exploring coping strategies.  The reader is invited to review the patient's full treatment plan in the Media section of the patient's Epic medical record.    Intervention: This writer utilized motivational interviewing, active listening, reassurance and support in the context of cognitive behavioral therapy to address the above.      Mental Status:   Grooming: Well groomed  Attire: Appropriate  Age: Appears Stated  Behavior Towards Examiner: Cooperative  Motor Activity: Within normal   Eye Contact: Appropriate  Mood: Euthymic  Affect: Congruent w/content of speech  Speech/Language: Within normal  Attention: Within normal  Concentration: Within normal  Thought Process: Within normal  Thought Content: No evidence of delusions or hallucinations  Orientation: Appeared oriented to person, place, and time, though not formally established  Memory: No Evidence of Impairment  Judgement: No Evidence of Impairment  Estimated Intelligence: Average  Demonstrated Insight:  Adequate  Fund of Knowledge: adequate      Progress:  Patient and her daughters are leaving today for a vacation with another family.  Patient is looking forward to seeing the [place] since she has never done so, but acknowledged increased anxiety over the trip.  She noted that the woman they are traveling with is a strong personality, 1 of her friends sons has a substance use problem, and she worries about how her youngest daughter will behave during a very long car trip in a crowded van.  Her friend also drives faster than the patient finds comfortable and this is an additional source of anxiety.  We reviewed patient's current coping strategies, identified those strategies likely to be most effective given the constraints of the trip, and explored some of the patient's fears regarding boundaries and limit setting.    Plan:   We will meet again in 1 week to address the patient's anxiety, bipolar 2 disorder, panic disorder, agoraphobia, and PTSD.  Estimated duration of treatment is 10+ individual therapy sessions (99477) at weekly intervals. Treatment is expected to be completed by December 2020.     Diagnosis:  Generalized Anxiety Disorder  Bipolar II Disorder  Panic Disorder  Posttraumatic Stress Disorder  Obsessive Compulsive Disorder

## 2021-06-09 NOTE — PROGRESS NOTES
Psychology Progress Note    Date: June 24, 2020    Time length and type of treatment: 45 minutes (11:03 AM to 11:49 AM), individual therapy    After review of the patient's situation, this visit was changed from an in-person visit to a  video visit via [employer] to reduce the risk of COVID 19 exposure. Patient was informed that policies and procedures that govern in-person sessions would also apply to  video sessions. Patient was also informed that  video sessions would be discontinued when COVID 19 exposure is no longer a concern (as determined by [facility]).     Patient location: Patient was housesitting in [place], [place]  Provider location:  [facility], [place], [place]    Patient was in agreement with proceeding with a  video session.      Necessity: This session is necessary to address the patient's anxiety, bipolar 2 disorder, panic disorder, agoraphobia, and PTSD.  Today we focus on the patient's treatment plan, specifically exploring assertive communication.  The reader is invited to review the patient's full treatment plan in the Media section of the patient's Epic medical record.    Intervention: This writer utilized motivational interviewing, active listening, reassurance and support in the context of cognitive behavioral therapy to address the above.      Mental Status:   Grooming: Within normal limits  Attire: Appropriate  Age: Appears Stated  Behavior Towards Examiner: Cooperative  Motor Activity: Within normal   Eye Contact: Avoidant  Mood: Sad  Affect: Blunted  Speech/Language: Soft  Attention: Distractible  Concentration: Brief  Thought Process: Tangential  Thought Content: No evidence of delusions or hallucinations  Orientation: Appeared oriented to person, place, and time, though not formally established  Memory: No Evidence of Impairment  Judgement: No Evidence of Impairment  Estimated Intelligence: Average  Demonstrated Insight: Adequate  Fund of Knowledge:  adequate      Progress:   Patient reported she is dreading a meeting this afternoon with her youngest daughter's therapist because she feels this therapist is condescending, has judged the patient negatively, and does not really care about the patient or her daughter.  Patient noted she requested therapy for her daughter because her daughter frequently does not listen to her, but the suggestions made by her daughter's therapist felt very simplistic and where things the patient had already tried.  The therapist did not appear receptive to the patient's explanation that she had tried these things, and tended to reiterate the importance of these skills with increased vigor, rather than here that the strategy she was recommending had been tried and did not work.  Patient is now even more frustrated because the therapist is insisting the patient sit beside her daughter for their video therapy sessions, because otherwise her daughter wanders away from the video and does not return.  The patient acknowledged her daughter does this, but attributed her daughter's behavior to her also feeling like the therapist does not like or care about her.  The patient noted that her daughter does not do this with other members of her care team.  The patient also expressed frustration because the reason she requested help was because of her difficulty getting her daughter to listen to her.  She finds it very frustrating that the therapist now expects the patient to make her daughter participate in therapy since the therapist is unable to manage her daughter's behavior independently.    Despite these concerns, patient acknowledged not feeling comfortable talking with the therapist about her feelings.   We discussed assertive communication, which the patient acknowledged is difficult for her because of her desire to have everyone like her, even though she intellectually knows this is not possible.  However patient also noted that a  significant power differential works, and she is concerned that if she she tries to talk about her thoughts and feelings with this particular therapist, the therapist will not only respond poorly, she fears the therapist will use the conversation as an excuse to label her daughter and her is noncompliant.  We discussed the possibility for the patient tackling a smaller goal, such as discussing the patient's feeling that the therapist does not like her, but the patient expressed discomfort with even the school.  This will continue to be a topic of conversation.  Patient was able to identify coloring and going outside as ways to calm her anxiety before the meeting and committed to both these activities.    Plan:   We will meet again in 1 week to address the patient's anxiety, bipolar 2 disorder, panic disorder, agoraphobia, and PTSD.  Estimated duration of treatment is 10+ individual therapy sessions (71964) at weekly intervals. Treatment is expected to be completed by December 2020.     Diagnosis:  Generalized Anxiety Disorder  Bipolar II Disorder  Panic Disorder  Posttraumatic Stress Disorder  Obsessive Compulsive Disorder

## 2021-06-09 NOTE — PATIENT INSTRUCTIONS - HE
Plan:   Interventions-    Follow up in 8 weeks or PRN as needed.     Refills sent to the pharmacy for norco and lyrica. Fill 7/7/2020    Patient plans on traveling to the [facility] this week for vacation    This limited telehealth encounter is due to the Covid 19,  as required by the governmental agencies at this time due to risk of transmission of the pandemic, therefore testing availability is limited as well as physical exams.      Prescription Drug Management will be continued by the [facility] center    Orders placed today  Medications that were ordered today   Requested Prescriptions     Pending Prescriptions Disp Refills     HYDROcodone-acetaminophen (NORCO) 5-325 mg per tablet 84 tablet 0     Sig: Take 1 tablet by mouth 3 (three) times a day as needed for pain.     pregabalin (LYRICA) 150 MG capsule 56 capsule 1     Sig: Take 1 capsule (150 mg total) by mouth 2 (two) times a day.     No orders of the defined types were placed in this encounter.        The patient understand todays plan and has their questions answered in regards to expectations and current treatment plan.     Prevent unexpected access/loss of medication: Keep medication locked. Only carry what you need with you    The plan of care will be adjusted to accommodate the issues discussed, discussing management of their care and follow up that is recommended. 7/7/2020         DUNG Mendoza [facility]  [address] [address]  [place] [address]  Ph: [phone]  Fax: [phone]

## 2021-06-10 NOTE — PROGRESS NOTES
Psychology Progress Note    Date: July 29, 2020    Time length and type of treatment: 52 minutes (11:04 AM - 11:56 AM), individual therapy    After review of the patient's situation, this visit was changed from an in-person visit to a video visit via [employer] to reduce the risk of COVID 19 exposure. Patient was informed that policies and procedures that govern in-person sessions would also apply to video sessions. Patient was also informed that video sessions would be discontinued when COVID 19 exposure is no longer a concern (as determined by [facility]).     Patient location: Patient home in [place], [place]  Provider location:  [facility], [place], [place]    Patient was in agreement with proceeding with a video session.      Necessity: This session is necessary to address the patient's Bipolar II Disorder, panic disorder, agoraphobia, anxiety, and PTSD.  Today we focus on the patient's treatment plan, specifically exploring thoughts and expectations about self and others. The reader is invited to review the patient's full treatment plan in the Media section of the patient's Epic medical record.    Intervention: This writer utilized motivational interviewing, active listening, reassurance and support in the context of cognitive behavioral therapy to address the above.      Mental Status:   Grooming: Within normal limits  Attire: Appropriate  Age: Appears Stated  Behavior Towards Examiner: Cooperative  Motor Activity: Retarded   Eye Contact: Avoidant  Mood: Depressed  Affect: Blunted  Speech/Language: Delayed/Hesitant  Attention: Distractible  Concentration: Brief  Thought Process: Slow  Thought Content: Delusions and hallucinations were not obvious on interview, but patient reports she continues to see shadows and hear voices  Orientation: Appeared oriented to person, place, and time, though not formally established  Memory: No Evidence of Impairment  Judgement: No Evidence  of Impairment  Estimated Intelligence: Average  Demonstrated Insight: Adequate  Fund of Knowledge: adequate      Progress:  Patient reports she has grown increasingly depressed as COVID-19 has worn on, which she attributed in large part to the continual need to parent her children.  She discussed her youngest daughter's ADHD and difficulty attending to schoolwork even when someone is sitting beside her and encouraging her, which reinforces the patient's feelings of failure.  We discussed resuming in-person meetings in order to give the patient a break from home, and to provide her a safe space to talk without being interrupted or overheard.      Patient acknowledged continuing visual hallucinations of shadows, occasional hallucinations of white orbs, which she believes are angels, and hearing indistinct voices.  While these voices are usually inaudible, patient recently heard a loud male voice tell her she was stupid.  Patient said she searched her house looking for who had talked to her, or to see if it was the television, but eventually concluded that it must have been an auditory hallucination.     We discussed the possibility that a Bipolar I Disorder diagnosis might be more appropriate than a Bipolar II Disorder diagnosis, given patient report of auditory and visual hallucinations, and patient stated she has recently accepted that this is likely accurate.  She explained that she watched a documentary on Bipolar Disorder and realized that she had very similar symptoms to one of the people featured on the show.  Patient is not taking any medications for mood, and we discussed the possibility of a mood stabilizer.  Patient stated that after having bad experiences with multiple antidepressants, she is not interested in any more medications for mental health conditions.  An attempt was made to distinguish mood stabilizers from antidepressants, but patient was not receptive to learning this distinction.        Plan:    We will meet again in 1 week to address the patient's Bipolar I Disorder, panic disorder, agoraphobia, anxiety, and PTSD.  Estimated duration of treatment is 10+ individual therapy sessions (25817) at weekly intervals. Treatment is expected to be completed by December 2020.     Diagnosis:  Bipolar I Disorder, severe, current episode depressed  Generalized Anxiety Disorder  Panic Disorder  Agoraphobia  Posttraumatic Stress Disorder  Obsessive Compulsive Disorder, with poor insight

## 2021-06-10 NOTE — PROGRESS NOTES
Psychology Progress Note    Date: August 3, 2020    Time length and type of treatment: 50 minutes (1:55 PM to 2:45 PM), individual therapy    Necessity: This session is necessary to address the patient's Bipolar I Disorder, generalized anxiety disorder, panic disorder, agoraphobia, PTSD, and OCD.  Today we focus on the patient's treatment plan, specifically exploring thoughts and expectations about self and others, and cognitive messages that exacerbate depression.  The reader is invited to review the patient's full treatment plan in the Media section of the patient's Epic medical record.    Intervention: This writer utilized motivational interviewing, active listening, reassurance and support in the context of cognitive behavioral therapy to address the above.      Mental Status:   Grooming: Within normal limits  Attire: Appropriate  Age: Appears Stated  Behavior Towards Examiner: Cooperative  Motor Activity: Within normal   Eye Contact: Appropriate  Mood: Sad  Affect: Blunted  Speech/Language: Within normal  Attention: Within normal  Concentration: Within normal  Thought Process: Tangential  Thought Content: No evidence of delusions or hallucinations  Orientation: Appeared oriented to person, place, and time, though not formally established  Memory: No Evidence of Impairment  Judgement: No Evidence of Impairment  Estimated Intelligence: Average  Demonstrated Insight: Adequate  Fund of Knowledge: adequate      Progress:   Patient discussed her belief that she is worthless and a bad mother.  She was introduced to the concept of distinguishing thoughts from emotions, and we engaged in an in-session activity to highlight the distinction and help the patient tolerate negative thoughts without instantly internalizing them.  Patient was able to pair this activity with a saying she commonly uses in order to help her gain some distance from critical thoughts.  We will address this further in the future, and eventually help  the patient develop strategies to dispute.      Plan:   We will meet again in 1 week to address the patient's Bipolar I Disorder, panic disorder, agoraphobia, anxiety, and PTSD.  Estimated duration of treatment is 10+ individual therapy sessions ([id number]) at weekly intervals. Treatment is expected to be completed by December 2020.     Diagnosis:  Bipolar I Disorder, severe, current episode depressed  Generalized Anxiety Disorder  Panic Disorder  Agoraphobia  Posttraumatic Stress Disorder  Obsessive Compulsive Disorder, with poor insight
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
## 2021-06-11 NOTE — PROGRESS NOTES
Vascular Medicine Progress note    Dr Tahir Bo MD, Vascular Medicine      Rohan Prince    Medical Record #:  [id number]    Date of Service: 09/17/2020     Date last seen:  Visit date not found    PRIMARY CARE PROVIDER: Rosenstein, Benjamin E, MD      IMPRESSION/PLAN: Patient is here for consultation regarding leg swelling bilaterally with discoloration on leg dependency and pain when she walks  Patient used to wear work in a warehouse she has a strong family history and personal history of varicose veins and she is getting leg swelling after prolonged standing gets worse at the end of the day and sometimes is relieved with leg elevation, no spontaneous venous ulceration or bleeding and she wears compression stockings on and off.  Patient also did mention that she has discoloration discoloration is worse on leg dependency sometimes it will disappear with leg elevation and sometimes it will not  Patient was diagnosed with hypovitaminosis D and she was on replacement now she is not taking any form of replacement  Patient does not wear compression stockings  Patient did report some pain in both legs and both calves when she walks and is relieved by rest yet the same pain can present or present while she is not doing anything and the pain is 8 out of 10 all the time never goes below 5 out of 10, it is a burning pain and affecting both lower extremities affecting mainly L for L5-S1 dermatomes    DISPOSITION:  1- venous insufficiency, will obtain Doppler venous insufficiency study bilaterally  2- ALICIA resting and post exercise to rule out any vascular element contributing to this pain which I highly doubt  3- neurogenic pain/claudication, patient is on Lyrica and she is with pain management  4- vitamin D supplementation  5-follow-up with the patient once lab results are available      ______________________________________________________________________    Subjective:    ALLERGIES:  Chlorhexidine    MEDS:    Current  Outpatient Medications:      aspirin-acetaminophen-caffeine (EXCEDRIN MIGRAINE) 250-250-65 mg per tablet, Take 1 tablet by mouth every 6 (six) hours as needed for pain., Disp: , Rfl:      HYDROcodone-acetaminophen (NORCO) 5-325 mg per tablet, Take 1 tablet by mouth 3 (three) times a day as needed for pain., Disp: 84 tablet, Rfl: 0     [START ON 10/6/2020] HYDROcodone-acetaminophen (NORCO) 5-325 mg per tablet, Take 1 tablet by mouth 3 (three) times a day as needed for pain., Disp: 84 tablet, Rfl: 0     pregabalin (LYRICA) 150 MG capsule, Take 1 capsule (150 mg total) by mouth 2 (two) times a day., Disp: 56 capsule, Rfl: 1    REVIEW OF SYSTEMS:    A 12 point ROS was reviewed and except for what is listed in the HPI above, all others are negative    Objective:    PE:  /60   Pulse 77   Resp 16   Wt (!) 234 lb (106.1 kg)   LMP 12/24/2016 (Exact Date)   BMI 33.58 kg/m    Wt Readings from Last 1 Encounters:   09/17/20 (!) 234 lb (106.1 kg)     Body mass index is 33.58 kg/m .    EXAM:  GENERAL: This is a well-developed 45 y.o. female who appears her stated age  EYES: Grossly normal.  MOUTH: Buccal mucosa normal   CARDIAC:  Normal S1 and S2, no Murmur  CHEST/LUNG:  Clear lung fields bilaterally  GASTROINTESINAL (ABDOMEN):Soft, non-tender, B/S present, no pulsatile mass     MUSCULOSKELETAL: Grossly normal and both lower extremities are intact.  HEME/LYMPH: No lymphedema  NEUROLOGIC: Focally intact, Alert and oriented x 3.   PSYCH: appropriate affect  INTEGUMENT: No open lesions or ulcers  Pulse Exam: N/A  Circumferential measures:  Vasc Edema 9/17/2020   Right just above MTP 23.3   Right Ankle 22   Right Widest Calf 40.7   Right Thigh Up 10cm 59.8   Left - just above MTP 24.7   Left Ankle 23.3   Left Widest Calf 39.8   Left Thigh Up 10cm 57.6           DIAGNOSTIC STUDIES:     No results found.  I personally reviewed the following imaging today and those on care everywhere, if indicated    LABS:      Sodium   Date  Value Ref Range Status   07/15/2020 137 136 - 145 mmol/L Final   06/10/2020 139 136 - 145 mmol/L Final   01/24/2019 139 136 - 145 mmol/L Final     Potassium   Date Value Ref Range Status   07/15/2020 3.7 3.5 - 5.0 mmol/L Final   06/10/2020 4.0 3.5 - 5.0 mmol/L Final   01/24/2019 4.2 3.5 - 5.0 mmol/L Final     Chloride   Date Value Ref Range Status   07/15/2020 99 98 - 107 mmol/L Final   06/10/2020 107 98 - 107 mmol/L Final   01/24/2019 100 98 - 107 mmol/L Final     BUN   Date Value Ref Range Status   07/15/2020 9 8 - 22 mg/dL Final   06/10/2020 12 8 - 22 mg/dL Final   01/24/2019 11 8 - 22 mg/dL Final     Creatinine   Date Value Ref Range Status   07/15/2020 0.73 0.60 - 1.10 mg/dL Final   06/10/2020 0.68 0.60 - 1.10 mg/dL Final   01/24/2019 0.64 0.60 - 1.10 mg/dL Final     Hemoglobin   Date Value Ref Range Status   07/15/2020 14.6 12.0 - 16.0 g/dL Final   06/10/2020 13.3 12.0 - 16.0 g/dL Final   01/24/2019 10.3 (L) 12.0 - 16.0 g/dL Final     Platelets   Date Value Ref Range Status   07/15/2020 212 140 - 440 thou/uL Final   06/10/2020 214 140 - 440 thou/uL Final   01/24/2019 481 (H) 140 - 440 thou/uL Final     BNP   Date Value Ref Range Status   01/24/2019 50 0 - 64 pg/mL Final     INR   Date Value Ref Range Status   12/21/2018 1.09 0.90 - 1.10 Final       Total time spent 35 (15,25,35) minutes face to face with patient with more than 50% time spent in counseling and coordination of care.    Tahir Bo MD  VASCULAR MEDICINE

## 2021-06-11 NOTE — PATIENT INSTRUCTIONS - HE
Plan:   Interventions-    Follow up in 4-8  weeks     Follow up with your PCP for her left foot pain- they may want to order more tests, HTN today noted    Ok to check in with vascular clinic- referral placed.     Will continue the use of the lyrica and norco as you are. Ok to refill on 9/8-10/6, 10/6-11/3    Prescription Drug Management will be continued by the [facility]  A narcotic contract was signed by the patient and  Patient is unable to get narcotics from other providers. They will be subject to random UAs.     As a reminder- chronic pain is generally stable, if you experience a new injury or new different pain it is expected that you present to the ED or urgent care for evaluation. DO NOT use your chronic pain medications to treat any new or different pain other then what it is intended for. We do not replace any lost or stolen prescriptions or provide early refills for overtaking your medications.     Orders placed today  Medications that were ordered today   Requested Prescriptions     Signed Prescriptions Disp Refills     HYDROcodone-acetaminophen (NORCO) 5-325 mg per tablet 84 tablet 0     Sig: Take 1 tablet by mouth 3 (three) times a day as needed for pain.     pregabalin (LYRICA) 150 MG capsule 56 capsule 1     Sig: Take 1 capsule (150 mg total) by mouth 2 (two) times a day.     HYDROcodone-acetaminophen (NORCO) 5-325 mg per tablet 84 tablet 0     Sig: Take 1 tablet by mouth 3 (three) times a day as needed for pain.     Orders Placed This Encounter   Procedures     Pain Management Drug Panel, Urine       UDT/SWAB:  Patient required a random Urine Drug Testing, due to the need to comply with [place] [employer] Policy Guidelines and CDC Guideline for the use of any controlled substances. This is to ensure that patient is compliant with treatment, and monitor for risks such as diversion, abuse, or any other aberrant behaviors. Patient is either being considered for or taking a controlled  substance. Unexpected findings will be discussed and treatment decision may be adjusted. Testing is being implemented across the board randomly w/o bias related to age, race, gender, socioeconomic status or Mandaeism affiliation.    The patient understand todays plan and has their questions answered in regards to expectations and current treatment plan.     SAFETY REMINDERS  No alcohol while taking controlled substances. Alcohol is not an illegal substance, it is unsafe to use in combination. It is a build up of substances in the body that can be extremely hazardous and may cause respirations to slow to a dangerous rate resulting in hospitalization, brain damage, or death.    Opioid medications have been associated with sharp rise in unintentional overdose and death.  Overdose is a condition characterized by the consumption in excess of a particular drug causing adverse effects. This can happen b/c you are sick, accidentally or intentionally took an extra dose, are on multiple medication that can interact. Someone took your medication and they are not use to the medication.  Symptoms of overdose include:   !breathing slow and shallow, erratic or not at all  !pinpoint pupils, hallucinations  !confusion  !muscle jerks, slack muscles   !extreme sleepiness or loss of alertness   !awake but not able to talk   !face pale or clammy, vomiting, for lighter skinned people, the skin tone turns bluish purple, for darker skinned people, it turns grayish or ashen   If in a situation where overdose is a concern engage the emergency response system (dial 911).    In one study it was noted that 80% of unintentional overdoses occurred in people who were taking a combination of opioids and benzodiazepines.    Do not sell, loan, borrow or share your opioid medication with anyone. Deaths have occurred as a result of this practice. It is illegal and patients are being prosecuted.     Prevent unexpected access/loss of medication: Keep  medication locked. Only carry what you need with you.    Mary Muniz CNP  [facility]  [address]  [place] [address]  Ph: [phone]  Fax: [phone]

## 2021-06-11 NOTE — PROGRESS NOTES
Psychology Progress Note    Date: September 30, 2020    Time length and type of treatment: 52 minutes (2:38 PM - 3:30 PM) , individual therapy (in-person session)    Necessity: This session is necessary to address the patient's Bipolar I Disorder, Generalized Anxiety Disorder, Panic Disorder, Agoraphobia, PTSD, and OCD.  Today we focus on the patient's treatment plan, specifically exploring coping strategies.  The reader is invited to review the patient's full treatment plan in the Media section of the patient's Epic medical record.    Intervention: This writer utilized motivational interviewing, active listening, reassurance and support in the context of cognitive behavioral therapy to address the above.      Mental Status:   Grooming: Within normal limits  Attire: Appropriate  Age: Appears Stated  Behavior Towards Examiner: Cooperative  Motor Activity: Retarded   Eye Contact: Avoidant  Mood: Sad  Affect: Blunted  Speech/Language: Delayed/Hesitant  Attention: Within normal  Concentration: Within normal  Thought Process: Slow  Thought Content: No evidence of delusions or hallucinations  Orientation: Appeared oriented to person, place, and time, though not formally established  Memory: No Evidence of Impairment though patient reported difficulties  Judgement: No Evidence of Impairment  Estimated Intelligence: Average  Demonstrated Insight: Adequate  Fund of Knowledge: adequate      Progress:   Patient attributed her past two cancellations to the need to deal with housing concerns, but also acknowledged an awareness that she didn't feel ready to talk about some painful things that have happened to her.  Patient was reassured that therapy can move at her pace, depending on her readiness.  Patient continues to struggle with feeling guilty for many life events over which she recognizes she had limited control.  Patient responded well to imagining these events were happening/had happened to one of her daughters, and was  able to identify both times when she would want her daughters to be responsible for their choices, and times when she wouldn't want her daughters to feel responsible for events that were beyond their control.  Patient agreed to work on using this strategy as a method to evaluate whether her guilt was appropriate or misplaced.  This will continue to be a target of clinical attention.     Plan:  We will meet again in 1 week to address the patient's Bipolar I Disorder, Panic Disorder, Agoraphobia, anxiety, and PTSD.  Estimated duration of treatment is 10+ individual therapy sessions (35046) at weekly intervals. Treatment is expected to be completed by December 2020.      Diagnosis:  Bipolar I Disorder, severe, current episode depressed  Generalized Anxiety Disorder  Panic Disorder  Agoraphobia  Posttraumatic Stress Disorder  Obsessive Compulsive Disorder, with poor insight

## 2021-06-11 NOTE — PROGRESS NOTES
Psychology Progress Note    Date: September 09, 2020    Time length and type of treatment: 50 minutes (3:01 PM - 5:51 PM), individual therapy (in-person session)    Necessity: This session is necessary to address the patient's Bipolar I Disorder, Generalized Anxiety Disorder, Panic Disorder, Agoraphobia, PTSD, and OCD.  Today we focus on the patient's treatment plan, specifically exploring compliance with medical treatment plan and exploring and reframing messages that exacerbate depression.  The reader is invited to review the patient's full treatment plan in the Media section of the patient's Epic medical record.    Intervention: This writer utilized motivational interviewing, active listening, reassurance and support in the context of cognitive behavioral therapy to address the above.      Mental Status:   Grooming: Within  Normal limits  Attire: Appropriate  Age: Appears Stated  Behavior Towards Examiner: Cooperative  Motor Activity: Retarded   Eye Contact: Avoidant  Mood: Depressed  Affect: Blunted  Speech/Language: Delayed/Hesitant  Attention: Within normal  Concentration: Within normal  Thought Process: Slow  Thought Content: No evidence of delusions or hallucinations  Orientation: Appeared oriented to person, place, and time, though not formally established  Memory: No Evidence of Impairment  Judgement: No Evidence of Impairment  Estimated Intelligence: Average  Demonstrated Insight: Adequate  Fund of Knowledge: adequate      Progress:   Patient has been having some increased pain and swelling in her legs, and was told to go to the emergency room if the pain persists because of the possibility of a blood clot, but patient expressed concern about the expense of ER visits and her inability to afford her previous ER visit.  We explored the reason for her previous visit not being covered, and patient was encouraged to get clarification regarding whether a visit for this reason would be covered.      Patient  reported that she strongly considered canceling today's appointment in order to stay in bed and sleep all day, but she recognized that she needed to force herself to come if she wanted to get better.  Patient talked about efforts to do things she believes will help her, regardless of what she is feeling, and we explored whether there might be a gentler way to acknowledge and accept her emotions even during those times when her logical self recognizes the need to engage in behaviors counter to her feelings.  Patient also expressed distress over how much of her life she is unable to remember. Psychoeducation was provided regarding both memory in general and traumatic memories.     Plan:   We will meet again in 1 week to address the patient's Bipolar I Disorder, Panic Disorder, Agoraphobia, anxiety, and PTSD.  Estimated duration of treatment is 10+ individual therapy sessions (53256) at weekly intervals. Treatment is expected to be completed by December 2020.     Diagnosis:  Bipolar I Disorder, severe, current episode depressed  Generalized Anxiety Disorder  Panic Disorder  Agoraphobia  Posttraumatic Stress Disorder  Obsessive Compulsive Disorder, with poor insight

## 2021-06-11 NOTE — PROGRESS NOTES
PAIN CLINIC FOLLOW UP PROGRESS NOTE    CC:  Chief Complaint   Patient presents with     Follow-up     back, neck and bilat knee and foot pain       HPI  Rohan Prince is a 45 y.o. female who presents for evaluation   Chief Complaint   Patient presents with     Follow-up     back, neck and bilat knee and foot pain    that is causing continued pain. Since the last visit the patient denies any trips to the urgent care or ED specifically for their pain. The patient denies any new medications, diagnoses since the last visit. Specific questions indicated the patient wanted addressed today include: medication management  And her left foot that has increased swelling and redness.       Major issues:  1. Chronic pain syndrome    2. Fibromyalgia    3. Vitamin D deficiency, unspecified     4. Chronic midline low back pain, unspecified whether sciatica present    5. Edema, unspecified type        Pain score 8  Constant  What does your pain like feel during a flare? Burning, tingling, pounding, stabbing  Does the pain interfere with:  Work ----- NA  Walking ------ yes  Sleep ------- yes  Daily activities ------yes  Relationships -------yes  F=7     UDT - 11/26/2019  CSA 12/2019    Previous Medical History  Social History     Substance and Sexual Activity   Alcohol Use No     Social History     Substance and Sexual Activity   Drug Use No     Social History     Tobacco Use   Smoking Status Never Smoker   Smokeless Tobacco Never Used       Pertinent Pain Medications/interventions:    Currently she is taking norco 3 times a day and lyrica- denies any side effects at this time.      She currently takes tramadol currently but notes that this is not helpful like she used to. She used to take  mg, she takes excedrin migraine is assistive in reducing her migraines since was a young lady at 14 years of age     Takes pregablin 75 mg, Sees Cheyenne Sanchez at [facility].       Review of Systems:  12 point systems were reviewed with pt as  documented on pt health form and the patient denies any new diagnosis or changes in 12 point system review since the last visit.     Physical Exam  Vitals:    09/08/20 1002   BP: (!) 155/93   Patient Site: Left Arm   Patient Position: Sitting   Cuff Size: Adult Regular   Pulse: 76   Weight: 220 lb (99.8 kg)     General- patient is alert and oriented, in NAD, well-groomed, well-nourished  Psych- Judgment and insight normal, AOx4, recent and remote memory normal, mood and affect normal  Eyes- pupils are equal and reactive, conjunctiva is clear bilaterally, no ptosis is noted.   Respiratory- breathing is non-labored  Cardiovascular- extremities warm and well perfused, no peripheral edema or varicosities. Left foot from the ankle down with pitting edema and discolorations noted, capillary refill is < 3 seconds in the dependent position.   Musculoskeletal- gait is normal, extremities with no joint swelling, erythema, or warmth.  Neuro- normal strength, no gait abnormalities, normal sensation to pain, temperature, light touch.  Integumentary- no rashes, dermatitis or discolorations noted throughout, no open wounds noted.    Medications    Current Outpatient Medications:      aspirin-acetaminophen-caffeine (EXCEDRIN MIGRAINE) 250-250-65 mg per tablet, Take 1 tablet by mouth every 6 (six) hours as needed for pain., Disp: , Rfl:      HYDROcodone-acetaminophen (NORCO) 5-325 mg per tablet, Take 1 tablet by mouth 3 (three) times a day as needed for pain., Disp: 84 tablet, Rfl: 0     [START ON 10/6/2020] HYDROcodone-acetaminophen (NORCO) 5-325 mg per tablet, Take 1 tablet by mouth 3 (three) times a day as needed for pain., Disp: 84 tablet, Rfl: 0     pregabalin (LYRICA) 150 MG capsule, Take 1 capsule (150 mg total) by mouth 2 (two) times a day., Disp: 56 capsule, Rfl: 1    Lab:  Last UDS on 11/26/2019    Imaging:  No new imaging.      Recent   Dated 9/8/2020 was reviewed with the patient today.   Paper copy reviewed      Assessment:   Rohan Prince is a 45 y.o. female seen in clinic today for   Chief Complaint   Patient presents with     Follow-up     back, neck and bilat knee and foot pain   . They are here for follow up and continued medical management of their pain. Today we reviewed the lab work of the UDT test and the results are expected because of the prescribed narcotics found in the urine drug screen.     I have also reviewed the information obtained from the last visit encounter after the TOYA was signed and have asked any pertintent questions needed from other healthcare providers/family/patient to continue care and formulate a treatment plan.     Patient notes that her left foot has had increased pain and notes that the discomfort is quite bothersome, she has had this historically but recently it has worsened. She likely needs a vascular consult. Will refer patients. She will otherwise follow up with her PCP. She does have compression hose and does use this. The dependent position is the most painful, laying down helps, suggestive of PVD. Patient denies any calf pain.     She uses norco for pain management as well as lyrica. She notes that it manages her chronic pain generally well. No changes in this plan.       Patients current MME is 15  Patient to call clinic for next month's prescription 5 days in advance. Based on the patients response to their previous narcotic therapy and quality of life, which includes their participation in ADLs, I recommend that the narcotics therapy continue, however the changes listed below will be incorporated into their plan of care.     Plan:   Interventions-    Follow up in 4-8  weeks     Follow up with your PCP for her left foot pain- they may want to order more tests, HTN today noted    Ok to check in with vascular clinic- referral placed.     Will continue the use of the lyrica and norco as you are. Ok to refill on 9/8-10/6, 10/6-11/3    Prescription Drug Management will be  continued by the [facility] center  A narcotic contract was signed by the patient and  Patient is unable to get narcotics from other providers. They will be subject to random UAs.     As a reminder- chronic pain is generally stable, if you experience a new injury or new different pain it is expected that you present to the ED or urgent care for evaluation. DO NOT use your chronic pain medications to treat any new or different pain other then what it is intended for. We do not replace any lost or stolen prescriptions or provide early refills for overtaking your medications.     Orders placed today  Medications that were ordered today   Requested Prescriptions     Signed Prescriptions Disp Refills     HYDROcodone-acetaminophen (NORCO) 5-325 mg per tablet 84 tablet 0     Sig: Take 1 tablet by mouth 3 (three) times a day as needed for pain.     pregabalin (LYRICA) 150 MG capsule 56 capsule 1     Sig: Take 1 capsule (150 mg total) by mouth 2 (two) times a day.     HYDROcodone-acetaminophen (NORCO) 5-325 mg per tablet 84 tablet 0     Sig: Take 1 tablet by mouth 3 (three) times a day as needed for pain.     Orders Placed This Encounter   Procedures     Pain Management Drug Panel, Urine     Ambulatory referral to Vascular Medicine     Referral Priority:   Routine     Referral Type:   Vascular     Referral Reason:   Evaluation and Treatment     Requested Specialty:   Vascular Surgery     Number of Visits Requested:   1       UDT/SWAB:  Patient required a random Urine Drug Testing, due to the need to comply with [place] Model Policy Guidelines and CDC Guideline for the use of any controlled substances. This is to ensure that patient is compliant with treatment, and monitor for risks such as diversion, abuse, or any other aberrant behaviors. Patient is either being considered for or taking a controlled substance. Unexpected findings will be discussed and treatment decision may be adjusted. Testing is being implemented  across the board randomly w/o bias related to age, race, gender, socioeconomic status or [employer] affiliation.    The patient understand todays plan and has their questions answered in regards to expectations and current treatment plan.     SAFETY REMINDERS  No alcohol while taking controlled substances. Alcohol is not an illegal substance, it is unsafe to use in combination. It is a build up of substances in the body that can be extremely hazardous and may cause respirations to slow to a dangerous rate resulting in hospitalization, brain damage, or death.    Opioid medications have been associated with sharp rise in unintentional overdose and death.  Overdose is a condition characterized by the consumption in excess of a particular drug causing adverse effects. This can happen b/c you are sick, accidentally or intentionally took an extra dose, are on multiple medication that can interact. Someone took your medication and they are not use to the medication.  Symptoms of overdose include:   !breathing slow and shallow, erratic or not at all  !pinpoint pupils, hallucinations  !confusion  !muscle jerks, slack muscles   !extreme sleepiness or loss of alertness   !awake but not able to talk   !face pale or clammy, vomiting, for lighter skinned people, the skin tone turns bluish purple, for darker skinned people, it turns grayish or ashen   If in a situation where overdose is a concern engage the emergency response system (dial 911).    In one study it was noted that 80% of unintentional overdoses occurred in people who were taking a combination of opioids and benzodiazepines.    Do not sell, loan, borrow or share your opioid medication with anyone. Deaths have occurred as a result of this practice. It is illegal and patients are being prosecuted.     Prevent unexpected access/loss of medication: Keep medication locked. Only carry what you need with you.    Mary Muniz, DUNG  [facility]  [address]  [place] [address]  Ph: [phone]  Fax: [phone]

## 2021-06-11 NOTE — PROGRESS NOTES
Patient is in clinic today to see MD Tahir Bo.      Patient is here for a consult to discuss lymphedema    The patient does not smoke.    Pt is currently taking no med's that would impact our treatment plan.    The patient states he/she are NOT wearing compression.    Swelling is observed in bilateral legs.  Pt rates pain 0/10 located at FEET  .  Pts symptoms include Swelling in Bilateral extremity. She stated that it has occurred for the past month at night. The top of her feet also become red.     At the end of the visit the patient was provided with education both verbally and on their AVS regarding testing.

## 2021-06-11 NOTE — PROGRESS NOTES
Pt is being seen today by Mary Muniz CNP, for refill, UDT/CSA and f/u of back, neck and bilat knee and foot pain.    Pain score 8  Constant  What does your pain like feel during a flare? Burning, tingling, pounding, stabbing  Does the pain interfere with:  Work ----- NA  Walking ------ yes  Sleep ------- yes  Daily activities ------yes  Relationships -------yes  F=7    UDT - 11/26/2019  CSA 12/2019

## 2021-06-11 NOTE — PATIENT INSTRUCTIONS - HE
Ultrasound    Thank you for choosing [facility] as partners in your care.  Please read the following information before your appointment.  Feel free to ask questions.    An ultrasound is an exam that uses sound waves to create pictures.  The special camera that takes the pictures is called a transducer.  An ultrasound technologist will perform the exam under the direction of a physician.    Preparation  Ultrasound preps vary.  If you are scheduled for an Aorta Ultrasound, please do not eat or drink anything 8 hours before your exam.  You may take daily medications with a small sip of water.  There is no preparation required for any of the other ultrasound exams performed at [facility].    The Examination  You may or may not need to change clothes for your exam.  The technologist will explain the exam to you.  He or she will ask you a few questions and answer any questions you may have.    You will lie on a table and a gel will be applied to your skin.  A small handheld instrument called a transducer will be moved across the area we are looking at while pictures are taken.  Also, your exam may include measuring your blood pressure on your arms, legs and/or toes.    When the Exam Is Completed  Your physician will receive the results of the exam and explain them to you.  You may return to your normal diet and activities unless otherwise directed by your doctor.  Feel free to ask questions if something is not clear to you.  We welcome comments.    At [facility], we are dedicated to providing the best possible care.  Thank you for taking time to read these instructions.  We hope your experience is as pleasant as possible.      You are scheduled for the following exam(s):    []Carotid Duplex:  Evaluates the carotid arteries in the neck that feed the brain with blood.  Gel is applied to the skin of the neck.  A transducer will be placed on the gel covered areas to obtain images and evaluate  and listen to the blood flow in the arteries.  This exam takes approximately 30 minutes.    [x]Venous Duplex:  Evaluates the veins that carry blood to the heart from the arms and/or legs.  Gel is applied to the skin of the arms and/or legs.  A transducer will be placed on the gel covered areas to obtain images and evaluate flow in the veins.  This exam takes approximately 30 minutes per arm/leg.    []Arterial Duplex:  Evaluates the arteries that feed the arms and legs with blood.  Gel is applied to the skin of the arms and/or legs.  A transducer will be placed on the gel covered areas to obtain images and listen to the blood flow in the arms and/or legs.  This exam takes approximately 30 minutes per arm/leg.    [x]ALICIA or Segmental Pressures:  Ultrasound and blood pressure cuffs are used to evaluate the arteries that supply the arms and legs with blood.  Several blood pressure cuffs will be place on your arms and legs.  When inflated, the cuffs will provide blood pressure readings that are used to determine where blockages in the arteries may be.  You may be asked to do a moderate level of exercise during this exam.  This exam takes approximately 30-60 minutes.    []Aorta:  Evaluates the abdominal aorta for blockages and aneurysm.  Gel is applied to the stomach.  A transducer will be placed on the gel covered areas to obtain images of the aorta.  This exam takes approximately 30 minutes.    Prep instructions:  Do not eat or drink anything 8 hours before procedure.  You may take daily medications with a small sip of water.

## 2021-06-12 NOTE — TELEPHONE ENCOUNTER
Medication being requested: Norco  Last visit date: 9/8/20.  Provider: KENYATTA  Next visit date: 12/1/20.  Provider: KENYATTA  Expected follow up: 4-8 weeks  MTM visit ([facility]) date: No  UDT date: 9/2020  Agreement date: 9/2020   (Last fill date; name; strength; provider; MME; quantity):  10/06/2020 Hydrocodone-Acetamin 5-325 Mg Qty: 84 for 28 days  Pertinent between visit information about requested medication (telephone, mychart, prior authorization, concerns, comments):   11/3 cancelled appointment with KENYATTA  Numerous neurology and psychotherapy appointments  Script being sent to provider by nurse- dates and quantity:   11/6/20-12/1/20 BRIDGE to appointment  Requested Prescriptions     Pending Prescriptions Disp Refills     HYDROcodone-acetaminophen (NORCO) 5-325 mg per tablet 75 tablet 0     Sig: Take 1 tablet by mouth 3 (three) times a day as needed for pain.     Pharmacy cued: [facility]  Standing orders for withdrawal protocol implemented: CARLOS ENRIQUE

## 2021-06-12 NOTE — PROGRESS NOTES
Psychology Progress Note    Date: October 07, 2020    Time length and type of treatment: 52 minutes (3:01 PM - 3:53 PM), individual therapy    After review of the patient's situation, this visit was changed from an in-person visit to a video visit via [employer] to reduce the risk of COVID 19 exposure. Patient was informed that policies and procedures that govern in-person sessions would also apply to video sessions. Patient was also informed that video sessions would be discontinued when COVID 19 exposure is no longer a concern (as determined by [facility]).     Patient location: Patient home in [place], [place]  Provider location:  [facility], [place], [place]    Patient was in agreement with proceeding with a video session.      Necessity: This session is necessary to address the patient's Bipolar I Disorder, ZAKIA, Panic Disorder, Agoraphobia, PTSD, and OCD.  Today we focus on the patient's treatment plan, specifically exploring coping strategies, and developing an awareness of irrational fears. The reader is invited to review the patient's full treatment plan in the Media section of the patient's Epic medical record.    Intervention: This writer utilized motivational interviewing, active listening, reassurance and support in the context of cognitive behavioral therapy to address the above.      Mental Status:   Grooming: Within normal limits  Attire: Appropriate  Age: Appears Stated  Behavior Towards Examiner: Cooperative  Motor Activity: Within normal   Eye Contact: Avoidant  Mood: Sad  Affect: Blunted  Speech/Language: Soft  Attention: Distractible  Concentration: Brief  Thought Process: Tangential  Thought Content: No evidence of delusions or hallucinations  Orientation: Appeared oriented to person, place, and time, though not formally established  Memory: No Evidence of Impairment  Judgement: No Evidence of Impairment  Estimated Intelligence: Average  Demonstrated  Insight: Adequate  Fund of Knowledge: adequate      Progress:   Patient's stepfather is visiting in approximately one week, and patient expressed surprise over how much she cares about what he will think, noting that she recognizes he wasn't a good father and treated her worse than his biological children, but he was also the only father she's ever known.  Patient expressed fear that her stepfather will say negative, hurtful things, and we role played ways the patient might respond. Patient reported feeling more comfortable with being assertive when she thought about the negative impact her stepfather's words might have on her children.  We also discussed eye contact, patient's preference that people not see her because being seen doesn't feel safe, and ways people have embarrassed the patient for lip reading.  Patient was provided supportive psychotherapy     Plan:  We will meet again in 1 week to address the patient's Bipolar I Disorder, Panic Disorder, Agoraphobia, anxiety, and PTSD.  Estimated duration of treatment is 10+ individual therapy sessions (41613) at weekly intervals. Treatment is expected to be completed by December 2020.      Diagnosis:  Bipolar I Disorder, severe, current episode depressed  Generalized Anxiety Disorder  Panic Disorder  Agoraphobia  Posttraumatic Stress Disorder  Obsessive Compulsive Disorder, with poor insight

## 2021-06-12 NOTE — PROGRESS NOTES
Vascular Medicine Progress note    Dr Tahir Bo MD, Vascular Medicine      Rohan Prince    Medical Record #:  [id number]    Date of Service: 10/08/2020     Date last seen:  9/17/2020    PRIMARY CARE PROVIDER: Rosenstein, Benjamin E, MD      IMPRESSION/PLAN: This was a phone interview lasted for 6 minutes this was to report imaging results to the patient    ALICIA resting and post exercise showed normal results with normal response to exercise and normal TBI    Venous insufficiency study showed no evidence of DVT bilaterally and there is evidence of deep system reflux    DISPOSITION:  1- compression stockings 20 to 30 mmHg bilateral and will be knee-high  2- follow-up with the patient in 3 months from now before the end of the year      ______________________________________________________________________    Subjective:    ALLERGIES:  Chlorhexidine    MEDS:    Current Outpatient Medications:      aspirin-acetaminophen-caffeine (EXCEDRIN MIGRAINE) 250-250-65 mg per tablet, Take 1 tablet by mouth every 6 (six) hours as needed for pain., Disp: , Rfl:      HYDROcodone-acetaminophen (NORCO) 5-325 mg per tablet, Take 1 tablet by mouth 3 (three) times a day as needed for pain., Disp: 84 tablet, Rfl: 0     HYDROcodone-acetaminophen (NORCO) 5-325 mg per tablet, Take 1 tablet by mouth 3 (three) times a day as needed for pain., Disp: 84 tablet, Rfl: 0     pregabalin (LYRICA) 150 MG capsule, Take 1 capsule (150 mg total) by mouth 2 (two) times a day., Disp: 56 capsule, Rfl: 1    REVIEW OF SYSTEMS:    A 12 point ROS was reviewed and except for what is listed in the HPI above, all others are negative    Objective:    PE:  LMP 12/24/2016 (Exact Date)   Wt Readings from Last 1 Encounters:   09/17/20 (!) 234 lb (106.1 kg)     There is no height or weight on file to calculate BMI.    EXAM:  GENERAL: This is a well-developed 45 y.o. female who appears her stated age  EYES: Grossly normal.  MOUTH: Buccal mucosa normal   CARDIAC:   Not assessed  CHEST/LUNG:  Not Assessed  GASTROINTESINAL (ABDOMEN):Not Assessed     MUSCULOSKELETAL: Grossly normal and both lower extremities are intact.  HEME/LYMPH: No lymphedema  NEUROLOGIC: Focally intact, Alert and oriented x 3.   PSYCH: appropriate affect  INTEGUMENT: No open lesions or ulcers  Pulse Exam:   Circumferential measures:  Vasc Edema 9/17/2020   Right just above MTP 23.3   Right Ankle 22   Right Widest Calf 40.7   Right Thigh Up 10cm 59.8   Left - just above MTP 24.7   Left Ankle 23.3   Left Widest Calf 39.8   Left Thigh Up 10cm 57.6           DIAGNOSTIC STUDIES:     Us Arterial Pressures With Exercise Legs Bilateral    Result Date: 10/5/2020  BILATERAL Ankle Brachial Index with Exercise Indication: Bilateral leg and foot pain/swelling Previous: None History: TIA/Stroke Resting ALICIA's          Right: mmHg Index     Brachial: 114  Ankle-(PT): 128 1.12 Ankle-(DP): 116 1.02           Digit: 102 0.89               Left: mmHg Index     Brachial: 111  Ankle-(PT): 135 1.18 Ankle-(DP): 116 1.02           Digit: 93 0.82 Resting ankle-brachial index of 1.12 on the right. Toe Pressures of 102 mmHg and TBI of 0.89.  Resting ankle-brachial index of 1.18 on the left. Toe Pressures of 93 mmHg and TBI of 0.82. WAVEFORMS: The right dorsalis pedis and posterior tibial arteries show triphasic waveforms. The left dorsalis pedis and posterior tibial arteries show triphasic waveforms. Exercise Testing: STANDARD STRESS TESTING: Patient started stress with a pain level of 5. Time (min) Grade Level % Rate MPH Level of Pain (1-10) Area of Pain 1 0 2.0  5   bilateral lower legs 2 0 2.0  5   bilateral lower legs 3 2 2.0  7   bilateral lower legs 4 2 2.0  7   bilateral lower legs 5 4 2.0  7   bilateral lower legs Post Exercise Pressures: Location: Rest 1 minute Right Ankle  144 Left Ankle  136 Right Brachial 114 123 Right ALICIA: 1.12 1.17 Left ALICIA: 1.18 1.11 Impression: 1. RIGHT LOWER EXTREMITY: Normal ALICIA with  adequate toe pressures 2. LEFT LOWER EXTREMITY: Normal ABIs with adequate toe pressures 3. BURNETT JACOBS TESTING: Exercise test is normal.     Us Venous Insufficiency Legs Bilateral    Result Date: 10/5/2020  BILATERAL Venous Insufficiency Ultrasound BILATERAL Lower Extremity Indication: Bilateral leg and feet pain/swelling Previous: None Patient History: Swelling and Stasis Presenting Symptoms:  Swelling, Pain and Stasis Technique: Supine and Upright Ultrasound of the Deep and Superficial Veins with Valsalva and Compression Augmentation Maneuvers. Duplex Imaging is performed utilizing gray-scale, Two-dimensional images, color-flow imaging, Doppler waveform analysis, and Spectral doppler imaging done with provacative maneuvers. Incompetency Criteria: Deep vein reflux reported when greater than 1000 msec flow reversal. Superficial vein reflux reported when equal to or greater than 600 msec flow reversal.  vein reflux reported as greater than 350 msec flow reversal. Right  Leg Deep Veins  CFV SFJ PFV PROX SFV PROX SFV MID SFV DIST POP V. PERON.  V. PTV'S Compressibility (FC,PC,NC) FC FC FC FC FC FC FC FC FC Spontaneous +  + + + + +  + Phasic  +  + + + + +  + Augmentation +  + + + + +  + Reflux -  - - - - -  - Right Leg Saphenous Veins Location SFJ PROX THIGH KNEE MID CALF SPJ PROX CALF MID CALF RT GSV (mm) 8 6 6 6    Reflux - - - -    RT SSV (mm)     9 5 4 Reflux     - - - Left Leg Deep Veins  CFV SFJ PFV PROX SFV PROX SFV MID SFV DIST POP V. PERON. V. PTV'S Compressibility (FC,PC,NC) FC FC FC FC FC FC FC FC FC Spontaneous +  + + + + +  + Phasic  +  + + + + +  + Augmentation +  + + + + +  + Reflux +  - + - - -  - Lt Leg Saphenous Veins Location SFJ PROX THIGH KNEE MID CALF SPJ PROX CALF MID CALF LT GSV (mm) 9 7 5 4    Reflux + - - -    LT SSV (mm)     7 4 4 Reflux     - - - Compression Study:  Normal Impression:  Right Deep Vein Findings: Patent and competent Left Deep Vein Findings: Patent with reflux in  the common femoral vein.  No evidence of DVT Superficial Vein Findings: Right Greater Saphenous vein: Patent and competent Right Small Saphenous Vein: Patent and competent Left Greater Saphenous Vein: Patent and competent Left Small Saphenous Vein: Patent and competent Reference: Compressibility: FC= Fully compressible, PC= Partially compressible, NC= Non-compressible, NV= Not Visualized Venous Doppler: (+) = Present  (0) = Absent  (-) = Decreased/Unable to Evaluate, (NV) = Not Visualized Reflux: (+) Incompetent  (-) Competent, (NV) = Not Visualized Interpretation criteria:  Duration of Retrograde flow (milliseconds) Category Deep Veins Superficial Veins  veins Competent < 1000ms < 600ms < 350ms Incompetent > 1000ms > 600ms > 350ms     I personally reviewed the following imaging today and those on care everywhere, if indicated    LABS:      Sodium   Date Value Ref Range Status   07/15/2020 137 136 - 145 mmol/L Final   06/10/2020 139 136 - 145 mmol/L Final   01/24/2019 139 136 - 145 mmol/L Final     Potassium   Date Value Ref Range Status   07/15/2020 3.7 3.5 - 5.0 mmol/L Final   06/10/2020 4.0 3.5 - 5.0 mmol/L Final   01/24/2019 4.2 3.5 - 5.0 mmol/L Final     Chloride   Date Value Ref Range Status   07/15/2020 99 98 - 107 mmol/L Final   06/10/2020 107 98 - 107 mmol/L Final   01/24/2019 100 98 - 107 mmol/L Final     BUN   Date Value Ref Range Status   07/15/2020 9 8 - 22 mg/dL Final   06/10/2020 12 8 - 22 mg/dL Final   01/24/2019 11 8 - 22 mg/dL Final     Creatinine   Date Value Ref Range Status   07/15/2020 0.73 0.60 - 1.10 mg/dL Final   06/10/2020 0.68 0.60 - 1.10 mg/dL Final   01/24/2019 0.64 0.60 - 1.10 mg/dL Final     Hemoglobin   Date Value Ref Range Status   07/15/2020 14.6 12.0 - 16.0 g/dL Final   06/10/2020 13.3 12.0 - 16.0 g/dL Final   01/24/2019 10.3 (L) 12.0 - 16.0 g/dL Final     Platelets   Date Value Ref Range Status   07/15/2020 212 140 - 440 thou/uL Final   06/10/2020 214 140 - 440 thou/uL  Final   01/24/2019 481 (H) 140 - 440 thou/uL Final     BNP   Date Value Ref Range Status   01/24/2019 50 0 - 64 pg/mL Final     INR   Date Value Ref Range Status   12/21/2018 1.09 0.90 - 1.10 Final       Total time spent 6 (15,25,35) minutes face to face with patient with more than 50% time spent in counseling and coordination of care.    Tahir Bo MD  VASCULAR MEDICINE

## 2021-06-12 NOTE — PROGRESS NOTES
Psychology Progress Note    Date: October 14, 2020    Time length and type of treatment: 50 minutes (2:48 PM to 3:38 PM), individual therapy, in person session    Necessity: This session is necessary to address the patient's bipolar 1 disorder, ZAKIA, panic disorder, agoraphobia, PTSD, and OCD..  Today we focus on the patient's treatment plan, specifically exploring thoughts and expectations about self and others.  The reader is invited to review the patient's full treatment plan in the Media section of the patient's Epic medical record.    Intervention: This writer utilized motivational interviewing, active listening, reassurance and support in the context of cognitive behavioral therapy to address the above.      Mental Status:   Grooming: Within normal limits  Attire: Appropriate  Age: Appears Stated  Behavior Towards Examiner: Cooperative  Motor Activity: Within normal   Eye Contact: Appropriate  Mood: Sad  Affect: Anxious  Speech/Language: Within normal  Attention: Distractible  Concentration: Brief  Thought Process: Tangential  Thought Content: No evidence of delusions or hallucinations  Orientation: Appeared oriented to person, place, and time, though not formally established  Memory: No Evidence of Impairment  Judgement: No Evidence of Impairment  Estimated Intelligence: Average  Demonstrated Insight: Adequate  Fund of Knowledge: adequate      Progress:   Patient's stepfather was due to arrive in [place] today, but called her shortly before our appointment to tell her that his plane had mechanical difficulties and hadn't left [place].  Patient discussed her anxiety over this visit, her fear that her stepfather will  her negatively, and her frustration that his opinion continues to matter to her.  Patient's feelings were validated and normalized, and we briefly discussed self soothing strategies.     Plan:   We will meet again in 2 weeks to address the patient's bipolar 1 disorder, panic disorder,  agoraphobia, anxiety, and PTSD.  Estimated duration of treatment is 10+ individual therapy sessions (44336) at twice monthly intervals. Treatment is expected to be completed by December 2020.     Diagnosis:  Bipolar I Disorder, severe, current episode depressed  Generalized Anxiety Disorder  Panic Disorder  Agoraphobia  Posttraumatic Stress Disorder  Obsessive Compulsive Disorder, with poor insight

## 2021-06-12 NOTE — PROGRESS NOTES
"Rohan Prince is a 45 y.o. female who is being evaluated via a billable video visit.      The patient has been notified of following:     \"This video visit will be conducted via a call between you and your physician/provider. We have found that certain health care needs can be provided without the need for an in-person physical exam.  This service lets us provide the care you need with a video conversation.  If a prescription is necessary we can send it directly to your pharmacy.  If lab work is needed we can place an order for that and you can then stop by our lab to have the test done at a later time.    Video visits are billed at different rates depending on your insurance coverage. Please reach out to your insurance provider with any questions.    If during the course of the call the physician/provider feels a video visit is not appropriate, you will not be charged for this service.\"    Patient has given verbal consent to a Video visit? Yes  How would you like to obtain your AVS? AVS Preference: [employer].  If dropped by the video visit, the video invitation should be sent to: [employer]  Will anyone else be joining your video visit? No    Video Start Time:         Video-Visit Details    Type of service:  Video Visit    Video End Time (time video stopped): 9:41 AM  Originating Location (pt. Location): Home    Distant Location (provider location):  [facility]     Platform used for Video Visit: Edmundo Martinez LPN  "

## 2021-06-12 NOTE — PATIENT INSTRUCTIONS - HE
[facility]  [address]  [place] [address]    [place] [phone]  [phone]  [place] [phone]  [email or web address]   [address]             [address]                 [address]     [place] [address]      [place] [address]         [place] [address]  [phone]  [phone]     [phone]               Fax: [phone]  [email or web address]                                                     [facility]  [address] [place] [address]  [phone]  [phone]  [email or web address]    Renee Mcdowell  [phone]  [email or web address]    [facility]   [phone]    [facility]  [address] [place] [address]    [phone]

## 2021-06-13 NOTE — PROGRESS NOTES
PAIN CLINIC FOLLOW UP PROGRESS NOTE    CC:  Chief Complaint   Patient presents with     Follow-up     back, neck, bilat knee and multisite pain       HPI  Rohan Prince is a 45 y.o. female who presents for evaluation   Chief Complaint   Patient presents with     Follow-up     back, neck, bilat knee and multisite pain    that is causing continued pain. Since the last visit the patient denies any trips to the urgent care or ED specifically for their pain. The patient denies any new medications, diagnoses since the last visit. Specific questions indicated the patient wanted addressed today include: medication management for chronic pain       Major issues:  1. Chronic pain syndrome    2. Fibromyalgia    3. Vitamin D deficiency, unspecified     4. Chronic midline low back pain, unspecified whether sciatica present    5. Other chronic pain        Pain score 7  Constant and intermittent   What does your pain like feel during a flare? Burning, jabbing  Does the pain interfere with:  Work ----- NA  Walking ------ yes  Sleep ------- yes  Daily activities ------yes  Relationships -------yes  F=7      Previous Medical History  Social History     Substance and Sexual Activity   Alcohol Use No     Social History     Substance and Sexual Activity   Drug Use No     Social History     Tobacco Use   Smoking Status Never Smoker   Smokeless Tobacco Never Used       Pertinent Pain Medications/interventions:    12/1/2020- increase norco to 4 times a day for the low back pain. Continue the lyrica for the fibromyalgia. Will start Co q 10 as well     Currently she is taking norco 3 times a day and lyrica- denies any side effects at this time.      She currently takes tramadol currently but notes that this is not helpful like she used to. She used to take  mg, she takes excedrin migraine is assistive in reducing her migraines since was a young lady at 14 years of age     Takes pregablin 75 mg, Sees Cheyenne Sanchez at [place].      Review of Systems:  12 point systems were reviewed with pt as documented on pt health form and the patient denies any new diagnosis or changes in 12 point system review since the last visit. Hip pain and knee pain     Physical Exam  Vitals:    12/01/20 0934   BP: 122/71   Patient Site: Left Arm   Patient Position: Sitting   Cuff Size: Adult Regular   Pulse: 86     General- patient is alert and oriented, in NAD, well-groomed, well-nourished  Psych- Judgment and insight normal, AOx4, recent and remote memory normal, mood and affect normal  Eyes- pupils are equal and reactive, conjunctiva is clear bilaterally, no ptosis is noted.   Respiratory- breathing is non-labored  Cardiovascular- extremities warm and well perfused, no peripheral edema or varicosities.  Musculoskeletal-  Low back pain- reported  Neuro- normal strength, no gait abnormalities, normal sensation to pain, temperature, light touch.  Integumentary- no rashes, dermatitis or discolorations noted throughout, no open wounds noted.    Medications    Current Outpatient Medications:      aspirin-acetaminophen-caffeine (EXCEDRIN MIGRAINE) 250-250-65 mg per tablet, Take 1 tablet by mouth every 6 (six) hours as needed for pain., Disp: , Rfl:      HYDROcodone-acetaminophen (NORCO) 5-325 mg per tablet, Take 1 tablet by mouth 4 (four) times a day as needed for pain., Disp: 112 tablet, Rfl: 0     [START ON 12/29/2020] HYDROcodone-acetaminophen (NORCO) 5-325 mg per tablet, Take 1 tablet by mouth 4 (four) times a day as needed for pain., Disp: 112 tablet, Rfl: 0     [START ON 12/3/2020] pregabalin (LYRICA) 150 MG capsule, Take 1 capsule (150 mg total) by mouth 2 (two) times a day., Disp: 56 capsule, Rfl: 1     coenzyme Q10 (CO Q-10) 100 mg capsule, Take 1 capsule (100 mg total) by mouth daily., Disp: 30 capsule, Rfl: 3     HYDROcodone-acetaminophen (NORCO) 5-325 mg per tablet, Take 1 tablet by mouth 3 (three) times a day as needed for pain., Disp: 84 tablet, Rfl:  0    Lab:  Last UDS on 9/9/2020 had expected results. Any abnormal results have been discussed with the patient and may change the plan of care. Please see the scanned document from the outside lab under the media tab. This was reviewed with the patient.      Imaging:  No new imaging.      Recent   Dated 12/1/2020 was reviewed with the patient today.       Assessment:   Rohan Prince is a 45 y.o. female seen in clinic today for   Chief Complaint   Patient presents with     Follow-up     back, neck, bilat knee and multisite pain   . They are here for follow up and continued medical management of their pain. Today we reviewed the lab work of the UDT test and the results are expected because of the prescribed narcotics found in the urine drug screen.     I have also reviewed the information obtained from the last visit encounter after the TOYA was signed and have asked any pertintent questions needed from other healthcare providers/family/patient to continue care and formulate a treatment plan.     Patients current MME is 15    Patient notes that she is having a new hip pain and continues to have knee pain and low back pain, discussed symptoms of fibromyalgia will trial co q 10 100 mg a day and can increase to 300-400 mg per day. Patient is working with her orthopedics provider as well.  Do not recommend that opioids are used for fibromyalgia, however for her ongoing known back pain she is struggling with this in the middle of her day doing her ADLS and trying to take care of her kids. Will increase the norco to 4 per day. Continue the lyrica.   Patient to call clinic for next month's prescription 5 days in advance. Based on the patients response to their previous narcotic therapy and quality of life, which includes their participation in ADLs, I recommend that the narcotics therapy continue, however the changes listed below will be incorporated into their plan of care.     Plan:   Interventions-    Follow up in 8  weeks- prior to 1/29/2021    Will increase the norco up to 4 per day ok to fill on 12/1/20- 12/29, 12/29-1/26/2021.     Will start CO Q 10- 100 mg a day for fibromyalgia increase to 300-400 mg per day when a fibro flare is occurring.     Continue the lyrica     Prescription Drug Management will be [facility] by the [facility]  A narcotic contract was signed by the patient and  Patient is unable to get narcotics from other providers. They will be subject to random UAs.     As a reminder- chronic pain is generally stable, if you experience a new injury or new different pain it is expected that you present to the ED or urgent care for evaluation. DO NOT use your chronic pain medications to treat any new or different pain other then what it is intended for. We do not replace any lost or stolen prescriptions or provide early refills for overtaking your medications.        Orders placed today  Medications that were ordered today   Requested Prescriptions     Signed Prescriptions Disp Refills     HYDROcodone-acetaminophen (NORCO) 5-325 mg per tablet 112 tablet 0     Sig: Take 1 tablet by mouth 4 (four) times a day as needed for pain.     pregabalin (LYRICA) 150 MG capsule 56 capsule 1     Sig: Take 1 capsule (150 mg total) by mouth 2 (two) times a day.     coenzyme Q10 (CO Q-10) 100 mg capsule 30 capsule 3     Sig: Take 1 capsule (100 mg total) by mouth daily.     HYDROcodone-acetaminophen (NORCO) 5-325 mg per tablet 112 tablet 0     Sig: Take 1 tablet by mouth 4 (four) times a day as needed for pain.     No orders of the defined types were placed in this encounter.      UDT/SWAB:  Patient required a random Urine Drug Testing, due to the need to comply with [place] Model Policy Guidelines and CDC Guideline for the use of any controlled substances. This is to ensure that patient is compliant with treatment, and monitor for risks such as diversion, abuse, or any other aberrant behaviors. Patient is either  being considered for or taking a controlled substance. Unexpected findings will be discussed and treatment decision may be adjusted. Testing is being implemented across the board randomly w/o bias related to age, race, gender, socioeconomic status or [employer] affiliation.    The patient understand todays plan and has their questions answered in regards to expectations and current treatment plan.     SAFETY REMINDERS  No alcohol while taking controlled substances. Alcohol is not an illegal substance, it is unsafe to use in combination. It is a build up of substances in the body that can be extremely hazardous and may cause respirations to slow to a dangerous rate resulting in hospitalization, brain damage, or death.    Opioid medications have been associated with sharp rise in unintentional overdose and death.  Overdose is a condition characterized by the consumption in excess of a particular drug causing adverse effects. This can happen b/c you are sick, accidentally or intentionally took an extra dose, are on multiple medication that can interact. Someone took your medication and they are not use to the medication.  Symptoms of overdose include:   !breathing slow and shallow, erratic or not at all  !pinpoint pupils, hallucinations  !confusion  !muscle jerks, slack muscles   !extreme sleepiness or loss of alertness   !awake but not able to talk   !face pale or clammy, vomiting, for lighter skinned people, the skin tone turns bluish purple, for darker skinned people, it turns grayish or ashen   If in a situation where overdose is a concern engage the emergency response system (dial 911).    In one study it was noted that 80% of unintentional overdoses occurred in people who were taking a combination of opioids and benzodiazepines.    Do not sell, loan, borrow or share your opioid medication with anyone. Deaths have occurred as a result of this practice. It is illegal and patients are being prosecuted.     Prevent  unexpected access/loss of medication: Keep medication locked. Only carry what you need with you.    Mary Muniz CNP  [facility]  [address]  [place] [address]  Ph: [phone]  Fax: [phone]
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
## 2021-06-13 NOTE — TELEPHONE ENCOUNTER
Dog bite on the back of her knee cap at friends house, very small area.    Advised homecare and what to watch for.  COVID 19 Nurse Triage Plan/Patient Instructions    Please be aware that novel coronavirus (COVID-19) may be circulating in the community. If you develop symptoms such as fever, cough, or SOB or if you have concerns about the presence of another infection including coronavirus (COVID-19), please contact your health care provider or visit [email or web address].     Disposition/Instructions    Home care recommended. Follow home care protocol based instructions.    Thank you for taking steps to prevent the spread of this virus.  o Limit your contact with others.  o Wear a simple mask to cover your cough.  o Wash your hands well and often.    Resources    [facility]: About COVID-19: [email or web address]    CDC: What to Do If You're Sick: www.cdc.gov/coronavirus/2019-ncov/about/steps-when-sick.html    CDC: Ending Home Isolation: www.cdc.gov/coronavirus/2019-ncov/hcp/disposition-in-home-patients.html     CDC: Caring for Someone: www.cdc.gov/coronavirus/2019-ncov/if-you-are-sick/care-for-someone.html     [facility]: Interim Guidance for Hospital Discharge to Home: www.[facility]/diseases/coronavirus/hcp/hospdischarge.pdf    [facility] clinical trials (COVID-19 research studies): [email or web address].[facility]-clinical-trials     Below are the COVID-19 hotlines at the [facility] ([facility]). Interpreters are available.   o For health questions: Call 306-649-0495 or 1-201.259.2013 (7 a.m. to 7 p.m.)  o For questions about schools and childcare: Call 330-413-2376 or 1-141.158.2452 (7 a.m. to 7 p.m.)       Cierra Pires RN 11/27/20 8:17 PM           Reason for Disposition    Cut that's too small to irrigate (<1/8 inch or 3 mm)    Additional Information    Negative: [1] Major bleeding (e.g., actively dripping or spurting) AND [2] can't be stopped    Negative: Sounds like a  life-threatening emergency to the triager    Negative: Snake bite    Negative: Bite, wound, or sting from fish    Negative: [1] Any break in skin from BITE (e.g., cut, puncture or scratch) AND[2] WILD animal at risk for RABIES (e.g., bat, raccoon, basurto, skunk, coyote, other carnivores)    Negative: [1] Any break in skin from BITE (e.g., cut, puncture or scratch) AND[2] PET animial (e.g., dog, cat, or ferret) at risk for RABIES (e.g., sick, stray, unprovoked bite, developing country)    Negative: [1] Any break in skin from BITE (e.g., cut, puncture or scratch) AND[2] monkey    Negative: [1] EXPOSURE of non-intact skin (e.g., exposed person has dermatitis, abrasion, wound) AND[2] with animal BODY FLUID (e.g., saliva such as licking, blood, brain) AND[3] animal at high-risk for RABIES (e.g., bat, raccoon, basurto, skunk, coyote, other carnivores)    Negative: [1] Cut (length > 1/8 inch or 3 mm) or skin tear AND [2] any animal    Negative: [1] Bleeding AND [2] won't stop after 10 minutes of direct pressure (using correct technique)    Negative: Description of bite sounds severe to the triager    Negative: [1] Puncture wound (hole through the skin) AND [2] from a cat bite (or deep claw puncture wound)    Negative: [1] Puncture wound or small cut AND [2] on face    Negative: [1] Puncture wound or small cut AND [2] on hands or genitals    Negative: [1] Puncture wound or small cut AND [2] weak immune system (e.g., HIV positive, cancer chemo, splenectomy, organ transplant, chronic steroids)    Negative: Looks infected (red area, red streak, or pus)    Negative: [1] No bite skip or scratch AND [2] suspicious bat exposure (e.g., bat found in same room as sleeping adult)    Negative: [1] Taking antibiotic > 24 hours for infected bite AND [2] bite getting worse (more swollen, more redness and increased pain)    Negative: [1] Taking antibiotic > 48 hours (2 days) for infected bite AND [2] fever persists    Negative: [1] Taking  antibiotic > 72 hours (3 days) for infected bite AND [2] has not improved (i.e., pain, pus, redness)    Negative: [1] No prior tetanus shots (or is not fully vaccinated) AND [2] any wound (e.g., cut, scrape)    Negative: [1] Last tetanus shot > 10 years ago AND [2] any wound (e.g., cut, scrape)    Negative: [1] Taking antibiotic < 48 hours for infected bite AND [2] fever persists    Negative: [1] Taking antibiotic < 72 hours (3 days) for infected bite AND [2] has not improved    Protocols used: ANIMAL BITE-A-

## 2021-06-13 NOTE — PROGRESS NOTES
Pt is being seen today by Mary Muniz CNP, for refill and f/u of back, neck, bilat knee and multisite pain.    Pain score 7  Constant and intermittent   What does your pain like feel during a flare? Burning, jabbing  Does the pain interfere with:  Work ----- NA  Walking ------ yes  Sleep ------- yes  Daily activities ------yes  Relationships -------yes  F=7    UDT/CSA 9/2020
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
## 2021-06-13 NOTE — PATIENT INSTRUCTIONS - HE
Plan:   Interventions-    Follow up in 8 weeks- prior to 1/29/2020    Will increase the norco up to 4 per day ok to fill on 1/21/20- 12/29, 12/29-1/26.     Will start CO Q 10- 100 mg a day for fibromyalgia increase to 300-400 mg per day when a fibro flare is occurring.     Continue the lyrica     Prescription Drug Management will be continued by the [facility]  A narcotic contract was signed by the patient and  Patient is unable to get narcotics from other providers. They will be subject to random UAs.     As a reminder- chronic pain is generally stable, if you experience a new injury or new different pain it is expected that you present to the ED or urgent care for evaluation. DO NOT use your chronic pain medications to treat any new or different pain other then what it is intended for. We do not replace any lost or stolen prescriptions or provide early refills for overtaking your medications.        Orders placed today  Medications that were ordered today   Requested Prescriptions     Signed Prescriptions Disp Refills     HYDROcodone-acetaminophen (NORCO) 5-325 mg per tablet 112 tablet 0     Sig: Take 1 tablet by mouth 4 (four) times a day as needed for pain.     pregabalin (LYRICA) 150 MG capsule 56 capsule 1     Sig: Take 1 capsule (150 mg total) by mouth 2 (two) times a day.     coenzyme Q10 (CO Q-10) 100 mg capsule 30 capsule 3     Sig: Take 1 capsule (100 mg total) by mouth daily.     HYDROcodone-acetaminophen (NORCO) 5-325 mg per tablet 112 tablet 0     Sig: Take 1 tablet by mouth 4 (four) times a day as needed for pain.     No orders of the defined types were placed in this encounter.      UDT/SWAB:  Patient required a random Urine Drug Testing, due to the need to comply with [place] [employer] Policy Guidelines and CDC Guideline for the use of any controlled substances. This is to ensure that patient is compliant with treatment, and monitor for risks such as diversion, abuse, or any other  aberrant behaviors. Patient is either being considered for or taking a controlled substance. Unexpected findings will be discussed and treatment decision may be adjusted. Testing is being implemented across the board randomly w/o bias related to age, race, gender, socioeconomic status or Christianity affiliation.    The patient understand todays plan and has their questions answered in regards to expectations and current treatment plan.     SAFETY REMINDERS  No alcohol while taking controlled substances. Alcohol is not an illegal substance, it is unsafe to use in combination. It is a build up of substances in the body that can be extremely hazardous and may cause respirations to slow to a dangerous rate resulting in hospitalization, brain damage, or death.    Opioid medications have been associated with sharp rise in unintentional overdose and death.  Overdose is a condition characterized by the consumption in excess of a particular drug causing adverse effects. This can happen b/c you are sick, accidentally or intentionally took an extra dose, are on multiple medication that can interact. Someone took your medication and they are not use to the medication.  Symptoms of overdose include:   !breathing slow and shallow, erratic or not at all  !pinpoint pupils, hallucinations  !confusion  !muscle jerks, slack muscles   !extreme sleepiness or loss of alertness   !awake but not able to talk   !face pale or clammy, vomiting, for lighter skinned people, the skin tone turns bluish purple, for darker skinned people, it turns grayish or ashen   If in a situation where overdose is a concern engage the emergency response system (dial 911).    In one study it was noted that 80% of unintentional overdoses occurred in people who were taking a combination of opioids and benzodiazepines.    Do not sell, loan, borrow or share your opioid medication with anyone. Deaths have occurred as a result of this practice. It is illegal and  patients are being prosecuted.     Prevent unexpected access/loss of medication: Keep medication locked. Only carry what you need with you.    Mary Muniz [facility]  [address] [facility]  [place] [address]  Ph: [phone]  Fax: [phone]

## 2021-06-14 NOTE — PROGRESS NOTES
Psychology Progress Note    Date: January 20, 2021    Time length and type of treatment: 49 minutes (1:58 PM - 2:47 PM) , individual therapy    After review of the patient's situation, this visit was changed from an in-person visit to a video visit via [employer] to reduce the risk of COVID 19 exposure. Patient was informed that policies and procedures that govern in-person sessions would also apply to video sessions. Patient was also informed that video sessions would be discontinued when COVID 19 exposure is no longer a concern (as determined by [facility]).     Patient location: Patient home in [place], [place]  Provider location:  [facility], [place], [place]    Patient was in agreement with proceeding with a video session.      Necessity: This session is necessary to address the patient's bipolar 1 disorder, anxiety, panic, agoraphobia, PTSD, and OCD.  Today we focus on the patient's treatment plan, specifically exploring thoughts and expectations of self and others, and coping strategies. The reader is invited to review the patient's full treatment plan in the Media section of the patient's Epic medical record.    Intervention: This writer utilized motivational interviewing, active listening, reassurance and support in the context of cognitive behavioral therapy to address the above.      Mental Status:   Grooming:   Attire: Appropriate  Age: Appears Stated  Behavior Towards Examiner: Cooperative  Motor Activity: Within normal   Eye Contact: Appropriate  Mood: Sad  Affect: Congruent w/content of speech  Speech/Language: Within normal  Attention: Within normal  Concentration: Within normal  Thought Process: Tangential  Thought Content: No evidence of delusions or hallucinations  Orientation: Appeared oriented to person, place, and time, though not formally established  Memory: No Evidence of Impairment  Judgement: No Evidence of Impairment  Estimated Intelligence:  Average  Demonstrated Insight: Adequate  Fund of Knowledge: adequate      Progress:   Patient discussed feeling hurt that a friend gave her a book about letting go of the past and insisted the patient read it. Patient noted that the book talked about holding onto grievances, which the patient doesn't feel she does, and was very negative about thinking about past events.  This was distressing for the patient who struggles regularly with intrusive trauma related memories and isn't able to stop thinking about past events quite so easily.  Patient was able to identify listening to high energy music as one of the few things that helps her relax, and the likelihood that the intensity of this music likely helped distract her from trauma themes was explored.  We also discussed the difference between healthy and unhealthy coping with difficult memories.        Plan:   We will meet again in 2 weeks to address the patient's Bipolar I Disorder, anxiety, panic, agoraphobia, PTSD, and OCD.  Estimated duration of treatment is 10+ individual therapy sessions (20160) at twice monthly intervals. Treatment is expected to be completed by December 2021.     Diagnosis:  Bipolar I Disorder, severe, most recent episode depressed  Generalized Anxiety Disorder  Panic Disorder  Agoraphobia  Posttraumatic Stress Disorder  Obsessive Compulsive Disorder, with poor insight
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
## 2021-06-14 NOTE — PROGRESS NOTES
"Rohan Prince is a 45 y.o. female who is being evaluated with [facility] or [employer] services- via video     \"This telephone/video visit will be conducted via a call between you and your physician/provider. We have found that certain health care needs can be provided without the need for a physical exam.  This service lets us provide the care you need with a short phone conversation.  If a prescription is necessary we can send it directly to your pharmacy.  If lab work is needed we can place an order for that and you can then stop by our lab to have the test done at a later time.    If during the course of the call the physician/provider feels a telephone/video visit is not appropriate, you will not be charged for this service.\"     Start time- 9:44 am   End time- 10:08 am     Rohan Prince complains of    Chief Complaint   Patient presents with     Back Pain     Fibromyalgia       I have reviewed and updated the patient's Past Medical History, Social History, Family History and Medication List as well as the Precharting workup by the [facility].       PAIN CLINIC FOLLOW UP PROGRESS NOTE    CC:  Chief Complaint   Patient presents with     Back Pain     Fibromyalgia       HPI  Rohan Prince is a 45 y.o. female who presents for evaluation   Chief Complaint   Patient presents with     Back Pain     Fibromyalgia    that is causing continued pain. Specific questions indicated the patient wanted addressed today include: to address her ongoing chronic pain plan       Major issues:  1. Chronic pain syndrome    2. Fibromyalgia    3. Vitamin D deficiency, unspecified     4. Chronic midline low back pain, unspecified whether sciatica present        Pain score: 6  Constant and intermittent   What does your pain feel like: Burning, jabbing  Does the pain interfere with:  Work ----- NA  Walking ------ yes  Sleep ------- yes  Daily activities ------yes  Relationships -------yes  F=7    ALLERGIES  Chlorhexidine    Previous Medical " History  Social History     Substance and Sexual Activity   Alcohol Use No     Social History     Substance and Sexual Activity   Drug Use No     Social History     Tobacco Use   Smoking Status Never Smoker   Smokeless Tobacco Never Used       Pertinent Pain Medications/interventions:    12/1/2020- increase norco to 4 times a day for the low back pain. Continue the lyrica for the fibromyalgia. Will start Co q 10 as well      Currently she is taking norco 3 times a day and lyrica- denies any side effects at this time.      She currently takes tramadol currently but notes that this is not helpful like she used to. She used to take  mg, she takes excedrin migraine is assistive in reducing her migraines since was a young lady at 14 years of age     Takes pregablin 75 mg, Sees Cheyenne Sanchez at [place].     Review of Systems:  12 point systems were reviewed with pt as documented on on previous exams. Any new diagnosis or new medication is reviewed today.     Medications    Current Outpatient Medications:      aspirin-acetaminophen-caffeine (EXCEDRIN MIGRAINE) 250-250-65 mg per tablet, Take 1 tablet by mouth every 6 (six) hours as needed for pain., Disp: , Rfl:      coenzyme Q10 (CO Q-10) 100 mg capsule, Take 1 capsule (100 mg total) by mouth daily., Disp: 30 capsule, Rfl: 3     HYDROcodone-acetaminophen (NORCO) 5-325 mg per tablet, Take 1 tablet by mouth 4 (four) times a day as needed for pain., Disp: 112 tablet, Rfl: 0     [START ON 2/19/2021] pregabalin (LYRICA) 150 MG capsule, Take 1 capsule (150 mg total) by mouth 2 (two) times a day., Disp: 56 capsule, Rfl: 1      Physical Exam- limited exam   General- patient is alert and oriented, in NAD, well-groomed, well-nourished  Psych- Judgment and insight normal, AOx4, recent and remote memory normal, mood and affect normal  Eyes- pupils are symmetrical, conjunctiva is clear bilaterally, no ptosis is noted.   Respiratory- Breathing appears non-labored  Integumentary-  coloring of skin appears normal.   Musculoskeletal- patient is moving all extremities that are visible.     Lab:  Last UDS on 9/9/2020 had expected results. Last UDT on file was reviewed.    Imaging:  No new imaging reviewed with patient over the phone, no new orders placed       Recent   Dated 1/25/2021 was reviewed with the patient today.       Assessment:     Rohan Prince is a 45 y.o. female seen in clinic today for   Chief Complaint   Patient presents with     Back Pain     Fibromyalgia       They are here for follow up and continued medical management of their pain. Today we reviewed the plan of care for their chronic pain and determined that the patient will need a refill of the current prescription.      Due to the Covid 19 precautions all visits are converted to telephone encounters until the government restrictions are lifted and the precautions have been lifted.     New concerns today- patient is still working with her psychiatrist bimonthly. She notes that she doesn't always take all 4 of her norco when she doesn't need, discussed the appropriateness of this.   Patient denies side effects from the current regimen  Plan of care going forward for ongoing pain control- patient notes that she does not need to take all 4 of the norco everyday so this does delay her refill dates. Discussed the appropriateness of this if she doesn't need it. She uses the lyrica for neck burning and notes that it is very helpful. She also does stairs for exercise in her home and this is helpful. Will continue the use of the medications at this time, patient is compliant with her care plan.   Patients current MME is 15    Plan:   Interventions-    Follow up in 8 weeks      Will increase the norco up to 4 per day ok to fill on 2/19     Continue the lyrica     Try piliates instead of yoga as this is a laying down position due to balance concerns.     This limited telehealth/televideo encounter is due to the Covid 19,  as  required by the governmental agencies at this time due to risk of transmission of the pandemic, therefore testing availability is limited as well as physical exams.      Prescription Drug Management will be continued by the [facility] center    Orders placed today  Medications that were ordered today   Requested Prescriptions     Signed Prescriptions Disp Refills     pregabalin (LYRICA) 150 MG capsule 56 capsule 1     Sig: Take 1 capsule (150 mg total) by mouth 2 (two) times a day.     HYDROcodone-acetaminophen (NORCO) 5-325 mg per tablet 112 tablet 0     Sig: Take 1 tablet by mouth 4 (four) times a day as needed for pain.     No orders of the defined types were placed in this encounter.        The patient understand todays plan and has their questions answered in regards to expectations and current treatment plan.     Prevent unexpected access/loss of medication: Keep medication locked. Only carry what you need with you    The plan of care will be adjusted to accommodate the issues discussed, discussing management of their care and follow up that is recommended. 1/25/2021         DUNG Mendoza [facility]  [address]  [place] [address]  Ph: [phone]  Fax: [phone]

## 2021-06-14 NOTE — PATIENT INSTRUCTIONS - HE
Plan:   Interventions-    Follow up in 8 weeks      Will increase the norco up to 4 per day ok to fill on 2/19     Continue the lyrica     Try piliates instead of yoga as this is a laying down position due to balance concerns.     This limited telehealth/televideo encounter is due to the Covid 19,  as required by the governmental agencies at this time due to risk of transmission of the pandemic, therefore testing availability is limited as well as physical exams.      Prescription Drug Management will be continued by the [facility]    Orders placed today  Medications that were ordered today   Requested Prescriptions     Signed Prescriptions Disp Refills     pregabalin (LYRICA) 150 MG capsule 56 capsule 1     Sig: Take 1 capsule (150 mg total) by mouth 2 (two) times a day.     HYDROcodone-acetaminophen (NORCO) 5-325 mg per tablet 112 tablet 0     Sig: Take 1 tablet by mouth 4 (four) times a day as needed for pain.     No orders of the defined types were placed in this encounter.        The patient understand todays plan and has their questions answered in regards to expectations and current treatment plan.     Prevent unexpected access/loss of medication: Keep medication locked. Only carry what you need with you    The plan of care will be adjusted to accommodate the issues discussed, discussing management of their care and follow up that is recommended. 1/25/2021         DUNG Mendoza [facility]  [address]  [place] [address]  Ph: [phone]  Fax: [phone]

## 2021-06-14 NOTE — PROGRESS NOTES
Rohan Prince is a 45 y.o. female who is being evaluated via a billable video visit.      How would you like to obtain your AVS? MyChart.  If dropped from the video visit, the video invitation should be resent by: [phone] RITIKA  Will anyone else be joining your video visit? No    Pain score: 6  Constant and intermittent   What does your pain feel like: Burning, jabbing  Does the pain interfere with:  Work ----- NA  Walking ------ yes  Sleep ------- yes  Daily activities ------yes  Relationships -------yes  F=7

## 2021-06-15 NOTE — PROGRESS NOTES
Psychology Progress Note    Date: February 05, 2021    Time length and type of treatment: 47 minutes (1:02 PM - 1:49 PM) , individual therapy    After review of the patient's situation, this visit was changed from an in-person visit to a video visit via [employer] to reduce the risk of COVID 19 exposure. Patient was informed that policies and procedures that govern in-person sessions would also apply to video sessions. Patient was also informed that video sessions would be discontinued when COVID 19 exposure is no longer a concern (as determined by [facility]).     Patient location: Patient home in [place], [place]  Provider location:  [facility], [place], [place]    Patient was in agreement with proceeding with a video session.      Necessity: This session is necessary to begin revising the patient's treatment plan      Intervention: This writer utilized motivational interviewing, active listening, reassurance and support in the context of cognitive behavioral therapy to address the above.      Mental Status:   Grooming: Within normal limits  Attire: Appropriate  Age: Appears Stated  Behavior Towards Examiner: Cooperative  Motor Activity: Within normal   Eye Contact: Avoidant  Mood: Anxious  Affect: Anxious  Speech/Language: Within normal  Attention: Distractible  Concentration: Brief  Thought Process: Tangential  Thought Content: No evidence of delusions or hallucinations  Orientation: Appeared oriented to person, place, and time, though not formally established  Memory: No Evidence of Impairment  Judgement: No Evidence of Impairment  Estimated Intelligence: Average  Demonstrated Insight: Adequate  Fund of Knowledge: adequate      Progress:   Patient was administered the PHQ-9, ZAKIA-7, and PCL-5 as part of revising her treatment plan.  Patient scored a 13 on the PHQ-9 is an increase from her previous score of 7, suggesting that her depression has moved from the mild to  moderate range.  In contrast, patient score of 9 on the ZAKIA-7 is an improvement over her previous score of 13. Her current score falls in the range suggestive of mild anxiety.  Patient's score of 44 on the PCL-5 continues to greatly exceed the recommended cut score of 32 or 33 recommended for consideration of PTSD.  Patient became dysregulated by the PCL-5, and treatment planning efforts were stopped in order to work with the patient on emotion regulation.  A treatment plan will be completed at the patient's next appointment.     Plan:   We will meet again in 2 weeks to address the patient's Bipolar I Disorder, anxiety, panic, agoraphobia, PTSD, and OCD.  Estimated duration of treatment is 10+ individual therapy sessions (07386) at twice monthly intervals. Treatment is expected to be completed by December 2021.     Diagnosis:  Bipolar I Disorder, severe, most recent episode depressed  Generalized Anxiety Disorder  Panic Disorder  Agoraphobia  Posttraumatic Stress Disorder  Obsessive Compulsive Disorder, with poor insight

## 2021-06-15 NOTE — TELEPHONE ENCOUNTER
Attempted to contact patient, mailbox was full unable to leave a message. An SMS message was sent to patient

## 2021-06-15 NOTE — PROGRESS NOTES
Patient appeared for our video conference, but explained that she has an abscessed tooth, is sleeping a great deal, and is waiting for her oldest daughter to come home and watch her youngest daughter before she seeks emergency treatment.  An attempt was made to problem solve a faster solution, but patient was clearly in pain and wished to stick with her current plan.  We agreed to meet at her next scheduled appointment and today's appointment will be canceled.

## 2021-06-15 NOTE — TELEPHONE ENCOUNTER
Medication being requested: hydrocodone and pregabalin(already at the pharmacy)  Last visit date:  1/25/21    Provider:KENYATTA  Next visit date: needs to scheduled      Provider:KENYATTA  Expected follow up: 8 weeks  MTM visit (Pain Center) date- no    UDT/CSA 9/2020  -  1/22/21, #112  Pertinent between visit information about requested medication (telephone, mychart, prior authorization, concerns, red flags, comments):   NA  Script being sent to provider by nurse- dates and quantity:  Requested Prescriptions     Pending Prescriptions Disp Refills     HYDROcodone-acetaminophen (NORCO) 5-325 mg per tablet 112 tablet 0     Sig: Take 1 tablet by mouth 4 (four) times a day as needed for pain.     Date that the medication is expected to be refilled-  2/22/21  Pharmacy cued:  [facility]  Standing orders for withdrawal protocol implemented or requested- N/A
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
## 2021-06-16 NOTE — PATIENT INSTRUCTIONS - HE
Plan:   Interventions-    Follow up in 8 weeks      Continue the use of the Norco- 4 per day. Next refill on 4/19-5/17 call for the next refill     Continue the lyrica refills sent      Try piliates instead of yoga as this is a laying down position due to balance concerns. Continue the use of the stair climbing as you are.       Orders placed today  Medications that were ordered today   Requested Prescriptions     Pending Prescriptions Disp Refills     HYDROcodone-acetaminophen (NORCO) 5-325 mg per tablet 112 tablet 0     Sig: Take 1-2 tablets by mouth 4 (four) times a day as needed for pain (up to 4 per day).     pregabalin (LYRICA) 150 MG capsule 56 capsule 1     Sig: Take 1 capsule (150 mg total) by mouth 2 (two) times a day.     No orders of the defined types were placed in this encounter.        The patient understand todays plan and has their questions answered in regards to expectations and current treatment plan.     Prevent unexpected access/loss of medication: Keep medication locked. Only carry what you need with you    The plan of care will be adjusted to accommodate the issues discussed, discussing management of their care and follow up that is recommended. 3/26/2021         Mary Muniz [facility]  [address]  [place] [address]  Ph: [phone]  Fax: [phone]

## 2021-06-16 NOTE — TELEPHONE ENCOUNTER
Telephone Encounter by Joyce Sue RN at 3/18/2020 11:20 AM     Author: Joyce Sue RN Service: -- Author Type: Registered Nurse    Filed: 3/18/2020 11:48 AM Encounter Date: 3/18/2020 Status: Signed    : Joyce Sue RN (Registered Nurse)       Medication being requested: Norco  Last visit date: 1/22.  Provider: donny  Next visit date: 3/24.  Provider: donny  Expected follow up: 4-8 weeks  MTM visit (Pain Center) date: no  UDT date: 11/2019  Agreement date: 12/2019   snipped in:    Pertinent between visit information about requested medication (telephone, mychart, prior authorization, concerns, comments): none  Script being sent to provider by nurse- dates and quantity:   Requested Prescriptions     Pending Prescriptions Disp Refills   ? HYDROcodone-acetaminophen (NORCO) 5-325 mg per tablet 84 tablet 0     Sig: Take 1 tablet by mouth 3 (three) times a day as needed for pain.     Pharmacy cued: [facility]  Standing orders for withdrawal protocol implemented: [place]

## 2021-06-16 NOTE — PROGRESS NOTES
Psychology Progress Note    Date: March 17, 2021    Time length and type of treatment: 46 minutes (1:08 PM to 1:54 PM), individual therapy    After review of the patient's situation, this visit was changed from an in-person visit to a  video visit via [employer] to reduce the risk of COVID 19 exposure. Patient was informed that policies and procedures that govern in-person sessions would also apply to  video sessions. Patient was also informed that  video sessions would be discontinued when COVID 19 exposure is no longer a concern (as determined by [facility]).     Patient location: Patient home in [place], [place]  Provider location:  [facility], [place], [place]    Patient was in agreement with proceeding with a  video session.      Necessity: This session is necessary to address the patient's Bipolar I Disorder, anxiety, panic disorder, agoraphobia, PTSD, and OCD.  Today we focus on the patient's treatment plan, specifically exploring compliance with medical treatment plan.  The reader is invited to review the patient's full treatment plan in the Media section of the patient's Epic medical record.    Psychotherapeutic Technique: This writer utilized motivational interviewing, active listening, reassurance and support in the context of cognitive behavioral therapy to address the above.      Mental Status:   Grooming: Well groomed  Attire: Appropriate  Age: Appears Stated  Behavior Towards Examiner: Cooperative  Motor Activity: Within normal   Eye Contact: Appropriate  Mood: Anxious  Affect: Congruent w/content of speech  Speech/Language: Within normal  Attention: Within normal  Concentration: Within normal  Thought Process: Tangential  Thought Content: No evidence of delusions or hallucinations  Orientation: Appeared oriented to person, place, and time, though not formally established  Memory: No Evidence of Impairment  Judgement: No Evidence of Impairment  Estimated  Intelligence: Average  Demonstrated Insight: Adequate  Fund of Knowledge: adequate      Intervention:  Patient reported her dentist informed her that the xray for her abscessed tooth detected a cyst on her thyroid gland and she was directed to follow-up with her physician.  Patient acknowledged she has not done this and identified feeling overwhelmed as a key reason.  This was explored, and we ultimately worked together to create a list of things the patient needs to do, including obtaining a tetanus shot and scheduling another MRI, following up with her dentist, and some nonmedical tasks such as going to her 's license office to obtain an ID. Patient committed to following through on these tasks before our next appointment.    Progress:  Patient has shown improvement in ability to utilize coping skills, including problem solving and utilizing lists.     Plan:   We will meet again in  2 weeks to address the patient's bipolar 1 disorder, anxiety, panic disorder, agoraphobia, PTSD, and OCD.  Estimated duration of treatment is ongoing due to a major mental illness and lengthy trauma history.  Individual therapy sessions (82347) at twice monthly intervals.     Diagnosis:  Bipolar 1 Disorder, severe, most recent episode depressed  Posttraumatic Stress Disorder  Panic Disorder  Agoraphobia  Generalized Anxiety Disorder   Obsessive-Compulsive Disorder, with poor insight
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
## 2021-06-16 NOTE — TELEPHONE ENCOUNTER
Telephone Encounter by Yolanda Simon RN at 2/18/2020  3:01 PM     Author: Yolanda Simon RN Service: -- Author Type: Registered Nurse    Filed: 2/18/2020  3:11 PM Encounter Date: 2/18/2020 Status: Signed    : Yolanda Simon RN (Registered Nurse)       Medication being requested: lyrica 150 mg(remaining refill on current RX)  norco 5/325  mg  Last visit date:1/22   Provider: KENYATTA  Next visit date: 3/24  Provider: KENYATTA  Expected follow up: 4-8 weeks  MTM visit (Pain Center) date: no  UDT - 11/26/2019  CSA-no on file   snipped in:    Pertinent between visit information about requested medication (telephone, mychart, prior authorization, concerns, comments): no  Script being sent to provider by nurse- dates and quantity:   Requested Prescriptions     Pending Prescriptions Disp Refills   ? HYDROcodone-acetaminophen (NORCO) 5-325 mg per tablet 84 tablet 0     Sig: Take 1 tablet by mouth 3 (three) times a day as needed for pain.     Pharmacy cued: [facility]  Standing orders for withdrawal protocol implemented: NA

## 2021-06-16 NOTE — PROGRESS NOTES
Psychology Progress Note    Date: April 08, 2021    Time length and type of treatment: 47 minutes (2:08 PM to 2:55 PM), individual therapy    After review of the patient's situation, this visit was changed from an in-person visit to a  video visit via [employer] to reduce the risk of COVID 19 exposure. Patient was informed that policies and procedures that govern in-person sessions would also apply to  video sessions. Patient was also informed that  video sessions would be discontinued when COVID 19 exposure is no longer a concern (as determined by [facility]).     Patient location: Patient home in [place], [place]  Provider location:  [facility], [place], [place]    Patient was in agreement with proceeding with a  video session.      Necessity: This session is necessary to address the patient's Bipolar 1 Disorder, anxiety, panic disorder, agoraphobia, PTSD, and OCD.  Today we focus on the patient's treatment plan, specifically exploring coping strategies.  The reader is invited to review the patient's full treatment plan in the Media section of the patient's Epic medical record.    Psychotherapeutic Technique: This writer utilized motivational interviewing, active listening, reassurance and support in the context of cognitive behavioral therapy to address the above.      Mental Status:   Grooming: Within normal limits  Attire: Appropriate  Age: Appears Stated  Behavior Towards Examiner: Cooperative  Motor Activity: Within normal   Eye Contact: Appropriate  Mood: Sad  Affect: Congruent w/content of speech  Speech/Language: Loud  Attention: Within normal  Concentration: Within normal  Thought Process: Tangential  Thought Content: No evidence of delusions or hallucinations  Orientation: Appeared oriented to person, place, and time, though not formally established  Memory: No Evidence of Impairment  Judgement: No Evidence of Impairment  Estimated Intelligence:  Average  Demonstrated Insight: Adequate  Fund of Knowledge: adequate      Intervention:   Patient discussed her frustration with being followed when shopping because store workers believe that she is going to shoplift.  This makes it more difficult for her to shop and aggravates her already high anxiety.  Patient reported using her breathing and working on self soothing, and eventually addressing the store personnel that were following her directly, telling them she wasn't going to steal anything and they didn't need to keep following her.  Patient was provided supportive psychotherapy, and her positive coping was validated and reinforced.      Progress:  Patient has shown improvement in ability to utilize coping skills.     Plan:   We will meet again in 2 weeks to address the patient's Bipolar I Disorder, anxiety, panic disorder, agoraphobia, PTSD, and OCD.  Estimated duration of treatment is ongoing due to a major mental illness and lengthy trauma history. Individual therapy sessions (93105) will be at twice monthly intervals.     Diagnosis:  Bipolar 1 Disorder, severe, most recent episode depressed  Posttraumatic Stress Disorder  Panic Disorder  Agoraphobia  Generalized Anxiety Disorder   Obsessive-Compulsive Disorder, with poor insight

## 2021-06-16 NOTE — PROGRESS NOTES
Rohan Prince is a 45 y.o. female who is being evaluated via a billable video visit.       How would you like to obtain your AVS? MyChart.  If dropped from the video visit, the video invitation should be resent by: [phone] DOXIMITY  Will anyone else be joining your video visit? No     Pain score: 8  Constant   What does your pain feel like: Burning, jabbing  Does the pain interfere with:  Work ----- NA  Walking ------ yes  Sleep ------- yes  Daily activities ------yes  Relationships -------yes  F=7

## 2021-06-16 NOTE — PROGRESS NOTES
Rohan Prince is a 45 y.o. female who [facility] [employer] [facility]- via video       Start time- 11:03 am   End time- 11:25 am     Subjective-    I have reviewed and updated the patient's Past Medical History, Social History, Family History and Medication List as well as the Precharting workup by the [facility].     PAIN CLINIC FOLLOW UP PROGRESS NOTE    CC:  Chief Complaint   Patient presents with     Back Pain     Fibromyalgia       HPI  Rohan Prince is a 45 y.o. female who presents for evaluation   Chief Complaint   Patient presents with     Back Pain     Fibromyalgia    that is causing continued pain. Specific questions indicated the patient wanted addressed today include: to follow up on her ongoing chronic pain related to her ongoing back pain         Objective-  Major issues:  1. Chronic pain syndrome    2. Fibromyalgia    3. Vitamin D deficiency, unspecified     4. Chronic midline low back pain, unspecified whether sciatica present      Pain score: 8  Constant   What does your pain feel like: Burning, jabbing  Does the pain interfere with:  Work ----- NA  Walking ------ yes  Sleep ------- yes  Daily activities ------yes  Relationships -------yes  F=7    ALLERGIES  Chlorhexidine    Previous Medical History  Social History     Substance and Sexual Activity   Alcohol Use No     Social History     Substance and Sexual Activity   Drug Use No     Social History     Tobacco Use   Smoking Status Never Smoker   Smokeless Tobacco Never Used       Pertinent Pain Medications/interventions:    12/1/2020- increase norco to 4 times a day for the low back pain. Continue the lyrica for the fibromyalgia. Will start Co q 10 as well      Currently she is taking norco 3 times a day and lyrica- denies any side effects at this time.      She currently takes tramadol currently but notes that this is not helpful like she used to. She used to take  mg, she takes excedrin migraine is assistive in reducing her  migraines since was a young lady at 14 years of age     Takes pregablin 75 mg, Sees Cheyenne Sanchez at [facility].      Medications    Current Outpatient Medications:      aspirin-acetaminophen-caffeine (EXCEDRIN MIGRAINE) 250-250-65 mg per tablet, Take 1 tablet by mouth every 6 (six) hours as needed for pain., Disp: , Rfl:      coenzyme Q10 (CO Q-10) 100 mg capsule, Take 1 capsule (100 mg total) by mouth daily., Disp: 30 capsule, Rfl: 3     [START ON 4/19/2021] HYDROcodone-acetaminophen (NORCO) 5-325 mg per tablet, Take 1-2 tablets by mouth 4 (four) times a day as needed for pain (up to 4 per day)., Disp: 112 tablet, Rfl: 0     [START ON 4/19/2021] pregabalin (LYRICA) 150 MG capsule, Take 1 capsule (150 mg total) by mouth 2 (two) times a day., Disp: 56 capsule, Rfl: 1      Physical Exam- limited exam   General- patient is alert and oriented, in NAD, well-groomed, well-nourished  Psych- Judgment and insight normal, AOx4, recent and remote memory normal, mood and affect normal  Eyes- pupils are symmetrical, conjunctiva is clear bilaterally, no ptosis is noted.   Respiratory- Breathing appears non-labored  Integumentary- coloring of skin appears normal.   Musculoskeletal- patient is moving all extremities that are visible.     Lab:  Last UDS on 9/9/20 had expected results. Last UDT on file was reviewed.    Imaging:  No new imaging reviewed with patient over the phone, no new orders placed       Recent   Dated 3/26/2021 was reviewed.       Assessment:     Rohan Prince is a 45 y.o. female seen in clinic today for   Chief Complaint   Patient presents with     Back Pain     Fibromyalgia       New concerns today- she is still trying to having issues with stairs due to the increased pain. She continues to work on this because it bothers her. Her left knee gives out and she also has a hard time lifting her toes up enough to avoid tripping up the stairs.     Plan of care going forward for ongoing pain control- she is noting a  change in exercises and is changing positions frequently to accommodate her pain issues. She reports no side effects of the increase of     Diet- she is incorporating more greens and reduced her intake on soda, she is avoiding eating chocolate, encouraged more protein. Metabolic syndrome education discussed today due to the patient complaints of slurring of words when needing sugar. Encouraged her to watch her glucose and follow up with PCP.     Patients current MME is 15    Plan:   Interventions-    Follow up in 8 weeks      Continue the use of the Norco- 4 per day. Next refill on 4/19-5/17 call for the next refill     Continue the lyrica refills sent      Try piliates instead of yoga as this is a laying down position due to balance concerns. Continue the use of the stair climbing as you are.       Orders placed today  Medications that were ordered today   Requested Prescriptions     Signed Prescriptions Disp Refills     HYDROcodone-acetaminophen (NORCO) 5-325 mg per tablet 112 tablet 0     Sig: Take 1-2 tablets by mouth 4 (four) times a day as needed for pain (up to 4 per day).     pregabalin (LYRICA) 150 MG capsule 56 capsule 1     Sig: Take 1 capsule (150 mg total) by mouth 2 (two) times a day.     No orders of the defined types were placed in this encounter.        The patient understand todays plan and has their questions answered in regards to expectations and current treatment plan.     Prevent unexpected access/loss of medication: Keep medication locked. Only carry what you need with you    The plan of care will be adjusted to accommodate the issues discussed, discussing management of their care and follow up that is recommended. 3/26/2021         Mary Muniz [facility]  [address]  [place] [address]  Ph: [phone]  Fax: [phone]

## 2021-06-16 NOTE — PROGRESS NOTES
Patient no showed for her scheduled appointment. She did not log onto our video visit and did not answer either of two attempted phone calls. I left a voicemail message encouraging the patient to reschedule.

## 2021-06-17 ENCOUNTER — COMMUNICATION - HEALTHEAST (OUTPATIENT)
Dept: ADMINISTRATIVE | Facility: HOSPITAL | Age: 46
End: 2021-06-17

## 2021-06-17 NOTE — PROGRESS NOTES
Rohan Prince is a 45 y.o. female who is being evaluated with [employer] or [employer] services- via video       Start time- 10:21 am   End time- 10:40 am   Patient location- of site- home  Provider location- off site- virtual     Subjective-    I have reviewed and updated the patient's Past Medical History, Social History, Family History and Medication List as well as the Precharting workup by the [facility].     PAIN CLINIC FOLLOW UP PROGRESS NOTE    CC:  Chief Complaint   Patient presents with     Back Pain     Fibromyalgia       HPI  Rohan Prince is a 45 y.o. female who presents for evaluation   Chief Complaint   Patient presents with     Back Pain     Fibromyalgia    that is causing continued pain. Specific questions indicated the patient wanted addressed today include: to follow up on her ongoing chronic pain and medications.         Objective-  Major issues:  1. Chronic pain syndrome    2. Fibromyalgia    3. Vitamin D deficiency, unspecified     4. Chronic midline low back pain, unspecified whether sciatica present        Pain score: 8  Constant   What does your pain feel like: Burning, jabbing, tingling, numbness  Does the pain interfere with:  Work ----- NA  Walking ------ yes  Sleep ------- yes  Daily activities ------yes  Relationships -------yes  F=7    ALLERGIES  Chlorhexidine    Previous Medical History  Social History     Substance and Sexual Activity   Alcohol Use No     Social History     Substance and Sexual Activity   Drug Use No     Social History     Tobacco Use   Smoking Status Never Smoker   Smokeless Tobacco Never Used       Pertinent Pain Medications/interventions:    12/1/2020- increase norco to 4 times a day for the low back pain. Continue the lyrica for the fibromyalgia. Will start Co q 10 as well      Currently she is taking norco 3 times a day and lyrica- denies any side effects at this time.      She currently takes tramadol currently but notes that this is not helpful like she used to. She used  to take  mg, she takes excedrin migraine is assistive in reducing her migraines since was a young lady at 14 years of age     Takes pregablin 75 mg, Sees Cheyenne Sanchez at [place].      Medications    Current Outpatient Medications:      aspirin-acetaminophen-caffeine (EXCEDRIN MIGRAINE) 250-250-65 mg per tablet, Take 1 tablet by mouth every 6 (six) hours as needed for pain., Disp: , Rfl:      coenzyme Q10 (CO Q-10) 100 mg capsule, Take 1 capsule (100 mg total) by mouth daily., Disp: 30 capsule, Rfl: 3     HYDROcodone-acetaminophen (NORCO) 5-325 mg per tablet, Take 1-2 tablets by mouth 4 (four) times a day as needed for pain (up to 4 per day)., Disp: 112 tablet, Rfl: 0     [START ON 6/18/2021] HYDROcodone-acetaminophen (NORCO) 5-325 mg per tablet, Take 1-2 tablets by mouth 4 (four) times a day as needed for pain (up to 4 per day)., Disp: 112 tablet, Rfl: 0     pregabalin (LYRICA) 150 MG capsule, Take 1 capsule (150 mg total) by mouth 2 (two) times a day., Disp: 56 capsule, Rfl: 1      Lab:  Last UDS on 9/9/20 had expected results. Last UDT on file was reviewed.    Imaging:  No new imaging reviewed with patient over the phone, no new orders placed       Recent   Dated 5/21/2021 was reviewed.       Assessment:     Rohan Prince is a 45 y.o. female seen in clinic today for   Chief Complaint   Patient presents with     Back Pain     Fibromyalgia       New concerns today- none really.    Plan of care going forward for ongoing pain control- to follow up on her ongoing chronic pain, she is noting that her knee goes out more and she falls more because of this, discussed that she follow up with her orthopedics provider. Patient is using the norco for her back pain and reports that she still has a fair amount of burning noted throughout. She is using lyirca and finds this helpful as well as norco when needed up to 4 times a day, she currentlly has 11 tabs left and her prescription is due today. No changes in the  plan of care. Education that taking the PRN medication when needed is appropriate.     Patients current MME is 15    Plan:   Interventions-    Follow up in 8 weeks     Continue the use of the Norco- 4 per day. Next refill on 5/21-6/18, 6/18-7/16     Continue the lyrica refills sent      Try piliates instead of yoga as this is a laying down position due to balance concerns. Continue the use of the stair climbing as you are.     Follow up with orthopedics    Orders placed today  Medications that were ordered today   Requested Prescriptions     Signed Prescriptions Disp Refills     HYDROcodone-acetaminophen (NORCO) 5-325 mg per tablet 112 tablet 0     Sig: Take 1-2 tablets by mouth 4 (four) times a day as needed for pain (up to 4 per day).     pregabalin (LYRICA) 150 MG capsule 56 capsule 1     Sig: Take 1 capsule (150 mg total) by mouth 2 (two) times a day.     HYDROcodone-acetaminophen (NORCO) 5-325 mg per tablet 112 tablet 0     Sig: Take 1-2 tablets by mouth 4 (four) times a day as needed for pain (up to 4 per day).     No orders of the defined types were placed in this encounter.        The patient understand todays plan and has their questions answered in regards to expectations and current treatment plan.     Prevent unexpected access/loss of medication: Keep medication locked. Only carry what you need with you    The plan of care will be adjusted to accommodate the issues discussed, discussing management of their care and follow up that is recommended. 5/21/2021         Mary Muniz CNP  [facility]  [address]  [place] [address]  Ph: [phone]  Fax: [phone]

## 2021-06-17 NOTE — PROGRESS NOTES
Rohan Prince is a 45 y.o. female who is being evaluated via a billable video visit.       How would you like to obtain your AVS? MyChart.  If dropped from the video visit, the video invitation should be resent by: [phone] DOXIMITY  Will anyone else be joining your video visit? No     Pain score: 8  Constant   What does your pain feel like: Burning, jabbing, tingling, numbness  Does the pain interfere with:  Work ----- NA  Walking ------ yes  Sleep ------- yes  Daily activities ------yes  Relationships -------yes  F=7

## 2021-06-17 NOTE — PATIENT INSTRUCTIONS - HE
Plan:   Interventions-    Follow up in 8 weeks     Continue the use of the Norco- 4 per day. Next refill on 5/21-6/18, 6/18-7/16     Continue the lyrica refills sent      Try piliates instead of yoga as this is a laying down position due to balance concerns. Continue the use of the stair climbing as you are.     Follow up with orthopedics    Orders placed today  Medications that were ordered today   Requested Prescriptions     Signed Prescriptions Disp Refills     HYDROcodone-acetaminophen (NORCO) 5-325 mg per tablet 112 tablet 0     Sig: Take 1-2 tablets by mouth 4 (four) times a day as needed for pain (up to 4 per day).     pregabalin (LYRICA) 150 MG capsule 56 capsule 1     Sig: Take 1 capsule (150 mg total) by mouth 2 (two) times a day.     HYDROcodone-acetaminophen (NORCO) 5-325 mg per tablet 112 tablet 0     Sig: Take 1-2 tablets by mouth 4 (four) times a day as needed for pain (up to 4 per day).     No orders of the defined types were placed in this encounter.        The patient understand todays plan and has their questions answered in regards to expectations and current treatment plan.     Prevent unexpected access/loss of medication: Keep medication locked. Only carry what you need with you    The plan of care will be adjusted to accommodate the issues discussed, discussing management of their care and follow up that is recommended. 5/21/2021         [facility]  [address] [address]  [place] [address]  Ph: [phone]  Fax: [phone]

## 2021-06-17 NOTE — PATIENT INSTRUCTIONS - HE
Patient Instructions by Leonila Moss RN at 8/19/2019  1:00 PM     Author: Leonila Moss RN Service: -- Author Type: Registered Nurse    Filed: 8/19/2019  1:41 PM Encounter Date: 8/19/2019 Status: Signed    : Leonila Moss RN (Registered Nurse)       Patient Education     Stereotactic Body Radiotherapy (SBRT) for Cancer    Radiation therapy is a treatment for cancer. SBRT is a form of radiation therapy. Your doctor may recommend SBRT if your cancer cannot be treated with surgery or other methods. SBRT may be used to help cure or control the growth of your cancer. Or, it may be used to help relieve pain and other symptoms caused by your cancer. This sheet tells you more about SBRT and what to expect. If you have further questions about the treatment, talk with your doctor.  How SBRT Works  SBRT uses radiation (high-energy X-rays) to destroy cancer cells. Imaging studies are used to create detailed pictures of the tumor. Then a machine called a linear accelerator (linac) is aimed at the tumor. It sends high doses of radiation into the tumor. This can help kill cancer cells or slow their growth. It can also shrink the size of the tumor. Each radiation dose is precisely focused so that normal tissue around the tumor receives little or no radiation. This helps reduce the risk of side effects.  Preparing for Your Treatment  SBRT is performed by a radiation therapy team. This team often includes a radiation doctor, nurse, therapist, physicist, and dosimetrist. Before your treatment begins, youll have one or more visits with your team to plan and prepare for your treatment. This may involve having other tests and procedures. Prior to your first treatment session:    You may need to have fiducial markers (also called seeds) placed in or near the tumor site. The markers are tiny pieces of gold metal. They show up clearly on imaging studies and are used later to help guide your radiation treatment. Your doctor  will explain this procedure to you in more detail if it is needed.    You may have CT scans or other imaging tests done. These are used to map out the exact sites in your body that will be treated with radiation.    Positioning devices are made that will help hold your body in the same position for each treatment session. These can include molds, masks, rests, and cushions.  Having SBRT Treatments  With SBRT, 1 to 5 radiation doses are given over the course of 1 to 2 weeks. You and your team will discuss the exact schedule for your treatment in advance. Each treatment session takes about 60-90 minutes. Heres what to expect during each session:    You change into a patient gown. The radiation therapist positions you on the treatment table. If positioning devices were made, they are used at this time.    The therapist leaves the room and turns on the linac from outside. He or she watches you on a TV monitor. You and the therapist can speak through an intercom.    X-ray or CT images are used to confirm correct positioning and alignment of the beams from the linac with your body. The beams are then directed at the tumor. You will hear the machine, but you wont feel anything.    You can go home shortly after the treatment is done. Your doctor or nurse will let you know if and when you need to return for your next session.  Possible Side Effects of SBRT  With any form of radiation therapy, healthy cells and tissue around the tumor can also be affected by the treatment. This can lead to side effects, such as fatigue and skin changes. Most side effects go away soon after treatment ends. But some side effects do not occur until months or even years after the treatment. The type of side effects you have and how severe they are will depend on the amount of radiation received and the part of your body being treated. Your doctor can tell you more about what side effects to expect and how to manage them. If needed, medications  can be prescribed to treat some side effects. Your health care team can also teach you ways to help cope with side effects.  When to see your healthcare provider  Call your healthcare provider if any of the following occur:    Fever of 100.4  F (38  C) or higher, or as directed by your health care provider    Shortness of breath or trouble breathing    Tiredness that doesnt go away between treatments    Pain that doesnt go away, especially if its in the same place    A new or unusual lump, bump, or swelling    Dizziness or lightheadedness    Unusual rashes, bruises, or bleeding    Uncontrolled nausea and vomiting    Diarrhea that doesnt improve over time    Skin breakdown or severe pain due to skin irritation    Any new or concerning symptom   Follow-Up  Youll have one or more follow-up visits with your doctor. These allow your doctor to check your health and the progress of your treatment. If more tests or treatments are needed, your doctor will discuss these with you.  Date Last Reviewed: 12/27/2015 2000-2017 [employer]. [address] [place] [address], [place] [address]. All rights reserved. This information is not intended as a substitute for professional medical care. Always follow your healthcare professional's instructions.

## 2021-06-17 NOTE — PROGRESS NOTES
Psychology Progress Note    Date: May 05, 2021    Time length and type of treatment: 43 minutes (2:00 PM - 2:43 PM), individual therapy    After review of the patient's situation, this visit was changed from an in-person visit to a video visit via [employer] to reduce the risk of COVID 19 exposure. Patient was informed that policies and procedures that govern in-person sessions would also apply to video sessions. Patient was also informed that video sessions would be discontinued when COVID 19 exposure is no longer a concern (as determined by [facility]).     Patient location: Patient home in [place], [place]  Provider location:  [facility], [place], [place]    Patient was in agreement with proceeding with a video session.      Necessity: This session is necessary to address the patient's Bipolar I Disorder, anxiety, panic disorder, agoraphobia, PTSD, and OCD.  Today we focus on the patient's treatment plan, specifically exploring thoughts and expectations of self and others and coping strategies. The reader is invited to review the patient's full treatment plan in the Media section of the patient's Epic medical record.    Psychotherapeutic Technique: This writer utilized motivational interviewing, active listening, reassurance and support in the context of cognitive behavioral therapy to address the above.      Mental Status:   Grooming: Within normal limits  Attire: Appropriate  Age: Appears Stated  Behavior Towards Examiner: Cooperative  Motor Activity: Within normal   Eye Contact: Appropriate  Mood: Anxious  Affect: Congruent w/content of speech  Speech/Language: Loud  Attention: Within normal  Concentration: Within normal  Thought Process: Tangential  Thought Content: No evidence of delusions or hallucinations  Orientation: Appeared oriented to person, place, and time, though not formally established  Memory: No Evidence of Impairment  Judgement: No Evidence of  Impairment  Estimated Intelligence: Average  Demonstrated Insight: Adequate  Fund of Knowledge: adequate      Intervention:   Patient discussed her frustration that several acquaintances have been pressuring her to begin dating and have not been respectful of her desire to refrain from dating, dismissing her explanations for why she isn't currently interested in dating, and regaling her with stories of how lonely and isolated she will be once her children grow up, if she doesn't date now.  We discussed boundaries and limit setting, patient was assisted with languaging her boundaries, and role plays in which the patient needs to assert her boundaries firmly were practiced in session. Patient values being a good mother, and we discussed how modeling self care will help her daughters.      Progress:  Patient successfully set strong boundaries in session in response to intrusive questions that have historically caused her distress, and expressed increased confidence in her ability to set limits going forward.      Plan:   We will meet again in 2 weeks to address the patient's Bipolar I Disorder, anxiety, panic disorder, agoraphobia, PTSD, and OCD.  Estimated duration of treatment is ongoing due to a major mental illness and lengthy trauma history. Individual therapy sessions (38724) will be at twice monthly intervals.     Diagnosis:  Bipolar 1 Disorder, severe, most recent episode depressed  Posttraumatic Stress Disorder  Panic Disorder  Agoraphobia  Generalized Anxiety Disorder   Obsessive-Compulsive Disorder, with poor insight

## 2021-06-17 NOTE — PROGRESS NOTES
Psychology Progress Note    Date: May 20, 2021    Time length and type of treatment: 45 minutes (9:02 AM to 9:47 AM), individual therapy    After review of the patient's situation, this visit was changed from an in-person visit to a  video visit via [employer] to reduce the risk of COVID 19 exposure. Patient was informed that policies and procedures that govern in-person sessions would also apply to  video sessions. Patient was also informed that  video sessions would be discontinued when COVID 19 exposure is no longer a concern (as determined by [facility]).     Patient location: Patient home in [place], [place]  Provider location:  [facility], [place], [place]    Patient was in agreement with proceeding with a  video session.      Necessity: This session is necessary to address the patient's Bipolar 1 Disorder, anxiety, Panic Disorder, Agoraphobia, PTSD, and OCD.  Today we focus on the patient's treatment plan, specifically processing grief.  The reader is invited to review the patient's full treatment plan in the Media section of the patient's Epic medical record.    Psychotherapeutic Technique: This writer utilized motivational interviewing, active listening, reassurance and support in the context of cognitive behavioral therapy to address the above.      Mental Status:   Grooming: Within normal limits  Attire: Appropriate  Age: Appears Stated  Behavior Towards Examiner: Cooperative  Motor Activity: Within normal   Eye Contact: Appropriate  Mood: Sad  Affect: Congruent w/content of speech  Speech/Language: Loud  Attention: Distractible  Concentration: Brief  Thought Process: Tangential  Thought Content: No evidence of delusions or hallucinations  Orientation: Appeared oriented to person, place, and time, though not formally established  Memory: No Evidence of Impairment  Judgement: No Evidence of Impairment  Estimated Intelligence: Average  Demonstrated Insight: Adequate  Fund  of Knowledge: adequate      Intervention:   Patient's treatment plan was due for revision, but patient's stepfather is visiting and patient reported she did not have sufficient privacy to engage in this process.  We agreed to revise her treatment plan at her next appointment.  Patient recently returned from a visit to [place] and [place] which stirred up many complex emotions.  Patient had hoped to visit her autistic son who resides in a group home in [place], but the woman who runs the group home would not allow her to visit despite the patient having maintained her parental rights.  Patient was provided supportive psychotherapy as she discussed her grief over not having seen her son for many years, and feelings of guilt that she was not able to keep him with her due to his violent behavior.      Progress:  Patient continues to struggle with deep feelings of guilt and inadequacy over having to place her now adult severely autistic son in a group home when he was 17-years-old.       Plan:   We will meet again in 3 weeks to revise the patient's treatment plan.    Diagnosis:  Bipolar 1 Disorder, severe, most recent episode depressed  Posttraumatic Stress Disorder  Panic Disorder  Agoraphobia  Generalized Anxiety Disorder   Obsessive-Compulsive Disorder, with poor insight

## 2021-06-17 NOTE — TELEPHONE ENCOUNTER
Telephone Encounter by Yolanda Simon RN at 8/7/2020  8:38 AM     Author: Yolanda Simon RN Service: -- Author Type: Registered Nurse    Filed: 8/7/2020  8:57 AM Encounter Date: 8/7/2020 Status: Signed    : Yolanda Simon RN (Registered Nurse)       Medication being requested: norco 5/325 mg  Last visit date: 7/7 Provider: [facility]  Next visit date: 09/08  Provider: [facility]YATTA  Expected follow up: 8 weeks  MTM visit ([facility] Center) date: no  UDT - 11/26/2019  CSA 12/2019   (Last fill date; name; strength; provider; MME; quantity):    Pertinent between visit information about requested medication (telephone, mychart, prior authorization, concerns, comments): 6/10-6/11-ED for fall  7/14-ED for dental pain and facial swelling  7/15-ED for fever  Appears additional hydrocodone was ordered possibly as a result of ED encounter, per the  patient did not fill this.  Script being sent to provider by nurse- dates and quantity:   Requested Prescriptions     Pending Prescriptions Disp Refills   ? HYDROcodone-acetaminophen (NORCO) 5-325 mg per tablet 84 tablet 0     Sig: Take 1 tablet by mouth 3 (three) times a day as needed for pain.     [facility]arm[facility] cued: [facility]  Standing orders for withdrawal protocol implemented: CARLOS ENRIQUE

## 2021-06-17 NOTE — TELEPHONE ENCOUNTER
Telephone Encounter by Yolanda Simon RN at 5/4/2020 11:59 AM     Author: Yolanda Simon RN Service: -- Author Type: Registered Nurse    Filed: 5/4/2020 12:07 PM Encounter Date: 5/4/2020 Status: Signed    : Yolanda Simon RN (Registered Nurse)       Medication being requested: pregabalin 150 mg( new RX remaining at pharmacy that has not been used), norco 5/325 mg  Last visit date:3/24   Provider: [facility]  Next visit date: 5/19  Provider: [facility]  Expected follow up: 8 weeks  MTM visit ([facility] Center) date: no  UDT - 11/26/2019   snipped in:    Pertinent between visit information about requested medication (telephone, mychart, prior authorization, concerns, comments):   Script being sent to provider by nurse- dates and quantity:   Requested Prescriptions     Pending Prescriptions Disp Refills   ? HYDROcodone-acetaminophen (NORCO) 5-325 mg per tablet 84 tablet 0     Sig: Take 1 tablet by mouth 3 (three) times a day as needed for pain.     Pharmacy cued: [facility]  Standing orders for withdrawal protocol implemented: NA

## 2021-06-18 NOTE — LETTER
"Letter by Abbi Weller CNP at      Author: Abbi Weller CNP Service: -- Author Type: --    Filed:  Encounter Date: 1/24/2019 Status: (Other)         Patient: Rohan Prince   MR Number: [id number]   YOB: 1975   Date of Visit: 1/24/2019     Code Status:  FULL CODE  Visit Type: Problem Visit     Facility:  [facility] [[id number]]         Facility Type: SNF (Skilled Nursing Facility, TCU)    History of Present Illness: Rohan Prince is a 43 y.o. female who I am seeing today for follow up on the TCU. Pt s/p Right TKA on 1/17/19 2/t DJD. She had no significant jeanine operative complications. She has hx of chronic pain in her back and joints. She takes Lyrica and Tramadol at home for this. Post operative complications include acute blood loss anemia. Hemoglobin dropped to the 10s. She did require transfusion. She continues on ASA for DVT prophylaxis. She is ambulating with rolling walker. She continues on tylenol and oxycodone for pain. She is also taking Naproxen two times a day. Today she is complaining of 10/10 pain to her right knee. She is requesting more pain medication. She is taking up to 15 mg of oxycodone Q 4 hours. She is somewhat drowsy on exam but is very chatty. She does have underlying anxiety and she tells me she use to take meds but no longer does. She has seen psych out patiently. According to the hospital notes \"she has mx complex issues from medical , to emotional, to relationships.\" She tells me today she does have some short term memory deficit due to her hx of Meningioma and CVA. She answers questions appropriately on exam and remembers my name at the end of the visit. Past medical hx includes cervical CA, chronic pain, DJD, Cervical stenosis, fibromyalgia, meningioma and TIA.     Today patient sitting up in bedside chair.  She is just returned from therapy.  She is ambulating well with a rolling walker.  I saw her earlier ambulate from her room down " to the vending machine and back.  She continues to milligrams every 4 hours as needed for pain.  She is using this around-the-clock.  She continues with a great deal of swelling in her right knee.  Recent venous Doppler was negative for DVT in her Baker's cyst.  Nurses noted redness last evening however no redness on exam today.  Dressing was changed by nursing last evening.  Today it was filled back incision line well approximated with.  No drainage on old dressing.  Patient afebrile.  She was complaining of recent UTI-like symptoms. UA looks positive.  She is reporting hot flashes and chills.  Chronic anxiety.  She was previously on Xanax and SSRI.  She has been seen by psych out patiently.  Multiple psychosocial issues.  She tells me she is having trouble sleeping and has a great deal of anxiety.  She is tearful at times on exam.  With increased anxiety patient was having some stuttering. She does have a history of this.  Otherwise neurologically intact.  No signs of oversedation on visit today.    Active Ambulatory Problems     Diagnosis Date Noted   ? Status post total knee replacement, right 01/17/2019   ? Chronic pain 01/21/2019   ? Fibromyalgia 01/21/2019   ? TIA (transient ischemic attack) 01/21/2019   ? DJD (degenerative joint disease) 01/21/2019   ? Meningioma (H) 01/21/2019   ? Cervical cancer (H) 01/21/2019     Resolved Ambulatory Problems     Diagnosis Date Noted   ? No Resolved Ambulatory Problems     Past Medical History:   Diagnosis Date   ? Cancer (H)    ? Chronic pain disorder    ? DJD (degenerative joint disease)    ? Endometriosis    ? Fibromyalgia    ? Kidney infection    ? Meningioma, recurrent of brain (H)    ? Migraine    ? TIA (transient ischemic attack)        Current Outpatient Medications   Medication Sig   ? ALPRAZolam (XANAX) 0.25 MG tablet Take 0.25 mg by mouth at bedtime as needed for sleep.   ? acetaminophen (TYLENOL) 500 MG tablet Take 2 tablets (1,000 mg total) by mouth 3 (three)  "times a day.   ? aspirin 325 MG EC tablet Take 1 tablet (325 mg total) by mouth 2 (two) times a day.   ? docusate sodium (COLACE) 100 MG capsule Take 1 capsule (100 mg total) by mouth 2 (two) times a day as needed.   ? hydrOXYzine pamoate (VISTARIL) 25 MG capsule Take 1-2 capsules (25-50 mg total) by mouth every 6 (six) hours as needed for anxiety or other (pain).   ? naproxen (NAPROSYN) 500 MG tablet Take 1 tablet (500 mg total) by mouth 2 (two) times a day with meals.   ? oxyCODONE (ROXICODONE) 5 MG immediate release tablet Take 1-3 tablets (5-15 mg total) by mouth every 4 (four) hours as needed for pain.   ? pregabalin (LYRICA) 150 MG capsule Take 1 capsule (150 mg total) by mouth 2 (two) times a day.   ? pregabalin (LYRICA) 75 MG capsule Take 150 mg by mouth 2 (two) times a day. x2 capsules             Allergies   Allergen Reactions   ? Chlorhexidine Rash         Review of Systems   No fevers.  Patient is reporting hot flashes and chills.  No headache, lightheadedness or dizziness. No SOB, chest pains or palpitations. Appetite is good. No nausea, vomiting, constipation or diarrhea.  Recently reported UTI-like symptoms.  She tells me that \"she has decreased sensation in the pelvic region so her symptoms are not the usual kind.\"  She complains 10/10 pain in her right knee. She reports pain behind the knee and pain with movement. She also reports anxiety. She tells me she was previously on anxiety med but quit taking it.  She is requesting anxiety medication today. She tells me \"she is unable to tolerate essential oils secondary to her visual deficit.  She tells me she is not sleeping secondary to anxiety.  She is very worried about her daughter's.  She also reports chronic pain. Chronic pain at home controlled with Lyrica Tramadol.        Physical Exam   PHYSICAL EXAMINATION:  Vital signs: /58, heart rate 83, respirations 18, temperature 97.7, O2 sat 97% on room air.  Current weight 195.8  pounds  General: " Awake, Alert, oriented x3, appropriately, follows simple commands, conversant  HEENT: Pink conjunctiva, anicteric sclerae, moist oral mucosa. Laxy eye on the left.  Left-sided facial droop. Tongue is midline.   NECK: Supple, without any lymphadenopathy, or masses. Scar to right side of neck.   CVS:  S1  S2, without murmur or gallop.   LUNG: Clear to auscultation, No wheezes, rales or rhonci.  BACK: No kyphosis of the thoracic spine  ABDOMEN: Soft, nontender to palpation, with positive bowel sounds  EXTREMITIES: Moves both upper and lower extremities with some limitation to RLE. Edema to R knee. No redness or warmth.  She is ambulating over 100 feet with a rolling walker.  SKIN: Warm and dry, no rashes or erythema noted.  Incision dry and intact with glue.  No drainage to the old bandage.  NEUROLOGIC: Intact, pulses palpable, Stuttering at  Times with increased anxiety/ and or excitement.   PSYCHIATRIC: Cognition intact. Anxiety. Very chatty. Flight of ideas.  Anxiety.  Tearful on exam.              Labs:    Recent Results (from the past 240 hour(s))   Creatinine   Result Value Ref Range    Creatinine 0.66 0.60 - 1.10 mg/dL    GFR MDRD Af Amer >60 >60 mL/min/1.73m2    GFR MDRD Non Af Amer >60 >60 mL/min/1.73m2   Hemoglobin - Daily x 2   Result Value Ref Range    Hemoglobin 10.4 (L) 12.0 - 16.0 g/dL   Potassium - Tomorrow AM   Result Value Ref Range    Potassium 3.4 (L) 3.5 - 5.0 mmol/L   Hemoglobin - Daily x 2   Result Value Ref Range    Hemoglobin 10.8 (L) 12.0 - 16.0 g/dL   Basic Metabolic Panel   Result Value Ref Range    Sodium 137 136 - 145 mmol/L    Potassium 4.3 3.5 - 5.0 mmol/L    Chloride 100 98 - 107 mmol/L    CO2 26 22 - 31 mmol/L    Anion Gap, Calculation 11 5 - 18 mmol/L    Glucose 81 70 - 125 mg/dL    Calcium 8.5 8.5 - 10.5 mg/dL    BUN 9 8 - 22 mg/dL    Creatinine 0.56 (L) 0.60 - 1.10 mg/dL    GFR MDRD Af Amer >60 >60 mL/min/1.73m2    GFR MDRD Non Af Amer >60 >60 mL/min/1.73m2   HM2(CBC w/o  Differential)   Result Value Ref Range    WBC 8.2 4.0 - 11.0 thou/uL    RBC 3.46 (L) 3.80 - 5.40 mill/uL    Hemoglobin 10.2 (L) 12.0 - 16.0 g/dL    Hematocrit 30.9 (L) 35.0 - 47.0 %    MCV 89 80 - 100 fL    MCH 29.5 27.0 - 34.0 pg    MCHC 33.0 32.0 - 36.0 g/dL    RDW 12.9 11.0 - 14.5 %    Platelets 353 140 - 440 thou/uL    MPV 10.7 8.5 - 12.5 fL   Urinalysis   Result Value Ref Range    Color, UA Yellow Colorless, Yellow, Straw, Light Yellow    Clarity, UA Cloudy (!) Clear    Glucose, UA Negative Negative    Bilirubin, UA Negative Negative    Ketones, UA Trace (!) Negative    Specific Gravity, UA 1.029 1.001 - 1.030    Blood, UA Trace (!) Negative    pH, UA 6.0 4.5 - 8.0    Protein, UA 30 mg/dL (!) Negative mg/dL    Urobilinogen, UA 2.0 E.U./dL <2.0 E.U./dL, 2.0 E.U./dL    Nitrite, UA Positive (!) Negative    Leukocytes, UA Large (!) Negative    Bacteria, UA Many (!) None Seen hpf    RBC, UA 3-5 (!) None Seen, 0-2 hpf    WBC, UA >100 (!) None Seen, 0-5 hpf    Squam Epithel, UA 5-10 (!) None Seen, 0-5 lpf    WBC Clumps Present (!) None Seen    Mucus, UA Many (!) None Seen lpf   Culture, Urine   Result Value Ref Range    Culture >100,000 col/ml Escherichia coli (!)    Basic Metabolic Panel   Result Value Ref Range    Sodium 139 136 - 145 mmol/L    Potassium 4.2 3.5 - 5.0 mmol/L    Chloride 100 98 - 107 mmol/L    CO2 29 22 - 31 mmol/L    Anion Gap, Calculation 10 5 - 18 mmol/L    Glucose 91 70 - 125 mg/dL    Calcium 9.4 8.5 - 10.5 mg/dL    BUN 11 8 - 22 mg/dL    Creatinine 0.64 0.60 - 1.10 mg/dL    GFR MDRD Af Amer >60 >60 mL/min/1.73m2    GFR MDRD Non Af Amer >60 >60 mL/min/1.73m2   BNP(B-type Natriuretic Peptide)   Result Value Ref Range    BNP 50 0 - 64 pg/mL   HM2(CBC w/o Differential)   Result Value Ref Range    WBC 7.6 4.0 - 11.0 thou/uL    RBC 3.52 (L) 3.80 - 5.40 mill/uL    Hemoglobin 10.3 (L) 12.0 - 16.0 g/dL    Hematocrit 32.5 (L) 35.0 - 47.0 %    MCV 92 80 - 100 fL    MCH 29.3 27.0 - 34.0 pg    MCHC 31.7 (L)  32.0 - 36.0 g/dL    RDW 13.2 11.0 - 14.5 %    Platelets 481 (H) 140 - 440 thou/uL    MPV 10.4 8.5 - 12.5 fL         Assessment/Plan:  1. Status post total right knee replacement     2. Chronic pain syndrome     3. Urinary tract infection without hematuria, site unspecified     4. Dysphagia, unspecified type     5. Fibromyalgia     6. Osteoarthritis of knee, unspecified laterality, unspecified osteoarthritis type     7. Anxiety       Patient status post right total knee arthroplasty secondary to DJD.  She continues on Tylenol and oxycodone.  She is moving quite well ambulating with a rolling walker without facial grimacing.  She does have some continued swelling in her right knee.  No signs or symptoms of infection.  No redness.  No warmth.  Incision intact with glue.  He currently has a Tubigrip up to just below the knee.  Will order thigh-high NAGI hose and/or Tubigrip up to the thigh.  Continue to elevate and ice.  Dysphagia.  She had a choking episode in the dining room yesterday.  She does have a history of TIA with some stuttering when very excited or anxious.  Speech therapy was ordered to eval and treat.  Chronic anxiety.  Previously on Xanax and SSRI.  She is having some continued anxiety which she feels as creating increased pain and insomnia.  I will give her briefly as needed Xanax 0.25 mg at at bedtime times 3 days.  I did talk at length with her about becoming oversedated.  She does not appear oversedated today.  UTI.  UA positive.  She is complaining of discomfort.  We will treat her with Keflex to cover possible infection of the knee due to swelling as well as UTI.      Electronically signed by: Abbi Weller, DUNG

## 2021-06-18 NOTE — PROGRESS NOTES
Neurosurgery consultation was requested by: ED  Pain: Lower back pain   Radicular Pain is present: Radiates into buttock and down posterior legs and stops above the knees  Lhermitte sign: NO  Motor complaints: Weakness in both legs   Sensory complaints: Numbness in buttock   Gait and balance issues: Pt is falling   Bowel or bladder issues: Denies   Duration of SX is: Back pain since a child, pain got worse when she fell down the stairs  The symptoms are worse with: Any position for to long   The symptoms are better with: Nothing   Injury: Fell down stairs a few weeks ago   Severity is: Mild - moderate  Patient has tried the following conservative measures: None   ROBERT score is:62%  KIRT Wilson

## 2021-06-18 NOTE — LETTER
"Letter by Abbi Weller CNP at      Author: Abbi Weller CNP Service: -- Author Type: --    Filed:  Encounter Date: 1/22/2019 Status: (Other)         Patient: Rohan Prince   MR Number: [id number]   YOB: 1975   Date of Visit: 1/22/2019     Code Status:  FULL CODE  Visit Type: Problem Visit     Facility:  [facility] [[id number]]         Facility Type: SNF (Skilled Nursing Facility, TCU)    History of Present Illness: Rohan Prince is a 43 y.o. female who I am seeing today for follow up on the TCU. Pt s/p Right TKA on 1/17/19 2/t DJD. She had no significant jeanine operative complications. She has hx of chronic pain in her back and joints. She takes Lyrica and Tramadol at home for this. Post operative complications include acute blood loss anemia. Hemoglobin dropped to the 10s. She did require transfusion. She continues on ASA for DVT prophylaxis. She is ambulating with rolling walker. She continues on tylenol and oxycodone for pain. She is also taking Naproxen two times a day. Today she is complaining of 10/10 pain to her right knee. She is requesting more pain medication. She is taking up to 15 mg of oxycodone Q 4 hours. She is somewhat drowsy on exam but is very chatty. She does have underlying anxiety and she tells me she use to take meds but no longer does. She has seen psych out patiently. According to the hospital notes \"she has mx complex issues from medical , to emotional, to relationships.\" She tells me today she does have some short term memory deficit due to her hx of Meningioma and CVA. She answers questions appropriately on exam and remembers my name at the end of the visit. Past medical hx includes cervical CA, chronic pain, DJD, Cervical stenosis, fibromyalgia, meningioma and TIA.     Today patient sitting up in bedside chair.  Alert and oriented x3.  Appears somewhat sleepy.  Occasionally eyes noted to roll back in her head as if to be nodding off.  " However arousable.  She continues today to request more pain medication.  At this point I would avoid increase in pain medication secondary to this.  She is moving quite well ambulating with walker.  Continues with swelling right knee.  No redness..  Afebrile.  Recent white count within normal limits.  Doppler was obtained which was negative for DVT cyst.  I did advise patient to avoid foods with high sodium she continues with anxiety.  She is requesting Xanax.  She does have a long-standing history of psychosocial issues.  However due to sedating effects will avoid.      Active Ambulatory Problems     Diagnosis Date Noted   ? Status post total knee replacement, right 01/17/2019   ? Chronic pain 01/21/2019   ? Fibromyalgia 01/21/2019   ? TIA (transient ischemic attack) 01/21/2019   ? DJD (degenerative joint disease) 01/21/2019   ? Meningioma (H) 01/21/2019   ? Cervical cancer (H) 01/21/2019     Resolved Ambulatory Problems     Diagnosis Date Noted   ? No Resolved Ambulatory Problems     Past Medical History:   Diagnosis Date   ? Cancer (H)    ? Chronic pain disorder    ? DJD (degenerative joint disease)    ? Endometriosis    ? Fibromyalgia    ? Kidney infection    ? Meningioma, recurrent of brain (H)    ? Migraine    ? TIA (transient ischemic attack)        Current Outpatient Medications   Medication Sig   ? acetaminophen (TYLENOL) 500 MG tablet Take 2 tablets (1,000 mg total) by mouth 3 (three) times a day.   ? aspirin 325 MG EC tablet Take 1 tablet (325 mg total) by mouth 2 (two) times a day.   ? docusate sodium (COLACE) 100 MG capsule Take 1 capsule (100 mg total) by mouth 2 (two) times a day as needed.   ? hydrOXYzine pamoate (VISTARIL) 25 MG capsule Take 1-2 capsules (25-50 mg total) by mouth every 6 (six) hours as needed for anxiety or other (pain).   ? naproxen (NAPROSYN) 500 MG tablet Take 1 tablet (500 mg total) by mouth 2 (two) times a day with meals.   ? oxyCODONE (ROXICODONE) 5 MG immediate release  "tablet Take 1-3 tablets (5-15 mg total) by mouth every 4 (four) hours as needed for pain.   ? pregabalin (LYRICA) 150 MG capsule Take 1 capsule (150 mg total) by mouth 2 (two) times a day.   ? pregabalin (LYRICA) 75 MG capsule Take 150 mg by mouth 2 (two) times a day. x2 capsules             Allergies   Allergen Reactions   ? Chlorhexidine Rash         Review of Systems   No fevers or chills. No headache, lightheadedness or dizziness. No SOB, chest pains or palpitations. Appetite is good. No nausea, vomiting, constipation or diarrhea. She thinks she may have a UTI.  However denies dysuria, frequency, burning or pain with urination.  She tells me that \"she has decreased sensation in the pelvic region so her symptoms are not the usual kind.\"  She complains 10/10 pain in her right knee. She reports pain behind the knee and pain with movement. She also reports anxiety. She tells me she was previously on anxiety med but quit taking it.  She is requesting anxiety medication today. She tells me \"she is unable to tolerate essential oils secondary to her visual deficit.  She feels she is supersensitive to smells\"  She wears glasses.  She also reports chronic pain. Chronic pain at home controlled with Lyrica Tramadol.        Physical Exam   PHYSICAL EXAMINATION:  Vital signs: /65, heart rate 79, respirations 18, temperature 97.9, O2 sat 99% on room air.  Current weight 197.2 pounds  General: Awake, Alert, oriented x3, appropriately, follows simple commands, conversant  HEENT:PERRLA, Drowsy, Pink conjunctiva, anicteric sclerae, moist oral mucosa.  Occasionally her eyes will roll back in her head and she appears to drift off but arouses quite quickly  NECK: Supple, without any lymphadenopathy, or masses. Scar to right side of neck.   CVS:  S1  S2, without murmur or gallop.   LUNG: Clear to auscultation, No wheezes, rales or rhonci.  BACK: No kyphosis of the thoracic spine  ABDOMEN: Soft, nontender to palpation, with " positive bowel sounds  EXTREMITIES: Moves both upper and lower extremities with some limitation to RLE. Edema to R knee. No redness or warmth. She is able to raise her leg from seated position in recliner.   SKIN: Warm and dry, no rashes or erythema noted. Tegaderm dressing in place to Right knee.   NEUROLOGIC: Intact, pulses palpable  PSYCHIATRIC: Cognition intact. Anxiety. Very chatty. Flight of ideas.             Labs:    Recent Results (from the past 240 hour(s))   Creatinine   Result Value Ref Range    Creatinine 0.66 0.60 - 1.10 mg/dL    GFR MDRD Af Amer >60 >60 mL/min/1.73m2    GFR MDRD Non Af Amer >60 >60 mL/min/1.73m2   Hemoglobin - Daily x 2   Result Value Ref Range    Hemoglobin 10.4 (L) 12.0 - 16.0 g/dL   Potassium - Tomorrow AM   Result Value Ref Range    Potassium 3.4 (L) 3.5 - 5.0 mmol/L   Hemoglobin - Daily x 2   Result Value Ref Range    Hemoglobin 10.8 (L) 12.0 - 16.0 g/dL   Basic Metabolic Panel   Result Value Ref Range    Sodium 137 136 - 145 mmol/L    Potassium 4.3 3.5 - 5.0 mmol/L    Chloride 100 98 - 107 mmol/L    CO2 26 22 - 31 mmol/L    Anion Gap, Calculation 11 5 - 18 mmol/L    Glucose 81 70 - 125 mg/dL    Calcium 8.5 8.5 - 10.5 mg/dL    BUN 9 8 - 22 mg/dL    Creatinine 0.56 (L) 0.60 - 1.10 mg/dL    GFR MDRD Af Amer >60 >60 mL/min/1.73m2    GFR MDRD Non Af Amer >60 >60 mL/min/1.73m2   HM2(CBC w/o Differential)   Result Value Ref Range    WBC 8.2 4.0 - 11.0 thou/uL    RBC 3.46 (L) 3.80 - 5.40 mill/uL    Hemoglobin 10.2 (L) 12.0 - 16.0 g/dL    Hematocrit 30.9 (L) 35.0 - 47.0 %    MCV 89 80 - 100 fL    MCH 29.5 27.0 - 34.0 pg    MCHC 33.0 32.0 - 36.0 g/dL    RDW 12.9 11.0 - 14.5 %    Platelets 353 140 - 440 thou/uL    MPV 10.7 8.5 - 12.5 fL         Assessment/Plan:  1. Status post total right knee replacement     2. Chronic pain syndrome     3. Fibromyalgia     4. Osteoarthritis of knee, unspecified laterality, unspecified osteoarthritis type     5. Dysuria       Patient status post right  "total knee arthroplasty secondary to DJD.  She continues on Tylenol and oxycodone.  Today she is requesting more pain medication however appears somewhat oversedated. She is moving quite well ambulating with rolling walker. Swelling continues in the right knee. Ice and elevation encouraged. Recent VS doppler negative for DVT and or bakers cyst. No redness or warmth. Recent laboratory unremarkable. Chronic pain syndrome with fibromyalgia. Anxiety.Today she is requesting xanax. Will refrain from this due to oversedation. Unable to tolerate essential oils. Relaxation encouraged. Dysuria. I will obtain UA/UC.   Pt  present after visit. Above plan of care reviewed with . He states his wife can be \"very manipulative.\"         Electronically signed by: Abbi Weller CNP            "

## 2021-06-18 NOTE — PROGRESS NOTES
ASSESSMENT: Rohan Prince is a 42 y.o. female with past medical history significant for fibromyalgia, meningioma, TIA, anxiety, cervical cancer, ulcers who presents today for new patient evaluation of several areas of pain.  1.  Acute on chronic right greater than left low back pain with radiation into the right greater than left lower extremity.  Pain worsened after a slip and fall down the stairs.  CT lumbar spine showed severe bilateral L5-S1 foraminal stenosis related to severe disc degeneration with some right lateral recess stenosis.  No fracture.  There is also moderate facet arthropathy at this level.  On exam, patient seems most symptomatic from facet arthropathy, although there may be a radicular component.  Patient states that she has had medial branch blocks at an outside facility which made her pain worse.  We do not have those records.  2.  Chronic right neck pain.  Patient has history of anterior cervical discectomy and fusion C3 through C7 February 2016.  She has had burning right neck pain ever since her surgery.  This may be related to degenerative changes adjacent to her fusion versus myofascial pain.  3.  Chronic  paresthesias bilateral hands and feet, uncertain etiology.  Hand paresthesias may be related to carpal tunnel syndrome.  Patient did have a positive Tinel sign and Phalen sign of the right wrist.  Lower extremity paresthesias, less clear.  Patient states that she was told that she would have foot surgery years ago but never followed through.  Numbness and tingling is in a stocking distribution.  She may have peripheral neuropathy.  4.  Patient has fibromyalgia.  I believe the patient is likely most symptomatic from her fibromyalgia.    NDI: 78  ROBERT: 78  Who 5: 5    PLAN:  A shared decision making model was used.  The patient's values and choices were respected.  The following represents what was discussed and decided upon by the physician assistant and the patient.      1.  DIAGNOSTIC  TESTS: I reviewed the CT scans of the cervical, thoracic, and lumbar spine.  No further diagnostic tests were ordered.  For further evaluation the paresthesias, she may need EMG of the bilateral upper and lower extremities.    2.  PHYSICAL THERAPY: I recommended that the patient begin pool therapy.  She states that she cannot do pool therapy.  She states that she does not have anyone to watch her 5-year-old daughter.  He states that she did participate in some physical therapy about a year ago and [place], which included pool therapy.  They were working primarily on her lower back and knee.    3.  MEDICATIONS: No changes are made to the patient's medications.  She is using Lyrica 75 mg twice daily.  This is managed by a provider at [facility].  She is scheduled to see this provider next week.  I did suggest that she discuss with him possibly increasing this dose.  Patient also uses Tylenol as needed and ibuprofen as needed.  Patient tried diclofenac which was not helpful.  When she was taking methocarbamol she had significant side effects.  -Patient states that she has used Vicodin in the past and this was helpful.  I told the patient I would not recommend using opiates for her chronic pain.  She was understanding to this.    4.  INTERVENTIONS: No interventions were ordered.  Patient states that she has had multiple injections in the past and they have not helped.  Recent medial branch blocks made her pain worse.  She is not interested in pursuing injections at this time.    5.  PATIENT EDUCATION: Patient is in agreement with the above plan.  All questions were answered.    6.  FOLLOW-UP:   I offered to follow-up with the patient after I get the records from the outside facility where she had her interventional pain management procedures performed to see if there may be something else that I had to offer her.  Patient declined.  She states that she has a nonsurgical spine provider at [facility]  and she would prefer to keep working with them.  I did tell her if she has any questions or concerns, I am happy to see her back anytime.        SUBJECTIVE:  Rohan Prince  Is a 42 y.o. female who presents today in consultation at the request of Dr. Santamaria for new patient evaluation of neck pain, low back pain radiating into the right greater than left lower extremity, and numbness and tingling in both hands and feet.  Patient states that all of her pain is chronic, but it was aggravated after a fall downstairs over [employer].  Patient states that she missed a step at her friend's house and fell down 6 steps, landing on her bottom.    Patient complaint first of neck pain.  Patient has a history of an anterior cervical discectomy and fusion C3 through C7 in February 2016.  This was done in [place].  Patient states that surgery was performed because she was told that if she did not have surgery she would end up paralyzed.  Patient states that after surgery she developed burning pain in the right neck.  She did physical therapy after surgery, but the burning pain persisted.  Burning pain is located in the right neck.  It spans from the base of the skull into the upper trapezius muscle.  She denies any pain radiating further down the arms.  Neck pain is aggravated with long cervical flexion.  It is alleviated with massage.  She has headaches associated with the neck pain.  She states that she feels off balance.    Patient has numbness and tingling in both hands affecting all 5 fingers.  This is been present for at least 2 years.  Patient states that right side is worse than left.  Patient states that she drops items in the right hand.  Numbness is aggravated with grasping objects and doing housework.    Patient also complains of low back pain.  Pain is worse on the right than the left.  Pain spans across low back at the lumbosacral junction.  Pain radiates up both sides of the spine toward the thoracolumbar junction.  She  also has pain which radiates down bilateral buttocks and down bilateral posterior thighs and extending just past the knee.  Right leg is worse than left.  Patient states that pain is 80% back pain and 20% leg pain.  She describes the pain as stabbing and pounding.  It is aggravated with bending, vacuuming, trying to sleep at night.  She denies any alleviating factors for the pain.  Patient states that she has chronic numbness and tingling in both feet.  This is been present for years and is stable.  Denies any new numbness or tingling.  Patient states that she was told by a foot doctor that she needed surgery but did not follow through with this.  Patient states that her knees feel very weak.  She states that she has been told that she needs right knee replacement surgery, but her left knee is also painful.  She says that sometimes her knees give out from under her.  Patient denies any loss of bowel or bladder control.  She denies any recent fevers, chills, sweats.    Patient has had multiple rounds of physical therapy.  She did physical therapy for her neck following her surgery 2016.  She did physical therapy for her lower back and knees and [place] in 2017.  She states that she went to 4 sessions.  She is doing home exercises.  She has tried chiropractic manipulation multiple times and it has not been helpful.  As mentioned above, she had a cervical fusion.  She has not had low back surgery.  Patient states that she has had multiple injections for her low back pain.  None of them have been helpful.  She recently tried medial branch blocks and these made her pain worse.  She was told that they made her pain worse because she has fibromyalgia.  She has not had any injections for her neck.  Patient uses Lyrica 75 mg twice daily.  She alternates Tylenol and ibuprofen.  She typically takes Tylenol 3 tabs at a time.  Uses ibuprofen 600 800 mg.  She has tried diclofenac which was not helpful.  She tried methocarbamol  but was too sedated that she cannot function.    Current Outpatient Prescriptions   Medication Sig Dispense Refill     pregabalin (LYRICA) 75 MG capsule Take 75 mg by mouth.         Allergies   Allergen Reactions     Chlorhexidine Rash       Past Medical History:   Diagnosis Date     Endometriosis      Kidney infection      TIA (transient ischemic attack)    Anxiety  Cervical cancer  Osteoporosis  Ulcers  Meningioma  Fibromyalgia    Past Surgical History:   Procedure Laterality Date     CERVICAL FUSION      2016 C3-C7      KNEE SURGERY       partial masetectomy       RIGHT OOPHORECTOMY         Family History   Problem Relation Age of Onset     Cancer Mother      Stroke Mother      Other Mother      swelling     Social history: The patient is single.  She has 2 children age 14 and 5.  She is on disability.  She denies tobacco, alcohol, or illicit drug use.    ROS: Positive for changes in vision, swelling of feet, poor sleep quality, back pain, joint pain, leg pain, headache.  Specifically negative for dysphagia, imbalance, fine motor skill difficulties, bowel/bladder dysfunction, fevers,chills, appetite changes, unexplained weight loss.   Otherwise 13 systems reviewed are negative.  Please see the patient's intake questionnaire from today for details.      OBJECTIVE:  PHYSICAL EXAMINATION:  CONSTITUTIONAL:  Vital signs as above.  Patient is very exaggerated pain response during entire testing.  Patient grimaces and moans with even light palpation over her body.  The patient is well nourished and well groomed.  PSYCHIATRIC:  The patient is awake, alert, oriented to person, place, time and answering questions appropriately with clear speech.    HEENT:  Normocephalic, atraumatic.  Sclera clear.    SKIN:  Skin over the face, bilateral upper extremities, and neck is clean, dry, intact without rashes.  LYMPH NODES:  No palpable or tender anterior/posterior cervical, submandibular, or supraclavicular lymph nodes.    MUSCLE  STRENGTH:  5/5 strength for the bilateral shoulder abductors, elbow flexors/extensors, wrist extensors, finger flexors/abductors, hip flexors, knee flexors/extensors, ankle dorsi/plantar flexors..  NEURO:  CN III-XII are grossly intact.  1-2+ symmetric biceps, brachioradialis, triceps reflexes bilaterally.  1+ bilateral patellar and Achilles reflexes.  Sensation to light touch is diminished over both hands, right greater than left in all 5 fingers and over both feet in a stocking distribution.  Negative Goldberg's bilaterally.  No ankle clonus.  Negative Babinski's.  VASCULAR:  2/4 radial pulses bilaterally.  Warm upper limbs bilaterally.  Capillary refill in the upper extremities is less than 1 second.  MUSCULOSKELETAL: Patient ambulates with an antalgic gait.  Lumbar flexion and extension will severely restricted.  Lumbar facet loading maneuvers reproduce pain.  Able to rise onto toes and heels bilaterally with support, but patient reports increased pain.  Patient has diffuse tenderness to minimal palpation over the entire cervical and lumbar region.  Cervical range of motion is severely restricted in all directions due to pain.  Straight leg raise increases pain bilaterally.  Positive Micky's test.  Positive thigh thrust.  Positive Micky's test bilaterally.    RESULTS:    [facility]  CT LUMBAR SPINE WO CONTRAST  5/29/2018 8:16 PM     INDICATION: Pain.  TECHNIQUE: Helical thin-section CT scan of the spine was performed using bone reconstruction algorithm. From the axial source data, sagittal and coronal thin reformats. Dose reduction techniques were used.   IV CONTRAST: None.  COMPARISON: MRI lumbar spine 01/30/2018.     FINDINGS: 5 lumbar-type vertebral bodies. Slight retrolisthesis L5 on S1 is stable. Marked degenerative disc disease L5-S1 with vacuum disc phenomenon. No acute fracture. No high-grade canal narrowing. Right paracentral disc osteophyte complex L5-S1   could impinge on the right S1  nerve and may be progressive in the interval. Moderate L5-S1 facet arthropathy. Small disc osteophyte complexes in each L5-S1 foramen results in marked bilateral foraminal narrowing. Visualized soft tissues are grossly   normal.     IMPRESSION:   CONCLUSION:  1.  No acute fracture or high-grade canal narrowing.     2.  Marked bilateral L5-S1 foraminal narrowing.     3.  Right lateral recess narrowing L5-S1 may have progressed since the previous MRI, could impinge on the right S1 nerve.     4.  Marked L5-S1 degenerative disc disease.  [facility]  CT THORACIC SPINE WO CONTRAST  5/29/2018 8:05 PM     INDICATION: Pain.   TECHNIQUE: Helical thin-section CT scan of the spine was performed using bone reconstruction algorithm. From the axial source data, sagittal and coronal thin reformats. Dose reduction techniques were used.   IV CONTRAST: None.  COMPARISON: None.     FINDINGS: No acute thoracic spine fracture or posttraumatic subluxation. Ankylosis of the T6-T7 spinous processes. Mild to moderate multilevel degenerative disc disease. Visualized lungs are clear. Visualized soft tissues are grossly normal.     IMPRESSION:   CONCLUSION:  1.  No acute thoracic spine fracture.     2.  Mild to moderate thoracic spondylosis.    [facility]  CT CERVICAL SPINE WO CONTRAST  5/29/2018 8:04 PM     INDICATION: Pain   TECHNIQUE: Helical thin-section CT scan of the spine was performed using bone reconstruction algorithm. From the axial source data, sagittal and coronal thin reformats. Dose reduction techniques were used.   IV CONTRAST: None.  COMPARISON: CT cervical spine 12/10/2016     FINDINGS: Straightening of the normal cervical lordosis. Alignment is otherwise normal. ACDF C3-C7 again noted. Persistent lucency across the interspace at C3-C4 and C6-C7. Progressive now solid fusion at C4-C5 and C5-C6. Hardware is intact. No evidence   of osseous fracture. Congenitally narrow canal with moderate narrowing of the  spinal canal at C6-C7, moderate to marked changes at C5-C6, and mild to moderate changes at C3-C4 similar to the prior study. Moderate bilateral C6-C7 degenerative foraminal   narrowing. Small bilateral thyroid nodules. The visualized lung apices are clear. The visualized soft tissues of the neck are otherwise unremarkable.     IMPRESSION:   CONCLUSION:  1.  No acute cervical spine fracture or posttraumatic subluxation.  2.  C3-C7 ACDF. No evidence of solid fusion across the interspace at C3-C4 or C6-C7. Solid fusion is seen at C4-C5 and C5-C6.  3.  Congenitally narrow canal contributes to multilevel canal narrowing similar to the prior study

## 2021-06-18 NOTE — LETTER
Letter by Lois Oscar MD at      Author: Lois Oscar MD Service: -- Author Type: --    Filed:  Encounter Date: 1/23/2019 Status: (Other)         Patient: Rohan Prince   MR Number: [id number]   YOB: 1975   Date of Visit: 1/23/2019                               [facility]        Visit Type: H & P (Right  TKA)    Code Status:  FULL CODE  Facility:  [facility] [[id number]]          PCP: Rosenstein, Benjamin E, MD       PHONE: [phone]     FAX:[phone]        ASSESSMENT/PLAN:  1. Status post total right knee replacement   will follow up with [facility] on 2/6/19.  Patient has very swollen right knee but there is no discharge on surgical site although there is some warmth and mild erythema suspicious for beginning infection.  Patient placed ice compress on her surgical site just prior to my examination which could also be the cause of some of her erythema.  Will monitor closely and if swelling/erythema/warmth continue, we will treat with antibiotics and inform orthopedics.  Will change APAP to 1000 mg every 8 hours scheduled.  Will discontinue naproxen since the patient states that this is not effective for her.  Will give hydroxyzine HCL 25 mg 1 capsule every 6 hours as needed.  Aspirin both for inflammation and DVT prophylaxis will be given at least 25 mg twice daily for 30 days or until seen by [facility] for further recommendations.  Continue PT/OT   2. Chronic pain syndrome   as above, very long discussion was done with the patient regarding her use of high-dose oxycodone and discussed other treatment options.  She did agree that once her pain from knee surgery has resoLved, that she is willing to taper her oxycodone use   3. Dysuria   positive UA, await urine cultures.  Addendum on 1/25-urine culture shows E. coli sensitive to levofloxacin/Bactrim and resistant to Keflex.  Will discontinue Keflex and start  Levofloxacin or Bactrim.  Discussed with TCU, Bactrim already ordered for today.   4. Acute blood loss anemia   hemoglobin is at 10.2, stable   5. Drug-induced constipation   long discussion with the patient, will change docusate sodium to 1 tablet daily and 1 tablet daily as needed, hold for loose stools   6.      Dysphagia, history of TIA-speech therapy now involved  7.      Mild hypokalemia-resolved, potassium is now at 4.3      HISTORY OF PRESENT ILLNESS:   Rohan Prince is a 43 y.o. female with a history of TIA, meningioma, chronic pain disorder, DJD, fibromyalgia and chronic anxiety who underwent right TKA on 1/17/19.  Postoperatively, the patient had acute blood loss anemia for which she was transfused PRBC.  She also had mild hypokalemia with a potassium of 3.4.  She also continued to have acute pain for which she is now on high doses of narcotics which occasionally relieved her pain.  On discharge she was anticoagulated with aspirin and continued on narcotics for pain control.    The patient continues to have acute pain on her right knee but occasionally she states this is generalized.  She is now on oxycodone 15 mg every 4 hours RN and 10 mg twice daily.  According to the nurses she takes approximately 15 mg oxycodone 4 times a day as needed.  Despite this big doses of narcotics, she still continues to complain of pain.  Other pain medications include aspirin 325 mg twice daily, naproxen 50 mg twice daily which does not help her pain, hydroxyzine and APAP.  She is also on Lyrica for her fibromyalgia and chronic pain.  She also has been complaining of dysuria and her UA did show positive nitrates with large leukocytes and WBC greater than 100.  Urine cultures are pending.  She denies any fevers or chills, chest pains or shortness of breath.  She states that her appetite has been good and that she does not have any stomach upset from her aspirin and naproxen.  She said also that she is always constipated and  that is sometimes normal for her even at home to have no BMs for a few weeks.  She does not complain of any weakness, dizziness or lightheadedness.  She did have one episode of dysphasia with a choking sensation at breakfast this morning and speech therapy has been consulted.  She does have also chronic anxiety but she does not want to take any SSRIs which caused some side effects.    Other review of systems are negative.      PAST MEDICAL/SURGICAL HISTORY:  Past Medical History:   Diagnosis Date   ? Cancer (H)     cervical cancer   ? Chronic pain disorder    ? DJD (degenerative joint disease)    ? Endometriosis    ? Fibromyalgia    ? Kidney infection    ? Meningioma, recurrent of brain (H)    ? Migraine    ? TIA (transient ischemic attack)      Past Surgical History:   Procedure Laterality Date   ? CERVICAL FUSION      2016 C3-C7    ? HYSTERECTOMY     ? KNEE SURGERY     ? MASTECTOMY Right     partial   ? partial masetectomy     ? [place] TOTAL KNEE ARTHROPLASTY Right 1/17/2019    Procedure: RIGHT TOTAL KNEE ARTHROPLASTY;  Surgeon: Pedro Moss DO;  [facility];  Service: Orthopedics   ? RIGHT OOPHORECTOMY         SOCIAL HISTORY:  Social History     Socioeconomic History   ? Marital status: Single     Spouse name: Not on file   ? Number of children: Not on file   ? Years of education: Not on file   ? Highest education level: Not on file   Social Needs   ? Financial resource strain: Not on file   ? Food insecurity - worry: Not on file   ? Food insecurity - inability: Not on file   ? Transportation needs - medical: Not on file   ? Transportation needs - non-medical: Not on file   Occupational History   ? Not on file   Tobacco Use   ? Smoking status: Never Smoker   ? Smokeless tobacco: Never Used   Substance and Sexual Activity   ? Alcohol use: No   ? Drug use: No   ? Sexual activity: Not on file   Other Topics Concern   ? Not on file   Social History Narrative   ? Not on file       FAMILY  HISTORY:  Family History   Problem Relation Age of Onset   ? Cancer Mother    ? Stroke Mother    ? Other Mother         swelling       MEDICATIONS:  Current Outpatient Medications on File Prior to Visit   Medication Sig   ? acetaminophen (TYLENOL) 500 MG tablet Take 2 tablets (1,000 mg total) by mouth 3 (three) times a day.   ? aspirin 325 MG EC tablet Take 1 tablet (325 mg total) by mouth 2 (two) times a day times 30 days or until seen by [facility]   ? docusate sodium (COLACE) 100 MG capsule Take 1 capsule (100 mg total) by mouth daily and 1 times a day as needed.   ? hydrOXYzine pamoate (VISTARIL) 25 MG capsule Take 1 capsules (25 mg total) by mouth every 6 (six) hours as needed for anxiety or other (pain).   ? oxyCODONE (ROXICODONE) 5 MG immediate release tablet Take 1-3 tablets (5-15 mg total) by mouth every 4 (four) hours as needed for pain.   ? pregabalin (LYRICA) 150 MG capsule Take 1 capsule (150 mg total) by mouth 2 (two) times a day.   ? pregabalin (LYRICA) 75 MG capsule Take 150 mg by mouth 2 (two) times a day. x2 capsules          Bactrim DS two times a day x 7 days  Oxycodone 10 mg twice daily    ALLERGIES:  Allergies   Allergen Reactions   ? Chlorhexidine Rash         PHYSICAL EXAMINATION:  Vital signs 97% room air, 112/70, 16, 87, 97.9  General: Awake, Alert, oriented x3, not in any form of acute distress, answers questions appropriately, follows simple commands, conversant, occasional slurred speech, seems sleepy  HEENT: Pink conjunctiva, anicteric sclerae, oral mucosa is moist  NECK: Supple, without any lymphadenopathy, thyromegaly or any masses  LUNG: Clear to auscultation, good chest expansion. There are no crackles, no wheezes, normal AP diameter  BACK: No kyphosis of the thoracic spine  CVS: There is good S1  S2, there are no murmurs, no heaves, rhythm is regular  ABDOMEN: Globular and soft, nontender to palpation, no organomegaly, good bowel sounds  EXTREMITIES: Good range of motion on  both upper and lower extremities except on right lower extremity with decreased range of motion on right knee, right knee swollen with mild erythema and warmth but surgical site is clean without any discharge, mild tenderness to palpation, +1 right pedal edema, no cyanosis or clubbing, no calf tenderness  SKIN: Warm and dry, no rashes or erythema noted    LABS:  Lab Results   Component Value Date    WBC 7.6 01/24/2019    HGB 10.3 (L) 01/24/2019    HCT 32.5 (L) 01/24/2019    MCV 92 01/24/2019     (H) 01/24/2019     Lab Results   Component Value Date    CREATININE 0.64 01/24/2019    BUN 11 01/24/2019     01/24/2019    K 4.2 01/24/2019     01/24/2019    CO2 29 01/24/2019           >45 minutes of total time spent, greater than 55% of the time spent in coordination of care and counseling regarding the above medical issues and plan of care. I have reviewed the patient's medical records, labs and medications.       Electronically signed by:Lois Oscar MD

## 2021-06-18 NOTE — PROGRESS NOTES
Neurosurgery consultation was requested by: ED  Pain: Lower back pain   Radicular Pain is present: Radiates into buttock and down posterior legs and stops above the knees  Lhermitte sign: NO  Motor complaints: Weakness in both legs   Sensory complaints: Numbness in buttock   Gait and balance issues: Pt is falling   Bowel or bladder issues: Denies   Duration of SX is: Back pain since a child, pain got worse when she fell down the stairs, landed on her buttocks.  The symptoms are worse with: Any position for to long   The symptoms are better with: Nothing   Injury: Fell down stairs a few weeks ago   Severity is: Mild - moderate  Patient has tried the following conservative measures: None   ROBERT score is:62%    She also has 2 meningiomas, right frontal and left middle fossa.  Her mother had meningiomas.  It appears that the right frontal one has changed in size.  She is scheduled for an MR brain tomorrow.  I have asked that she return to see me once this is completed.  Made referral to the spine center for spine care conservative management of her low back injury.  Total time spent in review of information, coordination of care, documentation and face-to-face discussion was 20 minutes.    Shannan Santamaria MD, FACS, FAANS

## 2021-06-18 NOTE — LETTER
"Letter by Abbi Weller CNP at      Author: Abbi Weller CNP Service: -- Author Type: --    Filed:  Encounter Date: 1/21/2019 Status: (Other)         Patient: Rohan Prince   MR Number: [id number]   YOB: 1975   Date of Visit: 1/21/2019     Code Status:  FULL CODE  Visit Type: Problem Visit     Facility:  [facility] [[id number]]         Facility Type: SNF (Skilled Nursing Facility, TCU)    History of Present Illness: Rohan Prince is a 43 y.o. female who I am seeing today for follow up on the TCU. Pt s/p Right TKA on 1/17/19 2/t DJD. She had no significant jeanine operative complications. She has hx of chronic pain in her back and joints. She takes Lyrica and Tramadol at home for this. Post operative complications include acute blood loss anemia. Hemoglobin dropped to the 10s. She did require transfusion. She continues on ASA for DVT prophylaxis. She is ambulating with rolling walker. She continues on tylenol and oxycodone for pain. She is also taking Naproxen two times a day. Today she is complaining of 10/10 pain to her right knee. She is requesting more pain medication. She is taking up to 15 mg of oxycodone Q 4 hours. She is somewhat drowsy on exam but is very chatty. She does have underlying anxiety and she tells me she use to take meds but no longer does. She has seen psych out patiently. According to the hospital notes \"she has mx complex issues from medical , to emotional, to relationships.\" She tells me today she does have some short term memory deficit due to her hx of Meningioma and CVA. She answers questions appropriately on exam and remembers my name at the end of the visit. Past medical hx includes cervical CA, chronic pain, DJD, Cervical stenosis, fibromyalgia, meningioma and TIA.       Active Ambulatory Problems     Diagnosis Date Noted   ? Status post total knee replacement, right 01/17/2019     Resolved Ambulatory Problems     Diagnosis Date Noted   ? " No Resolved Ambulatory Problems     Past Medical History:   Diagnosis Date   ? Cancer (H)    ? Chronic pain disorder    ? DJD (degenerative joint disease)    ? Endometriosis    ? Fibromyalgia    ? Kidney infection    ? Meningioma, recurrent of brain (H)    ? Migraine    ? TIA (transient ischemic attack)        Current Outpatient Medications   Medication Sig   ? acetaminophen (TYLENOL) 500 MG tablet Take 2 tablets (1,000 mg total) by mouth 3 (three) times a day.   ? aspirin 325 MG EC tablet Take 1 tablet (325 mg total) by mouth 2 (two) times a day.   ? docusate sodium (COLACE) 100 MG capsule Take 1 capsule (100 mg total) by mouth 2 (two) times a day as needed.   ? hydrOXYzine pamoate (VISTARIL) 25 MG capsule Take 1-2 capsules (25-50 mg total) by mouth every 6 (six) hours as needed for anxiety or other (pain).   ? naproxen (NAPROSYN) 500 MG tablet Take 1 tablet (500 mg total) by mouth 2 (two) times a day with meals.   ? oxyCODONE (ROXICODONE) 5 MG immediate release tablet Take 1-3 tablets (5-15 mg total) by mouth every 4 (four) hours as needed for pain.   ? pregabalin (LYRICA) 150 MG capsule Take 1 capsule (150 mg total) by mouth 2 (two) times a day.   ? pregabalin (LYRICA) 75 MG capsule Take 150 mg by mouth 2 (two) times a day. x2 capsules             Allergies   Allergen Reactions   ? Chlorhexidine Rash         Review of Systems   No fevers or chills. No headache, lightheadedness or dizziness. No SOB, chest pains or palpitations. Appetite is good. No nausea, vomiting, constipation or diarrhea. No dysuria, frequency, burning or pain with urination. She complains 10/10 pain in her right knee. She reports pain behind the knee and pain with movement. She also reports anxiety. She tells me she was previously on anxiety med but quit taking it. She also reports chronic pain. Chronic pain at home controlled with Lyrica Tramadol.       Physical Exam   PHYSICAL EXAMINATION:  Vital signs: 118/74, heart rate 84, respirations  16, O2 sat 99% on room air.  Temperature 97.1.    General: Awake, Alert, oriented x3, appropriately, follows simple commands, conversant  HEENT:PERRLA, Drowsy, Pink conjunctiva, anicteric sclerae, moist oral mucosa  NECK: Supple, without any lymphadenopathy, or masses. Scar to right side of neck.   CVS:  S1  S2, without murmur or gallop.   LUNG: Clear to auscultation, No wheezes, rales or rhonci.  BACK: No kyphosis of the thoracic spine  ABDOMEN: Soft, nontender to palpation, with positive bowel sounds  EXTREMITIES: Moves both upper and lower extremities with some limitation to RLE. Edema to RLE with extremity about 2 X the size of the left. Tenderness in back of knee with dorsi and plantar flexion.   SKIN: Warm and dry, no rashes or erythema noted. Tegaderm dressing in place to Right knee. Extremity warm. No redness.   NEUROLOGIC: Intact, pulses palpable  PSYCHIATRIC: Cognition intact. Anxiety. Very chatty. Flight of ideas.             Labs:    Recent Results (from the past 240 hour(s))   Creatinine   Result Value Ref Range    Creatinine 0.66 0.60 - 1.10 mg/dL    GFR MDRD Af Amer >60 >60 mL/min/1.73m2    GFR MDRD Non Af Amer >60 >60 mL/min/1.73m2   Hemoglobin - Daily x 2   Result Value Ref Range    Hemoglobin 10.4 (L) 12.0 - 16.0 g/dL   Potassium - Tomorrow AM   Result Value Ref Range    Potassium 3.4 (L) 3.5 - 5.0 mmol/L   Hemoglobin - Daily x 2   Result Value Ref Range    Hemoglobin 10.8 (L) 12.0 - 16.0 g/dL   Basic Metabolic Panel   Result Value Ref Range    Sodium 137 136 - 145 mmol/L    Potassium 4.3 3.5 - 5.0 mmol/L    Chloride 100 98 - 107 mmol/L    CO2 26 22 - 31 mmol/L    Anion Gap, Calculation 11 5 - 18 mmol/L    Glucose 81 70 - 125 mg/dL    Calcium 8.5 8.5 - 10.5 mg/dL    BUN 9 8 - 22 mg/dL    Creatinine 0.56 (L) 0.60 - 1.10 mg/dL    GFR MDRD Af Amer >60 >60 mL/min/1.73m2    GFR MDRD Non Af Amer >60 >60 mL/min/1.73m2   HM2(CBC w/o Differential)   Result Value Ref Range    WBC 8.2 4.0 - 11.0 thou/uL     RBC 3.46 (L) 3.80 - 5.40 mill/uL    Hemoglobin 10.2 (L) 12.0 - 16.0 g/dL    Hematocrit 30.9 (L) 35.0 - 47.0 %    MCV 89 80 - 100 fL    MCH 29.5 27.0 - 34.0 pg    MCHC 33.0 32.0 - 36.0 g/dL    RDW 12.9 11.0 - 14.5 %    Platelets 353 140 - 440 thou/uL    MPV 10.7 8.5 - 12.5 fL         Assessment/Plan:  1. Status post total right knee replacement     2. Chronic pain syndrome     3. Fibromyalgia     4. Osteoarthritis of knee, unspecified laterality, unspecified osteoarthritis type     5. Pain management       Patient status post right total knee arthroplasty secondary to DJD.  She is continued on Tylenol and oxycodone.  Today she is requesting more pain medication.  Rating her pain a 10 out of 10.  Tenderness with dorsi and plantar flexion.  Reports pain behind her knee.  She does have edema in her right knee her right lower extremity is about 2-3 times a size of her left.  I will order venous Doppler to rule out DVT and assess for Baker's cyst.  She is presenting more like a Baker's cyst.  History of fibromyalgia.  She continues on Lyrica.  She did tell me she at times forgets to ask for pain medication.  We will schedule 10 mg in the a.m. and p.m.  She will continue with her 10-15mg of oxycodone as needed.  We will need to monitor for increased signs and symptoms of sedation or loss of consciousness.  I did explain to her that more pain medication is not always the answer.  Will encourage icing and elevation at all times between therapies.  This could also be her fibromyalgia.  Acute blood loss anemia.  Hemoglobin stable in the TMs.  We will follow-up with a CBC and BMP.  Patient does have underlying history of anxiety.  Currently on the medication.Previously was on Xanax and an SSRI that however she cannot remember which one.  She was followed out patiently by psych.  This anxiety may be contributing to increased pain symptoms.  I did encourage use of essential oils, she tells me she is unable to tolerate those.   Will encourage relaxation techniques.    60 minutes spent of which greater than 50% was face to face communication with the patient about above plan of care    Electronically signed by: Abbi Weller CNP

## 2021-06-18 NOTE — LETTER
"Letter by Abbi Weller CNP at      Author: Abbi Weller CNP Service: -- Author Type: --    Filed:  Encounter Date: 1/29/2019 Status: (Other)         Patient: Rohan Prince   MR Number: [id number]   YOB: 1975   Date of Visit: 1/29/2019     Code Status:  FULL CODE  Visit Type: Discharge Summary     Facility:  [facility] [[id number]]         Facility Type: SNF (Skilled Nursing Facility, TCU)    History of Present Illness: Rohan Prince is a 43 y.o. female who I am seeing today for discharge from the TCU. Pt s/p Right TKA on 1/17/19 2/t DJD. She had no significant jeanine operative complications. She has hx of chronic pain in her back and joints. She takes Lyrica and Tramadol at home for this. Post operative complications include acute blood loss anemia. Hemoglobin dropped to the 10s. She did require transfusion. She continues on ASA for DVT prophylaxis. She is ambulating with rolling walker. She continues on tylenol and oxycodone for pain. She is also taking Naproxen two times a day. Today she is complaining of 10/10 pain to her right knee. She is requesting more pain medication. She is taking up to 15 mg of oxycodone Q 4 hours. She is somewhat drowsy on exam but is very chatty. She does have underlying anxiety and she tells me she use to take meds but no longer does. She has seen psych out patiently. According to the hospital notes \"she has mx complex issues from medical , to emotional, to relationships.\" She tells me today she does have some short term memory deficit due to her hx of Meningioma and CVA. She answers questions appropriately on exam and remembers my name at the end of the visit. Past medical hx includes cervical CA, chronic pain, DJD, Cervical stenosis, fibromyalgia, meningioma and TIA.     Today patient sitting up in bedside chair.  Her  is present on exam.  Last evening patient had a fall and was sent to the ER.  She sustained a nondisplaced " medial malleolus tip fracture she was placed in a CAM boot and returned to the facility.  While at the ER she underwent several x-rays including x-ray of the right shoulder which was negative for any acute injury or fracture.  Also of the right wrist which showed irregularity at the ulnar  Styliod.  X-ray of the right knee showed alignment of the surgery with some effusion noted.  CT of the head and neck without acute injury. It did show to the meningioma present.  She does have history of brain meningioma.  She is to follow-up with Kylee SHEN in 1 week.  She continues on oxycodone 3 tabs every 4 hours as needed she is taking this every 4 hours.  She continues to rate her pain a 10 out of 10 requesting pain medication frequently.  There was some question of oversedation last evening and at the ER.  Patient has been requesting to take patient home today.  She was to discharge tomorrow.  He feels she is very noncompliant and would be a liability if she continue to stay.  He would like to take her home so he could monitor her more closely.  He states she will always rate her pain a 10 can be very manipulative.  Patient treated for UTI during her TCU stay.  She continues on oral antibiotic until 2/1.  She did have a large amount of knee swelling during her TCU stay.  Venous Doppler was obtained which was negative.  Patient with multiple underlying psycho social situations and behaviors.  She does have chronic anxiety.  She does me she was previously on Xanax at home.  I did treat her with 2 doses during her TCU stay.        Active Ambulatory Problems     Diagnosis Date Noted   ? Status post total knee replacement, right 01/17/2019   ? Chronic pain 01/21/2019   ? Fibromyalgia 01/21/2019   ? TIA (transient ischemic attack) 01/21/2019   ? DJD (degenerative joint disease) 01/21/2019   ? Meningioma (H) 01/21/2019   ? Cervical cancer (H) 01/21/2019     Resolved Ambulatory Problems     Diagnosis Date Noted   ? No Resolved  Ambulatory Problems     Past Medical History:   Diagnosis Date   ? Cancer (H)    ? Chronic pain disorder    ? DJD (degenerative joint disease)    ? Endometriosis    ? Fibromyalgia    ? Kidney infection    ? Meningioma, recurrent of brain (H)    ? Migraine    ? TIA (transient ischemic attack)        Current Outpatient Medications   Medication Sig   ? acetaminophen (TYLENOL) 500 MG tablet Take 2 tablets (1,000 mg total) by mouth 3 (three) times a day.   ? aspirin 325 MG EC tablet Take 1 tablet (325 mg total) by mouth 2 (two) times a day.   ? hydrOXYzine pamoate (VISTARIL) 25 MG capsule Take 1-2 capsules (25-50 mg total) by mouth every 6 (six) hours as needed for anxiety or other (pain).   ? naproxen (NAPROSYN) 500 MG tablet Take 1 tablet (500 mg total) by mouth 2 (two) times a day with meals.   ? oxyCODONE (ROXICODONE) 5 MG immediate release tablet Take 1-3 tablets (5-15 mg total) by mouth every 4 (four) hours as needed for pain. (Patient taking differently: Take 5-10 mg by mouth every 4 (four) hours as needed for pain.       )   ? pregabalin (LYRICA) 150 MG capsule Take 1 capsule (150 mg total) by mouth 2 (two) times a day.   ? pregabalin (LYRICA) 75 MG capsule Take 150 mg by mouth 2 (two) times a day. x2 capsules             Allergies   Allergen Reactions   ? Chlorhexidine Rash         Review of Systems   No fevers.  Patient is reporting hot flashes and chills.  No headache, lightheadedness or dizziness. No SOB, chest pains or palpitations. Appetite is good. No nausea, vomiting, constipation or diarrhea.  She is emptying her bladder frequently.  No burning or pain.  She is having regular bowel movements and request not to take any stool softener.  She always rates her pain a 10.  She continues to take 15 mg of oxycodone every 4 hours.  Chronic anxiety.       Physical Exam   PHYSICAL EXAMINATION:  Vital signs: /64, heart rate 90, respirations 18, temperature 97.5, O2 sat 97% on room air.  Current weight 195.8   pounds  General: Awake, Alert, oriented x3, appropriately, follows simple commands, conversant  HEENT: Pink conjunctiva, anicteric sclerae, moist oral mucosa. Laxy eye on the left.  Left-sided facial droop. Tongue is midline.   NECK: Supple, without any lymphadenopathy, or masses. Scar to right side of neck.   CVS:  S1  S2, without murmur or gallop.   LUNG: Clear to auscultation, No wheezes, rales or rhonci.  BACK: No kyphosis of the thoracic spine  ABDOMEN: Soft, nontender to palpation, with positive bowel sounds  EXTREMITIES: Moves both upper and lower extremities with some limitation to RLE. Edema to R knee improved from last visit.  Incision dry and intact with glue.  No redness or warmth.  She is ambulating over 100 feet with a rolling walker.  Cam boot to right foot.  No edema.+CMS.   SKIN: Warm and dry, no rashes or erythema noted.  NEUROLOGIC: Intact, pulses palpable, Stuttering at  Times with increased anxiety/ and or excitement.   PSYCHIATRIC: Cognition intact. Anxiety. Very chatty. Flight of ideas.  Anxiety.            Labs:    Recent Results (from the past 240 hour(s))   Basic Metabolic Panel   Result Value Ref Range    Sodium 137 136 - 145 mmol/L    Potassium 4.3 3.5 - 5.0 mmol/L    Chloride 100 98 - 107 mmol/L    CO2 26 22 - 31 mmol/L    Anion Gap, Calculation 11 5 - 18 mmol/L    Glucose 81 70 - 125 mg/dL    Calcium 8.5 8.5 - 10.5 mg/dL    BUN 9 8 - 22 mg/dL    Creatinine 0.56 (L) 0.60 - 1.10 mg/dL    GFR MDRD Af Amer >60 >60 mL/min/1.73m2    GFR MDRD Non Af Amer >60 >60 mL/min/1.73m2   HM2(CBC w/o Differential)   Result Value Ref Range    WBC 8.2 4.0 - 11.0 thou/uL    RBC 3.46 (L) 3.80 - 5.40 mill/uL    Hemoglobin 10.2 (L) 12.0 - 16.0 g/dL    Hematocrit 30.9 (L) 35.0 - 47.0 %    MCV 89 80 - 100 fL    MCH 29.5 27.0 - 34.0 pg    MCHC 33.0 32.0 - 36.0 g/dL    RDW 12.9 11.0 - 14.5 %    Platelets 353 140 - 440 thou/uL    MPV 10.7 8.5 - 12.5 fL   Urinalysis   Result Value Ref Range    Color, UA Yellow  Colorless, Yellow, Straw, Light Yellow    Clarity, UA Cloudy (!) Clear    Glucose, UA Negative Negative    Bilirubin, UA Negative Negative    Ketones, UA Trace (!) Negative    Specific Gravity, UA 1.029 1.001 - 1.030    Blood, UA Trace (!) Negative    pH, UA 6.0 4.5 - 8.0    Protein, UA 30 mg/dL (!) Negative mg/dL    Urobilinogen, UA 2.0 E.U./dL <2.0 E.U./dL, 2.0 E.U./dL    Nitrite, UA Positive (!) Negative    Leukocytes, UA Large (!) Negative    Bacteria, UA Many (!) None Seen hpf    RBC, UA 3-5 (!) None Seen, 0-2 hpf    WBC, UA >100 (!) None Seen, 0-5 hpf    Squam Epithel, UA 5-10 (!) None Seen, 0-5 lpf    WBC Clumps Present (!) None Seen    Mucus, UA Many (!) None Seen lpf   Culture, Urine   Result Value Ref Range    Culture >100,000 col/ml Escherichia coli (!)        Susceptibility    Escherichia coli - REBECCA     Amoxicillin + Clavulanate >16/8 Resistant      Ampicillin >16 Resistant      Cefazolin >16 Resistant      Cefepime <=1 Sensitive      Ceftriaxone <=1 Sensitive      Ciprofloxacin <=0.5 Sensitive      Gentamicin <=2 Sensitive      Levofloxacin <=1 Sensitive      Meropenem <=0.25 Sensitive      Nitrofurantoin <=16 Sensitive      Tetracycline <=2 Sensitive      Tobramycin <=2 Sensitive      Trimethoprim + Sulfamethoxazole <=0.5 Sensitive    Basic Metabolic Panel   Result Value Ref Range    Sodium 139 136 - 145 mmol/L    Potassium 4.2 3.5 - 5.0 mmol/L    Chloride 100 98 - 107 mmol/L    CO2 29 22 - 31 mmol/L    Anion Gap, Calculation 10 5 - 18 mmol/L    Glucose 91 70 - 125 mg/dL    Calcium 9.4 8.5 - 10.5 mg/dL    BUN 11 8 - 22 mg/dL    Creatinine 0.64 0.60 - 1.10 mg/dL    GFR MDRD Af Amer >60 >60 mL/min/1.73m2    GFR MDRD Non Af Amer >60 >60 mL/min/1.73m2   BNP(B-type Natriuretic Peptide)   Result Value Ref Range    BNP 50 0 - 64 pg/mL   HM2(CBC w/o Differential)   Result Value Ref Range    WBC 7.6 4.0 - 11.0 thou/uL    RBC 3.52 (L) 3.80 - 5.40 mill/uL    Hemoglobin 10.3 (L) 12.0 - 16.0 g/dL    Hematocrit 32.5  (L) 35.0 - 47.0 %    MCV 92 80 - 100 fL    MCH 29.3 27.0 - 34.0 pg    MCHC 31.7 (L) 32.0 - 36.0 g/dL    RDW 13.2 11.0 - 14.5 %    Platelets 481 (H) 140 - 440 thou/uL    MPV 10.4 8.5 - 12.5 fL         Assessment/Plan:  1. Status post total right knee replacement     2. Closed fracture of right ankle, initial encounter     3. Chronic pain syndrome     4. Urinary tract infection without hematuria, site unspecified     5. Dysphagia, unspecified type     6. Osteoarthritis of knee, unspecified laterality, unspecified osteoarthritis type     7. Anxiety     8. Fibromyalgia     9. Pain management         Patient status post right total knee arthroplasty secondary to DJD.  She continues on Tylenol and oxycodone.  She was moving quite well ambulating with a rolling walker.  Last evening she had a fall sustaining a right bimalleolar closed fracture.  She was seen in the ER and placed in a Cam boot.  She is nonweightbearing.  She does have a follow-up with Orth [facility] next week.  She was a discharge tomorrow.  Her  is requesting to take her home today.  She continually rates her pain since admit out of 10.  She continues on oxycodone 15 mg every 4 hours.  Today I talked at length with both her and her  about attempting to reduce.  I will send her home on 10 mg every 4 hours.  She is at times appeared oversedated during her TCU stay and was some question at the ER last evening.  I will send her home with 33 tabs in a prescription for 15 additional tabs until she can be seen by either her primary and/or ortho.  She does have history of chronic pain.  She has underlying fibromyalgia.  She was taking tramadol prior to her knee replacement.  I have told both her and her  that she cannot take that while she is on oxycodone.  Chronic anxiety with multiple psychosocial issues.  She told me she was taking Xanax in the distant past.  I did give HER-2 doses while at the TCU.  UTI.  She continues on Bactrim DS 1 tab twice  daily until 2/1/19.    Discharge home with current meds and treatments including narcotics.  Home PT, OT, home health aide.  Follow-up with primary care provider in 1 week.  Follow-up with Kylee SHEN on February 6th.       DISCHARGE PLAN/FACE TO FACE:  I certify that this patient is under my care and that I, or a nurse practitioner or physician's assistant working with me, had a face-to-face encounter that meets the physician face-to-face encounter requirements with this patient.       I certify that, based on my findings, the following services are medically necessary home health services.     My clinical findings support the need for the above skilled services.     This patient is homebound because: Recent right total knee arthroplasty with recent right ankle fracture.    The patient is, or has been, under my care and I have initiated the establishment of the plan of care. This patient will be followed by a physician who will periodically review the plan of care.      Electronically signed by: Abbi Weller CNP

## 2021-06-19 NOTE — PROGRESS NOTES
Outside paper records received from [facility].Records reviewed by treating provider and to be scanned into patient's chart.    Outside paper records received:    OV 6/19/2018  OV 4/5/2018  OV 2/13/2018  OV 2/1/2018  OV 1/30/2018  OV 11/9/2017    MRI-L Spine Report 1/30/2018

## 2021-06-19 NOTE — PROGRESS NOTES
"Patient presents for follow-up today.  Patient states that she is very confused as to why she is following up with me.  She had previously been working with [facility] for her nonsurgical spine care, but is no longer following up there because her provider \"couldn't help her anymore.\"  She thought that she was either going to be following up with Dr. Santamaria from neurosurgery today or seeing a neurologist about her headaches.  I explained that I am neither a neurologist or neurosurgeon.  I also help with nonsurgical spine care.  I offered to evaluate the patient to discuss her treatment options with her from my standpoint.  Patient declined.  She states that she is going to be following up with her primary care provider this week.  Her primary care provider manages her pain medications.  She states that she will discuss her pain medications with him and discuss referrals if needed.  Patient can follow-up with me as needed.  Patient will not be charged for today's visit.  "

## 2021-06-19 NOTE — PROGRESS NOTES
Outside disc received from [employer] today. Outside disc label with patient's HE MRN # and given to [employer] to load into [facility]IL.    Outside disc contains the following images:    1) CT Dissection with pelvis 12/27/2015  2) MRI L-Spine 7/2/2016  3) Xray Fluoroscopy 2/1/2016  4) CT Angiogram neck 12/27/2015  5) MRI lumbar 01/30/2018  6) B/L L3-5 MBB 2/7/2018  /7) MRI C-Spine 12/2

## 2021-06-23 NOTE — PROGRESS NOTES
[facility]        Visit Type: H & P (Right  TKA)    Code Status:  FULL CODE  Facility:  [facility] [[id number]]          PCP: Rosenstein, Benjamin E, MD       PHONE: [phone]     FAX:[phone]        ASSESSMENT/PLAN:  1. Status post total right knee replacement   will follow up with [facility] on 2/6/19.  Patient has very swollen right knee but there is no discharge on surgical site although there is some warmth and mild erythema suspicious for beginning infection.  Patient placed ice compress on her surgical site just prior to my examination which could also be the cause of some of her erythema.  Will monitor closely and if swelling/erythema/warmth continue, we will treat with antibiotics and inform orthopedics.  Will change APAP to 1000 mg every 8 hours scheduled.  Will discontinue naproxen since the patient states that this is not effective for her.  Will give hydroxyzine HCL 25 mg 1 capsule every 6 hours as needed.  Aspirin both for inflammation and DVT prophylaxis will be given at least 25 mg twice daily for 30 days or until seen by [facility] for further recommendations.  Continue PT/OT   2. Chronic pain syndrome   as above, very long discussion was done with the patient regarding her use of high-dose oxycodone and discussed other treatment options.  She did agree that once her pain from knee surgery has resoLved, that she is willing to taper her oxycodone use   3. Dysuria   positive UA, await urine cultures.  Addendum on 1/25-urine culture shows E. coli sensitive to levofloxacin/Bactrim and resistant to Keflex.  Will discontinue Keflex and start Levofloxacin or Bactrim.  Discussed with TCU, Bactrim already ordered for today.   4. Acute blood loss anemia   hemoglobin is at 10.2, stable   5. Drug-induced constipation   long discussion with the patient, will change docusate sodium to 1 tablet daily and 1 tablet daily as  needed, hold for loose stools   6.      Dysphagia, history of TIA-speech therapy now involved  7.      Mild hypokalemia-resolved, potassium is now at 4.3      HISTORY OF PRESENT ILLNESS:   Rohan Prince is a 43 y.o. female with a history of TIA, meningioma, chronic pain disorder, DJD, fibromyalgia and chronic anxiety who underwent right TKA on 1/17/19.  Postoperatively, the patient had acute blood loss anemia for which she was transfused PRBC.  She also had mild hypokalemia with a potassium of 3.4.  She also continued to have acute pain for which she is now on high doses of narcotics which occasionally relieved her pain.  On discharge she was anticoagulated with aspirin and continued on narcotics for pain control.    The patient continues to have acute pain on her right knee but occasionally she states this is generalized.  She is now on oxycodone 15 mg every 4 hours RN and 10 mg twice daily.  According to the nurses she takes approximately 15 mg oxycodone 4 times a day as needed.  Despite this big doses of narcotics, she still continues to complain of pain.  Other pain medications include aspirin 325 mg twice daily, naproxen 50 mg twice daily which does not help her pain, hydroxyzine and APAP.  She is also on Lyrica for her fibromyalgia and chronic pain.  She also has been complaining of dysuria and her UA did show positive nitrates with large leukocytes and WBC greater than 100.  Urine cultures are pending.  She denies any fevers or chills, chest pains or shortness of breath.  She states that her appetite has been good and that she does not have any stomach upset from her aspirin and naproxen.  She said also that she is always constipated and that is sometimes normal for her even at home to have no BMs for a few weeks.  She does not complain of any weakness, dizziness or lightheadedness.  She did have one episode of dysphasia with a choking sensation at breakfast this morning and speech therapy has been consulted.   She does have also chronic anxiety but she does not want to take any SSRIs which caused some side effects.    Other review of systems are negative.      PAST MEDICAL/SURGICAL HISTORY:  Past Medical History:   Diagnosis Date     Cancer (H)     cervical cancer     Chronic pain disorder      DJD (degenerative joint disease)      Endometriosis      Fibromyalgia      Kidney infection      Meningioma, recurrent of brain (H)      Migraine      TIA (transient ischemic attack)      Past Surgical History:   Procedure Laterality Date     CERVICAL FUSION      2016 C3-C7      HYSTERECTOMY       KNEE SURGERY       MASTECTOMY Right     partial     partial masetectomy       CT TOTAL KNEE ARTHROPLASTY Right 1/17/2019    Procedure: RIGHT TOTAL KNEE ARTHROPLASTY;  Surgeon: Pedro Moss DO;  Location: [facility] OR;  Service: Orthopedics     RIGHT OOPHORECTOMY         SOCIAL HISTORY:  Social History     Socioeconomic History     Marital status: Single     Spouse name: Not on file     Number of children: Not on file     Years of education: Not on file     Highest education level: Not on file   Social Needs     Financial resource strain: Not on file     Food insecurity - worry: Not on file     Food insecurity - inability: Not on file     Transportation needs - medical: Not on file     Transportation needs - non-medical: Not on file   Occupational History     Not on file   Tobacco Use     Smoking status: Never Smoker     Smokeless tobacco: Never Used   Substance and Sexual Activity     Alcohol use: No     Drug use: No     Sexual activity: Not on file   Other Topics Concern     Not on file   Social History Narrative     Not on file       FAMILY HISTORY:  Family History   Problem Relation Age of Onset     Cancer Mother      Stroke Mother      Other Mother         swelling       MEDICATIONS:  Current Outpatient Medications on File Prior to Visit   Medication Sig     acetaminophen (TYLENOL) 500 MG tablet Take 2 tablets (1,000 mg  total) by mouth 3 (three) times a day.     aspirin 325 MG EC tablet Take 1 tablet (325 mg total) by mouth 2 (two) times a day times 30 days or until seen by [facility]     docusate sodium (COLACE) 100 MG capsule Take 1 capsule (100 mg total) by mouth daily and 1 times a day as needed.     hydrOXYzine pamoate (VISTARIL) 25 MG capsule Take 1 capsules (25 mg total) by mouth every 6 (six) hours as needed for anxiety or other (pain).     oxyCODONE (ROXICODONE) 5 MG immediate release tablet Take 1-3 tablets (5-15 mg total) by mouth every 4 (four) hours as needed for pain.     pregabalin (LYRICA) 150 MG capsule Take 1 capsule (150 mg total) by mouth 2 (two) times a day.     pregabalin (LYRICA) 75 MG capsule Take 150 mg by mouth 2 (two) times a day. x2 capsules          Bactrim DS two times a day x 7 days  Oxycodone 10 mg twice daily    ALLERGIES:  Allergies   Allergen Reactions     Chlorhexidine Rash         PHYSICAL EXAMINATION:  Vital signs 97% room air, 112/70, 16, 87, 97.9  General: Awake, Alert, oriented x3, not in any form of acute distress, answers questions appropriately, follows simple commands, conversant, occasional slurred speech, seems sleepy  HEENT: Pink conjunctiva, anicteric sclerae, oral mucosa is moist  NECK: Supple, without any lymphadenopathy, thyromegaly or any masses  LUNG: Clear to auscultation, good chest expansion. There are no crackles, no wheezes, normal AP diameter  BACK: No kyphosis of the thoracic spine  CVS: There is good S1  S2, there are no murmurs, no heaves, rhythm is regular  ABDOMEN: Globular and soft, nontender to palpation, no organomegaly, good bowel sounds  EXTREMITIES: Good range of motion on both upper and lower extremities except on right lower extremity with decreased range of motion on right knee, right knee swollen with mild erythema and warmth but surgical site is clean without any discharge, mild tenderness to palpation, +1 right pedal edema, no cyanosis or clubbing,  no calf tenderness  SKIN: Warm and dry, no rashes or erythema noted    LABS:  Lab Results   Component Value Date    WBC 7.6 01/24/2019    HGB 10.3 (L) 01/24/2019    HCT 32.5 (L) 01/24/2019    MCV 92 01/24/2019     (H) 01/24/2019     Lab Results   Component Value Date    CREATININE 0.64 01/24/2019    BUN 11 01/24/2019     01/24/2019    K 4.2 01/24/2019     01/24/2019    CO2 29 01/24/2019           >45 minutes of total time spent, greater than 55% of the time spent in coordination of care and counseling regarding the above medical issues and plan of care. I have reviewed the patient's medical records, labs and medications.       Electronically signed by:Lois Oscar MD

## 2021-06-23 NOTE — PROGRESS NOTES
"Code Status:  FULL CODE  Visit Type: Problem Visit     Facility:  [facility] [[id number]]         Facility Type: SNF (Skilled Nursing Facility, TCU)    History of Present Illness: Rohan Prince is a 43 y.o. female who I am seeing today for follow up on the TCU. Pt s/p Right TKA on 1/17/19 2/t DJD. She had no significant jeanine operative complications. She has hx of chronic pain in her back and joints. She takes Lyrica and Tramadol at home for this. Post operative complications include acute blood loss anemia. Hemoglobin dropped to the 10s. She did require transfusion. She continues on ASA for DVT prophylaxis. She is ambulating with rolling walker. She continues on tylenol and oxycodone for pain. She is also taking Naproxen two times a day. Today she is complaining of 10/10 pain to her right knee. She is requesting more pain medication. She is taking up to 15 mg of oxycodone Q 4 hours. She is somewhat drowsy on exam but is very chatty. She does have underlying anxiety and she tells me she use to take meds but no longer does. She has seen psych out patiently. According to the hospital notes \"she has mx complex issues from medical , to emotional, to relationships.\" She tells me today she does have some short term memory deficit due to her hx of Meningioma and CVA. She answers questions appropriately on exam and remembers my name at the end of the visit. Past medical hx includes cervical CA, chronic pain, DJD, Cervical stenosis, fibromyalgia, meningioma and TIA.     Today patient sitting up in bedside chair.  She is just returned from therapy.  She is ambulating well with a rolling walker.  I saw her earlier ambulate from her room down to the vending machine and back.  She continues to milligrams every 4 hours as needed for pain.  She is using this around-the-clock.  She continues with a great deal of swelling in her right knee.  Recent venous Doppler was negative for DVT in her Baker's cyst.  Nurses " noted redness last evening however no redness on exam today.  Dressing was changed by nursing last evening.  Today it was filled back incision line well approximated with.  No drainage on old dressing.  Patient afebrile.  She was complaining of recent UTI-like symptoms. UA looks positive.  She is reporting hot flashes and chills.  Chronic anxiety.  She was previously on Xanax and SSRI.  She has been seen by psych out patiently.  Multiple psychosocial issues.  She tells me she is having trouble sleeping and has a great deal of anxiety.  She is tearful at times on exam.  With increased anxiety patient was having some stuttering. She does have a history of this.  Otherwise neurologically intact.  No signs of oversedation on visit today.    Active Ambulatory Problems     Diagnosis Date Noted     Status post total knee replacement, right 01/17/2019     Chronic pain 01/21/2019     Fibromyalgia 01/21/2019     TIA (transient ischemic attack) 01/21/2019     DJD (degenerative joint disease) 01/21/2019     Meningioma (H) 01/21/2019     Cervical cancer (H) 01/21/2019     Resolved Ambulatory Problems     Diagnosis Date Noted     No Resolved Ambulatory Problems     Past Medical History:   Diagnosis Date     Cancer (H)      Chronic pain disorder      DJD (degenerative joint disease)      Endometriosis      Fibromyalgia      Kidney infection      Meningioma, recurrent of brain (H)      Migraine      TIA (transient ischemic attack)        Current Outpatient Medications   Medication Sig     ALPRAZolam (XANAX) 0.25 MG tablet Take 0.25 mg by mouth at bedtime as needed for sleep.     acetaminophen (TYLENOL) 500 MG tablet Take 2 tablets (1,000 mg total) by mouth 3 (three) times a day.     aspirin 325 MG EC tablet Take 1 tablet (325 mg total) by mouth 2 (two) times a day.     docusate sodium (COLACE) 100 MG capsule Take 1 capsule (100 mg total) by mouth 2 (two) times a day as needed.     hydrOXYzine pamoate (VISTARIL) 25 MG capsule Take  "1-2 capsules (25-50 mg total) by mouth every 6 (six) hours as needed for anxiety or other (pain).     naproxen (NAPROSYN) 500 MG tablet Take 1 tablet (500 mg total) by mouth 2 (two) times a day with meals.     oxyCODONE (ROXICODONE) 5 MG immediate release tablet Take 1-3 tablets (5-15 mg total) by mouth every 4 (four) hours as needed for pain.     pregabalin (LYRICA) 150 MG capsule Take 1 capsule (150 mg total) by mouth 2 (two) times a day.     pregabalin (LYRICA) 75 MG capsule Take 150 mg by mouth 2 (two) times a day. x2 capsules             Allergies   Allergen Reactions     Chlorhexidine Rash         Review of Systems   No fevers.  Patient is reporting hot flashes and chills.  No headache, lightheadedness or dizziness. No SOB, chest pains or palpitations. Appetite is good. No nausea, vomiting, constipation or diarrhea.  Recently reported UTI-like symptoms.  She tells me that \"she has decreased sensation in the pelvic region so her symptoms are not the usual kind.\"  She complains 10/10 pain in her right knee. She reports pain behind the knee and pain with movement. She also reports anxiety. She tells me she was previously on anxiety med but quit taking it.  She is requesting anxiety medication today. She tells me \"she is unable to tolerate essential oils secondary to her visual deficit.  She tells me she is not sleeping secondary to anxiety.  She is very worried about her daughter's.  She also reports chronic pain. Chronic pain at home controlled with Lyrica Tramadol.        Physical Exam   PHYSICAL EXAMINATION:  Vital signs: /58, heart rate 83, respirations 18, temperature 97.7, O2 sat 97% on room air.  Current weight 195.8  pounds  General: Awake, Alert, oriented x3, appropriately, follows simple commands, conversant  HEENT: Pink conjunctiva, anicteric sclerae, moist oral mucosa. Laxy eye on the left.  Left-sided facial droop. Tongue is midline.   NECK: Supple, without any lymphadenopathy, or masses. Scar " to right side of neck.   CVS:  S1  S2, without murmur or gallop.   LUNG: Clear to auscultation, No wheezes, rales or rhonci.  BACK: No kyphosis of the thoracic spine  ABDOMEN: Soft, nontender to palpation, with positive bowel sounds  EXTREMITIES: Moves both upper and lower extremities with some limitation to RLE. Edema to R knee. No redness or warmth.  She is ambulating over 100 feet with a rolling walker.  SKIN: Warm and dry, no rashes or erythema noted.  Incision dry and intact with glue.  No drainage to the old bandage.  NEUROLOGIC: Intact, pulses palpable, Stuttering at  Times with increased anxiety/ and or excitement.   PSYCHIATRIC: Cognition intact. Anxiety. Very chatty. Flight of ideas.  Anxiety.  Tearful on exam.              Labs:    Recent Results (from the past 240 hour(s))   Creatinine   Result Value Ref Range    Creatinine 0.66 0.60 - 1.10 mg/dL    GFR MDRD Af Amer >60 >60 mL/min/1.73m2    GFR MDRD Non Af Amer >60 >60 mL/min/1.73m2   Hemoglobin - Daily x 2   Result Value Ref Range    Hemoglobin 10.4 (L) 12.0 - 16.0 g/dL   Potassium - Tomorrow AM   Result Value Ref Range    Potassium 3.4 (L) 3.5 - 5.0 mmol/L   Hemoglobin - Daily x 2   Result Value Ref Range    Hemoglobin 10.8 (L) 12.0 - 16.0 g/dL   Basic Metabolic Panel   Result Value Ref Range    Sodium 137 136 - 145 mmol/L    Potassium 4.3 3.5 - 5.0 mmol/L    Chloride 100 98 - 107 mmol/L    CO2 26 22 - 31 mmol/L    Anion Gap, Calculation 11 5 - 18 mmol/L    Glucose 81 70 - 125 mg/dL    Calcium 8.5 8.5 - 10.5 mg/dL    BUN 9 8 - 22 mg/dL    Creatinine 0.56 (L) 0.60 - 1.10 mg/dL    GFR MDRD Af Amer >60 >60 mL/min/1.73m2    GFR MDRD Non Af Amer >60 >60 mL/min/1.73m2   HM2(CBC w/o Differential)   Result Value Ref Range    WBC 8.2 4.0 - 11.0 thou/uL    RBC 3.46 (L) 3.80 - 5.40 mill/uL    Hemoglobin 10.2 (L) 12.0 - 16.0 g/dL    Hematocrit 30.9 (L) 35.0 - 47.0 %    MCV 89 80 - 100 fL    MCH 29.5 27.0 - 34.0 pg    MCHC 33.0 32.0 - 36.0 g/dL    RDW 12.9 11.0 -  14.5 %    Platelets 353 140 - 440 thou/uL    MPV 10.7 8.5 - 12.5 fL   Urinalysis   Result Value Ref Range    Color, UA Yellow Colorless, Yellow, Straw, Light Yellow    Clarity, UA Cloudy (!) Clear    Glucose, UA Negative Negative    Bilirubin, UA Negative Negative    Ketones, UA Trace (!) Negative    Specific Gravity, UA 1.029 1.001 - 1.030    Blood, UA Trace (!) Negative    pH, UA 6.0 4.5 - 8.0    Protein, UA 30 mg/dL (!) Negative mg/dL    Urobilinogen, UA 2.0 E.U./dL <2.0 E.U./dL, 2.0 E.U./dL    Nitrite, UA Positive (!) Negative    Leukocytes, UA Large (!) Negative    Bacteria, UA Many (!) None Seen hpf    RBC, UA 3-5 (!) None Seen, 0-2 hpf    WBC, UA >100 (!) None Seen, 0-5 hpf    Squam Epithel, UA 5-10 (!) None Seen, 0-5 lpf    WBC Clumps Present (!) None Seen    Mucus, UA Many (!) None Seen lpf   Culture, Urine   Result Value Ref Range    Culture >100,000 col/ml Escherichia coli (!)    Basic Metabolic Panel   Result Value Ref Range    Sodium 139 136 - 145 mmol/L    Potassium 4.2 3.5 - 5.0 mmol/L    Chloride 100 98 - 107 mmol/L    CO2 29 22 - 31 mmol/L    Anion Gap, Calculation 10 5 - 18 mmol/L    Glucose 91 70 - 125 mg/dL    Calcium 9.4 8.5 - 10.5 mg/dL    BUN 11 8 - 22 mg/dL    Creatinine 0.64 0.60 - 1.10 mg/dL    GFR MDRD Af Amer >60 >60 mL/min/1.73m2    GFR MDRD Non Af Amer >60 >60 mL/min/1.73m2   BNP(B-type Natriuretic Peptide)   Result Value Ref Range    BNP 50 0 - 64 pg/mL   HM2(CBC w/o Differential)   Result Value Ref Range    WBC 7.6 4.0 - 11.0 thou/uL    RBC 3.52 (L) 3.80 - 5.40 mill/uL    Hemoglobin 10.3 (L) 12.0 - 16.0 g/dL    Hematocrit 32.5 (L) 35.0 - 47.0 %    MCV 92 80 - 100 fL    MCH 29.3 27.0 - 34.0 pg    MCHC 31.7 (L) 32.0 - 36.0 g/dL    RDW 13.2 11.0 - 14.5 %    Platelets 481 (H) 140 - 440 thou/uL    MPV 10.4 8.5 - 12.5 fL         Assessment/Plan:  1. Status post total right knee replacement     2. Chronic pain syndrome     3. Urinary tract infection without hematuria, site unspecified     4.  Dysphagia, unspecified type     5. Fibromyalgia     6. Osteoarthritis of knee, unspecified laterality, unspecified osteoarthritis type     7. Anxiety       Patient status post right total knee arthroplasty secondary to DJD.  She continues on Tylenol and oxycodone.  She is moving quite well ambulating with a rolling walker without facial grimacing.  She does have some continued swelling in her right knee.  No signs or symptoms of infection.  No redness.  No warmth.  Incision intact with glue.  He currently has a Tubigrip up to just below the knee.  Will order thigh-high NAGI hose and/or Tubigrip up to the thigh.  Continue to elevate and ice.  Dysphagia.  She had a choking episode in the dining room yesterday.  She does have a history of TIA with some stuttering when very excited or anxious.  Speech therapy was ordered to eval and treat.  Chronic anxiety.  Previously on Xanax and SSRI.  She is having some continued anxiety which she feels as creating increased pain and insomnia.  I will give her briefly as needed Xanax 0.25 mg at at bedtime times 3 days.  I did talk at length with her about becoming oversedated.  She does not appear oversedated today.  UTI.  UA positive.  She is complaining of discomfort.  We will treat her with Keflex to cover possible infection of the knee due to swelling as well as UTI.      Electronically signed by: Abbi Weller CNP

## 2021-06-23 NOTE — PROGRESS NOTES
"Code Status:  FULL CODE  Visit Type: Problem Visit     Facility:  [facility] [[id number]]         Facility Type: SNF (Skilled Nursing Facility, TCU)    History of Present Illness: Rohan Prince is a 43 y.o. female who I am seeing today for follow up on the TCU. Pt s/p Right TKA on 1/17/19 2/t DJD. She had no significant jeanine operative complications. She has hx of chronic pain in her back and joints. She takes Lyrica and Tramadol at home for this. Post operative complications include acute blood loss anemia. Hemoglobin dropped to the 10s. She did require transfusion. She continues on ASA for DVT prophylaxis. She is ambulating with rolling walker. She continues on tylenol and oxycodone for pain. She is also taking Naproxen two times a day. Today she is complaining of 10/10 pain to her right knee. She is requesting more pain medication. She is taking up to 15 mg of oxycodone Q 4 hours. She is somewhat drowsy on exam but is very chatty. She does have underlying anxiety and she tells me she use to take meds but no longer does. She has seen psych out patiently. According to the hospital notes \"she has mx complex issues from medical , to emotional, to relationships.\" She tells me today she does have some short term memory deficit due to her hx of Meningioma and CVA. She answers questions appropriately on exam and remembers my name at the end of the visit. Past medical hx includes cervical CA, chronic pain, DJD, Cervical stenosis, fibromyalgia, meningioma and TIA.       Active Ambulatory Problems     Diagnosis Date Noted     Status post total knee replacement, right 01/17/2019     Resolved Ambulatory Problems     Diagnosis Date Noted     No Resolved Ambulatory Problems     Past Medical History:   Diagnosis Date     Cancer (H)      Chronic pain disorder      DJD (degenerative joint disease)      Endometriosis      Fibromyalgia      Kidney infection      Meningioma, recurrent of brain (H)      Migraine      " TIA (transient ischemic attack)        Current Outpatient Medications   Medication Sig     acetaminophen (TYLENOL) 500 MG tablet Take 2 tablets (1,000 mg total) by mouth 3 (three) times a day.     aspirin 325 MG EC tablet Take 1 tablet (325 mg total) by mouth 2 (two) times a day.     docusate sodium (COLACE) 100 MG capsule Take 1 capsule (100 mg total) by mouth 2 (two) times a day as needed.     hydrOXYzine pamoate (VISTARIL) 25 MG capsule Take 1-2 capsules (25-50 mg total) by mouth every 6 (six) hours as needed for anxiety or other (pain).     naproxen (NAPROSYN) 500 MG tablet Take 1 tablet (500 mg total) by mouth 2 (two) times a day with meals.     oxyCODONE (ROXICODONE) 5 MG immediate release tablet Take 1-3 tablets (5-15 mg total) by mouth every 4 (four) hours as needed for pain.     pregabalin (LYRICA) 150 MG capsule Take 1 capsule (150 mg total) by mouth 2 (two) times a day.     pregabalin (LYRICA) 75 MG capsule Take 150 mg by mouth 2 (two) times a day. x2 capsules             Allergies   Allergen Reactions     Chlorhexidine Rash         Review of Systems   No fevers or chills. No headache, lightheadedness or dizziness. No SOB, chest pains or palpitations. Appetite is good. No nausea, vomiting, constipation or diarrhea. No dysuria, frequency, burning or pain with urination. She complains 10/10 pain in her right knee. She reports pain behind the knee and pain with movement. She also reports anxiety. She tells me she was previously on anxiety med but quit taking it. She also reports chronic pain. Chronic pain at home controlled with Lyrica Tramadol.       Physical Exam   PHYSICAL EXAMINATION:  Vital signs: 118/74, heart rate 84, respirations 16, O2 sat 99% on room air.  Temperature 97.1.    General: Awake, Alert, oriented x3, appropriately, follows simple commands, conversant  HEENT:PERRLA, Drowsy, Pink conjunctiva, anicteric sclerae, moist oral mucosa  NECK: Supple, without any lymphadenopathy, or masses. Scar  to right side of neck.   CVS:  S1  S2, without murmur or gallop.   LUNG: Clear to auscultation, No wheezes, rales or rhonci.  BACK: No kyphosis of the thoracic spine  ABDOMEN: Soft, nontender to palpation, with positive bowel sounds  EXTREMITIES: Moves both upper and lower extremities with some limitation to RLE. Edema to RLE with extremity about 2 X the size of the left. Tenderness in back of knee with dorsi and plantar flexion.   SKIN: Warm and dry, no rashes or erythema noted. Tegaderm dressing in place to Right knee. Extremity warm. No redness.   NEUROLOGIC: Intact, pulses palpable  PSYCHIATRIC: Cognition intact. Anxiety. Very chatty. Flight of ideas.             Labs:    Recent Results (from the past 240 hour(s))   Creatinine   Result Value Ref Range    Creatinine 0.66 0.60 - 1.10 mg/dL    GFR MDRD Af Amer >60 >60 mL/min/1.73m2    GFR MDRD Non Af Amer >60 >60 mL/min/1.73m2   Hemoglobin - Daily x 2   Result Value Ref Range    Hemoglobin 10.4 (L) 12.0 - 16.0 g/dL   Potassium - Tomorrow AM   Result Value Ref Range    Potassium 3.4 (L) 3.5 - 5.0 mmol/L   Hemoglobin - Daily x 2   Result Value Ref Range    Hemoglobin 10.8 (L) 12.0 - 16.0 g/dL   Basic Metabolic Panel   Result Value Ref Range    Sodium 137 136 - 145 mmol/L    Potassium 4.3 3.5 - 5.0 mmol/L    Chloride 100 98 - 107 mmol/L    CO2 26 22 - 31 mmol/L    Anion Gap, Calculation 11 5 - 18 mmol/L    Glucose 81 70 - 125 mg/dL    Calcium 8.5 8.5 - 10.5 mg/dL    BUN 9 8 - 22 mg/dL    Creatinine 0.56 (L) 0.60 - 1.10 mg/dL    GFR MDRD Af Amer >60 >60 mL/min/1.73m2    GFR MDRD Non Af Amer >60 >60 mL/min/1.73m2   HM2(CBC w/o Differential)   Result Value Ref Range    WBC 8.2 4.0 - 11.0 thou/uL    RBC 3.46 (L) 3.80 - 5.40 mill/uL    Hemoglobin 10.2 (L) 12.0 - 16.0 g/dL    Hematocrit 30.9 (L) 35.0 - 47.0 %    MCV 89 80 - 100 fL    MCH 29.5 27.0 - 34.0 pg    MCHC 33.0 32.0 - 36.0 g/dL    RDW 12.9 11.0 - 14.5 %    Platelets 353 140 - 440 thou/uL    MPV 10.7 8.5 - 12.5 fL          Assessment/Plan:  1. Status post total right knee replacement     2. Chronic pain syndrome     3. Fibromyalgia     4. Osteoarthritis of knee, unspecified laterality, unspecified osteoarthritis type     5. Pain management       Patient status post right total knee arthroplasty secondary to DJD.  She is continued on Tylenol and oxycodone.  Today she is requesting more pain medication.  Rating her pain a 10 out of 10.  Tenderness with dorsi and plantar flexion.  Reports pain behind her knee.  She does have edema in her right knee her right lower extremity is about 2-3 times a size of her left.  I will order venous Doppler to rule out DVT and assess for Baker's cyst.  She is presenting more like a Baker's cyst.  History of fibromyalgia.  She continues on Lyrica.  She did tell me she at times forgets to ask for pain medication.  We will schedule 10 mg in the a.m. and p.m.  She will continue with her 10-15mg of oxycodone as needed.  We will need to monitor for increased signs and symptoms of sedation or loss of consciousness.  I did explain to her that more pain medication is not always the answer.  Will encourage icing and elevation at all times between therapies.  This could also be her fibromyalgia.  Acute blood loss anemia.  Hemoglobin stable in the TMs.  We will follow-up with a CBC and BMP.  Patient does have underlying history of anxiety.  Currently on the medication.Previously was on Xanax and an SSRI that however she cannot remember which one.  She was followed out patiently by psych.  This anxiety may be contributing to increased pain symptoms.  I did encourage use of essential oils, she tells me she is unable to tolerate those.  Will encourage relaxation techniques.    60 minutes spent of which greater than 50% was face to face communication with the patient about above plan of care    Electronically signed by: Abbi Weller, DUNG

## 2021-06-23 NOTE — ANESTHESIA PROCEDURE NOTES
Peripheral Block    Patient location during procedure: pre-op  Start time: 1/17/2019 7:01 AM  End time: 1/17/2019 7:03 AM  post-op analgesia per surgeon order as noted in medical record  Staffing:  Performing  Anesthesiologist: Jesse Roberts MD  Preanesthetic Checklist  Completed: patient identified, site marked, risks, benefits, and alternatives discussed, timeout performed, consent obtained, airway assessed, oxygen available, suction available, emergency drugs available and hand hygiene performed  Peripheral Block  Block type: saphenous, adductor canal block  Prep: betadine  Patient position: supine  Patient monitoring: cardiac monitor, continuous pulse oximetry, heart rate and blood pressure  Laterality: right  Injection technique: ultrasound guided    Ultrasound used to visualize needle placement in proximity to nerve being blocked: yes   Permanent ultrasound image captured for medical record  Sterile gel and probe cover used for ultrasound.    Needle  Needle gauge: 21 G  Needle length: 4 in  no peripheral nerve catheter placed  Assessment  Injection assessment: no difficulty with injection, negative aspiration for heme, no paresthesia on injection and incremental injection

## 2021-06-23 NOTE — PROGRESS NOTES
"Code Status:  FULL CODE  Visit Type: Problem Visit     Facility:  [facility] [[id number]]         Facility Type: SNF (Skilled Nursing Facility, TCU)    History of Present Illness: Rohan Prince is a 43 y.o. female who I am seeing today for follow up on the TCU. Pt s/p Right TKA on 1/17/19 2/t DJD. She had no significant jeanine operative complications. She has hx of chronic pain in her back and joints. She takes Lyrica and Tramadol at home for this. Post operative complications include acute blood loss anemia. Hemoglobin dropped to the 10s. She did require transfusion. She continues on ASA for DVT prophylaxis. She is ambulating with rolling walker. She continues on tylenol and oxycodone for pain. She is also taking Naproxen two times a day. Today she is complaining of 10/10 pain to her right knee. She is requesting more pain medication. She is taking up to 15 mg of oxycodone Q 4 hours. She is somewhat drowsy on exam but is very chatty. She does have underlying anxiety and she tells me she use to take meds but no longer does. She has seen psych out patiently. According to the hospital notes \"she has mx complex issues from medical , to emotional, to relationships.\" She tells me today she does have some short term memory deficit due to her hx of Meningioma and CVA. She answers questions appropriately on exam and remembers my name at the end of the visit. Past medical hx includes cervical CA, chronic pain, DJD, Cervical stenosis, fibromyalgia, meningioma and TIA.     Today patient sitting up in bedside chair.  Alert and oriented x3.  Appears somewhat sleepy.  Occasionally eyes noted to roll back in her head as if to be nodding off.  However arousable.  She continues today to request more pain medication.  At this point I would avoid increase in pain medication secondary to this.  She is moving quite well ambulating with walker.  Continues with swelling right knee.  No redness..  Afebrile.  Recent white " count within normal limits.  Doppler was obtained which was negative for DVT cyst.  I did advise patient to avoid foods with high sodium she continues with anxiety.  She is requesting Xanax.  She does have a long-standing history of psychosocial issues.  However due to sedating effects will avoid.      Active Ambulatory Problems     Diagnosis Date Noted     Status post total knee replacement, right 01/17/2019     Chronic pain 01/21/2019     Fibromyalgia 01/21/2019     TIA (transient ischemic attack) 01/21/2019     DJD (degenerative joint disease) 01/21/2019     Meningioma (H) 01/21/2019     Cervical cancer (H) 01/21/2019     Resolved Ambulatory Problems     Diagnosis Date Noted     No Resolved Ambulatory Problems     Past Medical History:   Diagnosis Date     Cancer (H)      Chronic pain disorder      DJD (degenerative joint disease)      Endometriosis      Fibromyalgia      Kidney infection      Meningioma, recurrent of brain (H)      Migraine      TIA (transient ischemic attack)        Current Outpatient Medications   Medication Sig     acetaminophen (TYLENOL) 500 MG tablet Take 2 tablets (1,000 mg total) by mouth 3 (three) times a day.     aspirin 325 MG EC tablet Take 1 tablet (325 mg total) by mouth 2 (two) times a day.     docusate sodium (COLACE) 100 MG capsule Take 1 capsule (100 mg total) by mouth 2 (two) times a day as needed.     hydrOXYzine pamoate (VISTARIL) 25 MG capsule Take 1-2 capsules (25-50 mg total) by mouth every 6 (six) hours as needed for anxiety or other (pain).     naproxen (NAPROSYN) 500 MG tablet Take 1 tablet (500 mg total) by mouth 2 (two) times a day with meals.     oxyCODONE (ROXICODONE) 5 MG immediate release tablet Take 1-3 tablets (5-15 mg total) by mouth every 4 (four) hours as needed for pain.     pregabalin (LYRICA) 150 MG capsule Take 1 capsule (150 mg total) by mouth 2 (two) times a day.     pregabalin (LYRICA) 75 MG capsule Take 150 mg by mouth 2 (two) times a day. x2  "capsules             Allergies   Allergen Reactions     Chlorhexidine Rash         Review of Systems   No fevers or chills. No headache, lightheadedness or dizziness. No SOB, chest pains or palpitations. Appetite is good. No nausea, vomiting, constipation or diarrhea. She thinks she may have a UTI.  However denies dysuria, frequency, burning or pain with urination.  She tells me that \"she has decreased sensation in the pelvic region so her symptoms are not the usual kind.\"  She complains 10/10 pain in her right knee. She reports pain behind the knee and pain with movement. She also reports anxiety. She tells me she was previously on anxiety med but quit taking it.  She is requesting anxiety medication today. She tells me \"she is unable to tolerate essential oils secondary to her visual deficit.  She feels she is supersensitive to smells\"  She wears glasses.  She also reports chronic pain. Chronic pain at home controlled with Lyrica Tramadol.        Physical Exam   PHYSICAL EXAMINATION:  Vital signs: /65, heart rate 79, respirations 18, temperature 97.9, O2 sat 99% on room air.  Current weight 197.2 pounds  General: Awake, Alert, oriented x3, appropriately, follows simple commands, conversant  HEENT:PERRLA, Drowsy, Pink conjunctiva, anicteric sclerae, moist oral mucosa.  Occasionally her eyes will roll back in her head and she appears to drift off but arouses quite quickly  NECK: Supple, without any lymphadenopathy, or masses. Scar to right side of neck.   CVS:  S1  S2, without murmur or gallop.   LUNG: Clear to auscultation, No wheezes, rales or rhonci.  BACK: No kyphosis of the thoracic spine  ABDOMEN: Soft, nontender to palpation, with positive bowel sounds  EXTREMITIES: Moves both upper and lower extremities with some limitation to RLE. Edema to R knee. No redness or warmth. She is able to raise her leg from seated position in recliner.   SKIN: Warm and dry, no rashes or erythema noted. Tegaderm dressing in " place to Right knee.   NEUROLOGIC: Intact, pulses palpable  PSYCHIATRIC: Cognition intact. Anxiety. Very chatty. Flight of ideas.             Labs:    Recent Results (from the past 240 hour(s))   Creatinine   Result Value Ref Range    Creatinine 0.66 0.60 - 1.10 mg/dL    GFR MDRD Af Amer >60 >60 mL/min/1.73m2    GFR MDRD Non Af Amer >60 >60 mL/min/1.73m2   Hemoglobin - Daily x 2   Result Value Ref Range    Hemoglobin 10.4 (L) 12.0 - 16.0 g/dL   Potassium - Tomorrow AM   Result Value Ref Range    Potassium 3.4 (L) 3.5 - 5.0 mmol/L   Hemoglobin - Daily x 2   Result Value Ref Range    Hemoglobin 10.8 (L) 12.0 - 16.0 g/dL   Basic Metabolic Panel   Result Value Ref Range    Sodium 137 136 - 145 mmol/L    Potassium 4.3 3.5 - 5.0 mmol/L    Chloride 100 98 - 107 mmol/L    CO2 26 22 - 31 mmol/L    Anion Gap, Calculation 11 5 - 18 mmol/L    Glucose 81 70 - 125 mg/dL    Calcium 8.5 8.5 - 10.5 mg/dL    BUN 9 8 - 22 mg/dL    Creatinine 0.56 (L) 0.60 - 1.10 mg/dL    GFR MDRD Af Amer >60 >60 mL/min/1.73m2    GFR MDRD Non Af Amer >60 >60 mL/min/1.73m2   HM2(CBC w/o Differential)   Result Value Ref Range    WBC 8.2 4.0 - 11.0 thou/uL    RBC 3.46 (L) 3.80 - 5.40 mill/uL    Hemoglobin 10.2 (L) 12.0 - 16.0 g/dL    Hematocrit 30.9 (L) 35.0 - 47.0 %    MCV 89 80 - 100 fL    MCH 29.5 27.0 - 34.0 pg    MCHC 33.0 32.0 - 36.0 g/dL    RDW 12.9 11.0 - 14.5 %    Platelets 353 140 - 440 thou/uL    MPV 10.7 8.5 - 12.5 fL         Assessment/Plan:  1. Status post total right knee replacement     2. Chronic pain syndrome     3. Fibromyalgia     4. Osteoarthritis of knee, unspecified laterality, unspecified osteoarthritis type     5. Dysuria       Patient status post right total knee arthroplasty secondary to DJD.  She continues on Tylenol and oxycodone.  Today she is requesting more pain medication however appears somewhat oversedated. She is moving quite well ambulating with rolling walker. Swelling continues in the right knee. Ice and elevation  "encouraged. Recent VS doppler negative for DVT and or bakers cyst. No redness or warmth. Recent laboratory unremarkable. Chronic pain syndrome with fibromyalgia. Anxiety.Today she is requesting xanax. Will refrain from this due to oversedation. Unable to tolerate essential oils. Relaxation encouraged. Dysuria. I will obtain UA/UC.   Pt  present after visit. Above plan of care reviewed with . He states his wife can be \"very manipulative.\"         Electronically signed by: Abbi Weller, CNP     "

## 2021-06-23 NOTE — ANESTHESIA POSTPROCEDURE EVALUATION
Patient: Rohan Prince  RIGHT TOTAL KNEE ARTHROPLASTY  Anesthesia type: general    Patient location: PACU  Last vitals:   Vitals:    01/17/19 1155   BP: 101/50   Pulse: (!) 53   Resp: 14   Temp: 36.1  C (96.9  F)   SpO2: 97%     Post vital signs: stable  Level of consciousness: awake and responds to simple questions  Post-anesthesia pain: pain controlled  Post-anesthesia nausea and vomiting: no  Pulmonary: unassisted, spontaneous ventilation, nasal cannula  Cardiovascular: stable and blood pressure at baseline  Hydration: adequate  Anesthetic events: no    QCDR Measures:  ASA# 11 - Deidra-op Cardiac Arrest: ASA11B - Patient did NOT experience unanticipated cardiac arrest  ASA# 12 - Deidra-op Mortality Rate: ASA12B - Patient did NOT die  ASA# 13 - PACU Re-Intubation Rate: ASA13B - Patient did NOT require a new airway mgmt  ASA# 10 - Composite Anes Safety: ASA10A - No serious adverse event    Additional Notes: Patient was not a candidate for spinal anesthesia due to bilateral frontal meningiomas. Tolerated her anesthetic well, received IV magnesium in PACU for pain control, with good results.

## 2021-06-23 NOTE — PROGRESS NOTES
"Code Status:  FULL CODE  Visit Type: Discharge Summary     Facility:  [facility] [[id number]]         Facility Type: SNF (Skilled Nursing Facility, TCU)    History of Present Illness: Rohan Prince is a 43 y.o. female who I am seeing today for discharge from the TCU. Pt s/p Right TKA on 1/17/19 2/t DJD. She had no significant jeanine operative complications. She has hx of chronic pain in her back and joints. She takes Lyrica and Tramadol at home for this. Post operative complications include acute blood loss anemia. Hemoglobin dropped to the 10s. She did require transfusion. She continues on ASA for DVT prophylaxis. She is ambulating with rolling walker. She continues on tylenol and oxycodone for pain. She is also taking Naproxen two times a day. Today she is complaining of 10/10 pain to her right knee. She is requesting more pain medication. She is taking up to 15 mg of oxycodone Q 4 hours. She is somewhat drowsy on exam but is very chatty. She does have underlying anxiety and she tells me she use to take meds but no longer does. She has seen psych out patiently. According to the hospital notes \"she has mx complex issues from medical , to emotional, to relationships.\" She tells me today she does have some short term memory deficit due to her hx of Meningioma and CVA. She answers questions appropriately on exam and remembers my name at the end of the visit. Past medical hx includes cervical CA, chronic pain, DJD, Cervical stenosis, fibromyalgia, meningioma and TIA.     Today patient sitting up in bedside chair.  Her  is present on exam.  Last evening patient had a fall and was sent to the ER.  She sustained a nondisplaced medial malleolus tip fracture she was placed in a CAM boot and returned to the facility.  While at the ER she underwent several x-rays including x-ray of the right shoulder which was negative for any acute injury or fracture.  Also of the right wrist which showed irregularity " at the ulnar  Styliod.  X-ray of the right knee showed alignment of the surgery with some effusion noted.  CT of the head and neck without acute injury. It did show to the meningioma present.  She does have history of brain meningioma.  She is to follow-up with Kylee SHEN in 1 week.  She continues on oxycodone 3 tabs every 4 hours as needed she is taking this every 4 hours.  She continues to rate her pain a 10 out of 10 requesting pain medication frequently.  There was some question of oversedation last evening and at the ER.  Patient has been requesting to take patient home today.  She was to discharge tomorrow.  He feels she is very noncompliant and would be a liability if she continue to stay.  He would like to take her home so he could monitor her more closely.  He states she will always rate her pain a 10 can be very manipulative.  Patient treated for UTI during her TCU stay.  She continues on oral antibiotic until 2/1.  She did have a large amount of knee swelling during her TCU stay.  Venous Doppler was obtained which was negative.  Patient with multiple underlying psycho social situations and behaviors.  She does have chronic anxiety.  She does me she was previously on Xanax at home.  I did treat her with 2 doses during her TCU stay.        Active Ambulatory Problems     Diagnosis Date Noted     Status post total knee replacement, right 01/17/2019     Chronic pain 01/21/2019     Fibromyalgia 01/21/2019     TIA (transient ischemic attack) 01/21/2019     DJD (degenerative joint disease) 01/21/2019     Meningioma (H) 01/21/2019     Cervical cancer (H) 01/21/2019     Resolved Ambulatory Problems     Diagnosis Date Noted     No Resolved Ambulatory Problems     Past Medical History:   Diagnosis Date     Cancer (H)      Chronic pain disorder      DJD (degenerative joint disease)      Endometriosis      Fibromyalgia      Kidney infection      Meningioma, recurrent of brain (H)      Migraine      TIA (transient ischemic  attack)        Current Outpatient Medications   Medication Sig     acetaminophen (TYLENOL) 500 MG tablet Take 2 tablets (1,000 mg total) by mouth 3 (three) times a day.     aspirin 325 MG EC tablet Take 1 tablet (325 mg total) by mouth 2 (two) times a day.     hydrOXYzine pamoate (VISTARIL) 25 MG capsule Take 1-2 capsules (25-50 mg total) by mouth every 6 (six) hours as needed for anxiety or other (pain).     naproxen (NAPROSYN) 500 MG tablet Take 1 tablet (500 mg total) by mouth 2 (two) times a day with meals.     oxyCODONE (ROXICODONE) 5 MG immediate release tablet Take 1-3 tablets (5-15 mg total) by mouth every 4 (four) hours as needed for pain. (Patient taking differently: Take 5-10 mg by mouth every 4 (four) hours as needed for pain.       )     pregabalin (LYRICA) 150 MG capsule Take 1 capsule (150 mg total) by mouth 2 (two) times a day.     pregabalin (LYRICA) 75 MG capsule Take 150 mg by mouth 2 (two) times a day. x2 capsules             Allergies   Allergen Reactions     Chlorhexidine Rash         Review of Systems   No fevers.  Patient is reporting hot flashes and chills.  No headache, lightheadedness or dizziness. No SOB, chest pains or palpitations. Appetite is good. No nausea, vomiting, constipation or diarrhea.  She is emptying her bladder frequently.  No burning or pain.  She is having regular bowel movements and request not to take any stool softener.  She always rates her pain a 10.  She continues to take 15 mg of oxycodone every 4 hours.  Chronic anxiety.       Physical Exam   PHYSICAL EXAMINATION:  Vital signs: /64, heart rate 90, respirations 18, temperature 97.5, O2 sat 97% on room air.  Current weight 195.8  pounds  General: Awake, Alert, oriented x3, appropriately, follows simple commands, conversant  HEENT: Pink conjunctiva, anicteric sclerae, moist oral mucosa. Laxy eye on the left.  Left-sided facial droop. Tongue is midline.   NECK: Supple, without any lymphadenopathy, or masses.  Scar to right side of neck.   CVS:  S1  S2, without murmur or gallop.   LUNG: Clear to auscultation, No wheezes, rales or rhonci.  BACK: No kyphosis of the thoracic spine  ABDOMEN: Soft, nontender to palpation, with positive bowel sounds  EXTREMITIES: Moves both upper and lower extremities with some limitation to RLE. Edema to R knee improved from last visit.  Incision dry and intact with glue.  No redness or warmth.  She is ambulating over 100 feet with a rolling walker.  Cam boot to right foot.  No edema.+CMS.   SKIN: Warm and dry, no rashes or erythema noted.  NEUROLOGIC: Intact, pulses palpable, Stuttering at  Times with increased anxiety/ and or excitement.   PSYCHIATRIC: Cognition intact. Anxiety. Very chatty. Flight of ideas.  Anxiety.            Labs:    Recent Results (from the past 240 hour(s))   Basic Metabolic Panel   Result Value Ref Range    Sodium 137 136 - 145 mmol/L    Potassium 4.3 3.5 - 5.0 mmol/L    Chloride 100 98 - 107 mmol/L    CO2 26 22 - 31 mmol/L    Anion Gap, Calculation 11 5 - 18 mmol/L    Glucose 81 70 - 125 mg/dL    Calcium 8.5 8.5 - 10.5 mg/dL    BUN 9 8 - 22 mg/dL    Creatinine 0.56 (L) 0.60 - 1.10 mg/dL    GFR MDRD Af Amer >60 >60 mL/min/1.73m2    GFR MDRD Non Af Amer >60 >60 mL/min/1.73m2   HM2(CBC w/o Differential)   Result Value Ref Range    WBC 8.2 4.0 - 11.0 thou/uL    RBC 3.46 (L) 3.80 - 5.40 mill/uL    Hemoglobin 10.2 (L) 12.0 - 16.0 g/dL    Hematocrit 30.9 (L) 35.0 - 47.0 %    MCV 89 80 - 100 fL    MCH 29.5 27.0 - 34.0 pg    MCHC 33.0 32.0 - 36.0 g/dL    RDW 12.9 11.0 - 14.5 %    Platelets 353 140 - 440 thou/uL    MPV 10.7 8.5 - 12.5 fL   Urinalysis   Result Value Ref Range    Color, UA Yellow Colorless, Yellow, Straw, Light Yellow    Clarity, UA Cloudy (!) Clear    Glucose, UA Negative Negative    Bilirubin, UA Negative Negative    Ketones, UA Trace (!) Negative    Specific Gravity, UA 1.029 1.001 - 1.030    Blood, UA Trace (!) Negative    pH, UA 6.0 4.5 - 8.0    Protein, UA  30 mg/dL (!) Negative mg/dL    Urobilinogen, UA 2.0 E.U./dL <2.0 E.U./dL, 2.0 E.U./dL    Nitrite, UA Positive (!) Negative    Leukocytes, UA Large (!) Negative    Bacteria, UA Many (!) None Seen hpf    RBC, UA 3-5 (!) None Seen, 0-2 hpf    WBC, UA >100 (!) None Seen, 0-5 hpf    Squam Epithel, UA 5-10 (!) None Seen, 0-5 lpf    WBC Clumps Present (!) None Seen    Mucus, UA Many (!) None Seen lpf   Culture, Urine   Result Value Ref Range    Culture >100,000 col/ml Escherichia coli (!)        Susceptibility    Escherichia coli - REBECCA     Amoxicillin + Clavulanate >16/8 Resistant      Ampicillin >16 Resistant      Cefazolin >16 Resistant      Cefepime <=1 Sensitive      Ceftriaxone <=1 Sensitive      Ciprofloxacin <=0.5 Sensitive      Gentamicin <=2 Sensitive      Levofloxacin <=1 Sensitive      Meropenem <=0.25 Sensitive      Nitrofurantoin <=16 Sensitive      Tetracycline <=2 Sensitive      Tobramycin <=2 Sensitive      Trimethoprim + Sulfamethoxazole <=0.5 Sensitive    Basic Metabolic Panel   Result Value Ref Range    Sodium 139 136 - 145 mmol/L    Potassium 4.2 3.5 - 5.0 mmol/L    Chloride 100 98 - 107 mmol/L    CO2 29 22 - 31 mmol/L    Anion Gap, Calculation 10 5 - 18 mmol/L    Glucose 91 70 - 125 mg/dL    Calcium 9.4 8.5 - 10.5 mg/dL    BUN 11 8 - 22 mg/dL    Creatinine 0.64 0.60 - 1.10 mg/dL    GFR MDRD Af Amer >60 >60 mL/min/1.73m2    GFR MDRD Non Af Amer >60 >60 mL/min/1.73m2   BNP(B-type Natriuretic Peptide)   Result Value Ref Range    BNP 50 0 - 64 pg/mL   HM2(CBC w/o Differential)   Result Value Ref Range    WBC 7.6 4.0 - 11.0 thou/uL    RBC 3.52 (L) 3.80 - 5.40 mill/uL    Hemoglobin 10.3 (L) 12.0 - 16.0 g/dL    Hematocrit 32.5 (L) 35.0 - 47.0 %    MCV 92 80 - 100 fL    MCH 29.3 27.0 - 34.0 pg    MCHC 31.7 (L) 32.0 - 36.0 g/dL    RDW 13.2 11.0 - 14.5 %    Platelets 481 (H) 140 - 440 thou/uL    MPV 10.4 8.5 - 12.5 fL         Assessment/Plan:  1. Status post total right knee replacement     2. Closed fracture of  right ankle, initial encounter     3. Chronic pain syndrome     4. Urinary tract infection without hematuria, site unspecified     5. Dysphagia, unspecified type     6. Osteoarthritis of knee, unspecified laterality, unspecified osteoarthritis type     7. Anxiety     8. Fibromyalgia     9. Pain management         Patient status post right total knee arthroplasty secondary to DJD.  She continues on Tylenol and oxycodone.  She was moving quite well ambulating with a rolling walker.  Last evening she had a fall sustaining a right bimalleolar closed fracture.  She was seen in the ER and placed in a Cam boot.  She is nonweightbearing.  She does have a follow-up with Kylee O next week.  She was a discharge tomorrow.  Her  is requesting to take her home today.  She continually rates her pain since admit out of 10.  She continues on oxycodone 15 mg every 4 hours.  Today I talked at length with both her and her  about attempting to reduce.  I will send her home on 10 mg every 4 hours.  She is at times appeared oversedated during her TCU stay and was some question at the ER last evening.  I will send her home with 33 tabs in a prescription for 15 additional tabs until she can be seen by either her primary and/or ortho.  She does have history of chronic pain.  She has underlying fibromyalgia.  She was taking tramadol prior to her knee replacement.  I have told both her and her  that she cannot take that while she is on oxycodone.  Chronic anxiety with multiple psychosocial issues.  She told me she was taking Xanax in the distant past.  I did give HER-2 doses while at the TCU.  UTI.  She continues on Bactrim DS 1 tab twice daily until 2/1/19.    Discharge home with current meds and treatments including narcotics.  Home PT, OT, home health aide.  Follow-up with primary care provider in 1 week.  Follow-up with Kylee O on February 6th.       DISCHARGE PLAN/FACE TO FACE:  I certify that this patient is under my  care and that I, or a nurse practitioner or physician's assistant working with me, had a face-to-face encounter that meets the physician face-to-face encounter requirements with this patient.       I certify that, based on my findings, the following services are medically necessary home health services.     My clinical findings support the need for the above skilled services.     This patient is homebound because: Recent right total knee arthroplasty with recent right ankle fracture.    The patient is, or has been, under my care and I have initiated the establishment of the plan of care. This patient will be followed by a physician who will periodically review the plan of care.      Electronically signed by: Abbi Weller CNP

## 2021-06-23 NOTE — ANESTHESIA PROCEDURE NOTES
Peripheral Block    Patient location during procedure: pre-op  Start time: 1/17/2019 7:04 AM  End time: 1/17/2019 7:05 AM  post-op analgesia per surgeon order as noted in medical record  Staffing:  Performing  Anesthesiologist: Jesse Roberts MD  Preanesthetic Checklist  Completed: patient identified, site marked, risks, benefits, and alternatives discussed, timeout performed, consent obtained, airway assessed, oxygen available, suction available, emergency drugs available and hand hygiene performed  Peripheral Block  Block type: other, tibial  Prep: betadine  Patient position: supine  Patient monitoring: cardiac monitor, continuous pulse oximetry, heart rate and blood pressure  Laterality: right  Injection technique: ultrasound guided    Ultrasound used to visualize needle placement in proximity to nerve being blocked: yes   Permanent ultrasound image captured for medical record  Sterile gel and probe cover used for ultrasound.    Needle  Needle type: Stimuplex   Needle gauge: 21 G  Needle length: 4 in  no peripheral nerve catheter placed  Assessment  Injection assessment: no difficulty with injection, negative aspiration for heme, no paresthesia on injection and incremental injection

## 2021-06-23 NOTE — ANESTHESIA CARE TRANSFER NOTE
Last vitals:   Vitals:    01/17/19 1008   BP: 114/57   Pulse: 73   Resp: 14   Temp: 37.1  C (98.7  F)   SpO2: 98%     Patient's level of consciousness is drowsy  Spontaneous respirations: yes  Maintains airway independently: yes  Dentition unchanged: yes  Oropharynx: oropharynx clear of all foreign objects    QCDR Measures:  ASA# 20 - Surgical Safety Checklist: WHO surgical safety checklist completed prior to induction    PQRS# 430 - Adult PONV Prevention: 4558F - Pt received => 2 anti-emetic agents (different classes) preop & intraop  ASA# 8 - Peds PONV Prevention: NA - Not pediatric patient, not GA or 2 or more risk factors NOT present  PQRS# 424 - Deidra-op Temp Management: 4559F - At least one body temp DOCUMENTED => 35.5C or 95.9F within required timeframe  PQRS# 426 - PACU Transfer Protocol: - Transfer of care checklist used  ASA# 14 - Acute Post-op Pain: ASA14B - Patient did NOT experience pain >= 7 out of 10

## 2021-06-26 NOTE — TELEPHONE ENCOUNTER
Called pt to get her scheduled for her MRI and f/u w/Dr. Galvez.     lft for her to c/b to schedule [phone].

## 2021-06-28 NOTE — PROGRESS NOTES
"Progress Notes by Irma Brower CMA at 3/24/2020  9:20 AM     Author: Irma Brower CMA Service: -- Author Type: Certified Medical Assistant    Filed: 3/24/2020 10:03 AM Date of Service: 3/24/2020  9:20 AM Status: Signed    : Irma Brower CMA (Certified Medical Assistant)       Rohan Prince is a 44 y.o. female who is being evaluated via a billable telephone visit.      The patient has been notified of following:     \"This telephone visit will be conducted via a call between you and your physician/provider. We have found that certain health care needs can be provided without the need for a physical exam.  This service lets us provide the care you need with a short phone conversation.  If a prescription is necessary we can send it directly to your pharmacy.  If lab work is needed we can place an order for that and you can then stop by our lab to have the test done at a later time.    If during the course of the call the physician/provider feels a telephone visit is not appropriate, you will not be charged for this service.\"         Pain score 8  Constant or intermittent  BOTH, stabbing, throbbing, numbness, burning  Does the pain interfere with:  Work ----- disability  Walking ------ yes  Sleep ------- yes  Daily activities ------yes  Relationships -------yes  F=8        Irma SALEEM CMA  7:38 AM  3/24/2020      Rohan Prince complains of    Chief Complaint   Patient presents with   ? Fibromyalgia           Irma Brower CMA    Patient presents to the clinic today for a follow up with Mary Muniz CNP regarding fibromyalgia pain. Patient describes their pain as numbness, throbbing, stabbing, and burning. Her/His function score is 8.    Does the patient use mychart or want AVS mailed? Mychart       "

## 2021-06-28 NOTE — PROGRESS NOTES
Progress Notes by Cheryl Miller MS, CCC-SLP at 11/4/2019  1:00 PM     Author: Cheryl Miller MS, CCC-SLP Service: -- Author Type: Speech and Language Pathologist    Filed: 11/4/2019  2:38 PM Date of Service: 11/4/2019  1:00 PM Status: Attested    : Cheryl Miller MS, CCC-SLP (Speech and Language Pathologist) Cosigner: Shweta Phelan, Ph.D,LP at 11/5/2019 11:06 AM    Attestation signed by Shweta Phelan, Ph.D,LP at 11/5/2019 11:06 AM    Shweta Phelan, PhD, LP, [facility]  Clinical Neuropsychologist, LP#[id number]  Board Certified in Clinical Neuropsychology    [facility]  Neuropsychology Section   [phone]                 Speech Language/Pathology  Outpatient Speech Therapy   Evaluation and Initial Plan of Care    Rohan Prince  YOB: 1975      [id number]   Referring Provider: Shweta Phelan*      Medicare Patient   [id number]  Certification Dates: 11/4/19 to 1/4/20     Date of Onset: order received in [facility] in October 2019  Start of Care: 11/4/19   Medical Diagnosis: Major neurocognitive disorder  Treatment Diagnosis: cognition  Session 1 of 7    ASSESSMENT  Patient presents with cognitive deficits in attention, memory, executive function, and visuospatial skills based on administered evaluation. Patient is noted to double check work intermittently however completes tasks at a faster rate which negatively impacts performance/accuracy. She demonstrates signs of impulsivity and decreased planning/organization to a task. Patient will benefit from direct skilled speech services to address cognitive deficits through direct education and training of compensatory strategies for attention, memory, and executive function to improve completion and approach to functional tasks to decrease errors.      Patient participated in education regarding evaluation results and treatment plan/rationale and  verbalized understanding..  Rehab potential is good based on prior level of function, evaluation results and motivation and cooperation.    PLAN  Speech therapy 1 times per week for 6 weeks  Ongoing communication with patient and treatment Team  Ongoing patient/ family instruction regarding treatment plan/rationale, home program and expected functional outcome    Long term goals:   Patient will manage cognitive function by acquiring and implementing compensatory strategies     Short term goals:   1. Patient will recall x2 compensatory strategies to manage attention, memory, and executive function independently by end of POC     2. Patient will achieve 80% accuracy independently with use of compensatory strategies during moderate level cognitive tasks to improve cognitive function by end of POC     3. Patient will report x2 functional use of compensatory strategies upon inquiry to improve use of compensatory strategies outside of therapy by end of POC    SUBJECTIVE  Patient presents as alert and cooperative during the session.  An  was not applicable.  Patient reports no pain    Patient is a 44 year female who presents today for a speech, language, and cognitive evaluation secondary to history of remote history of CVA and meningiomas. Patient had a TIA in December of 2015 and received speech therapy for approx 2 months for expressive language. Most recently patient completed a course of radiation secondary to meningoma. Patient participated in neuropsychological evaluation and presented with cognitive deficits; see EMR for further details. Patient reports ongoing memory difficulties since TIA and reports new difficulties with staying organized and completing tasks. In regards to management of cognitive difficulties, patient reports compensating by using an electronic calendar and writing things down. Patient lives independently and is responsible for all household work and manages medications and fiances  in addition to caring for her two daughters. Patient is currently on disability and is not working.     OBJECTIVE  Speech:   -Oral motor function was grossly intact. Noted to have uneven labial movements on left side compared to right.    -Motor speech was not impaired. Speech intelligibility was approximately 100 % at the conversation level. Noted to have distorted with some fricatives, however this is likely due to being Deaf in right ear.   Voice was not impaired.      Cognition: SLP administered and completed Cognitive Linguistic Quick Test (CLQT)     Results are as follows:  Domain   Score  Severity      Severity                 rating  Attention  173  3                  mild    Memory  115  2                  mod    Executive function 23  3                  mild    Language  31  4                    WNL    Visuospatial skills 77  3                  mild    Clock drawing  12  4                    WNL       Overall   3.0/4.0  mild       Swallow:   -Patient reports ongoing swallowing difficulties. She reported that she had a VFSS during rehab post TIA and was recommended to have a surgery to help with opening of PES segment. She reports some difficulties with coughing during meals however no choking episodes.        Time: 49 speech/language minutes    Cheryl Miller MS, CCC-SLP    Physician Recommendation:  1. I certify the need for these services furnished within this jplan and while under my care.  I agree with the therapist's recommendtion for plan of care.  2. If there is any recommendation for modification of therapy plan, please indicate below.    Physician Signature: ____________________________________________

## 2021-06-29 NOTE — PROGRESS NOTES
"Progress Notes by Mary Muniz CNP at 5/19/2020 10:00 AM     Author: Mary Muniz CNP Service: -- Author Type: Nurse Practitioner    Filed: 5/19/2020 10:36 AM Date of Service: 5/19/2020 10:00 AM Status: Signed    : Mary Muniz CNP (Nurse Practitioner)       Rohan Prince is a 44 y.o. female who is being evaluated via a billable telephone visit.      Start time- 10:11 am   End time- 10:35 am 23:59    The patient has been notified of following:     \"This telephone visit will be conducted via a call between you and your physician/provider. We have found that certain health care needs can be provided without the need for a physical exam.  This service lets us provide the care you need with a short phone conversation.  If a prescription is necessary we can send it directly to your pharmacy.  If lab work is needed we can place an order for that and you can then stop by our lab to have the test done at a later time.    If during the course of the call the physician/provider feels a telephone visit is not appropriate, you will not be charged for this service.\"     Rohan Prince complains of    Chief Complaint   Patient presents with   ? Follow-up     back, bilat knee, rt wrist and neck pain       I have reviewed and updated the patient's Past Medical History, Social History, Family History and Medication List as well as the Precharting workup by the [facility].       PAIN CLINIC FOLLOW UP PROGRESS NOTE    CC:  Chief Complaint   Patient presents with   ? Follow-up     back, bilat knee, rt wrist and neck pain       HPI  Rohan Prince is a 44 y.o. female who presents for evaluation   Chief Complaint   Patient presents with   ? Follow-up     back, bilat knee, rt wrist and neck pain    that is causing continued pain. Specific questions indicated the patient wanted addressed today include: medication management related to her chronic pain, she notes that she is stilll using felling in her right hand " when using it.       Major issues:  1. Chronic pain syndrome    2. Fibromyalgia    3. Vitamin D deficiency, unspecified     4. Chronic midline low back pain, unspecified whether sciatica present        Pain score 8  Constant and intermittent  What does your pain like feel during a flare? Tingling, numb, prickling, burning, throbbing  Does the pain interfere with:  Work ----- NA  Walking ------ yes  Sleep ------- yes  Daily activities ------yes  Relationships -------yes  F= 7          ALLERGIES  Chlorhexidine    Previous Medical History  Social History     Substance and Sexual Activity   Alcohol Use No     Social History     Substance and Sexual Activity   Drug Use No     Social History     Tobacco Use   Smoking Status Never Smoker   Smokeless Tobacco Never Used       Pertinent Pain Medications/interventions:    Currently she is taking norco 3 times a day and lyrica- denies any side effects at this time.     She currently takes tramadol currently but notes that this is not helpful like she used to. She used to take  mg, she takes excedrin migraine is assistive in reducing her migraines since was a young lady at 14 years of age.              Takes pregablin 75 mg, Sees Cheyenne Sanchez at [place].    Review of Systems:  12 point systems were reviewed with pt as documented on on previous exams. Any new diagnosis or new medication is reviewed today.     Medications    Current Outpatient Medications:   ?  aspirin-acetaminophen-caffeine (EXCEDRIN MIGRAINE) 250-250-65 mg per tablet, Take 1 tablet by mouth every 6 (six) hours as needed for pain., Disp: , Rfl:   ?  ergocalciferol (VITAMIN D2) 1,250 mcg (50,000 unit) capsule, Take 1 capsule (50,000 Units total) by mouth every 7 days., Disp: 4 capsule, Rfl: 11  ?  [START ON 6/9/2020] HYDROcodone-acetaminophen (NORCO) 5-325 mg per tablet, Take 1 tablet by mouth 3 (three) times a day as needed for pain., Disp: 84 tablet, Rfl: 0  ?  magnesium oxide (MAG-OX) 400 mg (241.3 mg  magnesium) tablet, Take 1 tablet (400 mg total) by mouth daily., Disp: 30 tablet, Rfl: 1  ?  [START ON 6/9/2020] pregabalin (LYRICA) 150 MG capsule, Take 1 capsule (150 mg total) by mouth 2 (two) times a day., Disp: 56 capsule, Rfl: 1    Lab:  Last UDS on 11/26/2019 had expected results. Last UDT on file was reviewed.    Imaging:  No new imaging reviewed with patient over the phone, no new orders placed       Recent   Dated 5/19/2020 was reviewed with the patient today.   Paper copy reviewed     Assessment:     Rohan Prince is a 44 y.o. female seen in clinic today for   Chief Complaint   Patient presents with   ? Follow-up     back, bilat knee, rt wrist and neck pain       They are here for follow up and continued medical management of their pain. Today we reviewed the plan of care for their chronic pain and determined that the patient will need a refill of the current prescription.      Due to the Covid 19 precautions all visits are converted to telephone encounters until the government restrictions are lifted and the precautions have been lifted.     New concerns today- her right hand goes numb when using it- the palm- intermittently. As well as medication management     Any new diagnosis since the last visit- none     Any new prescriptions since the last visit- none  Patient denies side effects from the current regimen  Plan of care going forward for ongoing pain control- walking the patient through the tinels position she notes pain is re-illicited. She notes that she will follow up with her PCP in regards to this. In regards to her fibromyalgia for which she is coming to the pain center. She is still following along with her psychiatrist and notes that she is is trying to keep up on this. She notes that when she take her pain medication and feels that this is helpful in reducing her pain so that she can function. She also notes that she is trying to find balance. She is very open about her care and  diagnoses.   Patients current MME is 15    Plan:   Interventions-    Follow up in 8 weeks     Continue the use of the Lyrica   Continue the use of the Norco up to 3 per day ok to fill on 6/9/2020-7/7    Follow up with PCP about concerns in regards to her right carpel tunnel.     This limited telehealth encounter is due to the Covid 19,  as required by the governmental agencies at this time due to risk of transmission of the pandemic, therefore testing availability is limited as well as physical exams.      Prescription Drug Management will be continued by the [facility]    Orders placed today  Medications that were ordered today   Requested Prescriptions     Signed Prescriptions Disp Refills   ? HYDROcodone-acetaminophen (NORCO) 5-325 mg per tablet 84 tablet 0     Sig: Take 1 tablet by mouth 3 (three) times a day as needed for pain.   ? pregabalin (LYRICA) 150 MG capsule 56 capsule 1     Sig: Take 1 capsule (150 mg total) by mouth 2 (two) times a day.     No orders of the defined types were placed in this encounter.        The patient understand todays plan and has their questions answered in regards to expectations and current treatment plan.     Prevent unexpected access/loss of medication: Keep medication locked. Only carry what you need with you    The plan of care will be adjusted to accommodate the issues discussed, discussing management of their care and follow up that is recommended. 5/19/2020         DUNG Mendoza [facility]  [address]  [place] [address]  Ph: [phone]  Fax: [phone]

## 2021-06-29 NOTE — PROGRESS NOTES
"Progress Notes by Patricia Morgan CMA at 7/7/2020 10:00 AM     Author: Patricia Morgan CMA Service: -- Author Type: Certified Medical Assistant    Filed: 7/7/2020 10:52 AM Date of Service: 7/7/2020 10:00 AM Status: Signed    : Patricia Morgan CMA (Certified Medical Assistant)       Pt is being treated today via telephone visit with Mary Muniz CNP, for refill and f/u of back, neck, rt knee and multisite pain.    Rohan Prince is a 44 y.o. female who is being evaluated via a billable telephone visit.      The patient has been notified of following:     \"This telephone visit will be conducted via a call between you and your physician/provider. We have found that certain health care needs can be provided without the need for a physical exam.  This service lets us provide the care you need with a short phone conversation.  If a prescription is necessary we can send it directly to your pharmacy.  If lab work is needed we can place an order for that and you can then stop by our lab to have the test done at a later time.    Telephone visits are billed at different rates depending on your insurance coverage. During this emergency period, for some insurers they may be billed the same as an in-person visit.  Please reach out to your insurance provider with any questions.    If during the course of the call the physician/provider feels a telephone visit is not appropriate, you will not be charged for this service.\"    Patient has given verbal consent to a Telephone visit? Yes    What phone number would you like to be contacted at? [phone]    Patient would like to receive their AVS by AVS Preference: Rashid.    Pain score 9  Constant   What does your pain like feel during a flare? Sharp, stabbing  Does the pain interfere with:  Work ----- disabled  Walking ------ yes  Sleep ------- yes  Daily activities ------yes  Relationships -------yes  F=7          Patricia Morgan CMA           "

## 2021-06-29 NOTE — PROGRESS NOTES
"Progress Notes by Patricia Morgan CMA at 5/19/2020 10:00 AM     Author: Patricia Morgan CMA Service: -- Author Type: Certified Medical Assistant    Filed: 5/19/2020 10:36 AM Date of Service: 5/19/2020 10:00 AM Status: Signed    : Patricia Morgan CMA (Certified Medical Assistant)       Pt is being treated today via telephone visit with Mary Muniz CNP, for refills and f/u of back, neck, janey knee and rt wrist pain.     Rohna Prince is a 44 y.o. female who is being evaluated via a billable telephone visit.      The patient has been notified of following:     \"This telephone visit will be conducted via a call between you and your physician/provider. We have found that certain health care needs can be provided without the need for a physical exam.  This service lets us provide the care you need with a short phone conversation.  If a prescription is necessary we can send it directly to your pharmacy.  If lab work is needed we can place an order for that and you can then stop by our lab to have the test done at a later time.    Telephone visits are billed at different rates depending on your insurance coverage. During this emergency period, for some insurers they may be billed the same as an in-person visit.  Please reach out to your insurance provider with any questions.    If during the course of the call the physician/provider feels a telephone visit is not appropriate, you will not be charged for this service.\"    Patient has given verbal consent to a Telephone visit? Yes    What phone number would you like to be contacted at? [phone]    Patient would like to receive their AVS by AVS Preference: Rashid.    Pain score 8  Constant and intermittent  What does your pain like feel during a flare? Tingling, numb, prickling, burning, throbbing  Does the pain interfere with:  Work ----- NA  Walking ------ yes  Sleep ------- yes  Daily activities ------yes  Relationships -------yes  F= " 7          Patricia Morgan, CMA

## 2021-06-29 NOTE — PROGRESS NOTES
"Progress Notes by Mary Muniz CNP at 7/7/2020 10:00 AM     Author: Mary Muniz CNP Service: -- Author Type: Nurse Practitioner    Filed: 7/7/2020 10:52 AM Date of Service: 7/7/2020 10:00 AM Status: Signed    : Mary Muniz CNP (Nurse Practitioner)       Rohan Prince is a 44 y.o. female who is being evaluated via a billable telephone visit.      Start time- 10:34 am   End time- 10:50 am 15:37      The patient has been notified of following:     \"This telephone visit will be conducted via a call between you and your physician/provider. We have found that certain health care needs can be provided without the need for a physical exam.  This service lets us provide the care you need with a short phone conversation.  If a prescription is necessary we can send it directly to your pharmacy.  If lab work is needed we can place an order for that and you can then stop by our lab to have the test done at a later time.    If during the course of the call the physician/provider feels a telephone visit is not appropriate, you will not be charged for this service.\"     Rohan Prince complains of    Chief Complaint   Patient presents with   ? Follow-up     back, neck, rt knee and multisite pain       I have reviewed and updated the patient's Past Medical History, Social History, Family History and Medication List as well as the Precharting workup by the [facility].       PAIN CLINIC FOLLOW UP PROGRESS NOTE    CC:  Chief Complaint   Patient presents with   ? Follow-up     back, neck, rt knee and multisite pain       HPI  Rohan Prince is a 44 y.o. female who presents for evaluation   Chief Complaint   Patient presents with   ? Follow-up     back, neck, rt knee and multisite pain    that is causing continued pain. Specific questions indicated the patient wanted addressed today include: chronic pain management       Major issues:  1. Chronic pain syndrome    2. Fibromyalgia    3. Vitamin D deficiency, " unspecified     4. Chronic midline low back pain, unspecified whether sciatica present        Pain score 9  Constant   What does your pain like feel during a flare? Sharp, stabbing  Does the pain interfere with:  Work ----- disabled  Walking ------ yes  Sleep ------- yes  Daily activities ------yes  Relationships -------yes  F=7               ALLERGIES  Chlorhexidine    Previous Medical History  Social History     Substance and Sexual Activity   Alcohol Use No     Social History     Substance and Sexual Activity   Drug Use No     Social History     Tobacco Use   Smoking Status Never Smoker   Smokeless Tobacco Never Used       Pertinent Pain Medications/interventions:    Currently she is taking norco 3 times a day and lyrica- denies any side effects at this time.     She currently takes tramadol currently but notes that this is not helpful like she used to. She used to take  mg, she takes excedrin migraine is assistive in reducing her migraines since was a young lady at 14 years of age    Takes pregablin 75 mg, Sees Cheyenne Sanchez at [place].    Review of Systems:  12 point systems were reviewed with pt as documented on on previous exams. Any new diagnosis or new medication is reviewed today.     Medications    Current Outpatient Medications:   ?  aspirin-acetaminophen-caffeine (EXCEDRIN MIGRAINE) 250-250-65 mg per tablet, Take 1 tablet by mouth every 6 (six) hours as needed for pain., Disp: , Rfl:   ?  ergocalciferol (VITAMIN D2) 1,250 mcg (50,000 unit) capsule, Take 1 capsule (50,000 Units total) by mouth every 7 days., Disp: 4 capsule, Rfl: 11  ?  HYDROcodone-acetaminophen (NORCO) 5-325 mg per tablet, Take 1 tablet by mouth 3 (three) times a day as needed for pain., Disp: 84 tablet, Rfl: 0  ?  magnesium oxide (MAG-OX) 400 mg (241.3 mg magnesium) tablet, Take 1 tablet (400 mg total) by mouth daily., Disp: 30 tablet, Rfl: 1  ?  pregabalin (LYRICA) 150 MG capsule, Take 1 capsule (150 mg total) by mouth 2 (two)  times a day., Disp: 56 capsule, Rfl: 1    Lab:  Last UDS on 11/26/2019 had expected results. Last UDT on file was reviewed.    Imaging:  Reviewed imaging from the ED visits.     LOCATION: [facility]  DATE/TIME: 6/10/2020 11:40 PM     INDICATION: Low back pain  COMPARISON: None.  CONTRAST: Gadavist 10  TECHNIQUE: Without and with IV contrast     FINDINGS:   Nomenclature is based on 5 lumbar type vertebral bodies. Normal vertebral body heights, alignment and marrow signal. Normal distal spinal cord and cauda equina with conus medullaris at lower L1. No extraspinal abnormality. Unremarkable visualized bony   pelvis.     T12-L1: Normal disc height and signal. No herniation. Normal facets. No spinal canal or neural foraminal stenosis.      L1-L2: Disc dehydration. Mild narrowing of intervertebral space. Minimal left paracentral to foraminal disc protrusion. Mild facet arthropathy. No significant canal or foraminal narrowing.     L2-L3: Normal disc height and signal. No herniation. Normal facets. No spinal canal or neural foraminal stenosis.      L3-L4: Disc dehydration. Mild narrowing of intervertebral space. Minimal central disc protrusion with annular tear. Mild facet arthropathy. No significant canal narrowing. Slight left foraminal narrowing, new since prior.     L4-L5: Disc dehydration. Mild narrowing of intervertebral space. Mild central/left paracentral disc protrusion with annular tear with abutment and minimal mass effect on traversing left L5 nerve root. This has slightly progressed since prior. Mild facet   arthropathy. Mild left foraminal narrowing, new. Mild to moderate left lateral recess narrowing. No significant canal narrowing.     L5-S1: Disc dehydration. Moderate narrowing of intervertebral space. Mild diffuse disc bulge and facet arthropathy. No significant canal narrowing. Moderate left and mild right foraminal narrowing, slightly progressed bilaterally.     IMPRESSION:   1.  Normal distal  cord.  2.  Mild degenerative changes without significant canal narrowing at any level.  3.  Mild central/left paracentral disc protrusion at L4-L5 with annular tear with abutment and minimal mass effect on traversing left L5 nerve root. This has slightly progressed since prior.  4.  Moderate left and mild right foraminal narrowing at L5-S1, slightly progressed bilaterally.  5.  Mild left foraminal narrowing at L4-L5, new. Mild to moderate left lateral recess narrowing.    EXAM: MR BRAIN W WO CONTRAST  LOCATION: [facility]  DATE/TIME: 6/10/2020 11:39 PM     INDICATION: Headaches, history of meningioma  COMPARISON: 12/31/2019  CONTRAST: 10 mL of Gadavist  TECHNIQUE: Routine multiplanar multisequence head MRI without and with intravenous contrast.     FINDINGS:  INTRACRANIAL CONTENTS: No acute or subacute infarct. Stable small meningioma along the squamosal portion of the right temporal bone. Stable small paraclinoid meningioma along the medial and anterior aspect of the middle cranial fossa on the left. This is   along the outer aspect of the orbital apex and does not appear to involve the optic nerve or skull base foramina. Stable small cyst in the anterior right temporal lobe measuring up to 11 mm and a small arachnoid cyst in the anterior cranial fossa on the   right side measuring 6 mm in width. No significant mass effect on the adjacent structures. Stable enlargement of the vestibular aqueduct on the right side measuring up to 4 mm in width. Normal brain parenchymal signal. Normal ventricles and sulci.   Normal position of the cerebellar tonsils.     SELLA: No abnormality accounting for technique.     OSSEOUS STRUCTURES/SOFT TISSUES: Normal marrow signal. The major intracranial vascular flow voids are maintained.     ORBITS: No abnormality accounting for technique.     SINUSES/MASTOIDS: No paranasal sinus mucosal disease. No middle ear or mastoid effusion.     IMPRESSION:   1.  No acute or subacute  infarct.  2.  Stable small meningioma along the squamosal portion of the right temporal bone.  3.  Stable small paraclinoid meningioma along the medial and anterior aspect of the middle cranial fossa on the left.  4.  Stable small cyst in the anterior right temporal lobe measuring up to 11 mm and a small arachnoid cyst in the anterior cranial fossa on the right side measuring 6 mm in width.  5.  Stable enlargement of the vestibular aqueduct on the right side measuring up to 4 mm in width.    Recent   Dated 7/7/2020 was reviewed with the patient today.   Paper copy reviewed     Assessment:     Rohan Prince is a 44 y.o. female seen in clinic today for   Chief Complaint   Patient presents with   ? Follow-up     back, neck, rt knee and multisite pain       They are here for follow up and continued medical management of their pain. Today we reviewed the plan of care for their chronic pain and determined that the patient will need a refill of the current prescription.      Due to the Covid 19 precautions all visits are converted to telephone encounters until the government restrictions are lifted and the precautions have been lifted.     New concerns today- ongoing chronic pain, she notes that she has hit her head and fell since her last appointment     Any new diagnosis since the last visit- slight progression of her arthritic degeneration in her low back and ongoing headache.     Any new prescriptions since the last visit- none at this time.     Patient denies side effects from the current regimen    Plan of care going forward for ongoing pain control- she notes that the use of lyrica and norco are helpful for her ongoing chronic pain, she notes that she is using her walker at home with balance issues. She notes that her ankles still swell at times and sometimes start burning. She reports elevating them when needed- working with Dr. Rosenstein on this. No changes in her pain regiemen otherwise. Refills have been  sent to the pharmacy     Patients current MME is 15    Plan:   Interventions-    Follow up in 8 weeks or PRN as needed.     Refills sent to the pharmacy for norco and lyrica. Fill 7/7/2020    Patient plans on traveling to the [facility] this week for vacation    This limited telehealth encounter is due to the Covid 19,  as required by the governmental agencies at this time due to risk of transmission of the pandemic, therefore testing availability is limited as well as physical exams.      Prescription Drug Management will be continued by the [facility]    Orders placed today  Medications that were ordered today   Requested Prescriptions     Pending Prescriptions Disp Refills   ? HYDROcodone-acetaminophen (NORCO) 5-325 mg per tablet 84 tablet 0     Sig: Take 1 tablet by mouth 3 (three) times a day as needed for pain.   ? pregabalin (LYRICA) 150 MG capsule 56 capsule 1     Sig: Take 1 capsule (150 mg total) by mouth 2 (two) times a day.     No orders of the defined types were placed in this encounter.        The patient understand todays plan and has their questions answered in regards to expectations and current treatment plan.     Prevent unexpected access/loss of medication: Keep medication locked. Only carry what you need with you    The plan of care will be adjusted to accommodate the issues discussed, discussing management of their care and follow up that is recommended. 7/7/2020         DUNG Mendoza [facility]  [address]  [place] [address]  Ph: [phone]  Fax: [phone]

## 2021-07-03 NOTE — ADDENDUM NOTE
Addendum Note by Josie Astorga, Ph.D,LP at 4/29/2020 11:00 AM     Author: Josie Astorga, Ph.D,LP Service: Psychology Author Type: Psychologist    Filed: 4/29/2020  4:39 PM Date of Service: 4/29/2020 11:00 AM Status: Signed    : Josie Astorga, Ph.D,LP (Psychologist)    Encounter addended by: Josie Astorga, Ph.D,LP on: 4/29/2020  4:39   PM      Actions taken: Clinical Note Signed

## 2021-07-03 NOTE — ADDENDUM NOTE
Addendum Note by Zuly Mukherjee RN at 10/8/2020  9:20 AM     Author: Zuly Mukherjee RN Service: -- Author Type: Registered Nurse    Filed: 10/8/2020  9:51 AM Encounter Date: 10/8/2020 Status: Signed    : Zuly Mukherjee RN (Registered Nurse)    Addended by: ZULY MUKHERJEE on: 10/8/2020 09:51 AM        Modules accepted: Orders

## 2021-07-03 NOTE — ADDENDUM NOTE
Addendum Note by Mary Muniz CNP at 12/1/2020  9:20 AM     Author: Mary Munzi CNP Service: -- Author Type: Nurse Practitioner    Filed: 12/1/2020 10:20 AM Date of Service: 12/1/2020  9:20 AM Status: Signed    : Mary Muniz CNP (Nurse Practitioner)    Encounter addended by: Mary Muniz CNP on: 12/1/2020 10:20 AM      Actions taken: Clinical Note Signed

## 2021-07-03 NOTE — ADDENDUM NOTE
Addendum Note by Josie Asotrga, Ph.D,LP at 3/30/2020  9:00 AM     Author: Josie Astorga, Ph.D,LP Service: Psychology Author Type: Psychologist    Filed: 4/1/2020 11:22 AM Date of Service: 3/30/2020  9:00 AM Status: Signed    : Josie Astorga, Ph.D,LP (Psychologist)    Encounter addended by: Josie Astorga, Ph.D,LP on: 4/1/2020 11:22   AM      Actions taken: Clinical Note Signed

## 2021-07-03 NOTE — ADDENDUM NOTE
Addendum Note by Mary Muniz CNP at 7/7/2020 10:00 AM     Author: Mary Muniz CNP Service: -- Author Type: Nurse Practitioner    Filed: 7/7/2020 10:53 AM Date of Service: 7/7/2020 10:00 AM Status: Signed    : Mary Muniz CNP (Nurse Practitioner)    Encounter addended by: Mary Muniz CNP on: 7/7/2020 10:53 AM      Actions taken: Clinical Note Signed

## 2021-07-03 NOTE — ADDENDUM NOTE
Addendum Note by Josie Astorga, Ph.D,LP at 3/23/2020 11:00 AM     Author: Josie Astorga, Ph.D,LP Service: Psychology Author Type: Psychologist    Filed: 4/1/2020 11:33 AM Date of Service: 3/23/2020 11:00 AM Status: Signed    : Josie Astorga, Ph.D,LP (Psychologist)    Encounter addended by: Josie Astorga, Ph.D,LP on: 4/1/2020 11:33   AM      Actions taken: Clinical Note Signed

## 2021-07-03 NOTE — ADDENDUM NOTE
Addendum Note by Ruthie Avila at 5/19/2020 10:00 AM     Author: Ruthie Avila Service: -- Author Type: --    Filed: 5/21/2020  9:53 AM Date of Service: 5/19/2020 10:00 AM Status: Signed    : Ruthie Avila    Encounter addended by: Ruthie Avila on: 5/21/2020  9:53 AM      Actions taken: Charge Capture section accepted

## 2021-07-03 NOTE — ANESTHESIA PREPROCEDURE EVALUATION
Anesthesia Preprocedure Evaluation by Jesse Roberts MD at 1/17/2019  6:40 AM     Author: Jesse Roberts MD Service: -- Author Type: Physician    Filed: 1/17/2019  6:42 AM Date of Service: 1/17/2019  6:40 AM Status: Signed    : Jesse Roberts MD (Physician)       Anesthesia Evaluation      Patient summary reviewed   No history of anesthetic complications     Airway   Mallampati: II  Neck ROM: limited   Pulmonary - negative ROS and normal exam                          Cardiovascular - normal exam  Exercise tolerance: > or = 4 METS   Neuro/Psych    (+) neuromuscular disease (fibromyalgia),  chronic pain (baseline pain 8/10)    Comments: Bilateral meningiomas being followed by neurosurgery    Chronic migraines    H/o TIA    Endo/Other    (+) arthritis,      GI/Hepatic/Renal - negative ROS   (-) GERD     Other findings: Cervical cancer      Dental    (+) poor dentition                           Anesthesia Plan  Planned anesthetic: general LMA and peripheral nerve block  Will avoid doing a spinal anesthetic due to her bilateral meningiomas.   Plan for pop/tibial blocks for postop pain.   General with LMA  Decadron/zofran  ASA 3   Induction: intravenous   Anesthetic plan and risks discussed with: patient  Anesthesia plan special considerations: antiemetics,   Post-op plan: routine recovery      Results for orders placed or performed in visit on 12/21/18   INR   Result Value Ref Range    INR 1.09 0.90 - 1.10     Labs reviewed.  Imaging reviewed.

## 2021-07-03 NOTE — ADDENDUM NOTE
Addendum Note by Ruthie Avila at 1/25/2021  9:20 AM     Author: Ruthie Avila Service: -- Author Type: --    Filed: 1/27/2021  6:50 AM Date of Service: 1/25/2021  9:20 AM Status: Signed    : Ruthie Avila    Encounter addended by: Ruthie Avila on: 1/27/2021  6:50 AM      Actions taken: Charge Capture section accepted

## 2021-07-03 NOTE — ADDENDUM NOTE
Addendum Note by Ruthie Avila at 7/7/2020 10:00 AM     Author: Ruthie Avila Service: -- Author Type: --    Filed: 7/17/2020  8:55 AM Date of Service: 7/7/2020 10:00 AM Status: Signed    : Ruthie Avila    Encounter addended by: Ruthie Avila on: 7/17/2020  8:55 AM      Actions taken: Charge Capture section accepted

## 2021-07-04 NOTE — ADDENDUM NOTE
Addendum Note by Ruthie Avila at 5/21/2021 10:00 AM     Author: Ruthie Avila Service: -- Author Type: --    Filed: 5/24/2021  6:54 AM Date of Service: 5/21/2021 10:00 AM Status: Signed    : Ruthie Avila    Encounter addended by: Ruthie Avila on: 5/24/2021  6:54 AM      Actions taken: Charge Capture section accepted

## 2021-07-04 NOTE — ADDENDUM NOTE
Addendum Note by Ruthie Avila at 3/26/2021 10:40 AM     Author: Ruthie Avila Service: -- Author Type: --    Filed: 3/29/2021  5:48 AM Date of Service: 3/26/2021 10:40 AM Status: Signed    : Ruthie Avila    Encounter addended by: Ruthie Avila on: 3/29/2021  5:48 AM      Actions taken: Charge Capture section accepted

## 2021-07-13 ENCOUNTER — OFFICE VISIT (OUTPATIENT)
Dept: NEUROLOGY | Facility: CLINIC | Age: 46
End: 2021-07-13
Payer: MEDICARE

## 2021-07-13 DIAGNOSIS — F31.30 BIPOLAR I DISORDER, MOST RECENT EPISODE DEPRESSED (H): Primary | ICD-10-CM

## 2021-07-13 PROCEDURE — 90834 PSYTX W PT 45 MINUTES: CPT | Performed by: PSYCHOLOGIST

## 2021-07-13 NOTE — PROGRESS NOTES
"Psychology Progress Note    Date: July 13, 2021    Time length and type of treatment: 47 minutes (3:05 PM to 3:52 PM), individual therapy-in person session    Necessity: This session is necessary to address the patient's bipolar 1 disorder, ZAKIA, panic disorder, agoraphobia, PTSD, and OCD.  Today we focus on the patient's treatment plan, specifically exploring thoughts and expectations of self and others.  The reader is invited to review the patient's full treatment plan in the Media section of the patient's Epic medical record.    Psychotherapeutic Technique: This writer utilized motivational interviewing, active listening, reassurance and support in the context of cognitive behavioral therapy to address the above.      Mental Status:   Grooming: Within normal limits  Attire: Appropriate  Age: Appears Stated  Behavior Towards Examiner: Cooperative  Motor Activity: Within normal limits  Eye Contact: Appropriate  Mood: Anxious   Affect: Appropriate to situation  Speech/Language: Within normal limits  Attention: Normal  Concentration: Within normal limits  Thought Process: tangential  Thought Content: No evidence of delusions or hallucinations  Orientation: Appeared oriented to person, place, and time, though not formally established  Memory: No evidence of impairment.  Judgement: Good  Estimated Intelligence: Average  Demonstrated Insight: Adequate  Fund of Knowledge: Good      Intervention:   Patient acknowledged her discomfort with her own emotions, ambivalence over participating in therapy, and tendency to avoid talking about things she knows are important.  Patient fears were explored as well as how to help patient feel safe in therapy.  Patient also discussed her discomfort with her daughters' tears and tendency to tell them to \"stop crying,\" even though she recognizes their crying might be normal and healthy, because of her own discomfort and her fear that their tears will cause others to perceive them as weak.  " This will continue to be a focus of clinical attention    Progress:  Patient was able to let down her guard sufficiently to share regarding her fear of vulnerability.    Plan:   We will meet again in 1 week to address the patient's Bipolar 1 Disorder, anxiety, Panic Disorder, Agoraphobia, PTSD, and OCD.  Estimated duration of treatment is ongoing due to a major mental illness and lengthy trauma history.  Individual therapy sessions (45615) at twice monthly intervals.     Diagnosis:  Bipolar 1 Disorder, severe, most recent episode depressed  Posttraumatic Stress Disorder  Panic Disorder  Agoraphobia  Generalized Anxiety Disorder   Obsessive-Compulsive Disorder, with poor insight

## 2021-07-13 NOTE — LETTER
7/13/2021         RE: Rohan Prince  [address]  [place] [address]        Dear Colleague,    Thank you for referring your patient, Rohan Prince, to the [facility]. Please see a copy of my visit note below.    Psychology Progress Note    Date: July 13, 2021    Time length and type of treatment: 47 minutes (3:05 PM to 3:52 PM), individual therapy-in person session    Necessity: This session is necessary to address the patient's bipolar 1 disorder, ZAKIA, panic disorder, agoraphobia, PTSD, and OCD.  Today we focus on the patient's treatment plan, specifically exploring thoughts and expectations of self and others.  The reader is invited to review the patient's full treatment plan in the Media section of the patient's Epic medical record.    Psychotherapeutic Technique: This writer utilized motivational interviewing, active listening, reassurance and support in the context of cognitive behavioral therapy to address the above.      Mental Status:   Grooming: Within normal limits  Attire: Appropriate  Age: Appears Stated  Behavior Towards Examiner: Cooperative  Motor Activity: Within normal limits  Eye Contact: Appropriate  Mood: Anxious   Affect: Appropriate to situation  Speech/Language: Within normal limits  Attention: Normal  Concentration: Within normal limits  Thought Process: tangential  Thought Content: No evidence of delusions or hallucinations  Orientation: Appeared oriented to person, place, and time, though not formally established  Memory: No evidence of impairment.  Judgement: Good  Estimated Intelligence: Average  Demonstrated Insight: Adequate  Fund of Knowledge: Good      Intervention:   Patient acknowledged her discomfort with her own emotions, ambivalence over participating in therapy, and tendency to avoid talking about things she knows are important.  Patient fears were explored as well as how to help patient feel safe in therapy.  Patient also discussed her  "discomfort with her daughters' tears and tendency to tell them to \"stop crying,\" even though she recognizes their crying might be normal and healthy, because of her own discomfort and her fear that their tears will cause others to perceive them as weak.  This will continue to be a focus of clinical attention    Progress:  Patient was able to let down her guard sufficiently to share regarding her fear of vulnerability.    Plan:   We will meet again in 1 week to address the patient's Bipolar 1 Disorder, anxiety, Panic Disorder, Agoraphobia, PTSD, and OCD.  Estimated duration of treatment is ongoing due to a major mental illness and lengthy trauma history.  Individual therapy sessions (71596) at twice monthly intervals.     Diagnosis:  Bipolar 1 Disorder, severe, most recent episode depressed  Posttraumatic Stress Disorder  Panic Disorder  Agoraphobia  Generalized Anxiety Disorder   Obsessive-Compulsive Disorder, with poor insight        Again, thank you for allowing me to participate in the care of your patient.        Sincerely,        Josie Astorga Psy.D, LP    "

## 2021-07-21 DIAGNOSIS — G89.4 CHRONIC PAIN SYNDROME: Primary | ICD-10-CM

## 2021-07-21 RX ORDER — HYDROCODONE BITARTRATE AND ACETAMINOPHEN 5; 325 MG/1; MG/1
TABLET ORAL
COMMUNITY
Start: 2021-06-21 | End: 2021-07-21

## 2021-07-21 RX ORDER — HYDROCODONE BITARTRATE AND ACETAMINOPHEN 5; 325 MG/1; MG/1
1-2 TABLET ORAL 4 TIMES DAILY PRN
Qty: 12 TABLET | Refills: 0 | Status: SHIPPED | OUTPATIENT
Start: 2021-07-21 | End: 2021-07-23

## 2021-07-21 NOTE — TELEPHONE ENCOUNTER
Medication being requested: Norco  Last visit date: 5/21/21.  Provider: KENYATTA  Next visit date: 7/23/21.  Provider: KENYATTA  Expected follow up: 8 weeks  MTM visit (Pain Center) date: No  UDT date: 9/2020  Agreement date: 9/2020   (Last fill date; name; strength; provider; MME; quantity):  06/21/2021 Hydrocodone-Acetamin 5-325 Mg Qty: 112 for 28 days Cr Mon  Pertinent between visit information about requested medication (telephone, mychart, prior authorization, concerns, comments): No  Script being sent to provider by nurse- dates and quantity:   7/21/21-7/24/21 bridge through appointment  Pending Prescriptions:                       Disp   Refills    HYDROcodone-acetaminophen (NORCO) 5-325 M*12 tab*0            Sig: Take 1-2 tablets by mouth 4 times daily as needed           (chronic pain - Max of 4 tablets per day)    Signed Prescriptions:                        Disp   Refills    HYDROcodone-acetaminophen (NORCO) 5-325 MG*                Sig: TAKE 1-2 TABLETS BY MOUTH FOUR TIMES DAILY AS NEEDED           PAIN WITH A MAX OF 4 TABLETS DAILY  Authorizing Provider: PATIENT REPORTED  Ordering User: FERNIE CELAYA    Pharmacy cued: [place] [id number] [address]  Standing orders for withdrawal protocol implemented: CARLOS ENRIQUE

## 2021-07-23 ENCOUNTER — VIRTUAL VISIT (OUTPATIENT)
Dept: PALLIATIVE MEDICINE | Facility: OTHER | Age: 46
End: 2021-07-23
Payer: MEDICARE

## 2021-07-23 DIAGNOSIS — G89.4 CHRONIC PAIN SYNDROME: ICD-10-CM

## 2021-07-23 PROCEDURE — 999N001193 HC VIDEO/TELEPHONE VISIT; NO CHARGE: Performed by: NURSE PRACTITIONER

## 2021-07-23 PROCEDURE — 99213 OFFICE O/P EST LOW 20 MIN: CPT | Mod: 95 | Performed by: NURSE PRACTITIONER

## 2021-07-23 RX ORDER — HYDROCODONE BITARTRATE AND ACETAMINOPHEN 5; 325 MG/1; MG/1
1-2 TABLET ORAL 4 TIMES DAILY PRN
Qty: 112 TABLET | Refills: 0 | Status: SHIPPED | OUTPATIENT
Start: 2021-07-23 | End: 2021-08-20

## 2021-07-23 RX ORDER — PREGABALIN 150 MG/1
150 CAPSULE ORAL
COMMUNITY
Start: 2021-05-21 | End: 2021-07-23

## 2021-07-23 RX ORDER — PREGABALIN 150 MG/1
150 CAPSULE ORAL 2 TIMES DAILY
Qty: 60 CAPSULE | Refills: 1 | Status: SHIPPED | OUTPATIENT
Start: 2021-07-23 | End: 2021-09-17

## 2021-07-23 ASSESSMENT — PAIN SCALES - GENERAL: PAINLEVEL: EXTREME PAIN (8)

## 2021-07-23 NOTE — LETTER
7/23/2021         RE: Rohna Prince  [address]  [place] [address]        Dear Colleague,    Thank you for referring your patient, Rohan Prince, to the [facility]. Please see a copy of my visit note below.    Rohan Prince is a 45 y.o. female who is being evaluated via a billable video visit.       How would you like to obtain your AVS? MyChart.  If dropped from the video visit, the video invitation should be resent by: [phone] RITIKA  Will anyone else be joining your video visit? No     Pain score: 8  Constant   What does your pain feel like: Burning, jabbing, tingling, numbness, throbbing  Does the pain interfere with:  Work ----- NA  Walking ------ yes  Sleep ------- yes  Daily activities ------yes  Relationships -------yes  F=7    Rohan Prince is a 45 year old female who is being evaluated with [employer] or [employer] services- via video       Start time- 11:07 am   End time- 11:33 am   Patient location- of site- home  Provider location- off site- virtual     Subjective-    I have reviewed and updated the patient's Past Medical History, Social History, Family History and Medication List as well as the Precharting workup by the [facility].     PAIN CLINIC FOLLOW UP PROGRESS NOTE    CC:chief complaint    HPI  Rohan Prince is a 45 year old female who presents for evaluation chief complaint that is causing continued pain. Specific questions indicated the patient wanted addressed today include: follow up on her ongoing current pain regimen.         Objective-  Major issues:  1. Chronic pain syndrome        Pain score: 8  Constant   What does your pain feel like: Burning, jabbing, tingling, numbness, throbbing  Does the pain interfere with:  Work ----- NA  Walking ------ yes  Sleep ------- yes  Daily activities ------yes  Relationships -------yes  F=7    ALLERGIES  Chlorhexidine    Previous Medical History  Social History    Substance and Sexual Activity      Alcohol use: No    History   Drug  Use No     History   Smoking Status     Never Smoker   Smokeless Tobacco     Never Used       Pertinent Pain Medications/interventions:    7/23/2021- Norco up to 4 per day and Lyrica 150 mg BID, discussed adding Zinc 15-25 mg daily and magnesium 400 mg at bedtime    12/1/2020- increase norco to 4 times a day for the low back pain. Continue the lyrica for the fibromyalgia. Will start Co q 10 as well      Currently she is taking norco 3 times a day and lyrica- denies any side effects at this time.      She currently takes tramadol currently but notes that this is not helpful like she used to. She used to take  mg, she takes excedrin migraine is assistive in reducing her migraines since was a young lady at 14 years of age     Takes pregablin 75 mg, Sees Cheyenne Sanchez at [facility].      Medications    Current Outpatient Medications:      HYDROcodone-acetaminophen (NORCO) 5-325 MG tablet, Take 1-2 tablets by mouth 4 times daily as needed for moderate to severe pain (Max of 4 tablets per day), Disp: 112 tablet, Rfl: 0     pregabalin (LYRICA) 150 MG capsule, Take 1 capsule (150 mg) by mouth 2 times daily, Disp: 60 capsule, Rfl: 1      Lab:  Last UDS on 9/9/2020 had expected results. Last UDT on file was reviewed.    Imaging:  No new imaging reviewed with patient over the phone, no new orders placed       Recent   Dated 7/23/2021 was reviewed.       Assessment:     Rohan Prince is a 45 year old female seen in clinic today for chief complaint    New concerns today- she has cut down on her soda intake, she is down to 1 soda every other day.     Plan of care going forward for ongoing pain control- she is increasing her activity with increasing steps. Discussed meditation techniques today.     Medication refills- she needs a refill of lyrica as ell as norco up to 4 per day which helps her complete her ADLs as well as activities.    Fibromyalgia- is being managed with lyrica, diet and low activity movements    Back  pain- managed with norco, diet and activity.     Mental health- she is following along with her psychiatrist.      Patients current MME is 15    Plan:   Interventions-    Follow up in 8 weeks      Continue the use of the Norco- 4 per day. Next refill on 7/23/2021-8/20/2021     Continue the lyrica 150 mg bid refills sent     Recommend [id number] mg daily after a good breakfast.    Recommend magnesium 400 mg at bedtime for muscle support.      Try piliates instead of yoga as this is a laying down position due to balance concerns. Continue the use of the stair climbing as you are.        Orders placed today  Medications that were ordered today   Signed Prescriptions:                        Disp   Refills    HYDROcodone-acetaminophen (NORCO) 5-325 MG*112 ta*0        Sig: Take 1-2 tablets by mouth 4 times daily as needed for           moderate to severe pain (Max of 4 tablets per           day)  Authorizing Provider: HARINI STRATTON    pregabalin (LYRICA) 150 MG capsule         60 cap*1        Sig: Take 1 capsule (150 mg) by mouth 2 times daily  Authorizing Provider: HARINI STRATTON    No orders of the defined types were placed in this encounter.        The patient understand todays plan and has their questions answered in regards to expectations and current treatment plan.     Prevent unexpected access/loss of medication: Keep medication locked. Only carry what you need with you    The plan of care will be adjusted to accommodate the issues discussed, discussing management of their care and follow up that is recommended. 7/23/2021         ILIR Juares CNP  [facility]  [address]  [place] [address]  Ph: [phone]  Fax: [phone]            Again, thank you for allowing me to participate in the care of your patient.        Sincerely,        ILIR Juares CNP

## 2021-07-23 NOTE — PROGRESS NOTES
Rohan Prince is a 45 y.o. female who is being evaluated via a billable video visit.       How would you like to obtain your AVS? MyChart.  If dropped from the video visit, the video invitation should be resent by: [phone] RITIKA  Will anyone else be joining your video visit? No     Pain score: 8  Constant   What does your pain feel like: Burning, jabbing, tingling, numbness, throbbing  Does the pain interfere with:  Work ----- NA  Walking ------ yes  Sleep ------- yes  Daily activities ------yes  Relationships -------yes  F=7

## 2021-07-23 NOTE — PROGRESS NOTES
Rohan Prince is a 45 year old female who is being evaluated with [employer] or [employer] services- via video       Start time- 11:07 am   End time- 11:33 am   Patient location- of site- home  Provider location- off site- virtual     Subjective-    I have reviewed and updated the patient's Past Medical History, Social History, Family History and Medication List as well as the Precharting workup by the [facility].     PAIN CLINIC FOLLOW UP PROGRESS NOTE    CC:chief complaint    HPI  Rohan Prince is a 45 year old female who presents for evaluation chief complaint that is causing continued pain. Specific questions indicated the patient wanted addressed today include: follow up on her ongoing current pain regimen.         Objective-  Major issues:  1. Chronic pain syndrome        Pain score: 8  Constant   What does your pain feel like: Burning, jabbing, tingling, numbness, throbbing  Does the pain interfere with:  Work ----- NA  Walking ------ yes  Sleep ------- yes  Daily activities ------yes  Relationships -------yes  F=7    ALLERGIES  Chlorhexidine    Previous Medical History  Social History    Substance and Sexual Activity      Alcohol use: No    History   Drug Use No     History   Smoking Status     Never Smoker   Smokeless Tobacco     Never Used       Pertinent Pain Medications/interventions:    7/23/2021- Norco up to 4 per day and Lyrica 150 mg BID, discussed adding Zinc 15-25 mg daily and magnesium 400 mg at bedtime    12/1/2020- increase norco to 4 times a day for the low back pain. Continue the lyrica for the fibromyalgia. Will start Co q 10 as well      Currently she is taking norco 3 times a day and lyrica- denies any side effects at this time.      She currently takes tramadol currently but notes that this is not helpful like she used to. She used to take  mg, she takes excedrin migraine is assistive in reducing her migraines since was a young lady at 14 years of age     Takes pregablin 75 mg, Paulette Quinn  Daniel at [place].      Medications    Current Outpatient Medications:      HYDROcodone-acetaminophen (NORCO) 5-325 MG tablet, Take 1-2 tablets by mouth 4 times daily as needed for moderate to severe pain (Max of 4 tablets per day), Disp: 112 tablet, Rfl: 0     pregabalin (LYRICA) 150 MG capsule, Take 1 capsule (150 mg) by mouth 2 times daily, Disp: 60 capsule, Rfl: 1      Lab:  Last UDS on 9/9/2020 had expected results. Last UDT on file was reviewed.    Imaging:  No new imaging reviewed with patient over the phone, no new orders placed       Recent   Dated 7/23/2021 was reviewed.       Assessment:     Rohan Prince is a 45 year old female seen in clinic today for chief complaint    New concerns today- she has cut down on her soda intake, she is down to 1 soda every other day.     Plan of care going forward for ongoing pain control- she is increasing her activity with increasing steps. Discussed meditation techniques today.     Medication refills- she needs a refill of lyrica as ell as norco up to 4 per day which helps her complete her ADLs as well as activities.    Fibromyalgia- is being managed with lyrica, diet and low activity movements    Back pain- managed with norco, diet and activity.     Mental health- she is following along with her psychiatrist.      Patients current MME is 15    Plan:   Interventions-    Follow up in 8 weeks      Continue the use of the Norco- 4 per day. Next refill on 7/23/2021-8/20/2021     Continue the lyrica 150 mg bid refills sent     Recommend woff28-28 mg daily after a good breakfast.    Recommend magnesium 400 mg at bedtime for muscle support.      Try piliates instead of yoga as this is a laying down position due to balance concerns. Continue the use of the stair climbing as you are.        Orders placed today  Medications that were ordered today   Signed Prescriptions:                        Disp   Refills    HYDROcodone-acetaminophen (NORCO) 5-325 MG*112 ta*0        Sig:  Take 1-2 tablets by mouth 4 times daily as needed for           moderate to severe pain (Max of 4 tablets per           day)  Authorizing Provider: HARINI STRATTON    pregabalin (LYRICA) 150 MG capsule         60 cap*1        Sig: Take 1 capsule (150 mg) by mouth 2 times daily  Authorizing Provider: HARINI STRATTON    No orders of the defined types were placed in this encounter.        The patient understand todays plan and has their questions answered in regards to expectations and current treatment plan.     Prevent unexpected access/loss of medication: Keep medication locked. Only carry what you need with you    The plan of care will be adjusted to accommodate the issues discussed, discussing management of their care and follow up that is recommended. 7/23/2021         ILIR Juares [facility]  [address]  [place] [address]  Ph: [phone]  Fax: [phone]

## 2021-07-23 NOTE — PATIENT INSTRUCTIONS
Plan:   Interventions-    Follow up in 8 weeks      Continue the use of the Norco- 4 per day. Next refill on 7/23/2021-8/20/2021     Continue the lyrica 150 mg bid refills sent     Recommend [id number] mg daily after a good breakfast.    Recommend magnesium 400 mg at bedtime for muscle support.      Try piliates instead of yoga as this is a laying down position due to balance concerns. Continue the use of the stair climbing as you are.        Orders placed today  Medications that were ordered today   Signed Prescriptions:                        Disp   Refills    HYDROcodone-acetaminophen (NORCO) 5-325 MG*112 ta*0        Sig: Take 1-2 tablets by mouth 4 times daily as needed for           moderate to severe pain (Max of 4 tablets per           day)  Authorizing Provider: HARINI STRATTON    pregabalin (LYRICA) 150 MG capsule         60 cap*1        Sig: Take 1 capsule (150 mg) by mouth 2 times daily  Authorizing Provider: HARINI STRATTON    No orders of the defined types were placed in this encounter.        The patient understand todays plan and has their questions answered in regards to expectations and current treatment plan.     Prevent unexpected access/loss of medication: Keep medication locked. Only carry what you need with you    The plan of care will be adjusted to accommodate the issues discussed, discussing management of their care and follow up that is recommended. 7/23/2021         [facility]  [address]  [place] [address]  Ph: [phone]  Fax: [phone]

## 2021-07-30 ENCOUNTER — VIRTUAL VISIT (OUTPATIENT)
Dept: NEUROLOGY | Facility: CLINIC | Age: 46
End: 2021-07-30
Payer: MEDICARE

## 2021-07-30 DIAGNOSIS — F31.4 SEVERE BIPOLAR I DISORDER, CURRENT OR MOST RECENT EPISODE DEPRESSED (H): Primary | ICD-10-CM

## 2021-07-30 PROCEDURE — 90834 PSYTX W PT 45 MINUTES: CPT | Mod: 95 | Performed by: PSYCHOLOGIST
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
## 2021-08-13 ENCOUNTER — OFFICE VISIT (OUTPATIENT)
Dept: NEUROLOGY | Facility: CLINIC | Age: 46
End: 2021-08-13
Payer: MEDICARE

## 2021-08-13 DIAGNOSIS — F31.30 BIPOLAR I DISORDER, MOST RECENT EPISODE DEPRESSED (H): Primary | ICD-10-CM

## 2021-08-13 PROCEDURE — 90834 PSYTX W PT 45 MINUTES: CPT | Performed by: PSYCHOLOGIST
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
## 2021-08-20 DIAGNOSIS — G89.4 CHRONIC PAIN SYNDROME: ICD-10-CM

## 2021-08-20 NOTE — TELEPHONE ENCOUNTER
Medication being requested: Norco  Last visit date: 7/23/21.  Provider: KENYATTA  Next visit date: 9/17/21.  Provider: KENYATTA  Expected follow up: 8 weeks  MTM visit ([facility]) date: No  UDT date: 9/2020  Agreement date: 9/2020   (Last fill date; name; strength; provider; MME; quantity):  07/23/2021  Hydrocodone-Acetamin 5-325 Mg  112.00 28 Cr Mon    Pertinent between visit information about requested medication (telephone, mychart, prior authorization, concerns, comments): No  Script being sent to provider by nurse- dates and quantity:   Pending Prescriptions:                       Disp   Refills    HYDROcodone-acetaminophen (NORCO) 5-325 M*112 ta*0            Sig: Take 1-2 tablets by mouth 4 times daily as needed           for moderate to severe pain (Max of 4 tablets per           day)    Pharmacy cued: [facility]  Standing orders for withdrawal protocol implemented: CARLOS ENRIQUE

## 2021-08-26 RX ORDER — HYDROCODONE BITARTRATE AND ACETAMINOPHEN 5; 325 MG/1; MG/1
1-2 TABLET ORAL 4 TIMES DAILY PRN
Qty: 112 TABLET | Refills: 0 | Status: SHIPPED | OUTPATIENT
Start: 2021-08-26 | End: 2021-09-17

## 2021-08-27 ENCOUNTER — OFFICE VISIT (OUTPATIENT)
Dept: NEUROLOGY | Facility: CLINIC | Age: 46
End: 2021-08-27
Payer: MEDICARE

## 2021-08-27 DIAGNOSIS — F31.30 BIPOLAR I DISORDER, MOST RECENT EPISODE DEPRESSED (H): Primary | ICD-10-CM

## 2021-08-27 PROCEDURE — 90834 PSYTX W PT 45 MINUTES: CPT | Performed by: PSYCHOLOGIST
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
## 2021-09-17 ENCOUNTER — VIRTUAL VISIT (OUTPATIENT)
Dept: PALLIATIVE MEDICINE | Facility: OTHER | Age: 46
End: 2021-09-17
Payer: MEDICARE

## 2021-09-17 ENCOUNTER — OFFICE VISIT (OUTPATIENT)
Dept: NEUROLOGY | Facility: CLINIC | Age: 46
End: 2021-09-17
Payer: MEDICARE

## 2021-09-17 DIAGNOSIS — F31.30 BIPOLAR I DISORDER, MOST RECENT EPISODE DEPRESSED (H): Primary | ICD-10-CM

## 2021-09-17 DIAGNOSIS — G89.4 CHRONIC PAIN SYNDROME: ICD-10-CM

## 2021-09-17 PROCEDURE — 99214 OFFICE O/P EST MOD 30 MIN: CPT | Mod: 95 | Performed by: NURSE PRACTITIONER

## 2021-09-17 PROCEDURE — 90834 PSYTX W PT 45 MINUTES: CPT | Performed by: PSYCHOLOGIST

## 2021-09-17 RX ORDER — HYDROCODONE BITARTRATE AND ACETAMINOPHEN 5; 325 MG/1; MG/1
1-2 TABLET ORAL 4 TIMES DAILY PRN
Qty: 112 TABLET | Refills: 0 | Status: SHIPPED | OUTPATIENT
Start: 2021-10-21 | End: 2021-11-19

## 2021-09-17 RX ORDER — HYDROCODONE BITARTRATE AND ACETAMINOPHEN 5; 325 MG/1; MG/1
1-2 TABLET ORAL 4 TIMES DAILY PRN
Qty: 112 TABLET | Refills: 0 | Status: SHIPPED | OUTPATIENT
Start: 2021-09-23 | End: 2021-10-21

## 2021-09-17 RX ORDER — PREGABALIN 150 MG/1
150 CAPSULE ORAL 2 TIMES DAILY
Qty: 60 CAPSULE | Refills: 1 | Status: SHIPPED | OUTPATIENT
Start: 2021-09-21 | End: 2021-11-19

## 2021-09-17 ASSESSMENT — PAIN SCALES - GENERAL: PAINLEVEL: EXTREME PAIN (9)
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
## 2021-09-17 NOTE — PROGRESS NOTES
Rohan is a 46 year old who is being evaluated via a billable video visit.      How would you like to obtain your AVS? MyChart  If the video visit is dropped, the invitation should be resent by: Text to cell phone: [phone]  Will anyone else be joining your video visit? No      Video-Visit Details    Type of service:  Video Visit    Distant Location (provider location):  [facility]     Platform used for Video Visit: [facility]    Pain score: 9/10  Constant, sometimes goes away, but not oftne  What does your pain feel like: burning, tingling and jabbing  Does the pain interfere with:  Work: disability  Walking/distance: yes  Sleep: yes  Daily activities: yes  Relationships/social life: yes  Mood: yes  F= 8

## 2021-09-17 NOTE — PATIENT INSTRUCTIONS
Plan:   Interventions-    Follow up in 8 weeks with Shweta - Video is ok.      Continue the use of the Norco- 4 per day. Next refill on 9/23-10/21, 10/21-11/18.      Continue the lyrica 150 mg bid refills sent      Recommend [id number] mg daily after a good breakfast.     Recommend magnesium 400 mg at bedtime for muscle support.          Orders placed today  Medications that were ordered today   Pending Prescriptions:                       Disp   Refills    pregabalin (LYRICA) 150 MG capsule        60 cap*1            Sig: Take 1 capsule (150 mg) by mouth 2 times daily    HYDROcodone-acetaminophen (NORCO) 5-325 M*112 ta*0            Sig: Take 1-2 tablets by mouth 4 times daily as needed           for moderate to severe pain (Max of 4 tablets per           day)    HYDROcodone-acetaminophen (NORCO) 5-325 M*112 ta*0            Sig: Take 1-2 tablets by mouth 4 times daily as needed           for moderate to severe pain (Max of 4 tablets per           day)    Signed Prescriptions:                        Disp   Refills    aspirin-acetaminophen-caffeine (EXCEDRIN M*                Sig: Take 1 tablet by mouth every 6 hours as needed for           headaches  Authorizing Provider: PATIENT REPORTED    No orders of the defined types were placed in this encounter.        The patient understand todays plan and has their questions answered in regards to expectations and current treatment plan.     Prevent unexpected access/loss of medication: Keep medication locked. Only carry what you need with you    The plan of care will be adjusted to accommodate the issues discussed, discussing management of their care and follow up that is recommended. 9/17/2021         ILIR Juares [facility]  [address]  [place] [address]  Ph: [phone]  Fax: [phone]
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
## 2021-10-01 ENCOUNTER — OFFICE VISIT (OUTPATIENT)
Dept: NEUROLOGY | Facility: CLINIC | Age: 46
End: 2021-10-01
Payer: MEDICARE

## 2021-10-01 DIAGNOSIS — F31.30 BIPOLAR I DISORDER, MOST RECENT EPISODE DEPRESSED (H): Primary | ICD-10-CM

## 2021-10-01 PROCEDURE — 90834 PSYTX W PT 45 MINUTES: CPT | Performed by: PSYCHOLOGIST

## 2021-10-01 NOTE — PROGRESS NOTES
Psychology Progress Note    Date: October 1, 2021    Time length and type of treatment: 47 minutes (3:07 PM to 3:54 PM), individual therapy -in person session    Necessity: This session is necessary to address the patient's Bipolar I Disorder, Generalized Anxiety Disorder, Panic Disorder, Agoraphobia, PTSD, and OCD.  Today we focus on the patient's treatment plan, specifically exploring barriers to compliance with medical treatment plan, and thoughts and expectations of self and others.  The reader is invited to review the patient's full treatment plan in the Media section of the patient's Epic medical record.    Psychotherapeutic Technique: This writer utilized motivational interviewing, active listening, reassurance and support in the context of cognitive behavioral therapy to address the above.      MENTAL STATUS EVALUATION  Grooming: Within normal limits  Attire: Appropriate  Age: Appears Stated  Behavior Towards Examiner: Cooperative  Motor Activity: Restless  Eye Contact: Avoidant  Mood: Anxious   Affect: full range  Speech/Language: normal  Attention: Normal  Concentration: Sufficient  Thought Process: tangential  Thought Content: Clear    Orientation: Appeared oriented to person, place, and time, though not formally established  Memory: No evidence of impairment.  Judgement: Adequate  Estimated Intelligence: Average  Demonstrated Insight: Adequate  Fund of Knowledge: Adequate    Intervention:   Patient reported that she's had some changes on her care team, which has had her feeling more anxious than usual.  She acknowledged discomfort with change and we discussed self talk patient uses to help herself keep appointments.  Patient reported more intrusive memories lately, and psychoeducation was provided related to trauma.  Patient also discussed having meditation recommended as a pain management strategy, and the challenges she faces with meditation because when she attempts to sit quietly, she is flooded with  traumatic memories.  Patient was encouraged to let providers know that meditation is not an effective intervention for her.  We discussed patient use of distraction strategies and humor as effective coping.      Progress:  Patient has been struggling with an increase in traumatic memories.       Plan:   We will meet again in 2 weeks to address the patient's Bipolar I Disorder, anxiety, Panic Disorder, Agoraphobia, PTSD, and OCD.  Estimated duration of treatment is ongoing due to a major mental illness and lengthy trauma history.  Individual therapy sessions (45041) at twice monthly intervals.     Diagnosis:  Bipolar 1 Disorder, severe, most recent episode depressed  Posttraumatic Stress Disorder  Panic Disorder  Agoraphobia  Generalized Anxiety Disorder   Obsessive-Compulsive Disorder, with poor insight

## 2021-10-01 NOTE — LETTER
10/1/2021         RE: Rohan Prince  [address]  [facility] [address]        Dear Colleague,    Thank you for referring your patient, Rohan Prince, to the [facility]. Please see a copy of my visit note below.    Psychology Progress Note    Date: October 1, 2021    Time length and type of treatment: 47 minutes (3:07 PM to 3:54 PM), individual therapy -in person session    Necessity: This session is necessary to address the patient's Bipolar I Disorder, Generalized Anxiety Disorder, Panic Disorder, Agoraphobia, PTSD, and OCD.  Today we focus on the patient's treatment plan, specifically exploring barriers to compliance with medical treatment plan, and thoughts and expectations of self and others.  The reader is invited to review the patient's full treatment plan in the Media section of the patient's Epic medical record.    Psychotherapeutic Technique: This writer utilized motivational interviewing, active listening, reassurance and support in the context of cognitive behavioral therapy to address the above.      MENTAL STATUS EVALUATION  Grooming: Within normal limits  Attire: Appropriate  Age: Appears Stated  Behavior Towards Examiner: Cooperative  Motor Activity: Restless  Eye Contact: Avoidant  Mood: Anxious   Affect: full range  Speech/Language: normal  Attention: Normal  Concentration: Sufficient  Thought Process: tangential  Thought Content: Clear    Orientation: Appeared oriented to person, place, and time, though not formally established  Memory: No evidence of impairment.  Judgement: Adequate  Estimated Intelligence: Average  Demonstrated Insight: Adequate  Fund of Knowledge: Adequate    Intervention:   Patient reported that she's had some changes on her care team, which has had her feeling more anxious than usual.  She acknowledged discomfort with change and we discussed self talk patient uses to help herself keep appointments.  Patient reported more intrusive  memories lately, and psychoeducation was provided related to trauma.  Patient also discussed having meditation recommended as a pain management strategy, and the challenges she faces with meditation because when she attempts to sit quietly, she is flooded with traumatic memories.  Patient was encouraged to let providers know that meditation is not an effective intervention for her.  We discussed patient use of distraction strategies and humor as effective coping.      Progress:  Patient has been struggling with an increase in traumatic memories.       Plan:   We will meet again in 2 weeks to address the patient's Bipolar I Disorder, anxiety, Panic Disorder, Agoraphobia, PTSD, and OCD.  Estimated duration of treatment is ongoing due to a major mental illness and lengthy trauma history.  Individual therapy sessions (61474) at twice monthly intervals.     Diagnosis:  Bipolar 1 Disorder, severe, most recent episode depressed  Posttraumatic Stress Disorder  Panic Disorder  Agoraphobia  Generalized Anxiety Disorder   Obsessive-Compulsive Disorder, with poor insight      Again, thank you for allowing me to participate in the care of your patient.        Sincerely,        Josie Astorga Psy.D, LP

## 2021-10-08 NOTE — PROGRESS NOTES
"           Chronic Pain Initial Visit         Rohan Prince is a 42 year old year old who is  here for evaluation and treatment of chronic pain.     Rohan is here for evaluation and to develop a multifaceted chronic pain plan that may or may not include chronic opiate therapy.     Previously been on a pain contract? No  Previous pain diagnosis:No    Pain location: Lower back is where it is worst; Neck pain as well  Has history of L5-S1 foraminal narrowing  Has history of L5-S1 degenerative disc disease  Has history of modrate thoracic spondylosis   Has  History of C4-C5 fusion and C5-C6 fusion and narrow spinal canal   Has history of C3-C7 ACDF  Pain onset: Upper and lower back pain since age of 17  Pain is described as Burning, Numb, Sharp and Stabbing   Aggravating factors include: Prolonged household activities - cleaning, doing dishes; prolonged immobilization in one position   Relieving factors include: Physical therapy, meditation, Details    Previous medication treatments:  Opiates - none, oxycodone /acetaminophen (Percocet), oxycodone ER (Oxycontin) and tramadol ER (Ultram ER)  Antiepileptics - Neurontin (Gabapentin)  Antidepressants - Amitryptyline   NSAIDs - ibuprofen (Motrin) and naproxen (Anaprox, Naprosyn, Naprelan)  Muscle relaxants - none, baclofen and cyclobenzaprine (Flexeril)  Topicals - none    Past pain Treatments  Pain Clinic:  No  Physical Therapy:  Yes - last year was attempted  Psychologist:  Saw a psychologist when she was younger - is currently [facility]  Relaxation techniques/biofeedback:  Yes - music, meditation   Chiropractor:  No  Acupuncture:  No  TENs Unit:  No  Injections:  Yes - medial branch block   Formal self-care education:  No    Current Exercise: Activity \"steps\" 3 times a day, tries to stay active everyday,  6-7 days/week for an average of less than 15 minutes        Substance Use History    reports that she has never smoked. She has never used smokeless " Pre-op Diagnosis: Melena    The above referenced H&P was reviewed by Ramila Diego MD on 10/8/2021, the patient was examined and no significant changes have occurred in the patient's condition since the H&P was performed.   I discussed with the patient and "tobacco.  Alcohol Use:Never used / No history  History of chemical dependency:  No   Current use of recreational substances N/A  Any substance abuse in household? No    Family History:Substance use  Family History of alcohol abuse? No   Family History of Illicit substance use? No   Family History of prescription medication  abuse? No     Social History  Who lives in your household? 14 year old daughter, 5 year old daughter   Recent family or social stressors:  No recent changes - single mom supporting both daughters has been chronic stress  Who is in your support network? [address] 1 block from patient's house    Are you currently working ? No, on disability   What do you or did you do? 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 Poor   How many hours do you need? 6-7 How many do you get? 2-3    Mental Health  Diagnosis of Depression : History of depression   Other MH Diagnosis?:  No   History of Preadolescent Sexual Abuse No        Legal Issues   Are you currently involved in litigation related to your pain complaint? No  Have you ever been arrested or had other legal problems? No  Have you filed a Workers' Compensation/SSI/MVA claim related to your pain complaint?  YES - is on SSI/disability income     --TOYA/CareEverywhere signed for other providers,  Yes  --[place] Prescriber s Database reviewed:Yes    What are you hoping to do when your pain is more controlled?   - \"I am hoping that I can sleep better\"  - \"I am hoping I can do more activities with the girls\"  - \"I am hoping I can not struggle to do the dishes\"             Opioid Use Evaluation Tools         DIRE Score:  Score 14-21: May be a candidate for long-term opioid analgesia    Source: [facility]   2005    Opioid Risk Tool Score:  Total Score Risk Category  0 - 3 = " Low Risk  of future problems with Opioid     Functional Assessment  Questionnaire -5  No flowsheet data found.  DIRE Score: Patient Selection for Opioid Analgesia      Diagnosis: 3    1 = Benign chronic condition with minimal objective findings or no definite medical diagnosis.  Examples: fibromyalgia, migraine headaches, non-specific back pain.  2 = Slowly progressive condition concordant with moderate pain, or fixed condition with  moderate objective findings. Examples: failed back surgery syndrome, back pain with  moderate degenerative changes, neuropathic pain.  3 = Advanced condition concordant with severe pain with objective findings. Examples:  severe ischemic vascular disease, advanced neuropathy, severe spinal stenosis.    Intractability: 3    1 = Few therapies have been tried and the patient takes a passive role in his/her pain  management process.  2 = Most customary treatments have been tried but the patient is not fully engaged in the pain  management process, or barriers prevent (insurance, transportation, medical illness).  3 = Patient fully engaged in a spectrum of appropriate treatments but with inadequate  response.    Risk (P+C+R+S): 9    Psychological: 2    1 = Serious personality dysfunction or mental illness interfering with care. Example: personality disorder, severe affective disorder, significant personality issues.  2 = Personality or mental health interferes moderately. Example: depression or anxiety disorder.  3 = Good communication with clinic. No significant personality dysfunction or mental illness.    Chemical Health: 3    1 = Active or very recent use of illicit drugs, excessive alcohol, or prescription drug abuse.  2 = Chemical coper (uses medications to cope with stress) or history of CD in remission.  3 = No CD history. Not drug-focused or chemically reliant.    Reliability: 2  1 = History of numerous problems: medication misuse, missed appointments, rarely follows through.  2 =  Occasional difficulties with compliance, but generally reliable.  3 = Highly reliable patient with meds, appointments & treatment.    Social Support: 2  1 = Life in chaos. Little family support and few close relationships. Loss of most normal life roles.  2 = Reduction in some relationships and life roles.  3 = Supportive family/close relationships. Involved in work or school and no social isolation.     Efficacy score: 2    1 = Poor function or minimal pain relief despite moderate to high doses.  2 = Moderate benefit with function improved in a number of ways (or insufficient info - hasn't tried opioid yet or very low doses or too short of a trial).  3 = Good improvement in pain and function and quality of life with stable doses over time.      Total score (D + I + R + E): 15    Score 7-13: Not a suitable candidate for long-term opioid analgesia  Score 14-21: May be a good candidate for long-term opioid analgesia  Used with permission by Vega Myers M.D.  Date: Jul 11, 2018  Patient Name: Rohan Prince    OPIOID RISK TOOL        Michele each box that applies Item score if female Item score if male   1. Family history of substance abuse Alcohol  1 3    Illegal Drugs  2 3    Prescription Drugs  4 4   2. Personal History of Substance Abuse Alcohol  3 3    Illegal Drugs  4 4    Prescription Drugs  5 5   3. Age (Michele box if 16 - 45)   1 1   4. History of Preadolescent Sexual Abuse   3 0   5. Psychological Disease ADD,  OCD,  Bipolar,  Schizophrenia  2 2    Depression X 1 1     TOTAL: 1      Used by Permission: Cyndi Costa MD     Problem, Medication and Allergy Lists were reviewed and updated if needed.  reviewed and are current..    Patient is an established patient of this clinic..         Review of Systems:   CONSTITUTIONAL: no fatigue, no unexpected change in weight  SKIN: no worrisome rashes, no worrisome moles, no worrisome lesions  EYES: no acute vision problems or changes  ENT: no ear problems, no mouth  "problems, no throat problems  RESP: no significant cough, no shortness of breath  CV: no chest pain, no palpitations, no new or worsening peripheral edema  GI: no nausea, no vomiting, no constipation, no diarrhea         Physical Exam:     Vitals:    07/11/18 0923   BP: 100/66   Pulse: 63   Resp: 16   Temp: 97.5  F (36.4  C)   TempSrc: Oral   SpO2: 99%   Weight: 210 lb (95.3 kg)   Height: 5' 9.88\" (177.5 cm)     Body mass index is 30.23 kg/(m^2).  Vitals were reviewed  GENERAL: healthy, alert, well nourished, well hydrated, no distress  HENT: ear canals- normal; TMs- normal; Nose- normal; Mouth- no ulcers, no lesions  NECK: no tenderness, no adenopathy, no asymmetry, no masses, no stiffness; thyroid- normal to palpation  RESP: lungs clear to auscultation - no rales, no rhonchi, no wheezes  CV: regular rates and rhythm, normal S1 S2, no S3 or S4 and no murmur, no click or rub -  ABDOMEN: soft, no tenderness, no  hepatosplenomegaly, no masses, normal bowel sounds        Results:     Results from last visit:  Office Visit on 06/11/2018   Component Date Value Ref Range Status     Glucose 06/11/2018 79.0  60.0 - 109.0 mg/dL Final     Urea Nitrogen 06/11/2018 5.0  5.0 - 24.0 mg/dL Final     Creatinine 06/11/2018 0.7  0.6 - 1.3 mg/dL Final     Sodium 06/11/2018 140.0  133.0 - 144.0 mmol/L Final     Potassium 06/11/2018 3.8  3.4 - 5.3 mmol/L Final     Chloride 06/11/2018 100.0  94.0 - 109.0 mmol/L Final     Carbon Dioxide 06/11/2018 33.0* 20.0 - 32.0 mmol/L Final     Calcium 06/11/2018 9.7  8.5 - 10.4 mg/dL Final     eGFR Calculated (Non Black Referen* 06/11/2018 >90  >60.0 mL/min Final     eGFR Calculated (Black Reference) 06/11/2018 >90  >60.0 mL/min Final      -Comprehensive rapid urine drug screen obtained today: Yes   - tested positive for amphetamines, patient did take sudafed this AM which could account for this   - advised patient that will give 1 week supply and follow up for part II of chronic pain visit "     Assessment and Plan      Rohan Prince is here for evaluation of chronic pain.  Patient is being prescribed 100 mg of tramadol IR per day. 10 mg MEDD      Naloxone WILL NOT be prescribed.    The etiology of patient's chronic pain is degenerative disc disease, foraminal stenosis       Exercise discussed and patient     I explained that the assessment process may take up to 3 visits.   Expectations for CPM were also copied into the patient instructions.    Goals of therapy:     Increase function     Decrease suffering     May not relieve pain  1) Non-medical management: Continue counseling and . [facility]   2) Non-opioid, medical management: Patient in touch with neurosurgery   3) Opioid medical management: tramadol, 50 mg q12h PRN pain    Opioid Treatment Agreement completed No    4) If opioids prescribed  patient was asked to bring pill bottle to each appointment and was informed that refills would only be provided at office visits.   5) Asked patient to follow-up in one week with same provider for 40 min CPM #2 visit.  6) Follow clinic process to add patient to Clinic Chronic Pain Registry.      Options for treatment and follow-up care were reviewed with the patient. Rohan Prince was engaged and actively involved in the decision making process and verbalized understanding of the options discussed and was satisfied with the final plan.    Misbah Y. Palla, MD

## 2021-10-10 ENCOUNTER — HEALTH MAINTENANCE LETTER (OUTPATIENT)
Age: 46
End: 2021-10-10

## 2021-10-15 ENCOUNTER — OFFICE VISIT (OUTPATIENT)
Dept: NEUROLOGY | Facility: CLINIC | Age: 46
End: 2021-10-15
Payer: MEDICARE

## 2021-10-15 DIAGNOSIS — F31.30 BIPOLAR I DISORDER, MOST RECENT EPISODE DEPRESSED (H): Primary | ICD-10-CM

## 2021-10-15 PROCEDURE — 90834 PSYTX W PT 45 MINUTES: CPT | Performed by: PSYCHOLOGIST

## 2021-10-15 NOTE — LETTER
10/15/2021         RE: Rohan Prince  [address]  [facility] [address]        Dear Colleague,    Thank you for referring your patient, Rohan Prince, to the [facility]. Please see a copy of my visit note below.    Psychology Progress Note    Date: October 15, 2021    Time length and type of treatment: 49 minutes (3:03 PM to 3:52 PM), individual therapy -in person session    Necessity: This session is necessary to address the patient's Bipolar 1 Disorder, Generalized Anxiety Disorder, Panic Disorder, Agoraphobia, PTSD, and OCD.  Today we focus on the patient's treatment plan, specifically exploring thoughts and expectations of self and others.  The reader is invited to review the patient's full treatment plan in the Media section of the patient's Epic medical record.    Psychotherapeutic Technique: This writer utilized motivational interviewing, active listening, reassurance and support in the context of cognitive behavioral therapy to address the above.      MENTAL STATUS EVALUATION  Grooming: Within normal limits  Attire: Appropriate  Age: Appears Stated  Behavior Towards Examiner: Cooperative  Motor Activity: Patient ambulates slowly and with a slight limp  Eye Contact: Ranged from avoidant to appropriate  Mood: Anxious   Affect: full range  Speech/Language: normal  Attention: Easily distracted  Concentration: Sufficient  Thought Process: tangential  Thought Content: Does not seem to be responding to internal stimuli   Orientation: Appeared oriented to person, place, and time, though not formally established  Memory: No evidence of impairment.  Judgement: Adequate  Estimated Intelligence: Average  Demonstrated Insight: Fair  Fund of Knowledge: Adequate    Intervention:   Patient discussed her experience of auditory and visual hallucinations.  In several cases, patient demonstrated good insight, while in other cases she questioned whether she was being visited by spirits.  We  discussed using patient's awareness of right and wrong to help her  whether she listens to voices and patient gave multiple examples of ignoring voices and distracting herself if they were suggesting things she didn't believe were helpful.  We also discussed stress management and good sleep hygiene as important strategies to support well being.      Progress:  Patient discussed her visual and auditory hallucinations despite fear that doing so would negatively impact how I see her, and we were able to explore strategies to support positive functioning.     Plan:   We will meet again in 2 weeks to address the patient's Bipolar I Disorder, anxiety, Panic Disorder, Agoraphobia, PTSD, and OCD.  Estimated duration of treatment is ongoing due to a major mental illness and lengthy trauma history.  Individual therapy sessions ([id number]) will be at twice monthly intervals.    Diagnosis:  Bipolar 1 Disorder, severe, most recent episode depressed  Posttraumatic Stress Disorder  Panic Disorder  Agoraphobia  Generalized Anxiety Disorder   Obsessive-Compulsive Disorder, with poor insight      Again, thank you for allowing me to participate in the care of your patient.        Sincerely,        Josie Astorga Psy.D, LP

## 2021-10-29 ENCOUNTER — OFFICE VISIT (OUTPATIENT)
Dept: NEUROLOGY | Facility: CLINIC | Age: 46
End: 2021-10-29
Payer: MEDICARE

## 2021-10-29 DIAGNOSIS — F31.30 BIPOLAR I DISORDER, MOST RECENT EPISODE DEPRESSED (H): Primary | ICD-10-CM

## 2021-10-29 PROCEDURE — 90834 PSYTX W PT 45 MINUTES: CPT | Performed by: PSYCHOLOGIST

## 2021-10-29 NOTE — PROGRESS NOTES
Psychology Progress Note    Date: October 29, 2021    Time length and type of treatment: 42 minutes (3:05 PM to 3:47 PM), individual therapy -in person session    Necessity: This session is necessary to address the patient's bipolar I disorder, generalized anxiety disorder, panic disorder, agoraphobia, PTSD, and OCD.  Today we focus on revising the patient's treatment plan.  The reader is invited to review the patient's full treatment plan in the Media section of the patient's Epic medical record.    Psychotherapeutic Technique: This writer utilized motivational interviewing, active listening, reassurance and support in the context of cognitive behavioral therapy to address the above.      MENTAL STATUS EVALUATION  Grooming: Within normal limits  Attire: Appropriate  Age: Appears Stated  Behavior Towards Examiner: Cooperative  Motor Activity: Within normal limits  Eye Contact: Avoidant  Mood: Anxious  Depressed   Affect: blunted  Speech/Language: normal  Attention: Normal  Concentration: Sufficient  Thought Process: unremarkable  Thought Content: Clear    Orientation: Appeared oriented to person, place, and time, though not formally established  Memory: No evidence of impairment.  Judgement: Adequate  Estimated Intelligence: Average  Demonstrated Insight: Adequate  Fund of Knowledge: Adequate    Intervention:   Patient was readministered the PHQ-9, ZAKIA-7, and PCL-5 as part of revising her treatment plan.  Her score of 6 on the PHQ-9 and the ZAKIA-7 is suggestive of mild depression and anxiety.  Her score of 35 on the PCL-5 continues to support PTSD, but is modestly improved over her previous score of 44. Patient expressed surprise at these numbers, stating that her PTSD symptoms can wax and wane, but lately she has been sleeping very little and struggling with traumatic nightmares.  We discussed the challenge participating in therapy presents for the patient, and her tendency to become very matter of fact when  discussing painful memories.  We discussed patient fear that bad things will happen to her if she allows herself to be vulnerable.  Ways to promote safety will continue to be a topic of conversation.      Progress:  Patient's treatment plan was jointly revised    Plan:   We will meet again in 2 weeks to address the patient's bipolar I disorder, anxiety, panic disorder, agoraphobia, PTSD, and OCD.  Estimated duration of treatment is ongoing due to a major mental illness and lengthy trauma history individual therapy sessions (60935) at twice monthly intervals.     Diagnosis:  Bipolar 1 Disorder, severe, most recent episode depressed  Posttraumatic Stress Disorder  Panic Disorder  Agoraphobia  Generalized Anxiety Disorder   Obsessive-Compulsive Disorder, with poor insight

## 2021-10-29 NOTE — LETTER
10/29/2021         RE: Rohan Prince  [address]  [facility] [address]        Dear Colleague,    Thank you for referring your patient, Rohan Prince, to the [facility]. Please see a copy of my visit note below.    Psychology Progress Note    Date: October 29, 2021    Time length and type of treatment: 42 minutes (3:05 PM to 3:47 PM), individual therapy -in person session    Necessity: This session is necessary to address the patient's bipolar I disorder, generalized anxiety disorder, panic disorder, agoraphobia, PTSD, and OCD.  Today we focus on revising the patient's treatment plan.  The reader is invited to review the patient's full treatment plan in the Media section of the patient's Epic medical record.    Psychotherapeutic Technique: This writer utilized motivational interviewing, active listening, reassurance and support in the context of cognitive behavioral therapy to address the above.      MENTAL STATUS EVALUATION  Grooming: Within normal limits  Attire: Appropriate  Age: Appears Stated  Behavior Towards Examiner: Cooperative  Motor Activity: Within normal limits  Eye Contact: Avoidant  Mood: Anxious  Depressed   Affect: blunted  Speech/Language: normal  Attention: Normal  Concentration: Sufficient  Thought Process: unremarkable  Thought Content: Clear    Orientation: Appeared oriented to person, place, and time, though not formally established  Memory: No evidence of impairment.  Judgement: Adequate  Estimated Intelligence: Average  Demonstrated Insight: Adequate  Fund of Knowledge: Adequate    Intervention:   Patient was readministered the PHQ-9, ZAKIA-7, and PCL-5 as part of revising her treatment plan.  Her score of 6 on the PHQ-9 and the ZAKIA-7 is suggestive of mild depression and anxiety.  Her score of 35 on the PCL-5 continues to support PTSD, but is modestly improved over her previous score of 44. Patient expressed surprise at these numbers, stating that her  PTSD symptoms can wax and wane, but lately she has been sleeping very little and struggling with traumatic nightmares.  We discussed the challenge participating in therapy presents for the patient, and her tendency to become very matter of fact when discussing painful memories.  We discussed patient fear that bad things will happen to her if she allows herself to be vulnerable.  Ways to promote safety will continue to be a topic of conversation.      Progress:  Patient's treatment plan was jointly revised    Plan:   We will meet again in 2 weeks to address the patient's bipolar I disorder, anxiety, panic disorder, agoraphobia, PTSD, and OCD.  Estimated duration of treatment is ongoing due to a major mental illness and lengthy trauma history individual therapy sessions ([id number]) at twice monthly intervals.     Diagnosis:  Bipolar 1 Disorder, severe, most recent episode depressed  Posttraumatic Stress Disorder  Panic Disorder  Agoraphobia  Generalized Anxiety Disorder   Obsessive-Compulsive Disorder, with poor insight      Again, thank you for allowing me to participate in the care of your patient.        Sincerely,        Josie Astorga Psy.D, LP

## 2021-11-10 ENCOUNTER — VIRTUAL VISIT (OUTPATIENT)
Dept: NEUROLOGY | Facility: CLINIC | Age: 46
End: 2021-11-10
Payer: MEDICARE

## 2021-11-10 DIAGNOSIS — F31.4 SEVERE BIPOLAR I DISORDER, CURRENT OR MOST RECENT EPISODE DEPRESSED (H): Primary | ICD-10-CM

## 2021-11-10 PROCEDURE — 90834 PSYTX W PT 45 MINUTES: CPT | Mod: 95 | Performed by: PSYCHOLOGIST
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
## 2021-11-19 DIAGNOSIS — G89.4 CHRONIC PAIN SYNDROME: ICD-10-CM

## 2021-11-19 RX ORDER — HYDROCODONE BITARTRATE AND ACETAMINOPHEN 5; 325 MG/1; MG/1
1 TABLET ORAL 4 TIMES DAILY PRN
Qty: 44 TABLET | Refills: 0 | Status: SHIPPED | OUTPATIENT
Start: 2021-11-19 | End: 2021-11-29

## 2021-11-19 RX ORDER — PREGABALIN 150 MG/1
150 CAPSULE ORAL 2 TIMES DAILY
Qty: 60 CAPSULE | Refills: 0 | Status: SHIPPED | OUTPATIENT
Start: 2021-11-19 | End: 2021-11-29

## 2021-11-19 NOTE — TELEPHONE ENCOUNTER
Chart review of last script  HYDROcodone-acetaminophen (NORCO) 5-325 MG tablet 112 tablet 0 10/21/2021 11/18/2021 No   Sig - Route: Take 1-2 tablets by mouth 4 times daily as needed for moderate to severe pain (Max of 4 tablets per day) - Oral     pregabalin (LYRICA) 150 MG capsule 60 capsule 1 9/21/2021 11/20/2021 No   Sig - Route: Take 1 capsule (150 mg) by mouth 2 times daily - Oral     Per note 9/17/21 Continue the use of the Norco- 4 per day. Next refill on 9/23-10/21, 10/21-11/18.     :   11/19/2021 Reviewed to aid with decision regarding medication management  Last script:  Date: 10/21/21 due 11/18  Medication: hydrocodone  Dose: 5/325mg   Dispensed: 112   Prescriber: KENYATTA    Date: 10/20/21 due 11/19  Medication: pregabalin  Dose: 150mg   Dispensed: 60   Prescriber: KENYATTA    Completed  Signed Prescriptions:                        Disp   Refills    pregabalin (LYRICA) 150 MG capsule         60 cap*0        Sig: Take 1 capsule (150 mg) by mouth 2 times daily           11/19-12/19  Authorizing Provider: CELESTINO RABAGO      HYDROcodone-acetaminophen (NORCO) 5-325 MG*44 tab*0        Sig: Take 1 tablet by mouth 4 times daily as needed for           moderate to severe pain (Max of 4 tablets per           day) 11/19-11/30  Authorizing Provider: CELESTINO RABAGO

## 2021-11-19 NOTE — TELEPHONE ENCOUNTER
Received call from patient requesting refill  Hydrocodone:10/21/21-28 days  pregabalin: 10/20/21-30 days    Patient's last office/virtual visit by prescribing provider on 9/17/21 with KENYATTA  Next office/virtual appointment scheduled for 11/29/21 with GOLDY SAMUELS/LEXIE 9/2020    Will cue hydrocodone as a bridge to the next apt  Will cue pregabalin    Pending Prescriptions:                       Disp   Refills    pregabalin (LYRICA) 150 MG capsule        60 cap*0            Sig: Take 1 capsule (150 mg) by mouth 2 times daily    HYDROcodone-acetaminophen (NORCO) 5-325 M*36 tab*0            Sig: Take 1-2 tablets by mouth 4 times daily as needed           for moderate to severe pain (Max of 4 tablets per           day)    donny

## 2021-11-29 ENCOUNTER — OFFICE VISIT (OUTPATIENT)
Dept: PALLIATIVE MEDICINE | Facility: OTHER | Age: 46
End: 2021-11-29
Payer: MEDICARE

## 2021-11-29 VITALS
DIASTOLIC BLOOD PRESSURE: 57 MMHG | OXYGEN SATURATION: 96 % | SYSTOLIC BLOOD PRESSURE: 111 MMHG | HEART RATE: 98 BPM | WEIGHT: 231.3 LBS | BODY MASS INDEX: 33.19 KG/M2

## 2021-11-29 DIAGNOSIS — Z96.651 STATUS POST TOTAL KNEE REPLACEMENT, RIGHT: ICD-10-CM

## 2021-11-29 DIAGNOSIS — G89.29 CHRONIC BILATERAL LOW BACK PAIN, UNSPECIFIED WHETHER SCIATICA PRESENT: ICD-10-CM

## 2021-11-29 DIAGNOSIS — M17.0 OSTEOARTHRITIS OF BOTH KNEES, UNSPECIFIED OSTEOARTHRITIS TYPE: ICD-10-CM

## 2021-11-29 DIAGNOSIS — M51.369 LUMBAR DEGENERATIVE DISC DISEASE: ICD-10-CM

## 2021-11-29 DIAGNOSIS — M54.50 CHRONIC BILATERAL LOW BACK PAIN, UNSPECIFIED WHETHER SCIATICA PRESENT: ICD-10-CM

## 2021-11-29 DIAGNOSIS — G89.4 CHRONIC PAIN SYNDROME: Primary | ICD-10-CM

## 2021-11-29 PROCEDURE — 80320 DRUG SCREEN QUANTALCOHOLS: CPT | Performed by: NURSE PRACTITIONER

## 2021-11-29 PROCEDURE — 80307 DRUG TEST PRSMV CHEM ANLYZR: CPT | Performed by: NURSE PRACTITIONER

## 2021-11-29 PROCEDURE — G0463 HOSPITAL OUTPT CLINIC VISIT: HCPCS

## 2021-11-29 PROCEDURE — 99215 OFFICE O/P EST HI 40 MIN: CPT | Performed by: NURSE PRACTITIONER

## 2021-11-29 PROCEDURE — 80349 CANNABINOIDS NATURAL: CPT | Performed by: NURSE PRACTITIONER

## 2021-11-29 RX ORDER — HYDROCODONE BITARTRATE AND ACETAMINOPHEN 5; 325 MG/1; MG/1
1 TABLET ORAL 4 TIMES DAILY PRN
Qty: 112 TABLET | Refills: 0 | Status: SHIPPED | OUTPATIENT
Start: 2021-12-19 | End: 2021-12-30

## 2021-11-29 RX ORDER — PREGABALIN 150 MG/1
150 CAPSULE ORAL 2 TIMES DAILY
Qty: 56 CAPSULE | Refills: 0 | Status: SHIPPED | OUTPATIENT
Start: 2021-12-19 | End: 2021-12-30

## 2021-11-29 RX ORDER — HYDROCODONE BITARTRATE AND ACETAMINOPHEN 5; 325 MG/1; MG/1
1 TABLET ORAL 4 TIMES DAILY PRN
Qty: 76 TABLET | Refills: 0 | Status: SHIPPED | OUTPATIENT
Start: 2021-11-30 | End: 2022-01-18

## 2021-11-29 ASSESSMENT — PAIN SCALES - GENERAL: PAINLEVEL: EXTREME PAIN (9)

## 2021-11-29 NOTE — PROGRESS NOTES
PEG Score 11/29/2021   PEG Total Score 6.33              MEDICATIONS LAST USED:  HYDROcodone-acetaminophen (NORCO) 5-325 MG tablet on 11/29/2021 @ 1:40pm      QUESTIONS:  Multiple areas of pain.       Ricarda Reyna MA  [facility] [place]

## 2021-11-29 NOTE — PATIENT INSTRUCTIONS
______  Plan:  _____  Thank you for participating in the clinic visit - Here is the Plan of Care / NextSteps:    Contact [phone] to reserve a time. Please follow up by: 6 weeks      I want you to be aware I have resigned my position with [employer]. I will be here until Jan 14, 2022. I am working with administration to enhance the transition of patients. I am not clear to whom patients will be transferred within the pain team at this time.     I understand for patients who have prescriptions through the pain center, those prescriptions will sustained and there should be no issues with getting refills as it relates to my departure.    Communicating with us:  Please call Monday-Friday for problems or questions - leave a message and one of the clinical support staff (CSS) will help to get things figured out. The number is [phone].    You may also opt to communicate through [employer].    Primary Care: You are seeing a [facility]    COVID19: Contact your primary care clinic for any health related concerns or for any recommendations. You may also communicate with your primary care clinic for questions; The [facility] Hotline phone number is [phone] or [phone]; or you may login to [email or web address] or call [phone].    Rehabilitation: Remain active    Behavioral Health: continue with your psychologist    Diagnostics: UDT done today    Medication prescribed / to be continued:    Signed Prescriptions:                        Disp   Refills    HYDROcodone-acetaminophen (NORCO) 5-325 MG*76 tab*0    Script to align medication for same date of     Sig: Take 1 tablet by mouth 4 times daily as needed for           moderate to severe pain (Max of 4 tablets per           day) 11/30/21-12/19/21  Authorizing Provider: CELESTINO RABAGO      HYDROcodone-acetaminophen (NORCO) 5-325 MG*112 ta*0        Sig: Take 1 tablet by mouth 4 times daily as needed for            moderate to severe pain (Max of 4 tablets per           day) Fill 12/18 for use from 12/19-1/16/22  Authorizing Provider: CELESTINO RABAGO      pregabalin (LYRICA) 150 MG capsule         56 cap*0        Sig: Take 1 capsule (150 mg) by mouth 2 times daily for 28           days Fill 12/18 for use from 12/19-1/16/22  Authorizing Provider: CELESTINO RABAGO              [facility] Number:  [phone]    Call with any questions about your care and for scheduling assistance.     Calls are returned Monday through Friday between 8 AM and 4:30 PM. We usually get back to you within 2 business days depending on the issue/request.    If we are prescribing your medications:    For opioid medication refills, call the clinic or send a [employer] message 7 days in advance.  Please include:    Name of requested medication    Name of the pharmacy.    For non-opioid medications, call your pharmacy directly to request a refill. Please allow 3-4 days to be processed.     Per [place] State Law:    All controlled substance prescriptions must be filled within 30 days of being written.      For those controlled substances allowing refills, pickup must occur within 30 days of last fill.      We believe regular attendance is key to your success in our program!      Any time you are unable to keep your appointment we ask that you call us at least 24 hours in advance to cancel.This will allow us to offer the appointment time to another patient.     Multiple missed appointments may lead to dismissal from the clinic.

## 2021-11-29 NOTE — LETTER
11/29/2021         RE: Rohan Prince  [address] [place] [address]        Dear Colleague,    Thank you for referring your patient, Rohan Prince, to the [facility]. Please see a copy of my visit note below.    Medical Record review and prep:  Start Time: 0820  End Time: 0824  TT: 4    Visit Start: 1454  Visit Stop: 1539  TT: 45      Rohan Prince is a 46 year old person last evaluated 9/17/21.  Is being evaluated for fibromyalgia, low back, bilateral knees and ankles.      Major issues:  1. Chronic pain syndrome    2. Chronic bilateral low back pain, unspecified whether sciatica present    3. Osteoarthritis of both knees, unspecified osteoarthritis type    4. Lumbar degenerative disc disease    5. Status post total knee replacement, right      Patient Active Problem List   Diagnosis     Chronic pain syndrome     Lumbar degenerative disc disease     Chronic low back pain     Anxiety disorder, unspecified     Meningioma, multiple (H)     Chronic pain of right knee     Osteoarthritis of both knees, unspecified osteoarthritis type     Phyllodes tumor, benign, right     HORACIO II (cervical intraepithelial neoplasia II)     TIA (transient ischemic attack)     Status post total knee replacement, right     Left ovarian cyst       _____  HPI:  ____    MyChart/Communications: she is able to use bencheet    Location/Laterality of the pain: neck, low back, bilateral knees, feet and ankles  Quality: knees and feet (axe, jabbing); neck (fire); low back (forceful pulsing, hammerin)   Timing: constant  Aggravating factors: some struggles with identifying, standing too long  Alleviating factors: positioning, medication, position transitions    Severity: Today: 9    Since last visit pain has:worse    Associated symptoms:  Functional Symptoms: Pain interferes with:  Sleep: she does not sleep well - some related to PTSD and some related to pain  Walking:  Ambulation/Transfer: Pt is ambulatory. She reports  "trouble with walking. She has tie she will fall. Transfers independently. Stairs she is able to go up and down it can take loner. She likes to keep going b/c she does not want to lose function.    Work:  Employment/Financials: disability  Children: 2 kids - 17 & 8 (ADHD)   Animal Care: pelon Heller    ADL's/IADL's: 'I am very independent and I do not like to ask for help. But there are times I need to ask my daughter to come and help.\"  Toileting: constipation life long   Bathing: she has a grab bar for the shower. Brushing hair is hard. Clipping toe nails is hard.  Dressing: independent sometimes it is hard  Cooking: she will cook  Housekeeping: she will clean the house  Shopping: she is shopping - some struggles b/c of pain and panic attacks  Concentration: some trouble - ADHD  Dexterity: drops things she will lose function and drops things    Social indicators for health:  Housing: secure  Communications: prefers video visits  Transportation: medical taxi  Relationships/Social: she is connected with daughters, friends. She will connect via the [facility].     Impact of pain treatments:  Patient reports function has improved with current pain treatment: yes      Pain Plan of Care Review:    Medication changes: no  Medication side/adverse effects: no  Considerations: no      Current Outpatient Medications:      aspirin-acetaminophen-caffeine (EXCEDRIN MIGRAINE) 250-250-65 MG tablet, Take 1 tablet by mouth every 6 hours as needed for headaches, Disp: , Rfl:      HYDROcodone-acetaminophen (NORCO) 5-325 MG tablet, Take 1 tablet by mouth 4 times daily as needed for moderate to severe pain (Max of 4 tablets per day) 11/19-11/30, Disp: 44 tablet, Rfl: 0 - continued  She has some days she needs 4 and other days she will use 3 she will have a few pills left over each month - she will take 2 at a time at times occasionally will use 1/2 pills here and there     pregabalin (LYRICA) 150 MG capsule, " Take 1 capsule (150 mg) by mouth 2 times daily 11/19-12/19, Disp: 60 capsule, Rfl: 0 -  continued     and chart:   11/29/2021 Reviewed to aid with decision regarding medication management  HYDROcodone-acetaminophen (NORCO) 5-325 MG tablet 44 tablet 0 11/19/2021 11/30/2021 No   Sig - Route: Take 1 tablet by mouth 4 times daily as needed for moderate to severe pain (Max of 4 tablets per day) 11/19-11/30 - Oral     pregabalin (LYRICA) 150 MG capsule 60 capsule 0 11/19/2021 12/19/2021 No   Sig - Route: Take 1 capsule (150 mg) by mouth 2 times daily 11/19-12/19 - Oral     Last script:  Date: 11/19/21 due 12/19  Medication: pregabalin  Dose: 150mg  Dispensed: 60   Prescriber: tavon    Date: 11/19/21 11/30  Medication: hydrocodone  Dose: 5/325mg  Dispensed: 44  Prescriber: tavon    Scheduled medication from other professionals: no  Medication(s): -    Expected dispensing: ok     Supplements:   Zinc, magneisum, coQ10 - she has been taking on-and-off    Rehabilitation: she has trouble with being touched by people  Home exercise program: walks, up and down the stairs    Behavioral Medicine:   She is working with a psychologist.    ________  Objective:  _________  Vitals:    11/29/21 1434   BP: 111/57   Pulse: 98   SpO2: 96%   Weight: 104.9 kg (231 lb 4.8 oz)   PainSc: Extreme Pain (9)       Constitutional:  Pleasant and cooperative person who presents alone today.  Psychiatric: Mood and affect are appropriate for the situation, setting and topic of discussion.  Patient does not appear sedated.  Integumentary:  Observed skin WNL.   HEENT: EOM's grossly intact.    Chest: Breathing is non-labored.   Neurological:  Alert and oriented in all spheres including: time, place, person and situation.  Durable Medical Equipment: mask    Diagnostics:    Imaging:  Reviewed older imaging today to aid with decision making.      EXAM: MR LUMBAR SPINE W WO CONTRAST  LOCATION: [facility]  DATE/TIME: 6/10/2020 11:40 PM     INDICATION: Low  back pain  IMPRESSION:  1.  Normal distal cord.  2.  Mild degenerative changes without significant canal narrowing at any level.  3.  Mild central/left paracentral disc protrusion at L4-L5 with annular tear with abutment and minimal mass effect on traversing left L5 nerve root. This has slightly progressed since prior.  4.  Moderate left and mild right foraminal narrowing at L5-S1, slightly progressed bilaterally.  5.  Mild left foraminal narrowing at L4-L5, new. Mild to moderate left lateral recess narrowing.    ______________  Assessment:  ___________  Rohan Prince is a 46 year old person TIA, spinal fusion, , brain tumor (radiation), breast CA (Partial mastectomy), hysterectomy (CA), PTSD, anorexia    Clinic evaluation for chronic multi site pain variable etiologies - she has felt women listen better to her. She is open to seeing a male provider. She does not like a lot of change finding safety with consistency and known. She lives with a bit it trauma. She indicates her primary has refused to offer mediation beyond tramadol. She does not think she could return to her primary care doctor.     *Universal Precautions:  UDT//CSA- 11/29/2021  Pharmacy- as documented  Count- 11/29/2021  Psychological evaluation working with a counselor external to the clinic  Pharmacogenetic testing- -  MME- 20  MTM: -  Naloxone safety: -  Medical Cannabis: not interested    Medication hx:  Tramadol (not helpful)    Management of opioid medication is inherently a moderate to high complex medial interaction based on the risk management required at each contact r/t risks and side effects.    ______  Plan:  _____  Thank you for participating in the clinic visit - Here is the Plan of Care / NextSteps:    Contact [phone] to reserve a time. Please follow up by: 6 weeks      I want you to be aware I have resigned my position with [employer]. I will be here until Jan 14, 2022. I am working with administration to enhance the transition of  patients. I am not clear to whom patients will be transferred within the pain team at this time.     I understand for patients who have prescriptions through the pain center, those prescriptions will sustained and there should be no issues with getting refills as it relates to my departure.    Communicating with us:  Please call Monday-Friday for problems or questions - leave a message and one of the clinical support staff (CSS) will help to get things figured out. The number is [phone].    You may also opt to communicate through [employer].    Primary Care: You are seeing a [facility]    COVID19: Contact your primary care clinic for any health related concerns or for any recommendations. You may also communicate with your primary care clinic for questions; The [facility] Hotline phone number is [phone] or [phone]; or you may login to [employer] or call [phone].    Rehabilitation: Remain active    Behavioral Health: continue with your psychologist    Diagnostics: UDT done today    Medication prescribed / to be continued:    Signed Prescriptions:                        Disp   Refills    HYDROcodone-acetaminophen (NORCO) 5-325 MG*76 tab*0    Script to align medication for same date of     Sig: Take 1 tablet by mouth 4 times daily as needed for           moderate to severe pain (Max of 4 tablets per           day) 11/30/21-12/19/21  Authorizing Provider: CELESTINO RABAGO      HYDROcodone-acetaminophen (NORCO) 5-325 MG*112 ta*0        Sig: Take 1 tablet by mouth 4 times daily as needed for           moderate to severe pain (Max of 4 tablets per           day) Fill 12/18 for use from 12/19-1/16/22  Authorizing Provider: CELESTINO RABAGO      pregabalin (LYRICA) 150 MG capsule         56 cap*0        Sig: Take 1 capsule (150 mg) by mouth 2 times daily for 28           days Fill 12/18 for use from 12/19-1/16/22  Authorizing Provider: HIMA  CELESTINO HAZEL FNP-BC  [address] [address]  [phone]  [phone]      ILIR Mast CNP        PEG Score 11/29/2021   PEG Total Score 6.33              MEDICATIONS LAST USED:  HYDROcodone-acetaminophen (NORCO) 5-325 MG tablet on 11/29/2021 @ 1:40pm      QUESTIONS:  Multiple areas of pain.       Ricarda Reyna MA  [facility]       Again, thank you for allowing me to participate in the care of your patient.        Sincerely,        ILIR Mast CNP

## 2021-11-29 NOTE — LETTER
Opioid / Opioid Plus Controlled Substance Agreement    This is an agreement between you and your provider about the safe and appropriate use of controlled substance/opioids prescribed by your care team. Controlled substances are medicines that can cause physical and mental dependence (abuse).    There are strict laws about having and using these medicines. We here at [facility] are committing to working with you in your efforts to get better. To support you in this work, we ll help you schedule regular office appointments for medicine refills. If we must cancel or change your appointment for any reason, we ll make sure you have enough medicine to last until your next appointment.     As a Provider, I will:    Listen carefully to your concerns and treat you with respect.     Recommend a treatment plan that I believe is in your best interest. This plan may involve therapies other than opioid pain medication.     Talk with you often about the possible benefits, and the risk of harm of any medicine that we prescribe for you.     Provide a plan on how to taper (discontinue or go off) using this medicine if the decision is made to stop its use.    As a Patient, I understand that opioid(s):     Are a controlled substance prescribed by my care team to help me function or work and manage my condition(s).     Are strong medicines and can cause serious side effects such as:    Drowsiness, which can seriously affect my driving ability    A lower breathing rate, enough to cause death    Harm to my thinking ability     Depression     Abuse of and addiction to this medicine    Need to be taken exactly as prescribed. Combining opioids with certain medicines or chemicals (such as illegal drugs, sedatives, sleeping pills, and benzodiazepines) can be dangerous or even fatal. If I stop opioids suddenly, I may have severe withdrawal symptoms.    Do not work for all types of pain nor for all patients. If they re not helpful, I may  be asked to stop them.        The risks, benefits and side effects of these medicine(s) were explained to me. I agree that:  1. I will take part in other treatments as advised by my care team. This may be psychiatry or counseling, physical therapy, behavioral therapy, group treatment or a referral to a specialist.     2. I will keep all my appointments. I understand that this is part of the monitoring of opioids. My care team may require an office visit for EVERY opioid/controlled substance refill. If I miss appointments or don t follow instructions, my care team may stop my medicine.    3. I will take my medicines as prescribed. I will not change the dose or schedule unless my care team tells me to. There will be no refills if I run out early.     4. I may be asked to come to the clinic and complete a urine drug test or complete a pill count at any time. If I don t give a urine sample or participate in a pill count, the care team may stop my medicine.    5. I will only receive prescriptions from this clinic for chronic pain. If I am treated by another provider for acute pain issues, I will tell them that I am taking opioid pain medication for chronic pain and that I have a treatment agreement with this provider. I will inform my [facility] care team within one business day if I am given a prescription for any pain medication by another healthcare provider. My [facility] care team can contact other providers and pharmacists about my use of any medicines.    6. It is up to me to make sure that I don t run out of my medicines on weekends or holidays. If my care team is willing to refill my opioid prescription without a visit, I must request refills only during office hours. Refills may take up to 3 business days to process. I will use one pharmacy to fill all my opioid and other controlled substance prescriptions. I will notify the clinic about any changes to my insurance or medication  availability.    7. I am responsible for my prescriptions. If the medicine/prescription is lost, stolen or destroyed, it will not be replaced. I also agree not to share controlled substance medicines with anyone.    8. I am aware I should not use any illegal or recreational drugs. I agree not to drink alcohol unless my care team says I can.       9. If I enroll in the [place] Medical Cannabis program, I will tell my care team prior to my next refill.     10. I will tell my care team right away if I become pregnant, have a new medical problem treated outside of my regular clinic, or have a change in my medications.    11. I understand that this medicine can affect my thinking, judgment and reaction time. Alcohol and drugs affect the brain and body, which can affect the safety of my driving. Being under the influence of alcohol or drugs can affect my decision-making, behaviors, personal safety, and the safety of others. Driving while impaired (DWI) can occur if a person is driving, operating, or in physical control of a car, motorcycle, boat, snowmobile, ATV, motorbike, off-road vehicle, or any other motor vehicle ([place] Statute 169A.20). I understand the risk if I choose to drive or operate any vehicle or machinery.    I understand that if I do not follow any of the conditions above, my prescriptions or treatment may be stopped or changed.          Opioids  What You Need to Know    What are opioids?   Opioids are pain medicines that must be prescribed by a doctor. They are also known as narcotics.     Examples are:   1. morphine (MS Contin, Nai)  2. oxycodone (Oxycontin)  3. oxycodone and acetaminophen (Percocet)  4. hydrocodone and acetaminophen (Vicodin, Norco)   5. fentanyl patch (Duragesic)   6. hydromorphone (Dilaudid)   7. methadone  8. codeine (Tylenol #3)     What do opioids do well?   Opioids are best for severe short-term pain such as after a surgery or injury. They may work well for cancer pain. They may  help some people with long-lasting (chronic) pain.     What do opioids NOT do well?   Opioids never get rid of pain entirely, and they don t work well for most patients with chronic pain. Opioids don t reduce swelling, one of the causes of pain.                                    Other ways to manage chronic pain and improve function include:       Treat the health problem that may be causing pain    Anti-inflammation medicines, which reduce swelling and tenderness, such as ibuprofen (Advil, Motrin) or naproxen (Aleve)    Acetaminophen (Tylenol)    Antidepressants and anti-seizure medicines, especially for nerve pain    Topical treatments such as patches or creams    Injections or nerve blocks    Chiropractic or osteopathic treatment    Acupuncture, massage, deep breathing, meditation, visual imagery, aromatherapy    Use heat or ice at the pain site    Physical therapy     Exercise    Stop smoking    Take part in therapy       Risks and side effects     Talk to your doctor before you start or decide to keep taking opioids. Possible side effects include:      Lowering your breathing rate enough to cause death    Overdose, including death, especially if taking higher than prescribed doses    Worse depression symptoms; less pleasure in things you usually enjoy    Feeling tired or sluggish    Slower thoughts or cloudy thinking    Being more sensitive to pain over time; pain is harder to control    Trouble sleeping or restless sleep    Changes in hormone levels (for example, less testosterone)    Changes in sex drive or ability to have sex    Constipation    Unsafe driving    Itching and sweating    Dizziness    Nausea, throwing up and dry mouth    What else should I know about opioids?    Opioids may lead to dependence, tolerance, or addiction.      Dependence means that if you stop or reduce the medicine too quickly, you will have withdrawal symptoms. These include loose poop (diarrhea), jitters, flu-like symptoms,  nervousness and tremors. Dependence is not the same as addiction.                       Tolerance means needing higher doses over time to get the same effect. This may increase the chance of serious side effects.      Addiction is when people improperly use a substance that harms their body, their mind or their relations with others. Use of opiates can cause a relapse of addiction if you have a history of drug or alcohol abuse.      People who have used opioids for a long time may have a lower quality of life, worse depression, higher levels of pain and more visits to doctors.    You can overdose on opioids. Take these steps to lower your risk of overdose:    1. Recognize the signs:  Signs of overdose include decrease or loss of consciousness (blackout), slowed breathing, trouble waking up and blue lips. If someone is worried about overdose, they should call 911.    2. Talk to your doctor about Narcan (naloxone).   If you are at risk for overdose, you may be given a prescription for Narcan. This medicine very quickly reverses the effects of opioids.   If you overdose, a friend or family member can give you Narcan while waiting for the ambulance. They need to know the signs of overdose and how to give Narcan.     3. Don't use alcohol or street drugs.   Taking them with opioids can cause death.    4. Do not take any of these medicines unless your doctor says it s OK. Taking these with opioids can cause death:    Benzodiazepines, such as lorazepam (Ativan), alprazolam (Xanax) or diazepam (Valium)    Muscle relaxers, such as cyclobenzaprine (Flexeril)    Sleeping pills like zolpidem (Ambien)     Other opioids      How to keep you and other people safe while taking opioids:    1. Never share your opioids with others.  Opioid medicines are regulated by the Drug Enforcement Agency ([place]). Selling or sharing medications is a criminal act.    2. Be sure to store opioids in a secure place, locked up if possible. Young children  can easily swallow them and overdose.    3. When you are traveling with your medicines, keep them in the original bottles. If you use a pill box, be sure you also carry a copy of your medicine list from your clinic or pharmacy.    4. Safe disposal of opioids    Most pharmacies have places to get rid of medicine, called disposal kiosks. Medicine disposal options are also available in every [facility]. Search your county and  medication disposal  to find more options. You can find more details at:  [email or web address]     I agree that my provider, clinic care team, and pharmacy may work with any city, state or federal law enforcement agency that investigates the misuse, sale, or other diversion of my controlled medicine. I will allow my provider to discuss my care with, or share a copy of, this agreement with any other treating provider, pharmacy or emergency room where I receive care.    I have read this agreement and have asked questions about anything I did not understand.    _______________________________________________________  Patient Signature - Rohan Prince _____________________                   Date     _______________________________________________________  Provider Signature - ILIR Mast CNP   _____________________                   Date     _______________________________________________________  Witness Signature (required if provider not present while patient signing)   _____________________                   Date

## 2021-11-29 NOTE — PROGRESS NOTES
Medical Record review and prep:  Start Time: 0820  End Time: 0824  TT: 4    Visit Start: 1454  Visit Stop: 1539  TT: 45      Rohan Prince is a 46 year old person last evaluated 9/17/21.  Is being evaluated for fibromyalgia, low back, bilateral knees and ankles.      Major issues:  1. Chronic pain syndrome    2. Chronic bilateral low back pain, unspecified whether sciatica present    3. Osteoarthritis of both knees, unspecified osteoarthritis type    4. Lumbar degenerative disc disease    5. Status post total knee replacement, right      Patient Active Problem List   Diagnosis     Chronic pain syndrome     Lumbar degenerative disc disease     Chronic low back pain     Anxiety disorder, unspecified     Meningioma, multiple (H)     Chronic pain of right knee     Osteoarthritis of both knees, unspecified osteoarthritis type     Phyllodes tumor, benign, right     HORACIO II (cervical intraepithelial neoplasia II)     TIA (transient ischemic attack)     Status post total knee replacement, right     Left ovarian cyst       _____  HPI:  ____    MyChart/Communications: she is able to use [employer]    Location/Laterality of the pain: neck, low back, bilateral knees, feet and ankles  Quality: knees and feet (axe, jabbing); neck (fire); low back (forceful pulsing, hammerin)   Timing: constant  Aggravating factors: some struggles with identifying, standing too long  Alleviating factors: positioning, medication, position transitions    Severity: Today: 9    Since last visit pain has:worse    Associated symptoms:  Functional Symptoms: Pain interferes with:  Sleep: she does not sleep well - some related to PTSD and some related to pain  Walking:  Ambulation/Transfer: Pt is ambulatory. She reports trouble with walking. She has tie she will fall. Transfers independently. Stairs she is able to go up and down it can take loner. She likes to keep going b/c she does not want to lose function.    Work:  Employment/Financials:  "disability  Children: 2 kids - 17 & 8 (ADHD)   Animal Care: pelon Heller    ADL's/IADL's: 'I am very independent and I do not like to ask for help. But there are times I need to ask my daughter to come and help.\"  Toileting: constipation life long   Bathing: she has a grab bar for the shower. Brushing hair is hard. Clipping toe nails is hard.  Dressing: independent sometimes it is hard  Cooking: she will cook  Housekeeping: she will clean the house  Shopping: she is shopping - some struggles b/c of pain and panic attacks  Concentration: some trouble - ADHD  Dexterity: drops things she will lose function and drops things    Social indicators for health:  Housing: secure  Communications: prefers video visits  Transportation: medical taxi  Relationships/Social: she is connected with daughters, friends. She will connect via the [facility].     Impact of pain treatments:  Patient reports function has improved with current pain treatment: yes      Pain Plan of Care Review:    Medication changes: no  Medication side/adverse effects: no  Considerations: no      Current Outpatient Medications:      aspirin-acetaminophen-caffeine (EXCEDRIN MIGRAINE) 250-250-65 MG tablet, Take 1 tablet by mouth every 6 hours as needed for headaches, Disp: , Rfl:      HYDROcodone-acetaminophen (NORCO) 5-325 MG tablet, Take 1 tablet by mouth 4 times daily as needed for moderate to severe pain (Max of 4 tablets per day) 11/19-11/30, Disp: 44 tablet, Rfl: 0 - continued  She has some days she needs 4 and other days she will use 3 she will have a few pills left over each month - she will take 2 at a time at times occasionally will use 1/2 pills here and there     pregabalin (LYRICA) 150 MG capsule, Take 1 capsule (150 mg) by mouth 2 times daily 11/19-12/19, Disp: 60 capsule, Rfl: 0 -  continued     and chart:   11/29/2021 Reviewed to aid with decision regarding medication management  HYDROcodone-acetaminophen (NORCO) " 5-325 MG tablet 44 tablet 0 11/19/2021 11/30/2021 No   Sig - Route: Take 1 tablet by mouth 4 times daily as needed for moderate to severe pain (Max of 4 tablets per day) 11/19-11/30 - Oral     pregabalin (LYRICA) 150 MG capsule 60 capsule 0 11/19/2021 12/19/2021 No   Sig - Route: Take 1 capsule (150 mg) by mouth 2 times daily 11/19-12/19 - Oral     Last script:  Date: 11/19/21 due 12/19  Medication: pregabalin  Dose: 150mg  Dispensed: 60   Prescriber: tavon    Date: 11/19/21 11/30  Medication: hydrocodone  Dose: 5/325mg  Dispensed: 44  Prescriber: tavon    Scheduled medication from other professionals: no  Medication(s): -    Expected dispensing: ok     Supplements:   Zinc, magneisum, coQ10 - she has been taking on-and-off    Rehabilitation: she has trouble with being touched by people  Home exercise program: walks, up and down the stairs    Behavioral Medicine:   She is working with a psychologist.    ________  Objective:  _________  Vitals:    11/29/21 1434   BP: 111/57   Pulse: 98   SpO2: 96%   Weight: 104.9 kg (231 lb 4.8 oz)   PainSc: Extreme Pain (9)       Constitutional:  Pleasant and cooperative person who presents alone today.  Psychiatric: Mood and affect are appropriate for the situation, setting and topic of discussion.  Patient does not appear sedated.  Integumentary:  Observed skin WNL.   HEENT: EOM's grossly intact.    Chest: Breathing is non-labored.   Neurological:  Alert and oriented in all spheres including: time, place, person and situation.  Durable Medical Equipment: mask    Diagnostics:    Imaging:  Reviewed older imaging today to aid with decision making.      EXAM: MR LUMBAR SPINE W WO CONTRAST  LOCATION: [facility] [place]  DATE/TIME: 6/10/2020 11:40 PM     INDICATION: Low back pain  IMPRESSION:  1.  Normal distal cord.  2.  Mild degenerative changes without significant canal narrowing at any level.  3.  Mild central/left paracentral disc protrusion at L4-L5 with annular tear with abutment and  minimal mass effect on traversing left L5 nerve root. This has slightly progressed since prior.  4.  Moderate left and mild right foraminal narrowing at L5-S1, slightly progressed bilaterally.  5.  Mild left foraminal narrowing at L4-L5, new. Mild to moderate left lateral recess narrowing.    ______________  Assessment:  ___________  Rohan Prince is a 46 year old person TIA, spinal fusion, , brain tumor (radiation), breast CA (Partial mastectomy), hysterectomy (CA), PTSD, anorexia    Clinic evaluation for chronic multi site pain variable etiologies - she has felt women listen better to her. She is open to seeing a male provider. She does not like a lot of change finding safety with consistency and known. She lives with a bit it trauma. She indicates her primary has refused to offer mediation beyond tramadol. She does not think she could return to her primary care doctor.     *Universal Precautions:  UDT//CSA- 11/29/2021  Pharmacy- as documented  Count- 11/29/2021  Psychological evaluation working with a counselor external to the clinic  Pharmacogenetic testing- -  MME- 20  MTM: -  Naloxone safety: -  Medical Cannabis: not interested    Medication hx:  Tramadol (not helpful)    Management of opioid medication is inherently a moderate to high complex medial interaction based on the risk management required at each contact r/t risks and side effects.    ______  Plan:  _____  Thank you for participating in the clinic visit - Here is the Plan of Care / NextSteps:    Contact [phone] to reserve a time. Please follow up by: 6 weeks      I want you to be aware I have resigned my position with [facility]. I will be here until Jan 14, 2022. I am working with administration to enhance the transition of patients. I am not clear to whom patients will be transferred within the pain team at this time.     I understand for patients who have prescriptions through the pain center, those prescriptions will sustained and there  should be no issues with getting refills as it relates to my departure.    Communicating with us:  Please call Monday-Friday for problems or questions - leave a message and one of the clinical support staff (CSS) will help to get things figured out. The number is [phone].    You may also opt to communicate through [employer].    Primary Care: [facility]    COVID19: Contact your primary care clinic for any health related concerns or for any recommendations. You may also communicate with your primary care clinic for questions; [facility] Hotline phone number is [phone] or [phone]; or you may login to [email or web address] or call [phone].    Rehabilitation: Remain active    Behavioral Health: continue with your psychologist    Diagnostics: UDT done today    Medication prescribed / to be continued:    Signed Prescriptions:                        Disp   Refills    HYDROcodone-acetaminophen (NORCO) 5-325 MG*76 tab*0    Script to align medication for same date of     Sig: Take 1 tablet by mouth 4 times daily as needed for           moderate to severe pain (Max of 4 tablets per           day) 11/30/21-12/19/21  Authorizing Provider: CELESTINO RABAGO      HYDROcodone-acetaminophen (NORCO) 5-325 MG*112 ta*0        Sig: Take 1 tablet by mouth 4 times daily as needed for           moderate to severe pain (Max of 4 tablets per           day) Fill 12/18 for use from 12/19-1/16/22  Authorizing Provider: CELESTINO RABAGO      pregabalin (LYRICA) 150 MG capsule         56 cap*0        Sig: Take 1 capsule (150 mg) by mouth 2 times daily for 28           days Fill 12/18 for use from 12/19-1/16/22  Authorizing Provider: CELESTINO RABAGO APRN FNP-BC  [address] [address]  [phone]  [phone]      ILIR Mast CNP

## 2021-11-30 LAB
BARBITURATES UR QL: NORMAL
CANNABINOIDS UR QL SCN: NORMAL
CREAT UR-MCNC: 90 MG/DL
ETHANOL UR QL SCN: NORMAL

## 2021-12-02 LAB
HYDROCODONE UR CFM-MCNC: 1040 NG/ML
HYDROCODONE/CREAT UR: 1156 NG/MG {CREAT}
METHAMPHET UR CFM-MCNC: 439 NG/ML
METHAMPHET/CREAT UR: 488 NG/MG {CREAT}

## 2021-12-03 LAB
CANNABINOIDS UR CFM-MCNC: <5 NG/ML
CARBOXYTHC/CREAT UR: NORMAL

## 2021-12-04 LAB
ETHANOL UR QL CFM: NEGATIVE
ETHYL GLUCURONIDE UR QL SCN: NOT DETECTED NG/MG CREAT
ETHYL SULFATE/CREAT UR: NOT DETECTED NG/MG CREAT
LEVEL OF DETECTION (ETHANOL): NORMAL

## 2021-12-19 NOTE — RESULT ENCOUNTER NOTE
Reviewed note 11/29/2021  HYDROcodone-acetaminophen (NORCO) 5-325 MG tablet on 11/29/2021 @ 1:40pm  UDT:  Failed to detect ethanol, ethyl glucuronide and sulfate (expected). Failed to cannabinoids (expected). Detected hydrocodone no metabolites (reported hydrocodone use). Detected methamphetamine (not located on medication list or  - unexpected). Failed to detect barbiturates (expected). Pregabalin (no report)

## 2021-12-23 ENCOUNTER — TELEPHONE (OUTPATIENT)
Dept: PALLIATIVE MEDICINE | Facility: OTHER | Age: 46
End: 2021-12-23
Payer: MEDICARE

## 2021-12-23 NOTE — TELEPHONE ENCOUNTER
----- Message from ILIR Mast CNP sent at 12/19/2021  4:11 PM CST -----  Regarding: UDT  Reviewed note 11/29/2021  HYDROcodone-acetaminophen (NORCO) 5-325 MG tablet on 11/29/2021 @ 1:40pm  UDT:  Failed to detect ethyl glucuronide and sulfate (expected). Failed to cannabinoids (expected). Detected hydrocodone no metabolites (reported hydrocodone use). Detected methamphetamine (not located on medication list or  - unexpected)    Message to Department of Laboratory Medicine and Pathology [facility].

## 2021-12-23 NOTE — TELEPHONE ENCOUNTER
"Spoke with Department of Laboratory Medicine and Pathology [facility] [employer] - finding notes external source.      TC placed to the pt to make her aware of the findings on the test. She is not certain where the methamphetamine could have come from. She is distressed indicates there is \"no way this is possible\". Communication a bit of a struggle given her stress. Informed she will need to use her current medication to reduce - she interrupted several times. Informed pt ending phone call.     Unable to fully guide related to reduction and discontinue of medication.     Of note  Reviewed note 11/29/2021  HYDROcodone-acetaminophen (NORCO) 5-325 MG tablet on 11/29/2021 @ 1:40pm  UDT:  Failed to detect ethanol, ethyl glucuronide and sulfate (expected). Failed to cannabinoids (expected). Detected hydrocodone no metabolites (reported hydrocodone use). Detected methamphetamine (not located on medication list or  - unexpected). Failed to detect barbiturates (expected). Pregabalin (no report)       Chart review of last script    HYDROcodone-acetaminophen (NORCO) 5-325 MG*112 ta*0        Sig: Take 1 tablet by mouth 4 times daily as needed for           moderate to severe pain (Max of 4 tablets per           day) Fill 12/18 for use from 12/19-1/16/22  Authorizing Provider: CELESTINO RABAGO      pregabalin (LYRICA) 150 MG capsule         56 cap*0        Sig: Take 1 capsule (150 mg) by mouth 2 times daily for 28           days Fill 12/18 for use from 12/19-1/16/22  Authorizing Provider: CELESTINO RABAGO    :   12/23/2021 Reviewed to aid with decision regarding medication management  Last script:  Date: 12/18  Medication: hydrocodone   Dose: 5/325mg   Dispensed: 112   Prescriber: tavon    Date: 11/19/21  Medication: pregabalin  Dose: 150mg   Dispensed: 60   Prescriber: tavon      TC placed to the pharmacy canceled lyrica script and confirmed no further hydrocodone is available on the pt profile.     Plan:  Should pt " call please have her reduce by 1 hydrocodone pill every 5 days. If it is past the point where she is able to use current script please queue withdrawal medication.

## 2021-12-30 ENCOUNTER — TELEPHONE (OUTPATIENT)
Dept: PALLIATIVE MEDICINE | Facility: CLINIC | Age: 46
End: 2021-12-30
Payer: MEDICARE

## 2021-12-30 DIAGNOSIS — G89.4 CHRONIC PAIN SYNDROME: ICD-10-CM

## 2021-12-30 RX ORDER — PREGABALIN 150 MG/1
150 CAPSULE ORAL 2 TIMES DAILY
Qty: 60 CAPSULE | Refills: 5 | Status: SHIPPED | OUTPATIENT
Start: 2021-12-30 | End: 2022-02-09

## 2021-12-30 RX ORDER — HYDROCODONE BITARTRATE AND ACETAMINOPHEN 5; 325 MG/1; MG/1
1 TABLET ORAL 4 TIMES DAILY PRN
Qty: 120 TABLET | Refills: 0 | Status: SHIPPED | OUTPATIENT
Start: 2021-12-30 | End: 2022-02-09

## 2021-12-30 NOTE — TELEPHONE ENCOUNTER
Received information as patient spoke with operational leader of clinic.  Attempted to call patient- no answer at either number.  Will call back.    Danna Aceves MD  [facility]

## 2021-12-30 NOTE — TELEPHONE ENCOUNTER
Script Eprescribed to pharmacy  [place]  Spoke with patient  She is going to schedule with me at [place].  Will discuss more at that time.    Signed Prescriptions:                        Disp   Refills    pregabalin (LYRICA) 150 MG capsule         60 cap*5        Sig: Take 1 capsule (150 mg) by mouth 2 times daily  Authorizing Provider: STACIA CARLISLE    HYDROcodone-acetaminophen (NORCO) 5-325 MG*120 ta*0        Sig: Take 1 tablet by mouth 4 times daily as needed for           moderate to severe pain (Max of 4 tablets per           day) . Put 4 hours between doses. Fill 1/14/22 to           start on/after 1/16/22  Authorizing Provider: STACIA CARLISLE MD  [facility]

## 2022-01-14 ENCOUNTER — TELEPHONE (OUTPATIENT)
Dept: PALLIATIVE MEDICINE | Facility: CLINIC | Age: 47
End: 2022-01-14
Payer: MEDICARE

## 2022-01-14 NOTE — TELEPHONE ENCOUNTER
Reason for call:  Other   Patient called regarding (reason for call): appointment  Additional comments: Pt calling to state her kids are in quarantine due to their teacher having covid. Pt is wondering what she should do regarding her appointment. Routing to review if appointment should be rescheduled or if video is an option.    Phone number to reach patient:  Cell number on file:    Telephone Information:   Mobile [phone]       Best Time:  anytime    Can we leave a detailed message on this number?  YES    Travel screening: Not Applicable     Leonila SHEN    [facility]

## 2022-01-14 NOTE — TELEPHONE ENCOUNTER
I'm ok with changing to video.  We may not be able to do everything without an exam, but we can start the process.    Marcia Aceves MD on 1/14/2022 at 3:46 PM m

## 2022-01-14 NOTE — TELEPHONE ENCOUNTER
Changed appt to Video per Ketan. Called pt to confirm      Horace CRUZ    [facility] [place]

## 2022-01-14 NOTE — TELEPHONE ENCOUNTER
Her current appointment is scheduled for 01/18/22 in person at [facility] with Dr Aceves.  Her kids were exposed to COVID at school and are now on quarantine. Routing for review if we should change to virtual or if the preference is to reschedule an in person appointment.     Cierra Hickey, SHARMAINEN, RN  Care Coordinator  [facility] [place]

## 2022-01-18 ENCOUNTER — VIRTUAL VISIT (OUTPATIENT)
Dept: PALLIATIVE MEDICINE | Facility: CLINIC | Age: 47
End: 2022-01-18
Payer: MEDICARE

## 2022-01-18 ENCOUNTER — MYC MEDICAL ADVICE (OUTPATIENT)
Dept: PALLIATIVE MEDICINE | Facility: CLINIC | Age: 47
End: 2022-01-18

## 2022-01-18 DIAGNOSIS — Z96.651 STATUS POST TOTAL KNEE REPLACEMENT, RIGHT: ICD-10-CM

## 2022-01-18 DIAGNOSIS — M79.7 FIBROMYALGIA: ICD-10-CM

## 2022-01-18 DIAGNOSIS — M54.2 CERVICALGIA: ICD-10-CM

## 2022-01-18 DIAGNOSIS — G89.4 CHRONIC PAIN SYNDROME: Primary | ICD-10-CM

## 2022-01-18 PROCEDURE — 99417 PROLNG OP E/M EACH 15 MIN: CPT | Performed by: PSYCHIATRY & NEUROLOGY

## 2022-01-18 PROCEDURE — 99215 OFFICE O/P EST HI 40 MIN: CPT | Mod: 95 | Performed by: PSYCHIATRY & NEUROLOGY

## 2022-01-18 RX ORDER — PROCHLORPERAZINE MALEATE 10 MG
10 TABLET ORAL
COMMUNITY
Start: 2022-01-15 | End: 2022-04-28

## 2022-01-18 ASSESSMENT — PAIN SCALES - GENERAL: PAINLEVEL: EXTREME PAIN (8)

## 2022-01-18 NOTE — TELEPHONE ENCOUNTER
Rohan,  1. Next month, your refill will be due on 2/15.  We are doing 30 day supplies. Call/mychart 5-7 days in advance for refills  [phone].       2. Call [phone] to schedule follow up appt in ~6 weeks.     3. Please verify your children's rules about COVID and school.       Danna Aceves MD  [facility]

## 2022-01-18 NOTE — PROGRESS NOTES
[facility]    Rohan is a 46 year old who is being evaluated via a billable video visit.       MyChart visit  How would you like to obtain your AVS? [place] Anymobahart  If the video visit is dropped, the invitation should be resent by: Text to cell phone: [phone]  Will anyone else be joining your video visit? No  Is Pt currently in [place]? Yes  NOTE:  If Pt is not in [place], Appointment needs to be canceled and rescheduled.     Cassidy Dukes CMA  [facility] [place]     Video Start Time: 2:14 PM  Video-Visit Details     Type of service:  Video Visit     Video End Time:1446     Originating Location (pt. Location): Home     Distant Location (provider location):  [facility] [place]      Platform used for Video Visit: [employer]    Date of visit: 1/17/2022    Chief complaint:   Chief Complaint   Patient presents with     Pain       Interval history:  Rohan Prince was last seen by Michel SARMIENTO on 11/29/21 but has multiple phone encounters since.      Since her last visit, Rohan Prince reports:  -she describes that she has had pain since she was age 15.    -She has also had several different surgeries including a spinal fusion, right knee replacement in 2019, and previous knee surgeries  -She also has bilateral foot pain and has worked with a podiatrist in the past. They mentioned doing surgery in the feet but this was not done.  -She was told she had fibromyalgia in 2016 as well.  -She also has a history of brain tumors.  She had radiation done for the brain tumors due to increase in tumor size. She has had strokes as well. This has lead to weakness on one side of the face as well. She has short term memory loss which is related to that.    In her lower back, she has pretty severe pain. It is across the low back. She can have tingling in the legs, and her feet can get numb. When the back pain is more flared, she will notice more numbness in the feet. She  "has had problems in the past with her feet getting purple and has had a vascular workup with a cause not found.    She had had medial branch block in the past which was not helpful. She had the procedure aborted due to poor tolerance secondary to fibromyalgia She has a hard time sitting for a longer period of time, and she also has trouble standing for too long.  Sleeping is difficult.    Her knee pain is also pretty severe. Her knees give out. She had a total knee replacement on the right side done 2019.  That knee continues to bother her. Immediately after surgery but has gotten worse.   The worst pain is in the left knee.  It is painful and also gives way at times.  She has fallen in the past. She walks up stairs sideways to try to help with pain. Some days are good, and other days are not.    She has had spinal surgery in the neck C3-7.     She has had PT and OT in the home.  She now is doing her own home exercises.  She has done PT related to rehab stays- last likely 2019.    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 She does the ADLS around the home.  She has been on disabliity since 2016.     There was a positive methamphetamine presence in her UDS which was not expected. There has been some concerns raised internally about lab accuracy in these tests, so we have refilled Lyrica 150mg BID and Norco 5-325mg 120 tabs q4h prn max 4 tabs daily prior to today's visit      Aggravating factors: some struggles with identifying, standing too long  Alleviating factors: positioning, medication, position transitions        Pain scores:  Extreme Pain (8) . Pain is never lower than a 5/10    Current pain treatments:   Norco 5-325mg- up to 4/day  Lyrica 150mg BID  Excedrin migraine 1 tab q6h PRN for headache    Past pain treatments:  Tramadol  \"what the hospitals have given me\"    Side Effects: none    Medications:  Current Outpatient Medications   Medication Sig Dispense Refill     HYDROcodone-acetaminophen (NORCO) 5-325 MG tablet Take 1 " tablet by mouth 4 times daily as needed for moderate to severe pain (Max of 4 tablets per day) . Put 4 hours between doses. Fill 1/14/22 to start on/after 1/16/22 120 tablet 0     pregabalin (LYRICA) 150 MG capsule Take 1 capsule (150 mg) by mouth 2 times daily 60 capsule 5     prochlorperazine (COMPAZINE) 10 MG tablet Take 10 mg by mouth       aspirin-acetaminophen-caffeine (EXCEDRIN MIGRAINE) 250-250-65 MG tablet Take 1 tablet by mouth every 6 hours as needed for headaches (Patient not taking: Reported on 1/18/2022)         Medical History: any changes in medical history since they were last seen? No    Physical Exam:  There were no vitals taken for this visit.  General: alert.  Awake.  Patient describes sedation but not noted on exam  Gait: Not assessed  MSK exam: deferred due to video visit  Neuro: patient able to stand without assistance    Lumbar MRI 6/11/2020  FINDINGS:   Nomenclature is based on 5 lumbar type vertebral bodies. Normal vertebral body heights, alignment and marrow signal. Normal distal spinal cord and cauda equina with conus medullaris at lower L1. No extraspinal abnormality. Unremarkable visualized bony   pelvis.     T12-L1: Normal disc height and signal. No herniation. Normal facets. No spinal canal or neural foraminal stenosis.      L1-L2: Disc dehydration. Mild narrowing of intervertebral space. Minimal left paracentral to foraminal disc protrusion. Mild facet arthropathy. No significant canal or foraminal narrowing.     L2-L3: Normal disc height and signal. No herniation. Normal facets. No spinal canal or neural foraminal stenosis.      L3-L4: Disc dehydration. Mild narrowing of intervertebral space. Minimal central disc protrusion with annular tear. Mild facet arthropathy. No significant canal narrowing. Slight left foraminal narrowing, new since prior.     L4-L5: Disc dehydration. Mild narrowing of intervertebral space. Mild central/left paracentral disc protrusion with annular tear with  abutment and minimal mass effect on traversing left L5 nerve root. This has slightly progressed since prior. Mild facet      arthropathy. Mild left foraminal narrowing, new. Mild to moderate left lateral recess narrowing. No significant canal narrowing.     L5-S1: Disc dehydration. Moderate narrowing of intervertebral space. Mild diffuse disc bulge and facet arthropathy. No significant canal narrowing. Moderate left and mild right foraminal narrowing, slightly progressed bilaterally.     IMPRESSION:  1.  Normal distal cord.  2.  Mild degenerative changes without significant canal narrowing at any level.  3.  Mild central/left paracentral disc protrusion at L4-L5 with annular tear with abutment and minimal mass effect on traversing left L5 nerve root. This has slightly progressed since prior.  4.  Moderate left and mild right foraminal narrowing at L5-S1, slightly progressed bilaterally.  5.  Mild left foraminal narrowing at L4-L5, new. Mild to moderate left lateral recess narrowing.    THE 4 A's OF OPIOID MAINTENANCE ANALGESIA    Analgesia: Getting good relief    Activity: Completing ADLs taking care of children she otherwise would not be able to do    Adverse effects: none reported    Adherence to Rx protocol: per PDMP and patient, good adherence noted    [place] Board of Pharmacy Data Base Reviewed:    YES; 1/18/22  Last UDS/CSA- 11/29/2021    Assessment:   1. Chronic pain syndrome  2. Chronic low back pain with facet arthropathy, lumbar radiculopathy  3. H/o C3-4 and C6-7 fusion  4. Fibromyalgia  5. Bilateral knee pain with h/o right total knee arthroplasty  6. Bilateral foot/ankle pain  7. H/o meningioma s/p radiation  8. H/o stroke  9. H/o anorexia      MME - 20 mg    Plan:  1. Physical Therapy:  None for now  2. Clinical Health Psychologist to address issues of relaxation, behavioral change, coping style, and other factors important to improvement.  none  3. Diagnostic Studies:  none  4. Medication  Management:    1. Continue Norco 5-325mg QID PRN  2. Continue Lyrica 150mg BID  5. Further procedures recommended: none - patient likely has poor tolerance to procedures due to fibromyalgia  6. Recommendations to PCP: none  7. Follow up: 6 weeks in person    70 minutes spent on the date of encounter doing chart review, history, and exam documentation and further activities as noted above.       Jamison Carnes MD  Pain Medicine Fellow  [facility]    Danna Aceves MD  [facility]

## 2022-01-18 NOTE — PATIENT INSTRUCTIONS
1. Next month, your refill will be due on 2/15.  We are doing 30 day supplies. Call/EcTownUSAhart 5-7 days in advance for refills  [phone].      2. Call [phone] to schedule follow up appt in ~6 weeks.    3. Please verify your children's rules about COVID and school.          ----------------------------------------------------------------  Clinic Number:  [phone]     Call with any questions about your care and for scheduling assistance.     Calls are returned Monday through Friday between 8 AM and 4:30 PM. We usually get back to you within 2 business days depending on the issue/request.    If we are prescribing your medications:    For opioid medication refills, call the clinic or send a [employer] message 7 days in advance.  Please include:    Name of requested medication    Name of the pharmacy.    For non-opioid medications, call your pharmacy directly to request a refill. Please allow 3-4 days to be processed.     Per [place] State Law:    All controlled substance prescriptions must be filled within 30 days of being written.      For those controlled substances allowing refills, pickup must occur within 30 days of last fill.      We believe regular attendance is key to your success in our program!      Any time you are unable to keep your appointment we ask that you call us at least 24 hours in advance to cancel.This will allow us to offer the appointment time to another patient.     Multiple missed appointments may lead to dismissal from the clinic.

## 2022-01-19 ENCOUNTER — DOCUMENTATION ONLY (OUTPATIENT)
Dept: NEUROLOGY | Facility: CLINIC | Age: 47
End: 2022-01-19
Payer: MEDICARE

## 2022-01-19 NOTE — PROGRESS NOTES
Patient was contacted to determine interest in continuing individua therapy because patient has not been seen in more than two months. Patient reported she has COVID-19 and is working on recovering, but will call to schedule an appointment once she is feeling better.

## 2022-02-09 DIAGNOSIS — G89.4 CHRONIC PAIN SYNDROME: ICD-10-CM

## 2022-02-09 RX ORDER — PREGABALIN 150 MG/1
150 CAPSULE ORAL 2 TIMES DAILY
Qty: 60 CAPSULE | Refills: 5 | Status: SHIPPED | OUTPATIENT
Start: 2022-02-09

## 2022-02-09 RX ORDER — HYDROCODONE BITARTRATE AND ACETAMINOPHEN 5; 325 MG/1; MG/1
1 TABLET ORAL 4 TIMES DAILY PRN
Qty: 120 TABLET | Refills: 0 | Status: SHIPPED | OUTPATIENT
Start: 2022-02-09 | End: 2022-03-09

## 2022-02-09 NOTE — TELEPHONE ENCOUNTER
Medication refill information reviewed.     Due date for  HYDROcodone-acetaminophen (NORCO) 5-325 MG tablet is 02/15/22    Prescriptions prepped for review.     Will route to provider.

## 2022-02-09 NOTE — TELEPHONE ENCOUNTER
Reason for call:  Medication   If this is a refill request, has the caller requested the refill from the pharmacy already? No  Will the patient be using a [facility]? No  Name of the pharmacy and phone number for the current request: [id number] - [place], [place] - [address] [place] [address] AT [facility] [address]    Name of the medication requested:   HYDROcodone-acetaminophen (NORCO) 5-325 MG tablet    pregabalin (LYRICA) 150 MG capsule    Other request: none    Phone number to reach patient:  Cell number on file:    Telephone Information:   Mobile [phone]       Best Time:  anytime    Can we leave a detailed message on this number?  YES    Travel screening: Not Applicable     Leonila SHEN    [facility]

## 2022-02-09 NOTE — TELEPHONE ENCOUNTER
Received call from patient requesting refill(s) of HYDROcodone-acetaminophen (NORCO) 5-325 MG tablet     pregabalin (LYRICA) 150 MG capsule- last on 12/30/21, #120, 0 refills    Last dispensed from pharmacy on 1/14/22    Patient's last office/virtual visit by prescribing provider on 1/18/22  Next office/virtual appointment scheduled for 2/25/22    Last urine drug screen date 11/29/21  Current opioid agreement on file (completed within the last year) No Date of opioid agreement: 9/8/20    E-prescribe to [id number] - [facility] pharmacy    Will route to [facility] for review and preparation of prescription(s).

## 2022-02-09 NOTE — TELEPHONE ENCOUNTER
Script Eprescribed to pharmacy  [place]    Will send this to MA team to notify patient.    Signed Prescriptions:                        Disp   Refills    pregabalin (LYRICA) 150 MG capsule         60 cap*5        Sig: Take 1 capsule (150 mg) by mouth 2 times daily  Authorizing Provider: STACIA CARLISLE    HYDROcodone-acetaminophen (NORCO) 5-325 MG*120 ta*0        Sig: Take 1 tablet by mouth 4 times daily as needed for           moderate to severe pain (Max of 4 tablets per           day) . Put 4 hours between doses. Fill 02/13/22           to start on/after 02/15/22  Authorizing Provider: STACIA CARLISLE MD  [facility]

## 2022-02-10 NOTE — TELEPHONE ENCOUNTER
[employer] message sent with Rx approval from provider.  [place]  Pain Clinic Management Team

## 2022-02-25 ENCOUNTER — OFFICE VISIT (OUTPATIENT)
Dept: NEUROLOGY | Facility: CLINIC | Age: 47
End: 2022-02-25
Payer: MEDICARE

## 2022-02-25 ENCOUNTER — VIRTUAL VISIT (OUTPATIENT)
Dept: PALLIATIVE MEDICINE | Facility: CLINIC | Age: 47
End: 2022-02-25
Payer: MEDICARE

## 2022-02-25 DIAGNOSIS — G89.4 CHRONIC PAIN SYNDROME: ICD-10-CM

## 2022-02-25 DIAGNOSIS — M79.672 BILATERAL FOOT PAIN: Primary | ICD-10-CM

## 2022-02-25 DIAGNOSIS — M79.671 BILATERAL FOOT PAIN: Primary | ICD-10-CM

## 2022-02-25 DIAGNOSIS — F31.30 BIPOLAR I DISORDER, MOST RECENT EPISODE DEPRESSED (H): Primary | ICD-10-CM

## 2022-02-25 PROCEDURE — 90834 PSYTX W PT 45 MINUTES: CPT | Performed by: PSYCHOLOGIST

## 2022-02-25 RX ORDER — PROCHLORPERAZINE MALEATE 10 MG
5-10 TABLET ORAL 2 TIMES DAILY PRN
Qty: 20 TABLET | Refills: 1 | OUTPATIENT
Start: 2022-02-25

## 2022-02-25 ASSESSMENT — PAIN SCALES - GENERAL: PAINLEVEL: EXTREME PAIN (8)

## 2022-02-25 NOTE — PROGRESS NOTES
Rohan is a 46 year old who is being evaluated via a billable video visit.    Is Pt currently in [place]? Yes    NOTE:  If Pt is not in [place], Appointment needs to be canceled and rescheduled.    How would you like to obtain your AVS? Rashid  If the video visit is dropped, the invitation should be resent by: Rashid   Will anyone else be joining your video visit? No  {If patient encounters technical issues they should call [phone] :[phone]}  Rosy David CMA   [facility]   Pain Management Center    Video Start Time: 1030  Video-Visit Details    Type of service:  Video Visit    Video End Time:1100    Originating Location (pt. Location): Home    Distant Location (provider location):  [facility]     Platform used for Video Visit: PEPperPRINT                              [facility]    Date of visit: 2/25/2022    Chief complaint:   Chief Complaint   Patient presents with     Pain       Interval history:  Rohan Prince was last seen by me on 1/18/22.      Recommendations/plan at the last visit included:  1. Physical Therapy:  None for now  2. Clinical Health Psychologist to address issues of relaxation, behavioral change, coping style, and other factors important to improvement.  none  3. Diagnostic Studies:  none  4. Medication Management:    1. Continue Norco 5-325mg QID PRN  2. Continue Lyrica 150mg BID  5. Further procedures recommended: none - patient likely has poor tolerance to procedures due to fibromyalgia  6. Recommendations to PCP: none  7. Follow up: 6 weeks in person    Since her last visit, Rohan Prince reports:  -she continues to have some fatigue/tiredness from COVID  -there is a question if there is something going on with a close contact in regards to inflammation.  -she hasn't been able to do her usual housework due to fatigue.  Feels physical, mental and both.  -she continues to feel numbness in her right hand. She had a cervical fusion in  "hte past.  She has been told she has carpal tunnel on the left side.   -continues to have foot pain as well. Feels like a hammer smashing.    Pain scores:  Extreme Pain (8)     Current pain treatments:   Norco 5-325mg- up to 4/day  Lyrica 150mg BID  Excedrin migraine 1 tab q6h PRN for headache    Past pain treatments:  Tramadol  \"what the hospitals have given me\"        Side Effects: no side effect    Medications:  Current Outpatient Medications   Medication Sig Dispense Refill     HYDROcodone-acetaminophen (NORCO) 5-325 MG tablet Take 1 tablet by mouth 4 times daily as needed for moderate to severe pain (Max of 4 tablets per day) . Put 4 hours between doses. Fill 02/13/22 to start on/after 02/15/22 120 tablet 0     pregabalin (LYRICA) 150 MG capsule Take 1 capsule (150 mg) by mouth 2 times daily 60 capsule 5     aspirin-acetaminophen-caffeine (EXCEDRIN MIGRAINE) 250-250-65 MG tablet Take 1 tablet by mouth every 6 hours as needed for headaches (Patient not taking: Reported on 1/18/2022)       prochlorperazine (COMPAZINE) 10 MG tablet Take 10 mg by mouth         Medical History: any changes in medical history since they were last seen? No    Physical Exam:  There were no vitals taken for this visit.  General: ***  Gait: {GAIT:975534}  MSK exam: ***    Assessment:   1. Chronic pain syndrome  2. Chronic low back pain with facet arthropathy, lumbar radiculopathy  3. H/o C3-4 and C6-7 fusion  4. Fibromyalgia  5. Bilateral knee pain with h/o right total knee arthroplasty  6. Bilateral foot/ankle pain  7. H/o meningioma s/p radiation  8. H/o stroke  9. H/o anorexia      Plan:  8. Physical Therapy:  ***  9. Clinical Health Psychologist to address issues of relaxation, behavioral change, coping style, and other factors important to improvement.  ***  10. Diagnostic Studies:  ***  11. Medication Management:  ***  12. Further procedures recommended: ***  13. Recommendations to PCP: ***  14. Follow up: ***    *** minutes spent " on the date of encounter doing chart review, history, and exam documentation and further activities as noted above.       Danna Aceves MD

## 2022-02-25 NOTE — PATIENT INSTRUCTIONS
1. Podiatry- To contact Ortho  to schedule, call [phone].   2. [facility] [phone]  3. CT of neck-       ----------------------------------------------------------------  Clinic Number:  [phone]     Call with any questions about your care and for scheduling assistance.     Calls are returned Monday through Friday between 8 AM and 4:30 PM. We usually get back to you within 2 business days depending on the issue/request.    If we are prescribing your medications:    For opioid medication refills, call the clinic or send a [employer] message 7 days in advance.  Please include:    Name of requested medication    Name of the pharmacy.    For non-opioid medications, call your pharmacy directly to request a refill. Please allow 3-4 days to be processed.     Per [place] State Law:    All controlled substance prescriptions must be filled within 30 days of being written.      For those controlled substances allowing refills, pickup must occur within 30 days of last fill.      We believe regular attendance is key to your success in our program!      Any time you are unable to keep your appointment we ask that you call us at least 24 hours in advance to cancel.This will allow us to offer the appointment time to another patient.     Multiple missed appointments may lead to dismissal from the clinic.

## 2022-02-25 NOTE — LETTER
2/25/2022         RE: Rohan Prince  [address]  [place] [address]        Dear Colleague,    Thank you for referring your patient, Rohan Prince, to the [facility]. Please see a copy of my visit note below.    Psychology Progress Note    Date: 2/25/2022    Time length and type of treatment: 46 minutes (2:09 PM to 2:55 PM) , individual therapy -in person session    Necessity: This session is necessary to address the patient's bipolar disorder, PTSD, panic disorder, agoraphobia, anxiety, and OCD.  Today we focus on revising the patient's treatment plan.  The reader is invited to review the patient's full treatment plan in the Media section of the patient's Epic medical record.    Psychotherapeutic Technique: This writer utilized motivational interviewing, active listening, reassurance and support in the context of cognitive behavioral therapy to address the above.      MENTAL STATUS EVALUATION  Grooming: Within normal limits  Attire: Appropriate  Age: Appears Stated  Behavior Towards Examiner: Cooperative  Motor Activity: Within normal limits  Eye Contact: Avoidant  Mood: Depressed   Affect: full range  Speech/Language: normal  Attention: Normal  Concentration: Sufficient  Thought Process: tangential  Thought Content: Clear    Orientation: Appeared oriented to person, place, and time, though not formally established  Memory: No evidence of impairment.  Judgement: Adequate  Estimated Intelligence: Average  Demonstrated Insight: Adequate  Fund of Knowledge: Adequate    Intervention:   Patient was readministered the PHQ-9, ZAKIA-7, and PCL-5 as part of revising her treatment plan.  Patient score of 8 on the ZAKIA-7 remains suggestive of mild anxiety.  Her score of 6 on the PHQ-9 is identical to her earlier score and remains suggestive of mild depression.  Patient score of 43 on the PCL-5 greatly exceeds the recommended cut score of 32 or 33 for consideration of PTSD and is an  increase over her earlier score of 35.  Patient's treatment plan was jointly revised. Patient has been making a slow recovery from COVID-19 and has not been seen in over 3 months. Remaining time was spent catching up on events that have transpired since patient was last seen.    Progress:  Patient treatment plan was jointly revised    Plan:   We will meet again in 2 weeks to address the patient's Bipolar I Disorder, anxiety, Panic Disorder, Agoraphobia, PTSD, and OCD.  Estimated duration of treatment is ongoing due to a major mental illness and lengthy trauma history.  Individual therapy sessions (72119) at twice monthly intervals.     Diagnosis:  Bipolar 1 Disorder, severe, most recent episode depressed  Posttraumatic Stress Disorder  Panic Disorder  Agoraphobia  Generalized Anxiety Disorder   Obsessive-Compulsive Disorder, with poor insight      Again, thank you for allowing me to participate in the care of your patient.        Sincerely,        Josie Astorga Psy.D, LP

## 2022-02-25 NOTE — PROGRESS NOTES
Psychology Progress Note    Date: 2/25/2022    Time length and type of treatment: 46 minutes (2:09 PM to 2:55 PM) , individual therapy -in person session    Necessity: This session is necessary to address the patient's bipolar disorder, PTSD, panic disorder, agoraphobia, anxiety, and OCD.  Today we focus on revising the patient's treatment plan.  The reader is invited to review the patient's full treatment plan in the Media section of the patient's Epic medical record.    Psychotherapeutic Technique: This writer utilized motivational interviewing, active listening, reassurance and support in the context of cognitive behavioral therapy to address the above.      MENTAL STATUS EVALUATION  Grooming: Within normal limits  Attire: Appropriate  Age: Appears Stated  Behavior Towards Examiner: Cooperative  Motor Activity: Within normal limits  Eye Contact: Avoidant  Mood: Depressed   Affect: full range  Speech/Language: normal  Attention: Normal  Concentration: Sufficient  Thought Process: tangential  Thought Content: Clear    Orientation: Appeared oriented to person, place, and time, though not formally established  Memory: No evidence of impairment.  Judgement: Adequate  Estimated Intelligence: Average  Demonstrated Insight: Adequate  Fund of Knowledge: Adequate    Intervention:   Patient was readministered the PHQ-9, ZAKIA-7, and PCL-5 as part of revising her treatment plan.  Patient score of 8 on the ZAKIA-7 remains suggestive of mild anxiety.  Her score of 6 on the PHQ-9 is identical to her earlier score and remains suggestive of mild depression.  Patient score of 43 on the PCL-5 greatly exceeds the recommended cut score of 32 or 33 for consideration of PTSD and is an increase over her earlier score of 35.  Patient's treatment plan was jointly revised. Patient has been making a slow recovery from COVID-19 and has not been seen in over 3 months. Remaining time was spent catching up on events that have transpired since  patient was last seen.    Progress:  Patient treatment plan was jointly revised    Plan:   We will meet again in 2 weeks to address the patient's Bipolar I Disorder, anxiety, Panic Disorder, Agoraphobia, PTSD, and OCD.  Estimated duration of treatment is ongoing due to a major mental illness and lengthy trauma history.  Individual therapy sessions (82212) at twice monthly intervals.     Diagnosis:  Bipolar 1 Disorder, severe, most recent episode depressed  Posttraumatic Stress Disorder  Panic Disorder  Agoraphobia  Generalized Anxiety Disorder   Obsessive-Compulsive Disorder, with poor insight

## 2022-04-13 ENCOUNTER — VIRTUAL VISIT (OUTPATIENT)
Dept: NEUROLOGY | Facility: CLINIC | Age: 47
End: 2022-04-13
Payer: MEDICARE

## 2022-04-13 DIAGNOSIS — Z91.199 NO-SHOW FOR APPOINTMENT: Primary | ICD-10-CM

## 2022-04-13 PROCEDURE — 99207 PR NO BILLABLE SERVICE THIS VISIT: CPT | Performed by: PSYCHOLOGIST

## 2022-04-13 NOTE — PROGRESS NOTES
This patient was a no show for this scheduled appointment. They did not log onto our video visit and did not answer an attempted reminder phone call.

## 2022-04-26 DIAGNOSIS — G89.4 CHRONIC PAIN SYNDROME: ICD-10-CM

## 2022-04-26 RX ORDER — HYDROCODONE BITARTRATE AND ACETAMINOPHEN 5; 325 MG/1; MG/1
1 TABLET ORAL 4 TIMES DAILY PRN
Qty: 20 TABLET | Refills: 0 | Status: SHIPPED | OUTPATIENT
Start: 2022-04-26 | End: 2022-04-28

## 2022-04-26 NOTE — TELEPHONE ENCOUNTER
Received call from patient requesting refill(s) of Norco    Last dispensed from pharmacy on 3/15/22    Patient's last office/virtual visit by prescribing provider on 2/25/22 KN  Next office/virtual appointment scheduled for 4/28/22 BE    Last urine drug screen date 11/2021  Current opioid agreement on file (completed within the last year) Yes Date of opioid agreement: 11/2021    E-prescribe to [facility] pharmacy  Pending Prescriptions:                       Disp   Refills    HYDROcodone-acetaminophen (NORCO) 5-325 M*120 ta*0            Sig: Take 1 tablet by mouth 4 times daily as needed           for moderate to severe pain (Max of 4 tablets per           day) Put 4 hours between doses. - May fill/start           4/26/22

## 2022-04-28 ENCOUNTER — VIRTUAL VISIT (OUTPATIENT)
Dept: PALLIATIVE MEDICINE | Facility: OTHER | Age: 47
End: 2022-04-28
Payer: MEDICARE

## 2022-04-28 DIAGNOSIS — G89.4 CHRONIC PAIN SYNDROME: ICD-10-CM

## 2022-04-28 PROCEDURE — 99215 OFFICE O/P EST HI 40 MIN: CPT | Mod: 95 | Performed by: ANESTHESIOLOGY

## 2022-04-28 RX ORDER — HYDROCODONE BITARTRATE AND ACETAMINOPHEN 5; 325 MG/1; MG/1
1 TABLET ORAL 4 TIMES DAILY PRN
Qty: 120 TABLET | Refills: 0 | Status: SHIPPED | OUTPATIENT
Start: 2022-04-28 | End: 2022-05-31

## 2022-04-28 ASSESSMENT — PAIN SCALES - GENERAL: PAINLEVEL: EXTREME PAIN (8)
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
## 2022-04-28 NOTE — PATIENT INSTRUCTIONS
PLAN:    Referral to orthopedics for evaluation of your feet and surgery.    Dr. Lino to discuss with your psychotherapist.    MRI ordered for your neck    Continue the hydrocodone 5 mg up to 4 times a day.    Follow-up with Dr. Lino in 3 months

## 2022-04-28 NOTE — PROGRESS NOTES
Patient presents to the clinic today for a follow up with OLAMIDE DUNNE MD regarding Pain Management.        Rohan is a 46 year old who is being evaluated via a billable video visit.      How would you like to obtain your AVS? MyChart  If the video visit is dropped, the invitation should be resent by: Text to cell phone: [phone]   Will anyone else be joining your video visit? No      Video Start Time:   Video-Visit Details    Type of service:  Video Visit    Video End Time:    Originating Location (pt. Location): Home    Distant Location (provider location):  [facility]     Platform used for Video Visit: Doximity            Is Pt currently in [place]? Yes        NOTE: If Pt is not in [place], Appointment needs to be canceled and rescheduled            PEG Score 1/18/2022 2/25/2022 4/28/2022   PEG Total Score 8.33 8 6.33               UDS/CSA-              QUESTIONS:              Nany CR[facility] Patient Facilitator

## 2022-05-10 ENCOUNTER — OFFICE VISIT (OUTPATIENT)
Dept: PODIATRY | Facility: CLINIC | Age: 47
End: 2022-05-10
Payer: MEDICARE

## 2022-05-10 VITALS — OXYGEN SATURATION: 99 % | WEIGHT: 220 LBS | HEART RATE: 88 BPM | HEIGHT: 71 IN | BODY MASS INDEX: 30.8 KG/M2

## 2022-05-10 DIAGNOSIS — G89.4 CHRONIC PAIN SYNDROME: ICD-10-CM

## 2022-05-10 DIAGNOSIS — G57.52 TARSAL TUNNEL SYNDROME OF LEFT SIDE: ICD-10-CM

## 2022-05-10 DIAGNOSIS — G58.9 COMPRESSION NEUROPATHY: Primary | ICD-10-CM

## 2022-05-10 PROCEDURE — 99203 OFFICE O/P NEW LOW 30 MIN: CPT | Performed by: PODIATRIST

## 2022-05-10 NOTE — LETTER
5/10/2022         RE: Rohan Prince  [address] [place] [facility] [address]        Dear Colleague,    Thank you for referring your patient, Rohan Prince, to the [facility]. Please see a copy of my visit note below.    FOOT AND ANKLE SURGERY/PODIATRY CONSULT NOTE         ASSESSMENT:   Compression neuropathy left lower extremity      TREATMENT:  The patient has been referred to neurology for evaluation and treatment.  The patient is to return to podiatry as needed.        HPI:Rohan Prince presented to the clinic today complaining of pain involving her left lower extremity.  She stated that she has severe foot and ankle pain as well.  She has had the symptoms for several months.  She stated that her symptoms are so severe that it wakes her up at night.  She has burning, numbness, tingling involving her left lower extremity.  She is currently taking Lyrica.  She also indicated that she is having some weakness on her left leg.  She finds her self rolling her ankle periodically.  She appears to be quite frustrated at inability to alleviate the symptoms.     Past Medical History:   Diagnosis Date     Anxiety      Brain TIA 2015     Cancer (H)     cerivcal, removed everything     Cancer (H)     cervical cancer     Chronic pain disorder      Depression      DJD (degenerative joint disease)      Endometriosis 3/13/2017     Endometriosis      Fibromyalgia      Kidney infection      Meningioma, recurrent of brain (H)      Migraine      Osteoporosis      Screening for cervical cancer 3/23/2017    Overview:  2017-ASCUS, HPV + types other than 16 or 18.  4/11/17: Colposcopy, HORACIO II Recommend LEEP, patient consider hyst due to abnormal bleeding; pap test history     Stroke (H)      TIA (transient ischemic attack)        Social History     Socioeconomic History     Marital status: Single     Spouse name: Not on file     Number of children: Not on file     Years of education: Not on file     Highest  education level: Not on file   Occupational History     Not on file   Tobacco Use     Smoking status: Never Smoker     Smokeless tobacco: Never Used   Substance and Sexual Activity     Alcohol use: No     Drug use: No     Sexual activity: Not Currently   Other Topics Concern     Parent/sibling w/ CABG, MI or angioplasty before 65F 55M? Not Asked   Social History Narrative     Not on file     Social Determinants of Health     Financial Resource Strain: Not on file   Food Insecurity: Not on file   Transportation Needs: Not on file   Physical Activity: Not on file   Stress: Not on file   Social Connections: Not on file   Intimate Partner Violence: Not on file   Housing Stability: Not on file          Allergies   Allergen Reactions     Chlorhexidine Rash          Current Outpatient Medications:      HYDROcodone-acetaminophen (NORCO) 5-325 MG tablet, Take 1 tablet by mouth 4 times daily as needed for moderate to severe pain (Max of 4 tablets per day) Put 4 hours between doses. - May fill/start 4/26/22, Disp: 120 tablet, Rfl: 0     pregabalin (LYRICA) 150 MG capsule, Take 1 capsule (150 mg) by mouth 2 times daily, Disp: 60 capsule, Rfl: 5     Family History   Problem Relation Age of Onset     Other Cancer Mother         ovarian     Cerebrovascular Disease Mother      Depression Mother      Cancer Mother      Other - See Comments Mother         swelling     Bipolar Disorder Mother         Social History     Socioeconomic History     Marital status: Single     Spouse name: Not on file     Number of children: Not on file     Years of education: Not on file     Highest education level: Not on file   Occupational History     Not on file   Tobacco Use     Smoking status: Never Smoker     Smokeless tobacco: Never Used   Substance and Sexual Activity     Alcohol use: No     Drug use: No     Sexual activity: Not Currently   Other Topics Concern     Parent/sibling w/ CABG, MI or angioplasty before 65F 55M? Not Asked   Social  History Narrative     Not on file     Social Determinants of Health     Financial Resource Strain: Not on file   Food Insecurity: Not on file   Transportation Needs: Not on file   Physical Activity: Not on file   Stress: Not on file   Social Connections: Not on file   Intimate Partner Violence: Not on file   Housing Stability: Not on file        Review of Systems - Patient denies fever, chills, rash, wound, stiffness, limping, numbness, weakness, heart burn, blood in stool, chest pain with activity, calf pain when walking, shortness of breath with activity, chronic cough, easy bleeding/bruising, swelling of ankles, excessive thirst, fatigue, depression, anxiety.  Patient admits to pain, numbness, tingling left lower extremity.      OBJECTIVE:  Appearance: alert, well appearing, and in no distress and oriented to person, place, and time.    There were no vitals taken for this visit.     There is no height or weight on file to calculate BMI.     General appearance: Patient is alert and fully cooperative with history & exam.  No sign of distress is noted during the visit.  Psychiatric: Affect is pleasant & appropriate.  Patient appears motivated to improve health.  Respiratory: Breathing is regular & unlabored while sitting.  HEENT: Hearing is intact to spoken word.  Speech is clear.  No gross evidence of visual impairment that would impact ambulation.    Vascular: Dorsalis pedis and posterior tibial pulses are palpable. There is no pedal hair growth bilaterally.  CFT < 3 sec from anterior tibial surface to distal digits bilaterally. There is no appreciable edema noted.  Dermatologic: Turgor and texture are within normal limits. No coloration or temperature changes. No primary or secondary lesions noted.  Neurologic: All epicritic and proprioceptive sensations are grossly intact bilaterally.  There is a very pronounced positive Tinel sign when percussing the common, superficial, deep peroneal nerves and tarsal tunnel  bilaterally.  Musculoskeletal: All active and passive ankle, subtalar, midtarsal, and 1st MPJ range of motion are grossly intact without pain or crepitus, with the exception of none. Manual muscle strength is within normal limits right lower extremity and significantly diminished on the right lower extremity.  All dorsiflexors, plantarflexors, invertors, evertors are intact bilaterally. Tenderness present to the left lower extremity on palpation. Tenderness to the left lower extremity with range of motion. Calf is soft/non-tender with/without warmth/induration    Imaging:       No images are attached to the encounter or orders placed in the encounter.     No results found.   No results found.       Adebayo Berrios DPM  [facility] Foot & Ankle Surgery/Podiatry         Again, thank you for allowing me to participate in the care of your patient.        Sincerely,        Adebayo Adkins DPM

## 2022-05-10 NOTE — PROGRESS NOTES
FOOT AND ANKLE SURGERY/PODIATRY CONSULT NOTE         ASSESSMENT:   Compression neuropathy left lower extremity      TREATMENT:  The patient has been referred to neurology for evaluation and treatment.  The patient is to return to podiatry as needed.        HPI:Rohan Prince presented to the clinic today complaining of pain involving her left lower extremity.  She stated that she has severe foot and ankle pain as well.  She has had the symptoms for several months.  She stated that her symptoms are so severe that it wakes her up at night.  She has burning, numbness, tingling involving her left lower extremity.  She is currently taking Lyrica.  She also indicated that she is having some weakness on her left leg.  She finds her self rolling her ankle periodically.  She appears to be quite frustrated at inability to alleviate the symptoms.     Past Medical History:   Diagnosis Date     Anxiety      Brain TIA 2015     Cancer (H)     cerivcal, removed everything     Cancer (H)     cervical cancer     Chronic pain disorder      Depression      DJD (degenerative joint disease)      Endometriosis 3/13/2017     Endometriosis      Fibromyalgia      Kidney infection      Meningioma, recurrent of brain (H)      Migraine      Osteoporosis      Screening for cervical cancer 3/23/2017    Overview:  2017-ASCUS, HPV + types other than 16 or 18.  4/11/17: Colposcopy, HORACIO II Recommend LEEP, patient consider hyst due to abnormal bleeding; pap test history     Stroke (H)      TIA (transient ischemic attack)        Social History     Socioeconomic History     Marital status: Single     Spouse name: Not on file     Number of children: Not on file     Years of education: Not on file     Highest education level: Not on file   Occupational History     Not on file   Tobacco Use     Smoking status: Never Smoker     Smokeless tobacco: Never Used   Substance and Sexual Activity     Alcohol use: No     Drug use: No     Sexual activity: Not  Currently   Other Topics Concern     Parent/sibling w/ CABG, MI or angioplasty before 65F 55M? Not Asked   Social History Narrative     Not on file     Social Determinants of Health     Financial Resource Strain: Not on file   Food Insecurity: Not on file   Transportation Needs: Not on file   Physical Activity: Not on file   Stress: Not on file   Social Connections: Not on file   Intimate Partner Violence: Not on file   Housing Stability: Not on file          Allergies   Allergen Reactions     Chlorhexidine Rash          Current Outpatient Medications:      HYDROcodone-acetaminophen (NORCO) 5-325 MG tablet, Take 1 tablet by mouth 4 times daily as needed for moderate to severe pain (Max of 4 tablets per day) Put 4 hours between doses. - May fill/start 4/26/22, Disp: 120 tablet, Rfl: 0     pregabalin (LYRICA) 150 MG capsule, Take 1 capsule (150 mg) by mouth 2 times daily, Disp: 60 capsule, Rfl: 5     Family History   Problem Relation Age of Onset     Other Cancer Mother         ovarian     Cerebrovascular Disease Mother      Depression Mother      Cancer Mother      Other - See Comments Mother         swelling     Bipolar Disorder Mother         Social History     Socioeconomic History     Marital status: Single     Spouse name: Not on file     Number of children: Not on file     Years of education: Not on file     Highest education level: Not on file   Occupational History     Not on file   Tobacco Use     Smoking status: Never Smoker     Smokeless tobacco: Never Used   Substance and Sexual Activity     Alcohol use: No     Drug use: No     Sexual activity: Not Currently   Other Topics Concern     Parent/sibling w/ CABG, MI or angioplasty before 65F 55M? Not Asked   Social History Narrative     Not on file     Social Determinants of Health     Financial Resource Strain: Not on file   Food Insecurity: Not on file   Transportation Needs: Not on file   Physical Activity: Not on file   Stress: Not on file   Social  Connections: Not on file   Intimate Partner Violence: Not on file   Housing Stability: Not on file        Review of Systems - Patient denies fever, chills, rash, wound, stiffness, limping, numbness, weakness, heart burn, blood in stool, chest pain with activity, calf pain when walking, shortness of breath with activity, chronic cough, easy bleeding/bruising, swelling of ankles, excessive thirst, fatigue, depression, anxiety.  Patient admits to pain, numbness, tingling left lower extremity.      OBJECTIVE:  Appearance: alert, well appearing, and in no distress and oriented to person, place, and time.    There were no vitals taken for this visit.     There is no height or weight on file to calculate BMI.     General appearance: Patient is alert and fully cooperative with history & exam.  No sign of distress is noted during the visit.  Psychiatric: Affect is pleasant & appropriate.  Patient appears motivated to improve health.  Respiratory: Breathing is regular & unlabored while sitting.  HEENT: Hearing is intact to spoken word.  Speech is clear.  No gross evidence of visual impairment that would impact ambulation.    Vascular: Dorsalis pedis and posterior tibial pulses are palpable. There is no pedal hair growth bilaterally.  CFT < 3 sec from anterior tibial surface to distal digits bilaterally. There is no appreciable edema noted.  Dermatologic: Turgor and texture are within normal limits. No coloration or temperature changes. No primary or secondary lesions noted.  Neurologic: All epicritic and proprioceptive sensations are grossly intact bilaterally.  There is a very pronounced positive Tinel sign when percussing the common, superficial, deep peroneal nerves and tarsal tunnel bilaterally.  Musculoskeletal: All active and passive ankle, subtalar, midtarsal, and 1st MPJ range of motion are grossly intact without pain or crepitus, with the exception of none. Manual muscle strength is within normal limits right lower  extremity and significantly diminished on the right lower extremity.  All dorsiflexors, plantarflexors, invertors, evertors are intact bilaterally. Tenderness present to the left lower extremity on palpation. Tenderness to the left lower extremity with range of motion. Calf is soft/non-tender with/without warmth/induration    Imaging:       No images are attached to the encounter or orders placed in the encounter.     No results found.   No results found.       Adebayo Berrios DPM  [facility] Foot & Ankle Surgery/Podiatry

## 2022-05-21 ENCOUNTER — HEALTH MAINTENANCE LETTER (OUTPATIENT)
Age: 47
End: 2022-05-21

## 2022-05-26 ENCOUNTER — HOSPITAL ENCOUNTER (OUTPATIENT)
Dept: MRI IMAGING | Facility: HOSPITAL | Age: 47
Discharge: HOME | End: 2022-05-26
Attending: ANESTHESIOLOGY | Admitting: ANESTHESIOLOGY
Payer: MEDICARE

## 2022-05-26 DIAGNOSIS — G89.4 CHRONIC PAIN SYNDROME: ICD-10-CM

## 2022-05-26 PROCEDURE — 72141 MRI NECK SPINE W/O DYE: CPT | Mod: ME

## 2022-05-27 ENCOUNTER — VIRTUAL VISIT (OUTPATIENT)
Dept: NEUROLOGY | Facility: CLINIC | Age: 47
End: 2022-05-27
Payer: MEDICARE

## 2022-05-27 DIAGNOSIS — F31.30 BIPOLAR I DISORDER, MOST RECENT EPISODE DEPRESSED (H): Primary | ICD-10-CM

## 2022-05-27 PROCEDURE — 90834 PSYTX W PT 45 MINUTES: CPT | Mod: 95 | Performed by: PSYCHOLOGIST

## 2022-05-27 NOTE — PROGRESS NOTES
Psychology Progress Note    Date: May 27, 2022    Time length and type of treatment: 51 minutes (2:01 PM to 2:52 PM), individual therapy    After review of the patient's situation, this visit was changed from an in-person visit to a  video visit via [id number] to reduce the risk of COVID 19 exposure. Patient was informed that policies and procedures that govern in-person sessions would also apply to  video sessions. Patient was also informed that  video sessions would be discontinued when COVID 19 exposure is no longer a concern (as determined by [facility]).     Patient location: Patient home in [place], [place]  Provider location:  [facility] - Provider remote location    Patient was in agreement with proceeding with a  video session.      Necessity: This session is necessary to address the patient's bipolar disorder, PTSD, panic disorder, agoraphobia, anxiety, and OCD.  Today we focus on the patient's treatment plan, specifically exploring establishing and maintaining boundaries and limit setting.  The reader is invited to review the patient's full treatment plan in the Media section of the patient's Epic medical record.    Psychotherapeutic Technique: This writer utilized motivational interviewing, active listening, reassurance and support in the context of cognitive behavioral therapy to address the above.      MENTAL STATUS EVALUATION  Grooming: Within normal limits  Attire: Appropriate  Age: Appears Stated  Behavior Towards Examiner: Cooperative  Motor Activity: Within normal limits  Eye Contact: Appropriate  Mood: Sad   Affect: full range  Speech/Language: normal  Attention: Normal  Concentration: Sufficient  Thought Process: tangential  Thought Content: Clear    Orientation: Appeared oriented to person, place, and time, though not formally established  Memory: No evidence of impairment.  Judgement: Adequate  Estimated Intelligence: Average  Demonstrated Insight:  Adequate  Fund of Knowledge: Adequate    Intervention:   Patient's youngest daughter's dad is scheduled to be released from nursing home in 2023 and has been reaching out to the patient.  He is incarcerated for stealing the patient's identity and she discussed her complicated feelings related to his upcoming release. Patient discussed her anger that he has not been more present in his daughter's life, her desire to set good boundaries with him, and the difficulty she has with limit setting. We discussed patient's status as a role model for her daughters and how her choices teach them what is acceptable.     Progress:  Patient continues to struggle with setting boundaries because she is afraid that she is being selfish or mean.      Plan:   We will meet again in 2 weeks to address the patient's bipolar I disorder, anxiety, panic disorder, agoraphobia, PTSD, and OCD.  Estimated duration of treatment is ongoing due to a major mental illness and lengthy trauma history.  Individual therapy sessions (55631) will be at twice monthly intervals.    Diagnosis:  Bipolar 1 Disorder, severe, most recent episode depressed  Posttraumatic Stress Disorder  Panic Disorder  Agoraphobia  Generalized Anxiety Disorder   Obsessive-Compulsive Disorder, with poor insight

## 2022-06-15 ENCOUNTER — VIRTUAL VISIT (OUTPATIENT)
Dept: NEUROLOGY | Facility: CLINIC | Age: 47
End: 2022-06-15
Payer: MEDICARE

## 2022-06-15 DIAGNOSIS — F31.9 BIPOLAR I DISORDER (H): Primary | ICD-10-CM

## 2022-06-15 PROCEDURE — 90834 PSYTX W PT 45 MINUTES: CPT | Mod: 95 | Performed by: PSYCHOLOGIST

## 2022-06-15 NOTE — LETTER
6/15/2022         RE: Rohan Prince  [address]  [facility] [address]        Dear Colleague,    Thank you for referring your patient, Rohan Prince, to the [facility]. Please see a copy of my visit note below.    Psychology Progress Note    Date: Jenna 15, 2022    Time length and type of treatment: 44 minutes (1:04 PM to 1:48 PM), individual therapy    After review of the patient's situation, this visit was changed from an in-person visit to a  video visit via [employer] to reduce the risk of COVID 19 exposure. Patient was informed that policies and procedures that govern in-person sessions would also apply to  video sessions. Patient was also informed that  video sessions would be discontinued when COVID 19 exposure is no longer a concern (as determined by [facility]).     Patient location: Patient home in [place], [place]  Provider location:  [facility]    Patient was in agreement with proceeding with a  video session.      Necessity: This session is necessary to address the patient's bipolar I disorder, PTSD, panic disorder, agoraphobia, ZAKIA, and OCD.  Today we focus on revising the patient's treatment plan.  The reader is invited to review the patient's full treatment plan in the Media section of the patient's Epic medical record.    Psychotherapeutic Technique: This writer utilized motivational interviewing, active listening, reassurance and support in the context of cognitive behavioral therapy to address the above.      MENTAL STATUS EVALUATION  Grooming: Within normal limits  Attire: Appropriate  Age: Appears Stated  Behavior Towards Examiner: Cooperative  Motor Activity: Within normal limits  Eye Contact: Appropriate  Mood: Euthymic  Affect: full range  Speech/Language: normal  Attention: Easily distracted  Concentration: Brief  Thought Process: tangential  Thought Content: Clear    Orientation: Appeared oriented to person,  place, and time, though not formally established  Memory: No evidence of impairment.  Judgement: Adequate  Estimated Intelligence: Average  Demonstrated Insight: Adequate  Fund of Knowledge: Adequate    Intervention:   Patient was readministered the PHQ-9, ZAKIA-7, and PCL-5 as part of revising her treatment plan.  Her score of 11 on the PHQ-9 is suggestive of moderate depression and is an increase over her earlier score of 6.  Similarly, her score of 13 on the ZAKIA-7 is suggestive of moderate anxiety and is an increase over her earlier score of 8.  Patient score of 45 on the PCL-5 is similar to her earlier score of 48 and continues to greatly exceed the recommended cut score of 32 or 33 for consideration of PTSD.  Patient reported that she commonly struggles with increased depression and anxiety in the summer, explaining that her son's birthday is in the summer which causes her to think about and makes him more during this time of the year.  Much of her trauma history also occurred during summertime and she is generally troubled by more anxiety and sadness this time of year.  Patient treatment plan was jointly revised.  In remaining time, we discussed patient's daughter overhearing the patient discuss her relationship with her mother.  Patient felt some sadness that she wasn't able to preserve her oldest daughter's positive image of her grandmother, but also some relief that her daughter now knows a little more about what the patient experienced.     Progress:  Patient treatment plan was jointly revised    Plan:   We will meet again in 2 weeks to address the patient's Bipolar I Disorder, PTSD, Panic Disorder, Agoraphobia, anxiety, and OCD.  Estimated duration of treatment is ongoing due to a major mental illness and lengthy trauma history.  Individual therapy sessions ([address]130) will be at twice monthly.  Intervals.    Diagnosis:  Bipolar 1 Disorder, severe, most recent episode depressed  Posttraumatic Stress  Disorder  Panic Disorder  Agoraphobia  Generalized Anxiety Disorder   Obsessive-Compulsive Disorder, with poor insight      Again, thank you for allowing me to participate in the care of your patient.        Sincerely,        Josie Astorga, PhD LP

## 2022-06-15 NOTE — PROGRESS NOTES
Psychology Progress Note    Date: Jenna 15, 2022    Time length and type of treatment: 44 minutes (1:04 PM to 1:48 PM), individual therapy    After review of the patient's situation, this visit was changed from an in-person visit to a  video visit via [employer] to reduce the risk of COVID 19 exposure. Patient was informed that policies and procedures that govern in-person sessions would also apply to  video sessions. Patient was also informed that  video sessions would be discontinued when COVID 19 exposure is no longer a concern (as determined by [facility]).     Patient location: Patient home in [place], [place]  Provider location:  [facility]    Patient was in agreement with proceeding with a  video session.      Necessity: This session is necessary to address the patient's bipolar I disorder, PTSD, panic disorder, agoraphobia, ZAKIA, and OCD.  Today we focus on revising the patient's treatment plan.  The reader is invited to review the patient's full treatment plan in the Media section of the patient's Epic medical record.    Psychotherapeutic Technique: This writer utilized motivational interviewing, active listening, reassurance and support in the context of cognitive behavioral therapy to address the above.      MENTAL STATUS EVALUATION  Grooming: Within normal limits  Attire: Appropriate  Age: Appears Stated  Behavior Towards Examiner: Cooperative  Motor Activity: Within normal limits  Eye Contact: Appropriate  Mood: Euthymic  Affect: full range  Speech/Language: normal  Attention: Easily distracted  Concentration: Brief  Thought Process: tangential  Thought Content: Clear    Orientation: Appeared oriented to person, place, and time, though not formally established  Memory: No evidence of impairment.  Judgement: Adequate  Estimated Intelligence: Average  Demonstrated Insight: Adequate  Fund of Knowledge: Adequate    Intervention:   Patient was readministered the  PHQ-9, ZAKIA-7, and PCL-5 as part of revising her treatment plan.  Her score of 11 on the PHQ-9 is suggestive of moderate depression and is an increase over her earlier score of 6.  Similarly, her score of 13 on the ZAKIA-7 is suggestive of moderate anxiety and is an increase over her earlier score of 8.  Patient score of 45 on the PCL-5 is similar to her earlier score of 48 and continues to greatly exceed the recommended cut score of 32 or 33 for consideration of PTSD.  Patient reported that she commonly struggles with increased depression and anxiety in the summer, explaining that her son's birthday is in the summer which causes her to think about and makes him more during this time of the year.  Much of her trauma history also occurred during summertime and she is generally troubled by more anxiety and sadness this time of year.  Patient treatment plan was jointly revised.  In remaining time, we discussed patient's daughter overhearing the patient discuss her relationship with her mother.  Patient felt some sadness that she wasn't able to preserve her oldest daughter's positive image of her grandmother, but also some relief that her daughter now knows a little more about what the patient experienced.     Progress:  Patient treatment plan was jointly revised    Plan:   We will meet again in 2 weeks to address the patient's Bipolar I Disorder, PTSD, Panic Disorder, Agoraphobia, anxiety, and OCD.  Estimated duration of treatment is ongoing due to a major mental illness and lengthy trauma history.  Individual therapy sessions (52707) will be at twice monthly.  Intervals.    Diagnosis:  Bipolar 1 Disorder, severe, most recent episode depressed  Posttraumatic Stress Disorder  Panic Disorder  Agoraphobia  Generalized Anxiety Disorder   Obsessive-Compulsive Disorder, with poor insight

## 2022-06-30 NOTE — ADDENDUM NOTE
[address] ENCOUNTER        Pt Name: Naz Lacey  MRN: [id number]  [place] 1957  Date of evaluation: 6/30/2022  Provider: Mary Velazquez PA-C  PCP: Paul Barrett MD  Note Started: 11:20 AM EDT      LAMONT. I have evaluated this patient. My supervising physician was available for consultation. Triage CHIEF COMPLAINT       Chief Complaint   Patient presents with    Cough    Fever    Chills    Diarrhea         HISTORY OF PRESENT ILLNESS   (Location/Symptom, Timing/Onset, Context/Setting, Quality, Duration, Modifying Factors, Severity)  Note limiting factors. Chief Complaint: chills/abd/n/diarrhea    Naz Lacey is a 62 y.o. female who presents with a 2-day history multiple complaints, including decreased appetite difficulty sleeping, intermittent chills and sweats, diarrhea, nausea, and what she describes as \"stomach pain\". She mentions stomach pain started first and others seem followed. She mentions she is also had a cough since yesterday she mentions her assisted living facility tested for COVID this morning and was negative. She says she feels generally and weak and that she is urinating less, she denies any dysuria, hematuria, vomiting, chest pain, wheezing, shortness of breath, extremity pain or weakness, extremity swelling, sick contacts, fever, dizziness, or lightheadedness. Nursing Notes were all reviewed and agreed with or any disagreements were addressed in the HPI. REVIEW OF SYSTEMS    (2-9 systems for level 4, 10 or more for level 5)     Review of Systems   Constitutional: Positive for chills. Negative for fever. HENT: Negative for rhinorrhea and sore throat. Respiratory: Positive for cough. Negative for chest tightness, shortness of breath and wheezing. Cardiovascular: Negative for leg swelling.    Gastrointestinal: Positive for abdominal pain, diarrhea and Addendum Note by Mary Muniz CNP at 1/22/2020  8:40 AM     Author: Mary Muniz CNP Service: -- Author Type: Nurse Practitioner    Filed: 1/22/2020 12:50 PM Date of Service: 1/22/2020  8:40 AM Status: Signed    : Mary Muniz CNP (Nurse Practitioner)    Encounter addended by: Mary Muniz CNP on: 1/22/2020 12:50 PM      Actions taken: LOS modified       nausea. Negative for vomiting. Endocrine: Negative for polyuria. Genitourinary: Positive for decreased urine volume. Negative for dysuria, hematuria and pelvic pain. Musculoskeletal: Negative for arthralgias, joint swelling and myalgias. Skin: Negative for rash. Neurological: Negative for dizziness, syncope and weakness. Hematological: Negative for adenopathy. Psychiatric/Behavioral: Negative for confusion. PAST MEDICAL HISTORY     Past Medical History:   Diagnosis Date    Arthritis     Asthma     Cerebral artery occlusion with cerebral infarction [facility])     per old chart 8/2015- TIA    [facility]ch[facility]     Diabetes mellitus [facility] [facility]     Diabetic neuropathy ([facility] [address]     per old chart    Fracture     per old chart pt in ER 12/9/2018- after 2 days of arm pain after fall- dx with left humerus fx- for surg 12/31/2018    Hyperlipidemia     Hypertension     Muscle weakness (generalized)     Osteoarthritis     per old chart       SURGICAL HISTORY     Past Surgical History:   Procedure Laterality Date    BREAST BIOPSY Right     CHOLECYSTECTOMY  2002?  EYE SURGERY      per old chart had right eye cataract ext done 2/2018 and left eye 4/2018    FOOT SURGERY Left 2004?  HUMERUS FRACTURE SURGERY Left 12/31/2018    HUMERUS OPEN REDUCTION INTERNAL FIXATION LEFT PROXIMAL performed by Eulogio Krishnan DO at [facility] Right 2009?     LUNG SURGERY  2009?    simone lung lobectomy        CURRENTMEDICATIONS       Previous Medications    ALENDRONATE (FOSAMAX) 70 MG TABLET    Take 70 mg by mouth once a week    ALOGLIPTIN (NESINA) 6.25 MG TABS TABLET    Take 1 tablet by mouth daily    AMITRIPTYLINE (ELAVIL) 25 MG TABLET    Take 25 mg by mouth nightly     AMLODIPINE (NORVASC) 10 MG TABLET    amlodipine 10 mg tablet   TAKE ONE TABLET BY MOUTH DAILY    ATORVASTATIN (LIPITOR) 40 MG TABLET    Take 40 mg by mouth nightly     BACLOFEN (LIORESAL) 5 MG TABLET    Take 1 Occupational History    Not on file   Tobacco Use    Smoking status: Never Smoker    Smokeless tobacco: Never Used    Tobacco comment: 12/27/*2018- with phone assessment with caregiver- unsure of social, surgical or family hx for phone assessment   Vaping Use    Vaping Use: Never used   Substance and Sexual Activity    Alcohol use: No    Drug use: No    Sexual activity: Not on file   Other Topics Concern    Not on file   Social History Narrative    Not on file     Social Determinants of Health     Financial Resource Strain:     Difficulty of Paying Living Expenses: Not on file   Food Insecurity:     Worried About Running Out of Food in the Last Year: Not on file    Denita of Food in the Last Year: Not on file   Transportation Needs:     Lack of Transportation (Medical): Not on file    Lack of Transportation (Non-Medical):  Not on file   Physical Activity:     Days of Exercise per Week: Not on file    Minutes of Exercise per Session: Not on file   Stress:     Feeling of Stress : Not on file   Social Connections:     Frequency of Communication with Friends and Family: Not on file    Frequency of Social Gatherings with Friends and Family: Not on file    Attends Gnosticist Services: Not on file    Active Member of [address] [place] [address] [employer] or Organizations: Not on file    Attends Club or Organization Meetings: Not on file    Marital Status: Not on file   Intimate Partner Violence:     Fear of Current or Ex-Partner: Not on file    Emotionally Abused: Not on file    Physically Abused: Not on file    Sexually Abused: Not on file   Housing Stability:     Unable to Pay for Housing in the Last Year: Not on file    Number of [place] in the Last Year: Not on file    Unstable Housing in the Last Year: Not on file       SCREENINGS    Anup Coma Scale  Eye Opening: Spontaneous  Best Verbal Response: Oriented  Best Motor Response: Obeys commands  Anup Coma Scale Score: 15        PHYSICAL EXAM    (up to 7 for level 4, 8 or more for level 5)     ED Triage Vitals [06/30/22 1109]   BP Temp Temp Source Heart Rate Resp SpO2 Height Weight   (!) 174/82 97.7 °F (36.5 °C) Oral 85 18 100 % -- --       Physical Exam  Vitals and nursing note reviewed. Constitutional:       Appearance: Normal appearance. She is well-developed. She is not ill-appearing or diaphoretic. HENT:      Head: Normocephalic and atraumatic. Eyes:      General: No scleral icterus. Right eye: No discharge. Left eye: No discharge. Cardiovascular:      Rate and Rhythm: Normal rate and regular rhythm. Heart sounds: Normal heart sounds. No murmur heard. No friction rub. No gallop. Pulmonary:      Effort: Pulmonary effort is normal. No respiratory distress. Breath sounds: Normal breath sounds. No stridor. No wheezing or rales. Chest:      Chest wall: No tenderness. Abdominal:      General: Bowel sounds are normal. There is no distension. Palpations: Abdomen is soft. There is no mass. Tenderness: There is no abdominal tenderness. There is no right CVA tenderness, left CVA tenderness, guarding or rebound. Musculoskeletal:         General: No tenderness or deformity. Normal range of motion. Cervical back: Normal range of motion and neck supple. Right lower leg: No edema. Skin:     General: Skin is warm and dry. Coloration: Skin is not pale. Neurological:      Mental Status: She is alert and oriented to person, place, and time.       Coordination: Coordination normal.   Psychiatric:         Mood and Affect: Mood normal.         Behavior: Behavior normal.         DIAGNOSTIC RESULTS   LABS:    Labs Reviewed   URINALYSIS WITH REFLEX TO CULTURE - Abnormal; Notable for the following components:       Result Value    Color, UA YELLOW (*)     Glucose, Urine 250 (*)     Bilirubin Urine MODERATE (*)     Ketones, Urine 15 (*)     Blood, Urine SMALL (*)     Protein,  (*)     Leukocyte Esterase, Urine TRACE (*)     WBC, UA 43 (*)     Mucus, UA RARE (*)     All other components within normal limits   CBC WITH AUTO DIFFERENTIAL - Abnormal; Notable for the following components:    RBC 3.96 (*)     Hemoglobin 11.9 (*)     MCHC 31.4 (*)     Segs Relative 74.5 (*)     Lymphocytes % 17.4 (*)     Monocytes % 7.2 (*)     All other components within normal limits   COMPREHENSIVE METABOLIC PANEL - Abnormal; Notable for the following components:    CO2 19 (*)     Glucose 270 (*)     AST 13 (*)     GFR Non- 50 (*)     All other components within normal limits   BRAIN NATRIURETIC PEPTIDE - Abnormal; Notable for the following components:    Pro-BNP 1,389 (*)     All other components within normal limits   COVID-19, RAPID   RAPID INFLUENZA A/B ANTIGENS   CULTURE, URINE   LIPASE   TROPONIN       When ordered, only abnormal lab results are displayed. All other labs were within normal range or not returned as of this dictation. EKG: When ordered, EKG's are interpreted by the Emergency Department Physician in the absence of a cardiologist.  Please see their note for interpretation of EKG. RADIOLOGY:   Non-plain film images such as CT, Ultrasound and MRI are read by the radiologist. Plain radiographic images are visualized andpreliminarily interpreted by the  ED Provider with the below findings:        Interpretation perthe Radiologist below, if available at the time of this note:    CT ABDOMEN PELVIS WO CONTRAST Additional Contrast? None   Preliminary Result   1. No evidence of acute intra-abdominal abnormality. No evidence of bowel   obstruction, intraperitoneal free air, or abscess. 2. Status post cholecystectomy. 3. Coronary artery calcification. Minimal pericardial effusion which appears   slightly increased when compared to the study of 03/11/2022 as described   above. 4. Small sliding hiatal hernia. XR CHEST PORTABLE   Final Result   No acute process.            No results found.      PROCEDURES   Unless otherwise noted below, none     Procedures    CRITICAL CARE TIME   N/A    CONSULTS:  IP CONSULT TO CARDIOLOGY      EMERGENCY DEPARTMENT COURSE and DIFFERENTIAL DIAGNOSIS/MDM:   Vitals:    Vitals:    06/30/22 1109   BP: (!) 174/82   Pulse: 85   Resp: 18   Temp: 97.7 °F (36.5 °C)   TempSrc: Oral   SpO2: 100%       Patient was given thefollowing medications:  Medications   ondansetron (ZOFRAN) injection 4 mg (4 mg IntraVENous Given 6/30/22 1429)         Is this patient to be included in the SEP-1 Core Measure due to severe sepsis or septic shock? No   Exclusion criteria - the patient is NOT to be included for SEP-1 Core Measure due to:  2+ SIRS criteria are not met        @1530 patient's nausea improved after Zofran. Urinalysis does not appear to be consistent with urinary tract infection, but culture is pending. COVID and influenza swabs are negative. Chest x-ray unremarkable. No leukocytosis. Globin chronically decreased, but improved since last month. Chemistries. Unremarkable. Normal lipase normal troponin. Patient does have noted BNP of 1389, last month, this was 174. She has had a reading approximately 2 years ago [phone], at that time she was admitted for DKA. CT scan shows no acute abdominal pelvic findings, butworsening of her mild pericardial effusion discussion with patient, she does endorse some chronic shortness of breath shortness of breath that is worsening above her baseline for the past 2 days. She does describe some mild worsening on exertion. She has not been hypoxic. Given worsening shortness of breath, increasing BNP,and CT finding will discuss with cardiology. @0196 I spoke with on-call cardiologist Dr. Yuko Reid regarding patient's BNP, CT findings, and symptoms. He states patient can follow-up as an outpatient, with him next week. Meanwhile patient symptoms have improved here. Vital stable. No acute findings on CT scan.   Do feel symptoms are most consistent with a viral etiology. I do feel patient is safe for outpatient management. Low suspicion for appendicitis, pancreatitis, diverticulitis abdominal aortic aneurysm, mesenteric ischemia, bowel perforation, acute bowel obstruction, MI, splenic rupture, ACS    Differentials would include but not exclusive of   inflammatory bowel disease, irritable bowel syndrome, enteritis, gastroenteritis, peptic ulcer disease, foodborne illness, urinary tract infection,viral syndrome         FINAL IMPRESSION      1. Nausea vomiting and diarrhea    2. Shortness of breath    3. Elevated brain natriuretic peptide (BNP) level    4. Pain of upper abdomen          DISPOSITION/PLAN   DISPOSITION        PATIENT REFERREDTO:  Yazan Connors MD  [address] [facility] [address] [address], APRN - NP  [address] [id number] [phone]            DISCHARGE MEDICATIONS:  New Prescriptions    ONDANSETRON (ZOFRAN ODT) 4 MG DISINTEGRATING TABLET    Take 1 tablet by mouth every 8 hours as needed for Nausea       DISCONTINUED MEDICATIONS:  Discontinued Medications    No medications on file              (Please note that portions ofthis note were completed with a voice recognition program.  Efforts were made to edit the dictations but occasionally words are mis-transcribed.)    Mary Velazquez PA-C (electronically signed)            Mary Velazquez PA-C  06/30/22 2013

## 2022-07-01 ENCOUNTER — OFFICE VISIT (OUTPATIENT)
Dept: NEUROLOGY | Facility: CLINIC | Age: 47
End: 2022-07-01
Payer: MEDICARE

## 2022-07-01 DIAGNOSIS — F31.10 BIPOLAR I DISORDER, MOST RECENT EPISODE (OR CURRENT) MANIC (H): Primary | ICD-10-CM

## 2022-07-01 PROCEDURE — 90834 PSYTX W PT 45 MINUTES: CPT | Performed by: PSYCHOLOGIST

## 2022-07-01 NOTE — PROGRESS NOTES
Psychology Progress Note    Date: July 1, 2022    Time length and type of treatment: (3:05 PM to 3:52 PM), individual therapy -in person session    Necessity: This session is necessary to address the patient's Bipolar I Disorder, PTSD, Panic Disorder, Agoraphobia, ZAKIA, and OCD.  Today we focus on the patient's treatment plan, specifically exploring coping strategies.  The reader is invited to review the patient's full treatment plan in the Media section of the patient's Epic medical record.    Psychotherapeutic Technique: This writer utilized motivational interviewing, active listening, reassurance and support in the context of cognitive behavioral therapy to address the above.      MENTAL STATUS EVALUATION  Grooming: Within normal limits  Attire: Appropriate  Age: Appears Stated  Behavior Towards Examiner: Cooperative  Motor Activity: Within normal limits  Eye Contact: Appropriate  Mood: Elevated   Affect: full range  Speech/Language: pressured  Attention: Easily distracted  Concentration: Brief  Thought Process: flight of ideas  Thought Content: Clear  Hallucinations    Orientation: Appeared oriented to person, place, and time, though not formally established  Memory: No evidence of impairment.  Judgement: Adequate  Estimated Intelligence: Average  Demonstrated Insight: Adequate  Fund of Knowledge: Adequate    Intervention:   Patient is manic.  Speech was pressured and patient required frequent redirection.  Patient responded well to being told she appeared manic, stating this was helpful because she was aware that her thoughts were racing, she wasn't sleeping, and she was more irritable, but she hadn't connected this to denise since she usually cleans when manic and on this occasion she hadn't felt compelled to clean.  We discussed how patient has managed denise effectively in the past, and additional coping strategies were reviewed.      Progress:  Patient is manic, but is coping effectively.      Plan:   We will  meet again in 2 weeks to address the patient's Bipolar I Disorder, PTSD, panic disorder, agoraphobia, anxiety, and OCD.  Estimated duration of treatment is ongoing due to a major mental illness and lengthy trauma history.  Individual therapy sessions (15070) will be at twice monthly intervals.     Diagnosis:  Bipolar I Disorder, most recent episode manic  Posttraumatic Stress Disorder  Panic Disorder  Agoraphobia  Generalized Anxiety Disorder   Obsessive-Compulsive Disorder, with poor insight

## 2022-07-26 ENCOUNTER — OFFICE VISIT (OUTPATIENT)
Dept: PALLIATIVE MEDICINE | Facility: OTHER | Age: 47
End: 2022-07-26
Payer: MEDICARE

## 2022-07-26 VITALS — SYSTOLIC BLOOD PRESSURE: 116 MMHG | HEART RATE: 75 BPM | DIASTOLIC BLOOD PRESSURE: 64 MMHG | OXYGEN SATURATION: 98 %

## 2022-07-26 DIAGNOSIS — G89.4 CHRONIC PAIN SYNDROME: ICD-10-CM

## 2022-07-26 DIAGNOSIS — Z79.891 LONG-TERM CURRENT USE OF OPIATE ANALGESIC: Primary | ICD-10-CM

## 2022-07-26 PROCEDURE — G0463 HOSPITAL OUTPT CLINIC VISIT: HCPCS

## 2022-07-26 PROCEDURE — 99213 OFFICE O/P EST LOW 20 MIN: CPT | Performed by: ANESTHESIOLOGY

## 2022-07-26 RX ORDER — HYDROCODONE BITARTRATE AND ACETAMINOPHEN 5; 325 MG/1; MG/1
1 TABLET ORAL 4 TIMES DAILY PRN
Qty: 120 TABLET | Refills: 0 | Status: SHIPPED | OUTPATIENT
Start: 2022-07-26 | End: 2022-08-26

## 2022-07-26 ASSESSMENT — PAIN SCALES - GENERAL: PAINLEVEL: SEVERE PAIN (7)

## 2022-07-26 NOTE — PATIENT INSTRUCTIONS
PLAN:    [facility]    You will reschedule with neurology to monitor your history of brain tumors.    Continue working with your psychotherapist around PTSD.    Continue the hydrocodone 5 mg up to 4 in a day.    Follow-up with Dr. Lino in 3 months

## 2022-07-26 NOTE — PROGRESS NOTES
"[facility] - Office Visit    ASSESSMENT & PLAN     Rohan was seen today for pain.    Diagnoses and all orders for this visit:    Chronic pain syndrome  -     HYDROcodone-acetaminophen (NORCO) 5-325 MG tablet; Take 1 tablet by mouth 4 times daily as needed for moderate to severe pain (Max of 4 tablets per day) May fill 7/29 for 8/1        Patient Instructions   PLAN:    You are working with [facility] regarding your left ankle.    You will reschedule with neurology to monitor your history of brain tumors.    Continue working with your psychotherapist around PTSD.    Continue the hydrocodone 5 mg up to 4 in a day.    Follow-up with Dr. Dunne in 3 months        -----  OLAMIDE DUNNE MD  [facility]       SUBJECTIVE      Rohan Prince is a 46 year old year old female who presents to clinic today for the following:     Followed for history of cervical fusion, right total knee replacement, fibromyalgia, history of brain tumor and stroke.    Reviews ADD and PTSD symptoms have been active, sees her therapist tomorrow.  Prefers to manage to therapy.  Notes her mother had an overdose 3 times on Xanax so case he tries to avoid medications.  Also notes her mother had 3 failed back surgeries so she is cautious about other interventions.    Since last seen he did have an MRI showing her cervical fusion, some areas of foraminal stenosis.  At present she would like to hold off on any injections.    She did see podiatry who was concern for compression neuropathy and recommended she see a neurologist.  She missed that appointment.  Did go to the emergency room however 1 time with concern for left ankle pain and now is working with [facility], to have an evaluation of the MRI concern with ligament problems.    She has with her a bottle of \"Hydroxycut\" for us to review she is in using weight loss.  She is around 228 now with a goal to lose 10 pounds.  Initially started taking it " made her jittery so she is using lower doses.    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    She does not associate a weight gain with the Lyrica.    She does continue with the hydrocodone 5 mg usually taking 2 in the morning and then another 1 or 2 later in the day depending tabulate level.   reviewed.  Last urine drug test in November.    She notes forgot to make an appointment follow-up with neurology regarding her tumors.  Sometimes she has memory issues and word finding.  Does not attribute to Lyrica though notes initially after her brain tumors was on a very high dose of gabapentin poorly tolerated.  The Lyrica does help with burning in her neck.      Current Outpatient Medications:      CAFFEINE PO, Take 270 mg by mouth Hydroxycut two capsules a day., Disp: , Rfl:      HYDROcodone-acetaminophen (NORCO) 5-325 MG tablet, Take 1 tablet by mouth 4 times daily as needed for moderate to severe pain (Max of 4 tablets per day) May fill  for , Disp: 120 tablet, Rfl: 0     pregabalin (LYRICA) 150 MG capsule, Take 1 capsule (150 mg) by mouth 2 times daily, Disp: 60 capsule, Rfl: 5      Review of Systems   General, psych, musculoskeletal, bowels and bladder otherwise normal other than above.          OBJECTIVE   /64   Pulse 75   SpO2 98%       Physical Exam  General:  Normal appearance, no apparent distress, is he waving a bracelet she is making for her daughter.  Note she has trouble sitting still.  Has a brace on her left ankle.  Cardiovascular: Normal rate  Lungs: Pulmonary effort is normal, speaking in full sentences  MSK:. No concerning rashes or lesions.  Neurologic: No focal deficit, alert and oriented x3  Psychiatric: normal mood and affect, cooperative      Assessment: History of cervical fusion, fibromyalgia, right total knee replacement.  Has found low-dose hydrocodone  helpful.    Working with psychotherapist regarding PTSD with a difficult developmental history.    Discussed caution with the use of the Hydroxycut long-term.  Ingredients were reviewed.    She is followed through residency primary care, no other medication changes.    Total time more than 20 minutes

## 2022-07-26 NOTE — NURSING NOTE
Patient presents to the clinic today for a follow up with OLAMIDE DUNNE MD regarding Pain Management.      PEG Score 2/25/2022 4/28/2022 7/26/2022   PEG Total Score 8 6.33 6.67           UDS/CSA-11/2021      Medications-Norco        QUESTIONS:              Nany CRUZ [facility] Visit Facilitator

## 2022-07-27 ENCOUNTER — OFFICE VISIT (OUTPATIENT)
Dept: NEUROLOGY | Facility: CLINIC | Age: 47
End: 2022-07-27
Payer: MEDICARE

## 2022-07-27 DIAGNOSIS — F31.10 BIPOLAR I DISORDER, MOST RECENT EPISODE (OR CURRENT) MANIC (H): Primary | ICD-10-CM

## 2022-07-27 PROCEDURE — 90834 PSYTX W PT 45 MINUTES: CPT | Performed by: PSYCHOLOGIST

## 2022-07-27 NOTE — PROGRESS NOTES
Psychology Progress Note    Date: July 27, 2022    Time length and type of treatment: 48 minutes (2:02 PM to 2:50 PM), individual therapy -in person session    Necessity: This session is necessary to address the patient's Bipolar I Disorder, PTSD, Panic Disorder, Agoraphobia, Generalized Anxiety Disorder, and Obsessive-Compulsive Disorder.  Today we focus on the patient's treatment plan, specifically exploring boundaries and limit setting.  The reader is invited to review the patient's full treatment plan in the Media section of the patient's Epic medical record.    Psychotherapeutic Technique: This writer utilized motivational interviewing, active listening, reassurance and support in the context of cognitive behavioral therapy to address the above.      MENTAL STATUS EVALUATION  Grooming: Within normal limits  Attire: Appropriate  Age: Appears Stated  Behavior Towards Examiner: Cooperative  Motor Activity: Patient ambulates slowly with a small limp  Eye Contact: Appropriate  Mood: Anxious   Affect: full range  Speech/Language: normal  Attention: Normal  Concentration: Sufficient  Thought Process: tangential  Thought Content: Clear    Orientation: Appeared oriented to person, place, and time, though not formally established  Memory: No evidence of impairment.  Judgement: Adequate  Estimated Intelligence: Average  Demonstrated Insight: Adequate  Fund of Knowledge: Adequate    Intervention:   Patient reported that her stepfather is visiting for 2 weeks in August, and a close friend decided to visit at the same time.  Patient expressed ambivalence regarding both visits, noting she is looking forward to the time with her stepfather, but is concerned about managing his many physical health problems. She is also worried that if he has any adverse health events while visiting her, his children will blame her.  Patient's close friend, who likes her stepdad, purchased tickets, then informed her he would be visiting them for  "4 days of her stepdad's visit. Patient stated she doesn't see this friend as often as she would like and part of her is looking forward to the visit, but her friend and stepdad can \"gang up on her\" and their \"teasing\" can become mean.  We discussed the difference between teasing and bullying, patient's right to set limits around how people treat her, and began to explore how patient's fear of \"being mean\" or \"hurting their feelings\" can interfere with her ability to be assertive.  This will continue to be a focus of clinical attention.     Progress:  Patient demonstrated increased insight into interpersonal dynamics.     Plan:   We will meet again in 2 weeks to address the patient's Bipolar I Disorder, PTSD, Panic Disorder, Agoraphobia,anxiety, and OCD.  Estimated duration of treatment is ongoing due to a major mental illness and lengthy trauma history.  Individual therapy sessions (76466) will be at twice monthly intervals.     Diagnosis:  Bipolar I Disorder, most recent episode manic  Posttraumatic Stress Disorder  Panic Disorder  Agoraphobia  Generalized Anxiety Disorder   Obsessive-Compulsive Disorder, with poor insight  "

## 2022-08-09 ENCOUNTER — VIRTUAL VISIT (OUTPATIENT)
Dept: NEUROLOGY | Facility: CLINIC | Age: 47
End: 2022-08-09
Payer: MEDICARE

## 2022-08-09 DIAGNOSIS — F31.10 BIPOLAR I DISORDER, MOST RECENT EPISODE (OR CURRENT) MANIC (H): Primary | ICD-10-CM

## 2022-08-09 PROCEDURE — 99207 PR NO BILLABLE SERVICE THIS VISIT: CPT | Performed by: PSYCHOLOGIST

## 2022-08-09 NOTE — PROGRESS NOTES
Patient appeared briefly for our video visit to explain that she did not have sufficient privacy at home for a video visit. We mutually agreed to reschedule for August 17 at 2:00 PM for an in person appointment.

## 2022-08-17 ENCOUNTER — OFFICE VISIT (OUTPATIENT)
Dept: NEUROLOGY | Facility: CLINIC | Age: 47
End: 2022-08-17
Payer: MEDICARE

## 2022-08-17 DIAGNOSIS — F31.10 BIPOLAR I DISORDER, MOST RECENT EPISODE (OR CURRENT) MANIC (H): Primary | ICD-10-CM

## 2022-08-17 PROCEDURE — 90834 PSYTX W PT 45 MINUTES: CPT | Performed by: PSYCHOLOGIST

## 2022-08-17 NOTE — PROGRESS NOTES
"Psychology Progress Note    Date: August 17, 2022    Time length and type of treatment: 42 minutes (2:12 PM to 2:54 PM), individual therapy -in person session    Necessity: This session is necessary to address the patient's bipolar I disorder, PTSD, Panic Disorder, Generalized Anxiety Disorder, Agoraphobia, and Obsessive-Compulsive Disorder.  Today we focus on the patient's treatment plan, specifically exploring thoughts and expectations of self and others.  The reader is invited to review the patient's full treatment plan in the Media section of the patient's Epic medical record.    Psychotherapeutic Technique: This writer utilized motivational interviewing, active listening, reassurance and support in the context of cognitive behavioral therapy to address the above.      MENTAL STATUS EVALUATION  Grooming: Within normal limits  Attire: Appropriate  Age: Appears Stated  Behavior Towards Examiner: Cooperative  Motor Activity: Patient ambulates slowly  Eye Contact: Appropriate  Mood: Normal  Affect: full range  Speech/Language: Mildly pressured  Attention: Normal  Concentration: Sufficient  Thought Process: tangential  Thought Content: Clear    Orientation: Appeared oriented to person, place, and time, though not formally established  Memory: No evidence of impairment.  Judgement: Adequate  Estimated Intelligence: Average  Demonstrated Insight: Adequate  Fund of Knowledge: Adequate    Intervention:   Patient's stepfather is still visiting and patient's birthday is in 4 days, and both of these events have contributed to improved mood. Patient discussed her stepfather's dementia and physical frailty and opined that this will be his last visit to her.  Patient identified things she feels good that she has done for her stepfather as he approaches the end of his life. She also discussed the informal \"mom\" role she has taken on in her oldest daughter's friends' lives and the role this plays in enhancing her sense of self.  "     Progress:  Patient reports improved mood    Plan:   We will meet again in 2 weeks to address the patient's bipolar I disorder, PTSD, panic disorder, generalized anxiety disorder, agoraphobia, and obsessive-compulsive disorder.  Estimated duration of treatment is ongoing due to a major mental illness and lengthy trauma history.  Individual therapy sessions ([id number]) will be at twice monthly intervals.    Diagnosis:  Bipolar I disorder, most recent episode manic  Posttraumatic Stress Disorder  Panic Disorder  Agoraphobia  Generalized Anxiety Disorder  Obsessive-Compulsive Disorder, with poor insight   Mary

## 2022-08-24 ENCOUNTER — OFFICE VISIT (OUTPATIENT)
Dept: NEUROLOGY | Facility: CLINIC | Age: 47
End: 2022-08-24
Payer: MEDICARE

## 2022-08-24 DIAGNOSIS — F31.10 BIPOLAR I DISORDER, MOST RECENT EPISODE (OR CURRENT) MANIC (H): Primary | ICD-10-CM

## 2022-08-24 PROCEDURE — 90834 PSYTX W PT 45 MINUTES: CPT | Performed by: PSYCHOLOGIST
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
## 2022-09-09 ENCOUNTER — OFFICE VISIT (OUTPATIENT)
Dept: NEUROLOGY | Facility: CLINIC | Age: 47
End: 2022-09-09
Payer: MEDICARE

## 2022-09-09 DIAGNOSIS — F41.1 GAD (GENERALIZED ANXIETY DISORDER): Primary | ICD-10-CM

## 2022-09-09 PROCEDURE — 90834 PSYTX W PT 45 MINUTES: CPT | Performed by: PSYCHOLOGIST

## 2022-09-09 NOTE — PROGRESS NOTES
Psychology Progress Note    Date: September 9, 2022    Time length and type of treatment: 42 minutes (1:08 PM to 1:50 PM), individual therapy -in person session    Necessity: This session is necessary to address the patient's Bipolar I Disorder, PTSD, Panic Disorder, Generalized Anxiety Disorder, Agoraphobia, and Obsessive-Compulsive Disorder.  Today we focus on the patient's treatment plan, specifically exploring barriers to compliance with medical treatment plan and thoughts and expectations of self and others.  The reader is invited to review the patient's full treatment plan in the Media section of the patient's Epic medical record.    Psychotherapeutic Technique: This writer utilized motivational interviewing, active listening, reassurance and support in the context of cognitive behavioral therapy to address the above.      MENTAL STATUS EVALUATION  Grooming: Within normal limits  Attire: Appropriate  Age: Appears Stated  Behavior Towards Examiner: Cooperative  Motor Activity: Patient ambulates slowly with a noticeable limp  Eye Contact: Appropriate  Mood: Anxious   Affect: full range  Speech/Language: normal  Attention: Normal  Concentration: Sufficient  Thought Process: tangential  Thought Content: Clear    Orientation: Appeared oriented to person, place, and time, though not formally established  Memory: No evidence of impairment.  Judgement: Adequate  Estimated Intelligence: Average  Demonstrated Insight: Adequate  Fund of Knowledge: Adequate    Intervention:  Patient has ankle surgery next week and discussed her frustration that the person who had promised to bring her has backed out. Another friend has agreed to take her, but this friend can be unreliable.  This uncertainty has triggered many reminders of other times she couldn't count on people. We explored steps patient could take to ensure she can make it to her surgery. We also discussed both other people's and the patient's rights to set limits and  the importance of clear communication.  Patient's youngest daughter's dad is soon releasing from snf, which is another source of anxiety for the patient.      Progress:  Patient is experiencing an increase in anxiety within the context of increased environmental stressors.      Plan:   We will meet again in 2 weeks to address the patient's Generalized Anxiety Disorder, Bipolar I Disorder, PTSD, Panic Disorder,  Agoraphobia, and Obsessive Compulsive Disorder.  Estimated duration of treatment is ongoing due to a major mental illness and lengthy trauma history. Individual therapy sessions ([id number]) will be at twice monthly intervals.     Diagnosis:  Generalized Anxiety Disorder  Bipolar I disorder, most recent episode manic  Posttraumatic Stress Disorder  Panic Disorder  Agoraphobia  Obsessive-Compulsive Disorder, with poor insight

## 2022-09-10 ENCOUNTER — ANCILLARY PROCEDURE (OUTPATIENT)
Dept: MAMMOGRAPHY | Facility: CLINIC | Age: 47
End: 2022-09-10
Attending: MASSAGE THERAPIST
Payer: MEDICARE

## 2022-09-10 DIAGNOSIS — Z12.31 VISIT FOR SCREENING MAMMOGRAM: ICD-10-CM

## 2022-09-10 PROCEDURE — 77067 SCR MAMMO BI INCL CAD: CPT

## 2022-09-18 ENCOUNTER — HEALTH MAINTENANCE LETTER (OUTPATIENT)
Age: 47
End: 2022-09-18

## 2022-09-20 ENCOUNTER — OFFICE VISIT (OUTPATIENT)
Dept: NEUROLOGY | Facility: CLINIC | Age: 47
End: 2022-09-20
Attending: PODIATRIST
Payer: MEDICARE

## 2022-09-20 VITALS
HEART RATE: 91 BPM | WEIGHT: 220 LBS | SYSTOLIC BLOOD PRESSURE: 110 MMHG | DIASTOLIC BLOOD PRESSURE: 66 MMHG | BODY MASS INDEX: 31.12 KG/M2

## 2022-09-20 DIAGNOSIS — G57.52 TARSAL TUNNEL SYNDROME OF LEFT SIDE: Primary | ICD-10-CM

## 2022-09-20 DIAGNOSIS — D32.9 MENINGIOMA (H): ICD-10-CM

## 2022-09-20 DIAGNOSIS — G58.9 COMPRESSION NEUROPATHY: ICD-10-CM

## 2022-09-20 PROCEDURE — 99204 OFFICE O/P NEW MOD 45 MIN: CPT | Performed by: PSYCHIATRY & NEUROLOGY

## 2022-09-20 NOTE — LETTER
"    9/20/2022         RE: Rohan Prince  [address]  [facility] [address]        Dear Colleague,    Thank you for referring your patient, Rohan Prince, to the [facility] [place]. Please see a copy of my visit note below.    INITIAL NEUROLOGY CONSULTATION    DATE OF VISIT: 9/20/2022  MRN: [id number]  PATIENT NAME: Rohan Prince  YOB: 1975    REFERRING PROVIDER: Adebayo Adkins    Chief Complaint   Patient presents with     Neurologic Problem     Compression neuropathy       SUBJECTIVE:                                                      HPI:   Rohan Prince is a 47 year old female whom I have been asked by Dr. Adkins to see in consultation for tarsal tunnel syndrome.  She had follow-up yesterday in podiatry clinic after a bunionectomy.  Normal lower extremity ABIs in 2020.  It looks like Rohan met with the pain neurologist earlier this year.  Plan was to continue the Lyrica and Norco.    The patient follows in psychology clinic for bipolar disorder and generalized anxiety disorder.  Problem list includes chronic pain/fibromyalgia, migraines and cervical cancer as well as TIA and stroke and brain tumors.    Rohan says that she has been having trouble with her Left ankle. She says she always has some degree of pain, but the foot can be pins/needles, other times shooting pain. She says she has some restlessness, feeling of numbness in the foot intermittently. She is trying to get regular exercise. She did have right knee surgery. [facility] diagnosed her with fibromyalgia. She clarifies that she had a \"mini-stroke\" in 2015. She has history of meningiomas. She does endorse migraine headaches.  She does notice some improvement of neck burning on the Lyrica.  She has had some falls.    She clarifies that she did some radiation treatments for her brain tumor.  She says the provider that was following her for this left the clinic and she does not currently have someone to " monitor the tumors.  She says the Right side was affected by her mini stroke, she mentions 8 weeks of rehabilitation for this.  She says that she has some right hand clumsiness at times.    The foot pain started in February.  She mentions that the podiatrist told her she may have the pain is residual from an injury.  She has not had nerve conduction studies done.  She does continue to follow in the pain clinic.    Past Medical History:   Diagnosis Date     Anxiety      Brain TIA 2015     Cancer (H)     cerivcal, removed everything     Cancer (H)     cervical cancer     Chronic pain disorder      Depression      DJD (degenerative joint disease)      Endometriosis 3/13/2017     Endometriosis      Fibromyalgia      Kidney infection      Meningioma, recurrent of brain (H)      Migraine      Osteoporosis      Screening for cervical cancer 3/23/2017    Overview:  2017-ASCUS, HPV + types other than 16 or 18.  4/11/17: Colposcopy, HORACIO II Recommend LEEP, patient consider hyst due to abnormal bleeding; pap test history     Stroke (H)      TIA (transient ischemic attack)      Past Surgical History:   Procedure Laterality Date     ARTHROSCOPY KNEE Right 2008/2016     BREAST SURGERY       CERVICAL FUSION      2016 C3-C7      HYSTERECTOMY       JOINT REPLACEMENT       KNEE SURGERY Right 2019     KNEE SURGERY       LAPAROSCOPIC HYSTERECTOMY TOTAL  08/2017     MASTECTOMY Right     partial     Mastectomy 2017 Right 2017    partial on right side     Neck Fusion  2016     OOPHORECTOMY Right     2015, Due to complex cyst (endometrioma)  [facility].     OTHER SURGICAL HISTORY      partial masetectomy     RIGHT OOPHORECTOMY       Z TOTAL KNEE ARTHROPLASTY Right 1/17/2019    Procedure: RIGHT TOTAL KNEE ARTHROPLASTY;  Surgeon: Pedro Moss DO;  Location: [facility];  Service: Orthopedics       CAFFEINE PO, Take 270 mg by mouth Hydroxycut two capsules a day.  HYDROcodone-acetaminophen (NORCO) 5-325 MG tablet, Take  1 tablet by mouth 4 times daily as needed for moderate to severe pain (Max of 4 tablets per day) May fill/start 8/29/22  pregabalin (LYRICA) 150 MG capsule, Take 1 capsule (150 mg) by mouth 2 times daily    No current facility-administered medications on file prior to visit.    Allergies   Allergen Reactions     Chlorhexidine Rash        Problem (# of Occurrences) Relation (Name,Age of Onset)    Bipolar Disorder (1) Mother    Cancer (1) Mother    Cerebrovascular Disease (1) Mother    Depression (1) Mother    Other - See Comments (1) Mother: swelling    Other Cancer (1) Mother: ovarian        Social History     Tobacco Use     Smoking status: Never Smoker     Smokeless tobacco: Never Used   Substance Use Topics     Alcohol use: No     Drug use: No       REVIEW OF SYSTEMS:                                                      10-point review of systems is negative except as mentioned above in HPI.     EXAM:                                                      Physical Exam:   Vitals: /66   Pulse 91   Wt 99.8 kg (220 lb)   BMI 31.12 kg/m    BMI= Body mass index is 31.12 kg/m .  GENERAL: NAD.  HEENT: NC/AT. Poor dentition.  PULM: Non-labored breathing.   Neurologic:  MENTAL STATUS: Alert, attentive. Speech is slightly dysarthric. Normal comprehension.  Fair concentration. Poor fund of knowledge.   CRANIAL NERVES: Discs technically difficult to visualize. Visual fields intact to confrontation. Pupils equally, round and reactive to light. Facial sensation and movement normal. EOM full. Hearing intact to conversation. Trapezius strength intact. Tongue midline.  MOTOR: Some giveaway and pain with lower extremity testing, difficult to assess if there is really any true weakness.  Upper extremity strength appears normal. Tone and bulk normal.   DTRs: Intact and symmetric in biceps, BR, patellae.  Babinski down-going bilaterally.   SENSATION: Normal light touch.  Decreased pinprick in the right hand and left lower  extremity distal to the ankle.  Vibration diminished at left ankle.  COORDINATION: Normal fine motor movements of the hands.  STATION AND GAIT: Gait is abnormal due to right-sided boot, crutches.    Relevant Data:  Brain MRI (6.10.20):  IMPRESSION:  1.  No acute or subacute infarct.  2.  Stable small meningioma along the squamosal portion of the right temporal bone.  3.  Stable small paraclinoid meningioma along the medial and anterior aspect of the middle cranial fossa on the left.  4.  Stable small cyst in the anterior right temporal lobe measuring up to 11 mm and a small arachnoid cyst in the anterior cranial fossa on the right side measuring 6 mm in width.  5.  Stable enlargement of the vestibular aqueduct on the right side measuring up to 4 mm in width.    Lumbar Spine MRI (6.10.20):  IMPRESSION:  1.  Normal distal cord.  2.  Mild degenerative changes without significant canal narrowing at any level.  3.  Mild central/left paracentral disc protrusion at L4-L5 with annular tear with abutment and minimal mass effect on traversing left L5 nerve root. This has slightly progressed since prior.  4.  Moderate left and mild right foraminal narrowing at L5-S1, slightly progressed bilaterally.  5.  Mild left foraminal narrowing at L4-L5, new. Mild to moderate left lateral recess narrowing.    Imaging reviewed independently by me.  Agree with radiology read.    ASSESSMENT and PLAN:                                                      Assessment:     ICD-10-CM    1. Tarsal tunnel syndrome of left side  G57.52 Adult Neurology  Referral     EMG   2. Compression neuropathy  G58.9 Adult Neurology  Referral   3. Meningioma (H)  D32.9 MR Brain w/o & w Contrast        Ms. Prince is a pleasant 47-year-old woman with chronic pain, here for possible left-sided tarsal tunnel syndrome.  History from the patient is quite vague and tangential.  She does have history of meningioma and arachnoid cyst which are not being  monitored so I suggest we do updated brain imaging.  She has not had nerve conduction studies completed so we will do this is a neck step for evaluating the left foot issue.  We will have to spend some additional time at her next visit trying to put together the rest of her history.  In terms of symptomatic management for now we will try adding a compounded gabapentin cream for her foot discomfort.  Rohan understands and agrees with the plan.    Plan:  -- Updated brain MRI.  -- Nerve conduction study/EMG to look into the left foot pain.  -- In the meantime, I recommend you try gabapentin cream to put on the foot for breakthrough pain.  -- Return to neurology clinic after the above tests are completed.    Total Time: 45 minutes were spent with the patient and in chart review/documentation (as described above in Assessment and Plan) /coordinating the care on date of service.    Angélica Almendarez MD  Neurology    CC: Joseph Ewing MD and Adebayo Adkins DPM    Dragon software used in the dictation of this note.        Again, thank you for allowing me to participate in the care of your patient.        Sincerely,        Angélica Almendarez MD

## 2022-09-20 NOTE — PATIENT INSTRUCTIONS
Plan:  -- Updated brain MRI.  -- Nerve conduction study/EMG to look into the left foot pain.  -- In the meantime, I recommend you try gabapentin cream to put on the foot for breakthrough pain.  -- Return to neurology clinic after the above tests are completed.

## 2022-09-20 NOTE — NURSING NOTE
"Rohan Prince is a 47 year old female who presents for:  Chief Complaint   Patient presents with     Neurologic Problem     Compression neuropathy        Initial Vitals:  /66   Pulse 91   Wt 99.8 kg (220 lb)   BMI 31.12 kg/m   Estimated body mass index is 31.12 kg/m  as calculated from the following:    Height as of 5/10/22: 1.791 m (5' 10.5\").    Weight as of this encounter: 99.8 kg (220 lb).. Body surface area is 2.23 meters squared. BP completed using cuff size: annita Dwyer  "

## 2022-09-20 NOTE — PROGRESS NOTES
"INITIAL NEUROLOGY CONSULTATION    DATE OF VISIT: 9/20/2022  MRN: [id number]  PATIENT NAME: Rohan Prince  YOB: 1975    REFERRING PROVIDER: Adebayo Adkins    Chief Complaint   Patient presents with     Neurologic Problem     Compression neuropathy       SUBJECTIVE:                                                      HPI:   Rohan Prince is a 47 year old female whom I have been asked by Dr. Adkins to see in consultation for tarsal tunnel syndrome.  She had follow-up yesterday in podiatry clinic after a bunionectomy.  Normal lower extremity ABIs in 2020.  It looks like Rohan met with the pain neurologist earlier this year.  Plan was to continue the Lyrica and Norco.    The patient follows in psychology clinic for bipolar disorder and generalized anxiety disorder.  Problem list includes chronic pain/fibromyalgia, migraines and cervical cancer as well as TIA and stroke and brain tumors.    Rohan says that she has been having trouble with her Left ankle. She says she always has some degree of pain, but the foot can be pins/needles, other times shooting pain. She says she has some restlessness, feeling of numbness in the foot intermittently. She is trying to get regular exercise. She did have right knee surgery. [facility] diagnosed her with fibromyalgia. She clarifies that she had a \"mini-stroke\" in 2015. She has history of meningiomas. She does endorse migraine headaches.  She does notice some improvement of neck burning on the Lyrica.  She has had some falls.    She clarifies that she did some radiation treatments for her brain tumor.  She says the provider that was following her for this left the clinic and she does not currently have someone to monitor the tumors.  She says the Right side was affected by her mini stroke, she mentions 8 weeks of rehabilitation for this.  She says that she has some right hand clumsiness at times.    The foot pain started in February.  She mentions that the podiatrist " told her she may have the pain is residual from an injury.  She has not had nerve conduction studies done.  She does continue to follow in the pain clinic.    Past Medical History:   Diagnosis Date     Anxiety      Brain TIA 2015     Cancer (H)     cerivcal, removed everything     Cancer (H)     cervical cancer     Chronic pain disorder      Depression      DJD (degenerative joint disease)      Endometriosis 3/13/2017     Endometriosis      Fibromyalgia      Kidney infection      Meningioma, recurrent of brain (H)      Migraine      Osteoporosis      Screening for cervical cancer 3/23/2017    Overview:  2017-ASCUS, HPV + types other than 16 or 18.  4/11/17: Colposcopy, HORACIO II Recommend LEEP, patient consider hyst due to abnormal bleeding; pap test history     Stroke (H)      TIA (transient ischemic attack)      Past Surgical History:   Procedure Laterality Date     ARTHROSCOPY KNEE Right 2008/2016     BREAST SURGERY       CERVICAL FUSION      2016 C3-C7      HYSTERECTOMY       JOINT REPLACEMENT       KNEE SURGERY Right 2019     KNEE SURGERY       LAPAROSCOPIC HYSTERECTOMY TOTAL  08/2017     MASTECTOMY Right     partial     Mastectomy 2017 Right 2017    partial on right side     Neck Fusion  2016     OOPHORECTOMY Right     2015, Due to complex cyst (endometrioma)  [facility].     OTHER SURGICAL HISTORY      partial masetectomy     RIGHT OOPHORECTOMY       ZZC TOTAL KNEE ARTHROPLASTY Right 1/17/2019    Procedure: RIGHT TOTAL KNEE ARTHROPLASTY;  Surgeon: Pedro Moss DO;  Location: [facility];  Service: Orthopedics       CAFFEINE PO, Take 270 mg by mouth Hydroxycut two capsules a day.  HYDROcodone-acetaminophen (NORCO) 5-325 MG tablet, Take 1 tablet by mouth 4 times daily as needed for moderate to severe pain (Max of 4 tablets per day) May fill/start 8/29/22  pregabalin (LYRICA) 150 MG capsule, Take 1 capsule (150 mg) by mouth 2 times daily    No current facility-administered medications on  file prior to visit.    Allergies   Allergen Reactions     Chlorhexidine Rash        Problem (# of Occurrences) Relation (Name,Age of Onset)    Bipolar Disorder (1) Mother    Cancer (1) Mother    Cerebrovascular Disease (1) Mother    Depression (1) Mother    Other - See Comments (1) Mother: swelling    Other Cancer (1) Mother: ovarian        Social History     Tobacco Use     Smoking status: Never Smoker     Smokeless tobacco: Never Used   Substance Use Topics     Alcohol use: No     Drug use: No       REVIEW OF SYSTEMS:                                                      10-point review of systems is negative except as mentioned above in HPI.     EXAM:                                                      Physical Exam:   Vitals: /66   Pulse 91   Wt 99.8 kg (220 lb)   BMI 31.12 kg/m    BMI= Body mass index is 31.12 kg/m .  GENERAL: NAD.  HEENT: NC/AT. Poor dentition.  PULM: Non-labored breathing.   Neurologic:  MENTAL STATUS: Alert, attentive. Speech is slightly dysarthric. Normal comprehension.  Fair concentration. Poor fund of knowledge.   CRANIAL NERVES: Discs technically difficult to visualize. Visual fields intact to confrontation. Pupils equally, round and reactive to light. Facial sensation and movement normal. EOM full. Hearing intact to conversation. Trapezius strength intact. Tongue midline.  MOTOR: Some giveaway and pain with lower extremity testing, difficult to assess if there is really any true weakness.  Upper extremity strength appears normal. Tone and bulk normal.   DTRs: Intact and symmetric in biceps, BR, patellae.  Babinski down-going bilaterally.   SENSATION: Normal light touch.  Decreased pinprick in the right hand and left lower extremity distal to the ankle.  Vibration diminished at left ankle.  COORDINATION: Normal fine motor movements of the hands.  STATION AND GAIT: Gait is abnormal due to right-sided boot, crutches.    Relevant Data:  Brain MRI (6.10.20):  IMPRESSION:  1.  No  acute or subacute infarct.  2.  Stable small meningioma along the squamosal portion of the right temporal bone.  3.  Stable small paraclinoid meningioma along the medial and anterior aspect of the middle cranial fossa on the left.  4.  Stable small cyst in the anterior right temporal lobe measuring up to 11 mm and a small arachnoid cyst in the anterior cranial fossa on the right side measuring 6 mm in width.  5.  Stable enlargement of the vestibular aqueduct on the right side measuring up to 4 mm in width.    Lumbar Spine MRI (6.10.20):  IMPRESSION:  1.  Normal distal cord.  2.  Mild degenerative changes without significant canal narrowing at any level.  3.  Mild central/left paracentral disc protrusion at L4-L5 with annular tear with abutment and minimal mass effect on traversing left L5 nerve root. This has slightly progressed since prior.  4.  Moderate left and mild right foraminal narrowing at L5-S1, slightly progressed bilaterally.  5.  Mild left foraminal narrowing at L4-L5, new. Mild to moderate left lateral recess narrowing.    Imaging reviewed independently by me.  Agree with radiology read.    ASSESSMENT and PLAN:                                                      Assessment:     ICD-10-CM    1. Tarsal tunnel syndrome of left side  G57.52 Adult Neurology  Referral     EMG   2. Compression neuropathy  G58.9 Adult Neurology  Referral   3. Meningioma (H)  D32.9 MR Brain w/o & w Contrast        Ms. Prince is a pleasant 47-year-old woman with chronic pain, here for possible left-sided tarsal tunnel syndrome.  History from the patient is quite vague and tangential.  She does have history of meningioma and arachnoid cyst which are not being monitored so I suggest we do updated brain imaging.  She has not had nerve conduction studies completed so we will do this is a neck step for evaluating the left foot issue.  We will have to spend some additional time at her next visit trying to put together  the rest of her history.  In terms of symptomatic management for now we will try adding a compounded gabapentin cream for her foot discomfort.  Rohan understands and agrees with the plan.    Plan:  -- Updated brain MRI.  -- Nerve conduction study/EMG to look into the left foot pain.  -- In the meantime, I recommend you try gabapentin cream to put on the foot for breakthrough pain.  -- Return to neurology clinic after the above tests are completed.    Total Time: 45 minutes were spent with the patient and in chart review/documentation (as described above in Assessment and Plan) /coordinating the care on date of service.    Angélica Almendarez MD  Neurology    CC: Joseph Ewing MD and NAM [place] software used in the dictation of this note.

## 2022-09-22 ENCOUNTER — HOSPITAL ENCOUNTER (OUTPATIENT)
Dept: MRI IMAGING | Facility: CLINIC | Age: 47
Discharge: HOME | End: 2022-09-22
Attending: PSYCHIATRY & NEUROLOGY | Admitting: PSYCHIATRY & NEUROLOGY
Payer: MEDICARE

## 2022-09-22 DIAGNOSIS — D32.9 MENINGIOMA (H): ICD-10-CM

## 2022-09-22 PROCEDURE — 255N000002 HC RX 255 OP 636: Performed by: PSYCHIATRY & NEUROLOGY

## 2022-09-22 PROCEDURE — 70553 MRI BRAIN STEM W/O & W/DYE: CPT | Mod: MG

## 2022-09-22 PROCEDURE — A9585 GADOBUTROL INJECTION: HCPCS | Performed by: PSYCHIATRY & NEUROLOGY

## 2022-09-22 RX ORDER — GADOBUTROL 604.72 MG/ML
10 INJECTION INTRAVENOUS ONCE
Status: COMPLETED | OUTPATIENT
Start: 2022-09-22 | End: 2022-09-22

## 2022-09-22 RX ADMIN — GADOBUTROL 10 ML: 604.72 INJECTION INTRAVENOUS at 21:49

## 2022-09-30 ENCOUNTER — OFFICE VISIT (OUTPATIENT)
Dept: NEUROLOGY | Facility: CLINIC | Age: 47
End: 2022-09-30
Payer: MEDICARE

## 2022-09-30 DIAGNOSIS — F41.1 GAD (GENERALIZED ANXIETY DISORDER): Primary | ICD-10-CM

## 2022-09-30 PROCEDURE — 90834 PSYTX W PT 45 MINUTES: CPT | Performed by: PSYCHOLOGIST

## 2022-09-30 NOTE — PROGRESS NOTES
Psychology Progress Note    Date: September 30, 2022    Time length and type of treatment: 47 minutes (3:04 PM to 3:51 PM), individual therapy -in person session    Necessity: This session is necessary to address the patient's Bipolar I Disorder, PTSD, Panic Disorder, Generalized Anxiety Disorder, Agoraphobia, and Obsessive-Compulsive Disorder.  Today we focus on revising the patient's treatment plan.  The reader is invited to review the patient's full treatment plan in the Media section of the patient's Epic medical record.    Psychotherapeutic Technique: This writer utilized motivational interviewing, active listening, reassurance and support in the context of cognitive behavioral therapy to address the above.      MENTAL STATUS EVALUATION  Grooming: Within normal limits  Attire: Appropriate  Age: Appears Stated  Behavior Towards Examiner: Cooperative  Motor Activity: Patient ambulates slowly  Eye Contact: Appropriate  Mood: Anxious   Affect: full range  Speech/Language: Mildly pressured  Attention: Normal  Concentration: Sufficient  Thought Process: tangential  Thought Content: Clear    Orientation: Appeared oriented to person, place, and time, though not formally established  Memory: No evidence of impairment.  Judgement: Adequate  Estimated Intelligence: Average  Demonstrated Insight: Adequate  Fund of Knowledge: Adequate    Intervention:   Patient was readministered the PHQ-9, ZAKIA-7, and PCL-5 as part of revising her treatment plan.  Her score of 12 on the ZAKIA-7 is suggestive of moderate anxiety and is similar to her earlier score of 13.  Her score of 16 on the PHQ-9 is suggestive of severe depression and is an increase over her earlier score of 11.  Her score of 28 on the PCL-5 is a substantial improvement over her earlier score of 48 and suggests reduced trauma symptoms.  Patient agreed that this is generally accurate, but explained that it is not that she has fewer trauma symptoms, it is that she is working  very hard to manage trauma symptoms, and she identified multiple coping strategies she has been utilizing.  This positive progress was validated and reinforced.  Patient treatment plan was jointly revised.  In remaining time patient discussed her worry about friends and family who live in [place], especially her stepfather, since he has not answered any of her phone calls.    Progress:  Patient treatment plan was jointly revised    Plan:   We will meet again in 2 weeks to address the patient's Generalized Anxiety Disorder, Bipolar I Disorder, PTSD, Panic Disorder, Agoraphobia, and Obsessive-Compulsive Disorder.  Estimated duration of treatment is ongoing due to a major mental illness and lengthy trauma history.  Individual therapy sessions (05426) will be at twice monthly intervals.    Diagnosis:  Generalized Anxiety Disorder  Bipolar I disorder, most recent episode manic  Posttraumatic Stress Disorder  Panic Disorder  Agoraphobia  Obsessive-Compulsive Disorder, with poor insight

## 2022-10-06 ENCOUNTER — VIRTUAL VISIT (OUTPATIENT)
Dept: NEUROLOGY | Facility: CLINIC | Age: 47
End: 2022-10-06
Payer: MEDICARE

## 2022-10-06 DIAGNOSIS — F41.1 GAD (GENERALIZED ANXIETY DISORDER): Primary | ICD-10-CM

## 2022-10-06 PROCEDURE — 90832 PSYTX W PT 30 MINUTES: CPT | Mod: 95 | Performed by: PSYCHOLOGIST

## 2022-10-06 NOTE — PROGRESS NOTES
Psychology Progress Note    Date: October 06, 2022    Time length and type of treatment: 30 minutes (2:02 PM to 2:32 PM), individual therapy    After review of the patient's situation, this visit was changed from an in-person visit to a  video visit via [employer] to reduce the risk of COVID 19 exposure. Patient was informed that policies and procedures that govern in-person sessions would also apply to  video sessions. Patient was also informed that  video sessions would be discontinued when COVID 19 exposure is no longer a concern (as determined by [facility]).     Patient location: Patient home in [place], [place]  Provider location:  [facility] - Provider remote location    Patient was in agreement with proceeding with a  video session.      Necessity: This session is necessary to address the patient's Bipolar I Disorder, PTSD, Panic Disorder, Generalized Anxiety Disorder, Agoraphobia, and Obsessive-Compulsive Disorder.  Today we focus on the patient's treatment plan, specifically exploring coping strategies.  The reader is invited to review the patient's full treatment plan in the Media section of the patient's Epic medical record.    Psychotherapeutic Technique: This writer utilized motivational interviewing, active listening, reassurance and support in the context of cognitive behavioral therapy to address the above.      MENTAL STATUS EVALUATION  Grooming: Within normal limits  Attire: Appropriate  Age: Appears Stated  Behavior Towards Examiner: Cooperative  Motor Activity: Within normal limits  Eye Contact: Avoidant  Mood: Anxious   Affect: blunted  Speech/Language: normal  Attention: Easily distracted  Concentration: Brief  Thought Process: tangential  Thought Content: Clear    Orientation: Appeared oriented to person, place, and time, though not formally established  Memory: No evidence of impairment.  Judgement: Adequate  Estimated Intelligence: Average  Demonstrated  Insight: Adequate  Fund of Knowledge: Adequate    Intervention:   Patient stated that she has been struggling with increased anxiety secondary to worries about both her housing and her physical health. Patient was provided supportive psychotherapy as she discussed several health concerns. Her landlord resides out of state and the person who has historically collected her rent told her he no longer has this job. She has been unable to reach her landlord and no one has contacted her about rent. Because of her history of repeated periods of homelessness, it is frightening for the patient to not be able to pay her rent.  Patient acknowledged she has been ruminating on the possibility that she will be evicted for nonpayment of rent despite knowing this is unlikely.  We briefly reviewed the patient's breathing strategy, but this session ended unexpectedly early due to patient's daughter coming home earlier than anticipated, reducing patient privacy.      Progress:  Patient is experiencing increased anxiety in the face of multiple environmental stressors     Plan:   We will meet again in 2 weeks to address the patient's anxiety, Bipolar I Disorder, PTSD, Panic Disorder, Agoraphobia, and OCD.  Estimated duration of treatment is ongoing due to a major mental illness and lengthy trauma history. Individual therapy sessions (83788) at twice monthly intervals.     Diagnosis:  Generalized Anxiety Disorder  Bipolar I disorder, most recent episode manic  Posttraumatic Stress Disorder  Panic Disorder  Agoraphobia  Obsessive-Compulsive Disorder, with poor insight

## 2022-10-18 ENCOUNTER — OFFICE VISIT (OUTPATIENT)
Dept: PALLIATIVE MEDICINE | Facility: OTHER | Age: 47
End: 2022-10-18
Payer: MEDICARE

## 2022-10-18 VITALS — HEIGHT: 70 IN | WEIGHT: 220 LBS | BODY MASS INDEX: 31.5 KG/M2

## 2022-10-18 DIAGNOSIS — Z79.891 LONG TERM (CURRENT) USE OF OPIATE ANALGESIC: Primary | ICD-10-CM

## 2022-10-18 DIAGNOSIS — G89.4 CHRONIC PAIN SYNDROME: ICD-10-CM

## 2022-10-18 LAB
CANNABINOIDS UR QL SCN: NORMAL
CREAT UR-MCNC: 34 MG/DL

## 2022-10-18 PROCEDURE — 80307 DRUG TEST PRSMV CHEM ANLYZR: CPT | Performed by: ANESTHESIOLOGY

## 2022-10-18 PROCEDURE — 99213 OFFICE O/P EST LOW 20 MIN: CPT | Performed by: ANESTHESIOLOGY

## 2022-10-18 PROCEDURE — G0463 HOSPITAL OUTPT CLINIC VISIT: HCPCS

## 2022-10-18 RX ORDER — PHENAZOPYRIDINE HYDROCHLORIDE 95 MG/1
190 TABLET ORAL 3 TIMES DAILY
COMMUNITY

## 2022-10-18 RX ORDER — HYDROCODONE BITARTRATE AND ACETAMINOPHEN 5; 325 MG/1; MG/1
1 TABLET ORAL 4 TIMES DAILY PRN
Qty: 120 TABLET | Refills: 0 | Status: SHIPPED | OUTPATIENT
Start: 2022-10-18 | End: 2022-10-20

## 2022-10-18 ASSESSMENT — PAIN SCALES - GENERAL: PAINLEVEL: EXTREME PAIN (8)

## 2022-10-18 NOTE — NURSING NOTE
Patient presents to the clinic today for a follow up with OLAMIDE DUNNE MD  regarding Pain Management.         UDT/CSA 11/2021      MEDICATIONS LAST USED:    HYDROcodone-acetaminophen (NORCO) 5-325 MG tablet-  Patient reports last taken it on 10/17/2022        Ricarda Hansen MA  [facility]

## 2022-10-18 NOTE — PROGRESS NOTES
[facility] - Office Visit    ASSESSMENT & PLAN     Rohan was seen today for pain.    Diagnoses and all orders for this visit:    Chronic pain syndrome  -     HYDROcodone-acetaminophen (NORCO) 5-325 MG tablet; Take 1 tablet by mouth 4 times daily as needed for moderate to severe pain (Max of 4 tablets per day) May fill 10/25 to start 10/27        Patient Instructions     [facility] Number:  [phone]    Call with any questions about your care and for scheduling assistance.     Calls are returned Monday through Friday between 8 AM and 4:30 PM. We usually get back to you within 2 business days depending on the issue/request.    If we are prescribing your medications:    For opioid medication refills, call the clinic or send a [employer] message 7 days in advance.  Please include:    Name of requested medication    Name of the pharmacy.    For non-opioid medications, call your pharmacy directly to request a refill. Please allow 3-4 days to be processed.     Per [place] State Law:    All controlled substance prescriptions must be filled within 30 days of being written.      For those controlled substances allowing refills, pickup must occur within 30 days of last fill.      We believe regular attendance is key to your success in our program!      Any time you are unable to keep your appointment we ask that you call us at least 24 hours in advance to cancel.This will allow us to offer the appointment time to another patient.     Multiple missed appointments may lead to dismissal from the clinic.     PLAN:    Are working with orthopedics for your foot surgery and infection.    You are scheduled for a EMG with your neurologist following numbness in your hands.    Continue working with your psychotherapist.    Hydrocodone 5 mg up to 4 times a day.    Lyrica 150 mg twice a day.    You are scheduled for a sleep study.    Follow-up with Dr. Lino in 3  months        -----  OLAMIDE DUNNE MD  [facility]       SUBJECTIVE      Rohan Prince is a 47 year old year old female who presents to clinic today for the following:     Followed for history of cervical fusion, right total knee replacement, fibromyalgia, history of a brain tumor.    Since last seen she reviews working with [facility] for her foot, having a surgery to address hardware, presently with an infection and a walking boot and will follow-up next week.    She did have an MRI showing there was no change with monitoring for tumors.    She will be having an EMG ordered by neurologist as there is sometimes that her right hand can feel like it is falling asleep and she drops things.    She continues see her psychotherapist every 2 weeks, has been diagnosed with ADD.    Has been using the hydrocodone 5 mg usually 3 times a day, occasionally 4 times a day now that her back seems to flared up as she is walking different.     reviewed.  Last urine drug test in November so will be obtained.    She continues doing her home physical therapy exercises    She is scheduled for a sleep study.    Continues on the Lyrica 150 mg twice a day helpful.    She reviews stressors, the landlord may be selling her house.  She notes 1 daughter has ADD and not treated.  Finds her therapist supported      Current Outpatient Medications:      CAFFEINE PO, Take 270 mg by mouth Hydroxycut two capsules a day., Disp: , Rfl:      HYDROcodone-acetaminophen (NORCO) 5-325 MG tablet, Take 1 tablet by mouth 4 times daily as needed for moderate to severe pain (Max of 4 tablets per day) May fill 10/25 to start 10/27, Disp: 120 tablet, Rfl: 0     phenazopyridine (AZO URINARY PAIN RELIEF) 95 MG tablet, Take 190 mg by mouth 3 times daily, Disp: , Rfl:      pregabalin (LYRICA) 150 MG capsule, Take 1 capsule (150 mg) by mouth 2 times daily, Disp: 60 capsule, Rfl: 5    Current Facility-Administered Medications:      ketamine  "5%-gabapentin 8%-lidocaine 2.5% in PLO cream 0.5 g, 0.5 g, Topical, TID PRN, Angélica Maldonado MD      Review of Systems   General, psych, musculoskeletal, bowels and bladder otherwise normal other than above.          OBJECTIVE   Ht 1.778 m (5' 10\")   Wt 99.8 kg (220 lb)   BMI 31.57 kg/m          Physical Exam  General: Alert, clear sensorium.  Ambulates with a right foot in a walking boot.  No pain behavior  Cardiovascular: Normal rate  Lungs: Pulmonary effort is normal, speaking in full sentences  MSK: normal muscle bulk and tone, ROM, equal strength in all extremities  Skin: Warm and dry. No concerning rashes or lesions.  Neurologic: No focal deficit, alert and oriented x3  Psychiatric: Affect congruent.      Assessment: History of cervical fusion, some radiculopathy having an EMG.    Working with orthopedics for surgery in her right ankle with revision for hardware.    Continue with the hydrocodone allowing her to have a fair degree of function as she is managing her medical conditions, parenting.    Total time more than 20 minutes    "

## 2022-10-18 NOTE — PATIENT INSTRUCTIONS
[facility] Number:  [phone]  Call with any questions about your care and for scheduling assistance.   Calls are returned Monday through Friday between 8 AM and 4:30 PM. We usually get back to you within 2 business days depending on the issue/request.    If we are prescribing your medications:  For opioid medication refills, call the clinic or send a [employer] message 7 days in advance.  Please include:  Name of requested medication  Name of the pharmacy.  For non-opioid medications, call your pharmacy directly to request a refill. Please allow 3-4 days to be processed.   Per [place] State Law:  All controlled substance prescriptions must be filled within 30 days of being written.    For those controlled substances allowing refills, pickup must occur within 30 days of last fill.      We believe regular attendance is key to your success in our program!    Any time you are unable to keep your appointment we ask that you call us at least 24 hours in advance to cancel.This will allow us to offer the appointment time to another patient.   Multiple missed appointments may lead to dismissal from the clinic.     PLAN:    Are working with orthopedics for your foot surgery and infection.    You are scheduled for a EMG with your neurologist following numbness in your hands.    Continue working with your psychotherapist.    Hydrocodone 5 mg up to 4 times a day.    Lyrica 150 mg twice a day.    You are scheduled for a sleep study.    Follow-up with Dr. Lino in 3 months

## 2022-10-20 LAB
AMPHET UR CFM-MCNC: 397 NG/ML
AMPHET/CREAT UR: 1168 NG/MG {CREAT}
ETHYL GLUCURONIDE UR QL SCN: NEGATIVE NG/ML
METHAMPHET UR CFM-MCNC: 3420 NG/ML
METHAMPHET/CREAT UR: ABNORMAL NG/MG {CREAT}

## 2022-11-04 ENCOUNTER — VIRTUAL VISIT (OUTPATIENT)
Dept: NEUROLOGY | Facility: CLINIC | Age: 47
End: 2022-11-04
Payer: MEDICARE

## 2022-11-04 DIAGNOSIS — F41.1 GAD (GENERALIZED ANXIETY DISORDER): Primary | ICD-10-CM

## 2022-11-04 PROCEDURE — 90832 PSYTX W PT 30 MINUTES: CPT | Mod: 95 | Performed by: PSYCHOLOGIST

## 2022-11-04 NOTE — PROGRESS NOTES
Psychology Progress Note    Date: November 04, 2022    Time length and type of treatment: 19 minutes (9:00 AM - 9:19 AM), individual therapy    After review of the patient's situation, this visit was changed from an in-person visit to a video visit via [employer] to reduce the risk of COVID 19 exposure. Patient was informed that policies and procedures that govern in-person sessions would also apply to video sessions. Patient was also informed that video sessions would be discontinued when COVID 19 exposure is no longer a concern (as determined by [facility]).     Patient location: Patient home in [place], [place]  Provider location:  [facility] - Provider remote location     Patient was in agreement with proceeding with a video session.      Necessity: This session is necessary to address the patient's Bipolar I Disorder, PTSD, Panic Disorder, Generalized Anxiety Disorder, Agoraphobia, and Obsessive Compulsive Disorder.  Today we focus on the patient's treatment plan, specifically exploring coping strategies. The reader is invited to review the patient's full treatment plan in the Media section of the patient's Epic medical record.    Psychotherapeutic Technique: This writer utilized motivational interviewing, active listening, reassurance and support in the context of cognitive behavioral therapy to address the above.      MENTAL STATUS EVALUATION  Grooming: Within broad normal limits  Attire: Appropriate  Age: Appears Stated  Behavior Towards Examiner: Cooperative  Motor Activity: Within normal limits  Eye Contact: Appropriate  Mood: Anxious  Depressed   Affect: full range  Speech/Language: normal  Attention: Easily distracted  Concentration: Sufficient  Thought Process: unremarkable  Thought Content: Clear    Orientation: Appeared oriented to person, place, and time, though not formally established  Memory: No evidence of impairment.  Judgement: Adequate  Estimated Intelligence:  Average  Demonstrated Insight: Adequate  Fund of Knowledge: Adequate    Intervention:   Patient reported her landlord is selling her home, she is on wait lists for section 8 but nothing is yet available, and she is uncertain about details of her rental assistance since the person who used to help her at [facility] no longer works there.  Having strangers going through her home is a trigger for the patient, further escalating her anxiety.  Patient also questions if she has COVID-19 and her foot is again swollen.  We discussed practical problem solving steps patient might take, including reviewing rental assistance paperwork, contacting [facility], taking a COVID test, and seeing her doctor about her foot.  Session ended early because patient's daughters missed their school bus.      Progress:  Patient calmed as she developed some concrete plans.      Plan:   We will meet again in 2 weeks to address the patient's Bipolar I Disorder, anxiety, PTSD, Panic Disorder, Agoraphobia, and OCD.  Estimated duration of treatment is ongoing due to a major mental illness and lengthy trauma history. Individual therapy sessions (12432) will be at twice monthly intervals.     Diagnosis:  Generalized Anxiety Disorder  Bipolar I disorder, most recent episode manic  Posttraumatic Stress Disorder  Panic Disorder  Agoraphobia  Obsessive-Compulsive Disorder, with poor insight

## 2022-11-11 ENCOUNTER — OFFICE VISIT (OUTPATIENT)
Dept: NEUROLOGY | Facility: CLINIC | Age: 47
End: 2022-11-11
Attending: PSYCHIATRY & NEUROLOGY
Payer: MEDICARE

## 2022-11-11 DIAGNOSIS — G57.52 TARSAL TUNNEL SYNDROME OF LEFT SIDE: Primary | ICD-10-CM

## 2022-11-11 PROCEDURE — 95886 MUSC TEST DONE W/N TEST COMP: CPT | Mod: LT | Performed by: PSYCHIATRY & NEUROLOGY

## 2022-11-11 PROCEDURE — 95908 NRV CNDJ TST 3-4 STUDIES: CPT | Performed by: PSYCHIATRY & NEUROLOGY

## 2022-11-11 NOTE — LETTER
11/11/2022         RE: Rohan Prince  [address] [place] [address]        Dear Colleague,    Thank you for referring your patient, Rohan Prince, to the [facility] [place]. Please see a copy of my visit note below.    See EMG report      Again, thank you for allowing me to participate in the care of your patient.        Sincerely,        Nahid Manzo MD

## 2022-11-11 NOTE — PROCEDURES
[facility]     [facility]ly [facility]  [address], [address]  [place] [address]  Tel: [phone]  Fax: [phone]          Full Name: Rohan Prince Gender: Female  Patient ID: [id number] YOB: 1975      Visit Date: 11/11/2022 13:40  Age: 47 Years 2 Months Old  Interpreted By: Nahid Manzo M.D.   Ref Dr.: Angélica Almendarez MD  Tech: ST   Height: 5 feet 11 inch  Reason for referral: Evaluate left lower. c/o weakness, pain, tingling, sensory changes in left leg > 2 years.       Motor NCS      Nerve / Sites Lat Amp Dist Alex    ms mV cm m/s   L Peroneal - EDB      Ankle 4.11 5.8 8       Fib head 10.47 5.7 24 38      Pop fossa 13.59 5.5 12 38   L Tibial - AH      Ankle 4.06 8.5 8       Pop fossa 13.44 7.6 43 46       F  Wave      Nerve Fmin    ms   L Tibial - AH 54.69       Sensory NCS      Nerve / Sites Pk Lat Amp.1-2 Dist    ms  V cm   L Sural - Ankle (Calf)      Calf 4.32 7.0 14   L Superficial peroneal - Ankle      Lat leg 3.23 11.1 12       EMG Summary Table     Spontaneous MUAP Rcmt Note   Muscle Fib PSW Fasc IA # Amp Dur PPP Rate Type   L. Gluteus medius None None None N N N N N N N   L. Gluteus dianelys None None None N N N N N N N   L. L3 paraspinal None None None N N N N N N N   L. L4 paraspinal None None None N N N N N N N   L. L5 paraspinal None None None N N N N N N N   L. S1 paraspinal None None None N N N N N N N   L. Adductor steve None None None N N N N N N N   L. Quadriceps None None None N N N N N N N   L. Tibialis anterior None None None N N N N N N N   L. Gastrocnemius (Medial head) None None None N N N N N N N   L. Tibialis posterior None None None N N N N N N N     Left peroneal motor conduction study mildly slowed conduction velocity    Left tibial motor clinic study normal including distal latency  Left tibial F wave latency within normal limits for height    Left sural sensory snap potential upper limit of  normal  Left superficial peroneal snap potential normal       Needle examination left lower extremity normal      Impression    Essentially normal EMG of the left lower extremity,  Minimally slow left peroneal motor conduction study and borderline abnormal left sural snap potential question possible early sensorimotor neuropathy clinical correlation recommended.

## 2022-11-21 ENCOUNTER — TELEPHONE (OUTPATIENT)
Dept: NEUROLOGY | Facility: CLINIC | Age: 47
End: 2022-11-21

## 2022-11-21 NOTE — TELEPHONE ENCOUNTER
Please let Rohan know that her nerve conduction studies were essentially normal.  No change in plan at this time.  I will see her in January.    Thank you,  Dr. Almendarez

## 2022-11-21 NOTE — TELEPHONE ENCOUNTER
Called patient to relay message of EMG result 11/11.     Pt understood and no questions or concerns at the moment.     Joseluis Dwyer 11/21/2022 2:10 PM

## 2023-01-09 NOTE — PROGRESS NOTES
Pt was scheduled for an office visit.  Canceled day of appointment.  Has not yet rescheduled.    Reviewing the  last filled hydrocodone the end of September.  Has had several cancellations and no-shows for other providers in neurology and neuropsychology over the last month.

## 2023-01-16 ENCOUNTER — OFFICE VISIT (OUTPATIENT)
Dept: NEUROLOGY | Facility: CLINIC | Age: 48
End: 2023-01-16
Payer: MEDICARE

## 2023-01-16 VITALS
SYSTOLIC BLOOD PRESSURE: 122 MMHG | DIASTOLIC BLOOD PRESSURE: 86 MMHG | WEIGHT: 243.4 LBS | HEART RATE: 105 BPM | BODY MASS INDEX: 34.84 KG/M2 | HEIGHT: 70 IN

## 2023-01-16 DIAGNOSIS — M25.579 PAIN IN JOINT, ANKLE AND FOOT, UNSPECIFIED LATERALITY: Primary | ICD-10-CM

## 2023-01-16 DIAGNOSIS — D32.9 MENINGIOMA (H): ICD-10-CM

## 2023-01-16 DIAGNOSIS — R29.6 FALLS FREQUENTLY: ICD-10-CM

## 2023-01-16 PROCEDURE — 99214 OFFICE O/P EST MOD 30 MIN: CPT | Performed by: PSYCHIATRY & NEUROLOGY

## 2023-01-16 NOTE — PATIENT INSTRUCTIONS
Plan:  -- I think you should get back into see your Podiatrist through [facility].  If you have trouble getting in there, you can see someone within our system.  I have put in the referral just in case.  -- Referral to physical therapy for updated balance exercises.  -- Return to neurology clinic in 6 months.

## 2023-01-16 NOTE — PROGRESS NOTES
"ESTABLISHED PATIENT NEUROLOGY NOTE    DATE OF VISIT: 1/16/2023  MRN: [id number]  PATIENT NAME: Rohan Prince  YOB: 1975    Chief Complaint   Patient presents with     tarsal tunnel syndrome     Patient reports she has fallen a few times and hit her head.     SUBJECTIVE:                                                      HISTORY OF PRESENT ILLNESS:  Rohan is here for follow up regarding    History as previously documented by me (9.20.22):  Rohan Prince is a 47 year old female whom I have been asked by Dr. Adkins to see in consultation for tarsal tunnel syndrome.  She had follow-up yesterday in podiatry clinic after a bunionectomy.  Normal lower extremity ABIs in 2020.  It looks like Rohan met with the pain neurologist earlier this year.  Plan was to continue the Lyrica and Norco.     The patient follows in psychology clinic for bipolar disorder and generalized anxiety disorder.  Problem list includes chronic pain/fibromyalgia, migraines and cervical cancer as well as TIA and stroke and brain tumors.     Rohan says that she has been having trouble with her Left ankle. She says she always has some degree of pain, but the foot can be pins/needles, other times shooting pain. She says she has some restlessness, feeling of numbness in the foot intermittently. She is trying to get regular exercise. She did have right knee surgery. [facility] diagnosed her with fibromyalgia. She clarifies that she had a \"mini-stroke\" in 2015. She has history of meningiomas. She does endorse migraine headaches.  She does notice some improvement of neck burning on the Lyrica.  She has had some falls.     She clarifies that she did some radiation treatments for her brain tumor.  She says the provider that was following her for this left the clinic and she does not currently have someone to monitor the tumors.  She says the Right side was affected by her mini stroke, she mentions 8 weeks of rehabilitation for this.  She says " that she has some right hand clumsiness at times.     The foot pain started in February.  She mentions that the podiatrist told her she may have the pain is residual from an injury.  She has not had nerve conduction studies done.  She does continue to follow in the pain clinic.     We did go ahead and do an EMG after her previous visit which was essentially normal.  Very slight abnormalities, possibly representing an early sensorimotor neuropathy.  No evidence of compressive neuropathy in the left lower extremity.  I did also recommend an updated brain MRI for meningioma monitoring, which was stable.    She tells me that she has been having more falls. Her legs go out on her. There has been some discussion about additional surgery on the Right foot. She used to work at a warehouse, and was on her feet.     She stopped the Lyrica, she prefers to be off of medications. She says that her mother was pill-popper and she does not want to be.       CURRENT MEDICATIONS:   CAFFEINE PO, Take 270 mg by mouth Hydroxycut two capsules a day. (Patient not taking: Reported on 1/16/2023)  phenazopyridine (AZO URINARY PAIN RELIEF) 95 MG tablet, Take 190 mg by mouth 3 times daily (Patient not taking: Reported on 1/16/2023)  pregabalin (LYRICA) 150 MG capsule, Take 1 capsule (150 mg) by mouth 2 times daily (Patient not taking: Reported on 1/16/2023)    ketamine 5%-gabapentin 8%-lidocaine 2.5% in PLO cream 0.5 g      RECENT DIAGNOSTIC STUDIES:   Labs: No results found for any visits on 01/16/23.    Nerve conduction studies/EMG (11.11.22):  Impression     Essentially normal EMG of the left lower extremity,  Minimally slow left peroneal motor conduction study and borderline abnormal left sural snap potential question possible early sensorimotor neuropathy clinical correlation recommended.    Imaging:  MRI Brain (9.22.22):  IMPRESSION:  1.  No significant change in size of the small right frontotemporal and left sphenoid wing meningiomas  "since 12/13/2019. No adjacent parenchymal edema.  2.  Remaining findings are without significant interval change.    Imaging reviewed independently by me.  Agree with radiology read.    REVIEW OF SYSTEMS:                                                      10-point review of systems is negative except as mentioned above in HPI.    EXAM:                                                      Physical Exam:   Vitals: /86   Pulse 105   Ht 1.778 m (5' 10\")   Wt 110.4 kg (243 lb 6.4 oz)   BMI 34.92 kg/m    BMI= Body mass index is 34.92 kg/m .  GENERAL: NAD.  HEENT: NC/AT.  CV: RRR. S1, S2.   NECK: No bruits.  PULM: Non-labored breathing.   Neurologic:  MENTAL STATUS: Alert, attentive. Speech is slightly dysarthric. Normal comprehension.  Fair concentration. Poor fund of knowledge.   CRANIAL NERVES: Discs technically difficult to visualize. Visual fields intact to confrontation. Pupils equally, round and reactive to light. Facial sensation and movement normal. EOM full. Hearing intact to conversation. Trapezius strength intact. Tongue midline.  MOTOR: Some giveaway and pain with lower extremity testing, difficult to assess if there is really any true weakness.  Upper extremity strength appears normal. Tone and bulk normal.   DTRs: Intact and symmetric in biceps, BR, patellae.  Babinski down-going bilaterally.   SENSATION: Normal light touch.  Decreased pinprick in both lower extremities distal to the ankle.  Vibration diminished at ankles.  COORDINATION: Normal fine motor movements of the hands.  STATION AND GAIT: Gait is cautious, slightly unsteady with tandem. Romberg negative.    ASSESSMENT and PLAN:                                                      Assessment:    ICD-10-CM    1. Pain in joint, ankle and foot, unspecified laterality  M25.579 Orthopedic  Referral      2. Falls frequently  R29.6 Orthopedic  Referral     Physical Therapy Referral      3. Meningioma (H)  D32.9            Ms. " Suresh is a pleasant 47-year-old woman with history of chronic pain here for follow-up regarding EMG and meningioma.  The EMG was essentially normal, so I do not think we need to do further neurologic work-up regarding her lower extremity pain.  I did recommend that Rohan get back in to see her orthopedist/podiatrist for the ankle and foot pain she is currently experiencing.  Because she is having some falls, I recommend physical therapy as well.  We will continue to follow-up from a meningioma monitoring standpoint.  I would like to see Rohan back in clinic in about 6 months.    Plan:  -- I think you should get back into see your Podiatrist through [facility].  If you have trouble getting in there, you can see someone within our system.  I have put in the referral just in case.  -- Referral to physical therapy for updated balance exercises.  -- Return to neurology clinic in 6 months.    Total Time: 30 minutes were spent with the patient and in chart review/documentation (as described above in Assessment and Plan)/coordinating the care on date of service.    Angélica Almendarez MD  Neurology    Dragon software used in the dictation of this note.

## 2023-01-16 NOTE — Clinical Note
"    1/16/2023         RE: Rohan Prince  [address]  [facility] [place] [address]        Dear Colleague,    Thank you for referring your patient, Rohan Prince, to the [facility] [place]. Please see a copy of my visit note below.    ESTABLISHED PATIENT NEUROLOGY NOTE    DATE OF VISIT: 1/16/2023  MRN: [id number]  PATIENT NAME: Rohan Prince  YOB: 1975    Chief Complaint   Patient presents with     tarsal tunnel syndrome     Patient reports she has fallen a few times and hit her head.     SUBJECTIVE:                                                      HISTORY OF PRESENT ILLNESS:  Rohan is here for follow up regarding    History as previously documented by me (9.20.22):  Rohan Prince is a 47 year old female whom I have been asked by Dr. Adkins to see in consultation for tarsal tunnel syndrome.  She had follow-up yesterday in podiatry clinic after a bunionectomy.  Normal lower extremity ABIs in 2020.  It looks like Rohan met with the pain neurologist earlier this year.  Plan was to continue the Lyrica and Norco.     The patient follows in psychology clinic for bipolar disorder and generalized anxiety disorder.  Problem list includes chronic pain/fibromyalgia, migraines and cervical cancer as well as TIA and stroke and brain tumors.     Rohan says that she has been having trouble with her Left ankle. She says she always has some degree of pain, but the foot can be pins/needles, other times shooting pain. She says she has some restlessness, feeling of numbness in the foot intermittently. She is trying to get regular exercise. She did have right knee surgery. [facility] diagnosed her with fibromyalgia. She clarifies that she had a \"mini-stroke\" in 2015. She has history of meningiomas. She does endorse migraine headaches.  She does notice some improvement of neck burning on the Lyrica.  She has had some falls.     She clarifies that she did some radiation treatments for her brain " tumor.  She says the provider that was following her for this left the clinic and she does not currently have someone to monitor the tumors.  She says the Right side was affected by her mini stroke, she mentions 8 weeks of rehabilitation for this.  She says that she has some right hand clumsiness at times.     The foot pain started in February.  She mentions that the podiatrist told her she may have the pain is residual from an injury.  She has not had nerve conduction studies done.  She does continue to follow in the pain clinic.     We did go ahead and do an EMG after her previous visit which was essentially normal.  Very slight abnormalities, possibly representing an early sensorimotor neuropathy.  No evidence of compressive neuropathy in the left lower extremity.  I did also recommend an updated brain MRI for meningioma monitoring, which was stable.    She tells me that she has been having more falls. Her legs go out on her. There has been some discussion about additional surgery on the Right foot. She used to work at a warehouse, and was on her feet.     She stopped the Lyrica, she prefers to be off of medications. She says that her mother was pill***      CURRENT MEDICATIONS:   CAFFEINE PO, Take 270 mg by mouth Hydroxycut two capsules a day. (Patient not taking: Reported on 1/16/2023)  phenazopyridine (AZO URINARY PAIN RELIEF) 95 MG tablet, Take 190 mg by mouth 3 times daily (Patient not taking: Reported on 1/16/2023)  pregabalin (LYRICA) 150 MG capsule, Take 1 capsule (150 mg) by mouth 2 times daily (Patient not taking: Reported on 1/16/2023)    ketamine 5%-gabapentin 8%-lidocaine 2.5% in PLO cream 0.5 g      RECENT DIAGNOSTIC STUDIES:   Labs: No results found for any visits on 01/16/23.    Nerve conduction studies/EMG (11.11.22):  Impression     Essentially normal EMG of the left lower extremity,  Minimally slow left peroneal motor conduction study and borderline abnormal left sural snap potential question  "possible early sensorimotor neuropathy clinical correlation recommended.    Imaging:  MRI Brain (9.22.22):  IMPRESSION:  1.  No significant change in size of the small right frontotemporal and left sphenoid wing meningiomas since 12/13/2019. No adjacent parenchymal edema.  2.  Remaining findings are without significant interval change.    Imaging reviewed independently by me.  Agree with radiology read.    REVIEW OF SYSTEMS:                                                      10-point review of systems is negative except as mentioned above in HPI.    EXAM:                                                      Physical Exam:   Vitals: /86   Pulse 105   Ht 1.778 m (5' 10\")   Wt 110.4 kg (243 lb 6.4 oz)   BMI 34.92 kg/m    BMI= Body mass index is 34.92 kg/m .  GENERAL: NAD.  HEENT: NC/AT.  CV: RRR. S1, S2.   NECK: No bruits.  PULM: Non-labored breathing.   Neurologic:  MENTAL STATUS: Alert, attentive. Speech is slightly dysarthric. Normal comprehension.  Fair concentration. Poor fund of knowledge.   CRANIAL NERVES: Discs technically difficult to visualize. Visual fields intact to confrontation. Pupils equally, round and reactive to light. Facial sensation and movement normal. EOM full. Hearing intact to conversation. Trapezius strength intact. Tongue midline.  MOTOR: Some giveaway and pain with lower extremity testing, difficult to assess if there is really any true weakness.  Upper extremity strength appears normal. Tone and bulk normal.   DTRs: Intact and symmetric in biceps, BR, patellae.  Babinski down-going bilaterally.   SENSATION: Normal light touch.  Decreased pinprick in both lower extremities distal to the ankle.  Vibration diminished at ankles.  COORDINATION: Normal fine motor movements of the hands.  STATION AND GAIT: Gait is cautious, slightly unsteady with tandem. Romberg negative.    ASSESSMENT and PLAN:                                                      Assessment:    ICD-10-CM    1. Pain in " joint, ankle and foot, unspecified laterality  M25.579 Orthopedic  Referral      2. Falls frequently  R29.6 Orthopedic  Referral     Physical Therapy Referral      3. Meningioma (H)  D32.9            Ms. Prince is a pleasant 47-year-old woman with history of chronic pain here for follow-up regarding EMG and meningioma.  The EMG was essentially normal, so I do not think we need to do further neurologic work-up regarding her lower extremity pain.  I did recommend that Rohan get back in to see her orthopedist/podiatrist for the ankle and foot pain she is currently experiencing.  Because she is having some falls, I recommend physical therapy as well.  We will continue to follow-up from a meningioma monitoring standpoint.  I would like to see Rohan back in clinic in about 6 months.    Plan:  -- I think you should get back into see your Podiatrist through [facility].  If you have trouble getting in there, you can see someone within our system.  I have put in the referral just in case.  -- Referral to physical therapy for updated balance exercises.  -- Return to neurology clinic in 6 months.    Total Time: 20 minutes were spent with the patient and in chart review/documentation (as described above in Assessment and Plan)/coordinating the care on date of service.    Angélica Almendarez MD  Neurology    Dragon software used in the dictation of this note.                        Again, thank you for allowing me to participate in the care of your patient.        Sincerely,        Angélica Almendarez MD

## 2023-01-16 NOTE — NURSING NOTE
Chief Complaint   Patient presents with     tarsal tunnel syndrome     Patient reports she has fallen a few times and hit her head.     Nany Green on 1/16/2023 at 9:02 AM

## 2023-02-14 ENCOUNTER — HOSPITAL ENCOUNTER (OUTPATIENT)
Dept: PHYSICAL THERAPY | Facility: REHABILITATION | Age: 48
Discharge: HOME | End: 2023-02-14
Attending: PSYCHIATRY & NEUROLOGY
Payer: MEDICARE

## 2023-02-14 DIAGNOSIS — R29.6 FALLS FREQUENTLY: ICD-10-CM

## 2023-02-14 DIAGNOSIS — Z74.09 IMPAIRED FUNCTIONAL MOBILITY, BALANCE, GAIT, AND ENDURANCE: Primary | ICD-10-CM

## 2023-02-14 DIAGNOSIS — G89.4 CHRONIC PAIN SYNDROME: ICD-10-CM

## 2023-02-14 DIAGNOSIS — M25.572 PAIN IN JOINT, ANKLE AND FOOT, LEFT: ICD-10-CM

## 2023-02-14 DIAGNOSIS — M62.81 MUSCLE WEAKNESS (GENERALIZED): ICD-10-CM

## 2023-02-14 PROCEDURE — 97110 THERAPEUTIC EXERCISES: CPT | Mod: GP

## 2023-02-14 PROCEDURE — 97162 PT EVAL MOD COMPLEX 30 MIN: CPT | Mod: GP

## 2023-02-14 NOTE — PROGRESS NOTES
[facility]                                                                                   OUTPATIENT PHYSICAL THERAPY FUNCTIONAL EVALUATION  PLAN OF TREATMENT FOR OUTPATIENT REHABILITATION  (COMPLETE FOR INITIAL CLAIMS ONLY)  Patient's Last Name, First Name, M.I.  YOB: 1975  Rohan Prince     Provider's Name   [facility]   Medical Record No.  [id number]     Start of Care Date:  02/14/23   [place] Date:  01/16/23   Type:     _X__PT   ____OT  ____SLP Medical Diagnosis:  Falls frequently     PT Diagnosis:  Impaired functional mobility, impaired balance affecting gait, impaired strength, pain affecting mobility Visits from SOC:  1                              __________________________________________________________________________________  Plan of Treatment/Functional Goals:  balance training, gait training, joint mobilization, neuromuscular re-education, ROM, strengthening, stretching, manual therapy           GOALS  FGA  Rohan will improve score on FGA by 4 or more points in order to show meaningful improvement in dynamic balance and decrease risk for falls.  05/14/23    Functional LE strength  Rohan will improve score on 5x STS by 3 or more seconds and with proper body mechanics in order to improve functional LE strength needed to complete daily tasks.  05/14/23    Falls  Rohan will report 0 falls over a 3 week time period in order to improve safety with mobility.  05/14/23       Therapy Frequency:  1 time/week (decreasing prn)   Predicted Duration of Therapy Intervention:  12 weeks    RIYA CARLOS PT                                    I CERTIFY THE NEED FOR THESE SERVICES FURNISHED UNDER        THIS PLAN OF TREATMENT AND WHILE UNDER MY CARE     (Physician co-signature of this document indicates review and certification of the therapy plan).                 Certification Date From:  02/14/23   Certification Date To:  05/14/23    Referring Provider:  Angélica Almendarez MD    Initial Assessment  See Epic Evaluation- Start of Care Date: 02/14/23

## 2023-02-14 NOTE — PROGRESS NOTES
02/14/23 1100   Quick Adds   Quick Adds Certification   Type of Visit Initial OP PT Evaluation   General Information   Start of Care Date 02/14/23   Referring Physician Angélica Almendarez MD   Orders Evaluate and Treat as Indicated   Order Date 01/16/23   Medical Diagnosis Falls frequently   Onset of illness/injury or Date of Surgery 01/16/23   Precautions/Limitations fall precautions   Surgical/Medical history reviewed Yes   Pertinent history of current problem (include personal factors and/or comorbidities that impact the POC) Rohan is here today for eval. She states her balance is off which causes her to fall frequently, and she also is experiencing L ankle pain from an injury she had about a year ago. She states she broke it but can't remember how- thinks she fell. Tells therapist she has short term memory loss. Describes that her ankle sometimes feels like it's rolling and she almost falls and it happens a lot. Had surgery on her R foot/ankle (doesn't provide date) and hx of breaking her L ankle but never got it checked out so thinks it healed wrong. Wanted to see if there's something different for the ankle. Has adapted for her balance issues. Was in nursing home twice. Also has a hx of TIA, stroke, and brain tumor. Still has brain tumor, they're just monitoring it. Has a lot of anxiety. Can't walk long distances d/t ankle pain. Can't run and play badmiton with daughters. Other pmhx significant for: chronic pain/fibromyalgia, R TKA, bipolar disorder, generalized anxiety disorder, cervical cancert, TIA, stroke, and brain tumors.   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 Feet tingle and then go completely numb. Happens in R hand too. Has gotten weaker. Symptoms are worse when  stressed. States medical appointments give her a lot of anxiety so doesn't like to go to a lot of them.   Fall Risk Screen   Fall screen completed by PT   Have you fallen 2 or more times in the past year? Yes   Have you fallen and had an injury in the past year? Yes   Timed Up and Go score (seconds) 14 seconds   Is patient a fall risk? Yes   Fall screen comments States she falls when she gets overworked.   Abuse Screen (yes response referral indicated)   Feels Unsafe at Home or Work/School no   Feels Threatened by Someone no   Does Anyone Try to Keep You From Having Contact with Others or Doing Things Outside Your Home? no   Physical Signs of Abuse Present no   Pain   Patient currently in pain Yes   Pain location Head, R and L knees, L ankle, back, and sometimes hands   Pain rating 8/10   Pain comments Chronic pain/fibromyalgia   Cognitive Status Examination   Cognitive Comment Short term memory loss. Frequently jumps from one point to another in conversation.   Range of Motion (ROM)   ROM Comment Gross LE ROM screen WFL bilaterally with exception of L foot/ankle. Limited by pain. Demonstrating minimal active DF/PF when prompted.   Strength   Strength Comments Gross LE strength screen: all motions limited by pain and hesitancy to hold against resistance. 3+/5 all LE motions B.   Transfer Skills   Transfer Comments STS: uses arms of chair to push off on and requires a lot of effort to stand up with increased time taken. If no arm rests available, puts hands on knees and extends legs first before extending trunk, then walks hands up legs to extend trunk (similar to David sign).   Gait   Gait Comments Ambulates with limp in LLE. Fairly good pace. States this is not her normal walk, her ankle has been acting up the past 3 days. Demonstrates mild instabilities throughout.   Balance   Balance Comments Overall fair balance observed today. No difficulty in static NBOS position, increased difficulty in static tandem  position. Will test FGA at next session for further insight into balance abilities. 5x STS: 26.1 seconds using UEs to push off arm rests of chair- significant effort required during testing and report of B knee pain following.   Balance Special Tests   Balance Special Tests Timed up and go;Romberg;Sharpened Romberg   Balance Special Tests Timed Up and Go   Seconds 14.7 Seconds   Comments Limp in LLE. Increased effort for STS.   Balance Special Tests Romberg   Seconds 30 Seconds   Comments 30 seconds EO, 5 seconds EC with min sway   Balance Special Tests Sharpened Romberg   Seconds 2 Seconds   Comments Significant sway   Sensory Examination   Sensory Perception Comments Decreased sensation to light touch on R foot/ankle compared to L.   Planned Therapy Interventions   Planned Therapy Interventions balance training;gait training;joint mobilization;neuromuscular re-education;ROM;strengthening;stretching;manual therapy   Clinical Impression   Criteria for Skilled Therapeutic Interventions Met yes, treatment indicated   PT Diagnosis Impaired functional mobility, impaired balance affecting gait, impaired strength, pain affecting mobility   Influenced by the following impairments Chronic pain/fibromyalgia, hx of strokes, TIAs, and brain tumors, complex medical hx, impaired mobility, strength, and balance   Functional limitations due to impairments Increased risk for falls, increased instances of falls, decreased safety and independence with daily tasks and decreased functional mobility   Clinical Presentation Evolving/Changing   Clinical Presentation Rationale Complex pmhx, symptom report, clinical judgment   Clinical Decision Making (Complexity) Moderate complexity   Therapy Frequency 1 time/week  (decreasing prn)   Predicted Duration of Therapy Intervention (days/wks) 12 weeks   Risk & Benefits of therapy have been explained Yes   Patient, Family & other staff in agreement with plan of care Yes   Clinical Impression  Comments Rohan is a 48 yo female who presents to PT for evaluation. She demonstrates decreased functional mobility, balance, gait, and strength, due to chronic pain, fibromyalgia, brain tumors, stroke, and several other medical factors. She will benefit from skilled PT intervention targeting her impairments in order to improve overall functional mobility and decrease risk for falls.   GOALS   PT Eval Goals 1;2;3   Goal 1   Goal Identifier FGA   Goal Description Rohan will improve score on FGA by 4 or more points in order to show meaningful improvement in dynamic balance and decrease risk for falls.   Goal Progress (to be tested at next session)   Target Date 05/14/23   Goal 2   Goal Identifier Functional LE strength   Goal Description Rohan will improve score on 5x STS by 3 or more seconds and with proper body mechanics in order to improve functional LE strength needed to complete daily tasks.   Target Date 05/14/23   Goal 3   Goal Identifier Falls   Goal Description Rohan will report 0 falls over a 3 week time period in order to improve safety with mobility.   Target Date 05/14/23   Total Evaluation Time   PT Eval, Moderate Complexity Minutes (17635) 30   Therapy Certification   Certification date from 02/14/23   Certification date to 05/14/23   Medical Diagnosis Falls frequently   Certification I certify the need for these services furnished under this plan of treatment and while under my care.  (Physician co-signature of this document indicates review and certification of the therapy plan).       Thank you for referring Rohan to outpatient physical therapy. Please do not hesitate to contact me with any questions via email at [email or web address].     Joel Reyes, PT, DPT  [employer]   [email or web address]

## 2023-02-27 ENCOUNTER — VIRTUAL VISIT (OUTPATIENT)
Dept: NEUROLOGY | Facility: CLINIC | Age: 48
End: 2023-02-27
Payer: MEDICARE

## 2023-02-27 DIAGNOSIS — F41.1 GAD (GENERALIZED ANXIETY DISORDER): Primary | ICD-10-CM

## 2023-02-27 PROCEDURE — 90834 PSYTX W PT 45 MINUTES: CPT | Mod: 95 | Performed by: PSYCHOLOGIST

## 2023-02-27 NOTE — PROGRESS NOTES
Psychology Progress Note    Date: February 27, 2023    Time length and type of treatment: 46 minutes (2:59 PM - 3:45 PM), individual therapy    After review of the patient's situation, this visit was changed from an in-person visit to a video visit via [employer] to reduce the risk of COVID 19 exposure. Patient was informed that policies and procedures that govern in-person sessions would also apply to video sessions. Patient was also informed that video sessions would be discontinued when COVID 19 exposure is no longer a concern (as determined by [facility]).     Patient location: Patient home in [place], [place]  Provider location:  [facility] - Provider remote location     Patient was in agreement with proceeding with a video session.      Necessity: This session is necessary to address the patient's Bipolar I Disorder, anxiety, PTSD, Panic Disorder, Agoraphobia, and OCD.  Today we focus on the patient's treatment plan, specifically exploring thoughts and expectations of self and others. The reader is invited to review the patient's full treatment plan in the Media section of the patient's Epic medical record.    Psychotherapeutic Technique: This writer utilized motivational interviewing, active listening, reassurance and support in the context of cognitive behavioral therapy to address the above.      Mental Status Evaluation:  Grooming: Within normal limits  Attire: Appropriate  Age: Appears Stated  Behavior Towards Examiner: Cooperative  Motor Activity: Within normal limits  Eye Contact: Appropriate  Mood: Anxious   Affect: full range  Speech/Language: normal  Attention: Normal  Concentration: Sufficient  Thought Process: unremarkable  Thought Content: Clear  Does not seem to be responding to internal stimuli  Orientation: Appeared oriented to person, place, and time, though not formally established  Memory: No evidence of impairment.  Judgement: Adequate  Estimated  Intelligence: Average  Demonstrated Insight: Adequate  Fund of Knowledge: Adequate    Intervention:   Patient was seen for the first time in almost 3 months.  Patient acknowledged that anxiety related to the possible sale of her rental home, the stress of having multiple buyers walk through her house each week, and fear of again being homeless left her feeling overwhelmed and she responded by isolating.  The buyers are renovating the other side of her duplex, and have told her that once they are ready to start renovating her side of the home, she will need to move.  Patient also discussed her allowing several of her oldest daughter's friends to stay on their couch when they were struggling.  We discussed how this is a value for the patient grounded in her own experience of teen homelessness, and this positive source of esteem for the patient was validated and reinforced.    Progress:  Rapport was reestablished after a 3 month break from therapy.     Plan:   We will meet again in 2 weeks to address the patient's Bipolar I Disorder, anxiety, PTSD, Panic Disorder, Agoraphobia, and OCD.  Estimated duration of treatment is ongoing due to a major mental illness and lengthy trauma history. Individual therapy sessions (55791) will be at twice monthly intervals.     Diagnosis:  Generalized Anxiety Disorder  Bipolar I disorder, most recent episode manic  Posttraumatic Stress Disorder  Panic Disorder  Agoraphobia  Obsessive-Compulsive Disorder, with poor insight

## 2023-03-06 NOTE — PROGRESS NOTES
Scheduled for office visit.  Canceled.  Had to cancel recent physical therapy appointment though did meet with her neuropsychologist recently.  Had also canceled her appointment in January, last seen in October

## 2023-03-30 ENCOUNTER — VIRTUAL VISIT (OUTPATIENT)
Dept: NEUROLOGY | Facility: CLINIC | Age: 48
End: 2023-03-30
Payer: MEDICARE

## 2023-03-30 DIAGNOSIS — F43.10 PTSD (POST-TRAUMATIC STRESS DISORDER): Primary | ICD-10-CM

## 2023-03-30 PROCEDURE — 90834 PSYTX W PT 45 MINUTES: CPT | Mod: VID | Performed by: PSYCHOLOGIST

## 2023-03-30 NOTE — PROGRESS NOTES
Psychology Progress Note    Date: March 30, 2023    Time length and type of treatment: 52 minutes (1:56 PM - 2:48 PM), individual therapy    After review of the patient's situation, this visit was changed from an in-person visit to a video visit via [employer]. Patient was informed that policies and procedures that govern in-person sessions would also apply to video sessions. Patient was in agreement with proceeding with a video session.    Patient Location: Patient home in [place], [place]  Provider Location:  [facility] - Provider remote location     Necessity: This session is necessary to address the patient's bipolar I disorder, PTSD, panic disorder, agoraphobia, OCD, and anxiety.  Today we focus on revising the patient's treatment plan. The reader is invited to review the patient's full treatment plan in the Media section of the patient's Epic medical record.    Psychotherapeutic Technique: This writer utilized motivational interviewing, active listening, reassurance and support in the context of cognitive behavioral therapy to address the above.      Mental Status Evaluation:  Grooming: Within normal limits  Attire: Appropriate  Age: Appears Stated  Behavior Towards Examiner: Cooperative  Motor Activity: Within normal limits  Eye Contact: Avoidant  Mood: Anxious   Affect: full range  Speech/Language: normal  Attention: Normal  Concentration: Sufficient  Thought Process: unremarkable  Thought Content: Clear    Orientation: Appeared oriented to person, place, and time, though not formally established  Memory: No evidence of impairment.  Judgement: Adequate  Estimated Intelligence: Within normal limits  Demonstrated Insight: Adequate  Fund of Knowledge: Adequate    Intervention:   Patient was readministered the PHQ-9, ZAKIA-7, and PCL-5 as part of revising her treatment plan.  Patient's score of 14 on the PHQ-9 is suggestive of moderate depression and is a modest improvement over her  earlier severe score.  Similarly, her score of 9 on the ZAKIA-7 is suggestive of mild anxiety and is an improvement over her earlier moderate score of 12.  Consistent with patient self-report, her score of 42, PCL-5 is suggestive of increased trauma symptoms over her earlier score of 28.  Patient treatment plan was jointly revised and in remaining time we discussed patient avoidance and the role this plays in maintaining trauma.  Patient was also provided a strategy to work with traumatic memories and nightmares.    Progress:  Patient treatment plan was jointly revised    Plan:   We will meet again in 1 week to address the patient's PTSD, Bipolar I Disorder, Generalized Anxiety Disorder, Panic Disorder, Obsessive-Compulsive Disorder, and Agoraphobia.  Estimated duration of treatment is ongoing due to multiple major mental health disorders and a lengthy trauma history. Individual therapy sessions (92210) will be at twice monthly intervals.     Diagnosis:  Posttraumatic Stress Disorder  Bipolar I disorder, most recent episode manic  Generalized Anxiety Disorder  Panic Disorder  Agoraphobia  Obsessive-Compulsive Disorder, with poor insight

## 2023-04-12 ENCOUNTER — MEDICAL CORRESPONDENCE (OUTPATIENT)
Dept: HEALTH INFORMATION MANAGEMENT | Facility: CLINIC | Age: 48
End: 2023-04-12

## 2023-05-24 ENCOUNTER — VIRTUAL VISIT (OUTPATIENT)
Dept: NEUROLOGY | Facility: CLINIC | Age: 48
End: 2023-05-24
Payer: MEDICARE

## 2023-05-24 DIAGNOSIS — F31.30 BIPOLAR I DISORDER, MOST RECENT EPISODE DEPRESSED (H): Primary | ICD-10-CM

## 2023-05-24 PROCEDURE — 90832 PSYTX W PT 30 MINUTES: CPT | Mod: 95 | Performed by: PSYCHOLOGIST

## 2023-05-24 NOTE — PROGRESS NOTES
Psychology Progress Note    Date: May 24, 2023    Time length and type of treatment: 36 minutes (11:02 AM to 11:38 AM), individual therapy    After review of the patient's situation, this visit was changed from an in-person visit to a  telephone visit. Patient was informed that policies and procedures that govern in-person sessions would also apply to  telephone sessions. Patient was in agreement with proceeding with a  telephone session.    Patient Location: Patient home in [place], [place]  Provider Location:  [facility] - Provider remote location     Necessity: This session is necessary to address the patient's Bipolar I Disorder, PTSD, panic disorder, agoraphobia, OCD, and anxiety.  Today we focus on the patient's treatment plan, specifically processing grief.  The reader is invited to review the patient's full treatment plan in the Media section of the patient's Epic medical record.    Psychotherapeutic Technique: This writer utilized motivational interviewing, active listening, reassurance and support in the context of cognitive behavioral therapy to address the above.      Mental Status Evaluation:  Grooming: Unable to determine due to telephone visit  Attire: Unable to determine due to telephone visit  Age: Unable to determine due to telephone visit  Behavior Towards Examiner: Cooperative  Motor Activity: Unable to determine due to telephone visit  Eye Contact: Unable to determine due to telephone visit  Mood: Sad   Affect: full range  Speech/Language: normal  Attention: Normal  Concentration: Sufficient  Thought Process: tangential  Thought Content: Clear    Orientation: Appeared oriented to person, place, and time, though not formally established  Memory: No evidence of impairment.  Judgement: Adequate  Estimated Intelligence: Within normal limits  Demonstrated Insight: Adequate  Fund of Knowledge: Adequate    Intervention:   Patient's 10-year-old daughter was killed in an ATV  accident while at a sleepover at her best friend's house.  Patient discussed her confusion and distress, given the girls were supposed to be at a movie and she never would have given permission for her to be on an ATV, vacillating between disbelief and grief, and feeling overwhelmed by changes to her benefits now that she doesn't have a minor in her household while she's still feeling devastated by her loss.  Patient discussed her inability to sleep, her 19-year-old daughter's anger, her questions about why God is angry with her, and the comfort she takes from believing her daughter is in heaven with her former partner who passed away a couple of years ago.  Patient was provided supportive psychotherapy and I informed her that I would ask social work to reach out to her.     Progress:  Patient calmed as the session progressed and as she calmed, became very tired and opined she would be able to sleep    Plan:   We will meet again in 2 weeks to address the patient's Bipolar I Disorder, PTSD, Generalized Anxiety Disorder, Panic Disorder, Obsessive Compulsive Disorder, and Agoraphobia.  Estimated duration of treatment is ongoing due to multiple major mental health disorders and a lengthy trauma history. Iindividual therapy sessions (41685) will be at twice monthly intervals.     Diagnosis:  Bipolar I disorder, most recent episode manic  Posttraumatic Stress Disorder  Generalized Anxiety Disorder  Panic Disorder  Agoraphobia  Obsessive-Compulsive Disorder, with poor insight

## 2023-05-31 ENCOUNTER — TELEPHONE (OUTPATIENT)
Dept: NEUROLOGY | Facility: CLINIC | Age: 48
End: 2023-05-31

## 2023-05-31 NOTE — TELEPHONE ENCOUNTER
LM asking pt to return call.     Would like to know who her primary care provider is and what clinic they work at?     I am trying to connect her with social work to assist with concerns regarding food stamps, insurance, and housing. [facility] social work team is suggesting that she is connected through PCP team, but will assist if she does not have services through primary care.     My plan is to reach out to her PCP office to find out what options are available.     For immediate resources regarding food/nutrition needs she can use the [employer] and [employer] food shelves as needed.     Gonsalo Hyatt, RN, BSN  [facility]

## 2023-06-04 ENCOUNTER — HEALTH MAINTENANCE LETTER (OUTPATIENT)
Age: 48
End: 2023-06-04

## 2023-06-06 NOTE — TELEPHONE ENCOUNTER
Left second message asking pt to return call.     Gonsalo Hyatt, RN, BSN  [facility]

## 2023-06-07 ENCOUNTER — VIRTUAL VISIT (OUTPATIENT)
Dept: NEUROLOGY | Facility: CLINIC | Age: 48
End: 2023-06-07
Payer: MEDICARE

## 2023-06-07 DIAGNOSIS — F31.30 BIPOLAR I DISORDER, MOST RECENT EPISODE DEPRESSED (H): Primary | ICD-10-CM

## 2023-06-07 PROCEDURE — 90832 PSYTX W PT 30 MINUTES: CPT | Mod: 95 | Performed by: PSYCHOLOGIST

## 2023-06-07 NOTE — PROGRESS NOTES
Psychology Progress Note    Date: June 7, 2023    Time length and type of treatment: 21 minutes (3:08 PM to 3:29 PM), individual therapy    After review of the patient's situation, this visit was changed from an in-person visit to a  telephone visit. Patient was informed that policies and procedures that govern in-person sessions would also apply to  telephone sessions. Patient was in agreement with proceeding with a  telephone session.    Patient Location: Patient home in [place], [place]  Provider Location:  [facility] - Provider remote location     Necessity: This session is necessary to address the patient's Bipolar I Disorder, PTSD, Panic Disorder, Agoraphobia, OCD, and anxiety.  Today we focus on the patient's treatment plan, specifically processing grief.  The reader is invited to review the patient's full treatment plan in the Media section of the patient's Epic medical record.    Psychotherapeutic Technique: This writer utilized motivational interviewing, active listening, reassurance and support in the context of cognitive behavioral therapy to address the above.      Mental Status Evaluation:  Grooming: Unable to determine due to telephone visit  Attire: Unable to determine due to telephone visit  Age: Unable to determine due to telephone visit  Behavior Towards Examiner: Cooperative  Motor Activity: Unable to determine due to telephone visit  Eye Contact: Unable to determine due to telephone visit  Mood: Depressed  Sad   Affect: blunted  Speech/Language: slowed  Attention: Normal  Concentration: Sufficient  Thought Process: unremarkable  Thought Content: Clear    Orientation: Appeared oriented to person, place, and time, though not formally established  Memory: No evidence of impairment.  Judgement: Adequate  Estimated Intelligence: Within normal limits  Demonstrated Insight: Adequate  Fund of Knowledge: Adequate    Intervention:   Patient was provided supportive psychotherapy as  "she discussed ruminating on her failure to call her daughter on her sleepover to say nuria, and feeling responsible for her daughter's death because she allowed her to go on the sleepover.  Patient also discussed struggling to cope with the quiet in her house, finding affectionate notes from her daughter in random places as she cleans, and feeling \"overwhelmed, sad, stressed, broken, and numb.\"  Patient feelings were normalized.  Patient reported struggling with a severe headache secondary to prolonged crying and as a result the session ended early.      Progress:  Patient continues to cope with intense grief over her daughter's death.    Plan:   We will meet again in 2 weeks to address the patient's Bipolar I Disorder, PTSD, Generalized Anxiety Disorder, Panic Disorder, Obsessive Compulsive Disorder, and Agoraphobia.  Estimated duration of treatment is ongoing due to multiple major mental health disorders and a lengthy trauma history. Individual therapy sessions (37477) will be at twice monthly intervals.    Diagnosis:  Bipolar I disorder, most recent episode depressed  Posttraumatic Stress Disorder  Generalized Anxiety Disorder  Panic Disorder  Agoraphobia  Obsessive-Compulsive Disorder, with poor insight  "

## 2023-06-28 ENCOUNTER — DOCUMENTATION ONLY (OUTPATIENT)
Dept: NEUROLOGY | Facility: CLINIC | Age: 48
End: 2023-06-28
Payer: MEDICARE

## 2023-06-28 NOTE — PROGRESS NOTES
Called patient at 4:09 PM due to several canceled appointments since her daughter's death and to assess for safety. Patient acknowledged she continues to struggle but reported she is stable and committed to attending her 7/5 appointment.

## 2023-07-05 ENCOUNTER — OFFICE VISIT (OUTPATIENT)
Dept: NEUROLOGY | Facility: CLINIC | Age: 48
End: 2023-07-05
Payer: MEDICARE

## 2023-07-05 DIAGNOSIS — F31.9 BIPOLAR I DISORDER (H): Primary | ICD-10-CM

## 2023-07-05 PROCEDURE — 90834 PSYTX W PT 45 MINUTES: CPT | Performed by: PSYCHOLOGIST

## 2023-07-05 NOTE — PROGRESS NOTES
Psychology Progress Note    Date: 2023    Time length and type of treatment: 48 minutes (3:02 PM - 3:50 PM), individual therapy - in person session    Location:  [facility]    Necessity: This session is necessary to address the patient's Bipolar I Disorder, PTSD, Panic Disorder, Agoraphobia, OCD, and anxiety.  Today we focus on the patient's treatment plan, specifically e processing grief.  The reader is invited to review the patient's full treatment plan in the Media section of the patient's Epic medical record.    Psychotherapeutic Technique: This writer utilized motivational interviewing, active listening, reassurance and support in the context of cognitive behavioral therapy to address the above.      Mental Status Evaluation:  Grooming: Within normal limits  Attire: Appropriate  Age: Appears Stated  Behavior Towards Examiner: Cooperative  Motor Activity: Patient ambulates slowly  Eye Contact: Appropriate  Mood: Sad   Affect: tearful  Speech/Language: normal  Attention: Normal  Concentration: Sufficient  Thought Process: tangential  Thought Content: Clear    Orientation: Appeared oriented to person, place, and time, though not formally established  Memory: No evidence of impairment.  Judgement: Adequate  Estimated Intelligence: Within normal limits  Demonstrated Insight: Adequate  Fund of Knowledge: Adequate    Intervention:   Patient was provided supportive psychotherapy as she discussed her ongoing struggle with grief, her anger that a teacher from [employer] has repeatedly asked her if she's going to eran, and her distress that the parents who were watching her daughter when she  continue to call her.  Patient's stepdad visited for a few weeks and patient discussed how helping him gave her something to do, helped her feel useful, and was healing for both Cayla and her.  Patient acknowledged that knowing her oldest daughter needs her is the only thing that keeps  her from contemplating suicide, and this need was validated and reinforced.     Progress:  Patient calmed as the session progressed    Plan:   We will meet again in 2 weeks to address the patient's bipolar I disorder, PTSD, generalized anxiety disorder, panic disorder, obsessive-compulsive disorder, and agoraphobia.  Estimated duration of treatment is ongoing due to multiple major mental health disorders and a lengthy trauma history.  Individual therapy sessions (01025) will be at twice monthly intervals.     Diagnosis:  Bipolar I disorder, most recent episode depressed  Posttraumatic Stress Disorder  Generalized Anxiety Disorder  Panic Disorder  Agoraphobia  Obsessive-Compulsive Disorder, with poor insight

## 2023-07-07 ENCOUNTER — PATIENT OUTREACH (OUTPATIENT)
Dept: CARE COORDINATION | Facility: CLINIC | Age: 48
End: 2023-07-07
Payer: MEDICARE

## 2023-07-07 NOTE — PROGRESS NOTES
Clinic Care Coordination Contact    Request received from patient's Neurology clinic with request for patient outreach to assist in establishing care with a PCP.     [facility]/Voicemail    Referral Source: Care Team  Clinical Data: Care Coordinator Outreach  Outreach attempted x 1.  Left message on patient's voicemail with call back information and requested return call.  Plan: RN Clinical Product Navigator will try to reach patient again in 1-2 business days.    Osiris Sainz RN   Clinical Product Navigator   [email or web address]   Office: [phone]

## 2023-07-10 ENCOUNTER — PATIENT OUTREACH (OUTPATIENT)
Dept: CARE COORDINATION | Facility: CLINIC | Age: 48
End: 2023-07-10
Payer: MEDICARE

## 2023-07-10 NOTE — PROGRESS NOTES
Clinic Care Coordination Contact    Request received from patient's Neurology clinic to assist patient establishing care with an [facility] PCP.     Rohan states that she sees Dr. Chucky Aldana with [facility], last seen 9/12/22. Patient declined RN Clinical Product Navigator's offer to assist her in scheduling an appointment to establish care with [facility] PCP. Patient states that her preference is to remain with Dr. Aldana for the time being. Encouraged patient to schedule her annual wellness visit, due 9/2023.     Discussed Clinical Product Navigator program, patient declines enrolling, states that she has all the support and resources that she needs at this time.     Patient was agreeable to take RN Clinical Product Navigator contact information, encouraged to call with any care concerns, need for support or resources as they arise. Patient verbalized understanding.     RN Clinical Product Navigator program closed.     Osiris Sainz RN   Clinical Product Navigator   [email or web address]   Office: [phone]

## 2023-07-17 NOTE — TELEPHONE ENCOUNTER
Patient contacted by our social work team for assistance in scheduling with [facility] PCP and offering other social assistance programs as able. Pt declined services at this time, was given contact information for  and told to contact in future if she would like to pursue appointment and any offered services/assistance.     Gonsalo Hyatt RN, BSN  [facility]

## 2023-07-19 ENCOUNTER — OFFICE VISIT (OUTPATIENT)
Dept: NEUROLOGY | Facility: CLINIC | Age: 48
End: 2023-07-19
Payer: MEDICARE

## 2023-07-19 DIAGNOSIS — F31.9 BIPOLAR I DISORDER (H): Primary | ICD-10-CM

## 2023-07-19 PROCEDURE — 90834 PSYTX W PT 45 MINUTES: CPT | Performed by: PSYCHOLOGIST

## 2023-07-19 NOTE — PROGRESS NOTES
Psychology Progress Note    Date: 2023    Time length and type of treatment: (52 minutes 3:03 PM to 3:55 PM), individual therapy -in person session    Location:  [facility] Neu[facility] - Provider Remote Location     Necessity: This session is necessary to address the patient's Bipolar I Disorder, PTSD, Panic Disorder, Agoraphobia, OCD, and anxiety.  Today we focus on the patient's treatment plan, specifically processing grief.  The reader is invited to review the patient's full treatment plan in the Media section of the patient's Epic medical record.    Psychotherapeutic Technique: This writer utilized motivational interviewing, active listening, reassurance and support in the context of cognitive behavioral therapy to address the above.      Mental Status Evaluation:  Grooming: Within normal limits  Attire: Appropriate  Age: Appears Stated  Behavior Towards Examiner: Cooperative  Motor Activity: Within normal limits  Eye Contact: Appropriate  Mood: Sad   Affect: tearful  Speech/Language: normal  Attention: Normal  Concentration: Sufficient  Thought Process: tangential  Thought Content: Clear    Orientation: Appeared oriented to person, place, and time, though not formally established  Memory: No evidence of impairment.  Judgement: Adequate  Estimated Intelligence: Within normal limits  Demonstrated Insight: Adequate  Fund of Knowledge: Adequate    Intervention:   Patient reported she continues to be unable to sleep at night and has been falling asleep at about 6:00 am. She discussed feeling overwhelmed by her grief and wondering why she has had so many losses in her life. Patient also discussed her worry about her oldest daughter, and acknowledged that the only reason she doesn't commit suicide is because she knows her oldest daughter couldn't handle another loss.  We also discussed how patient's  daughter was the only person in her life from whom she regularly received hugs.   Patient also discussed her sadness that a friend weeded her garden. We discussed how patient was keeping the garden just like it was when her daughter , weeds and all, and weeding the garden changed it.  Patient stated the garden had been a source of comfort, but now she's lost all interest in it. Patient feelings were validated and normalized.      Progress:  Affect lightened modestly as the session progressed    Plan:   We will meet again in 2 weeks to address the patient's Bipolar I Disorder, PTSD, Generalized Anxiety Disorder, Panic Disorder, Obsessive Compulsive Disorder, and Agoraphobia.  Estimated duration of treatment is ongoing due to multiple major mental health disorders and a significant trauma history. Individual therapy sessions (23621) will be at twice monthly intervals.     Diagnosis:  Bipolar I disorder, most recent episode depressed  Posttraumatic Stress Disorder  Generalized Anxiety Disorder  Panic Disorder  Agoraphobia  Obsessive-Compulsive Disorder, with poor insight

## 2023-08-14 ENCOUNTER — MEDICAL CORRESPONDENCE (OUTPATIENT)
Dept: HEALTH INFORMATION MANAGEMENT | Facility: CLINIC | Age: 48
End: 2023-08-14

## 2023-08-22 ENCOUNTER — DOCUMENTATION ONLY (OUTPATIENT)
Dept: NEUROLOGY | Facility: CLINIC | Age: 48
End: 2023-08-22
Payer: MEDICARE
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
## 2023-09-08 ENCOUNTER — OFFICE VISIT (OUTPATIENT)
Dept: NEUROLOGY | Facility: CLINIC | Age: 48
End: 2023-09-08
Payer: MEDICARE

## 2023-09-08 DIAGNOSIS — F31.30 BIPOLAR I DISORDER, MOST RECENT EPISODE DEPRESSED (H): Primary | ICD-10-CM

## 2023-09-08 PROCEDURE — 90834 PSYTX W PT 45 MINUTES: CPT | Performed by: PSYCHOLOGIST

## 2023-09-08 NOTE — PROGRESS NOTES
Psychology Progress Note    Date: September 8, 2023    Time length and type of treatment: 52 minutes (1:05 PM to 1:57 PM), individual therapy -in person session    Location:  [facility]    Necessity: This session is necessary to address the patient's bipolar I disorder, PTSD, panic disorder, agoraphobia, OCD, and anxiety.  Today we focus on revising the patient's treatment plan. The reader is invited to review the patient's full treatment plan in the Media section of the patient's Epic medical record.    Psychotherapeutic Technique: This writer utilized motivational interviewing, active listening, reassurance and support in the context of cognitive behavioral therapy to address the above.      Mental Status Evaluation:  Grooming: Within normal limits  Attire: Appropriate  Age: Appears Stated  Behavior Towards Examiner: Cooperative  Motor Activity: Patient ambulates slowly with a slight limp  Eye Contact: Appropriate  Mood: Anxious  Depressed   Affect: full range  Speech/Language: Mildly pressured  Attention: Normal  Concentration: Sufficient  Thought Process: tangential  Thought Content: Clear    Orientation: Appeared oriented to person, place, and time, though not formally established  Memory: No evidence of impairment.  Judgement: Adequate  Estimated Intelligence: Within normal limits  Demonstrated Insight: Adequate  Fund of Knowledge: Adequate    Intervention:   Patient was readministered the PHQ-9, ZAKIA-7, and PCL-5 as part of revising her treatment plan.  Her score of 17 on the PHQ-9 is suggestive of severe depression and is a worsening over her earlier moderate score of 14.  Similarly her score of 17 on the ZAKIA-7 is suggestive of severe anxiety and is a substantial worsening over her earlier mild score of 9.  Likewise, patient's PCL-5 score of 55 is much worse than her earlier score of 42.  We discussed how the tragic death of patient's daughter has negatively affected  patient's mental health in multiple domains.  Patient treatment plan was jointly revised and remaining time was spent processing grief,     Progress:  Patient treatment plan was jointly revised.    Plan:   We will meet again in 2 weeks to address the patient's Bipolar I Disorder, PTSD, Generalized Anxiety Disorder, Panic Disorder, Obsessive-Compulsive Disorder, and Agoraphobia.  Estimated duration of treatment is ongoing due to multiple major mental health disorders in conjunction with a lengthy trauma history.  Individual therapy sessions ([id number]) will be at weekly intervals.     Diagnosis:  Bipolar I disorder, most recent episode depressed  Posttraumatic Stress Disorder  Generalized Anxiety Disorder  Panic Disorder  Agoraphobia  Obsessive-Compulsive Disorder, with poor insight

## 2023-09-27 ENCOUNTER — OFFICE VISIT (OUTPATIENT)
Dept: NEUROLOGY | Facility: CLINIC | Age: 48
End: 2023-09-27
Payer: MEDICARE

## 2023-09-27 DIAGNOSIS — F31.30 BIPOLAR I DISORDER, MOST RECENT EPISODE DEPRESSED (H): Primary | ICD-10-CM

## 2023-09-27 PROCEDURE — 90834 PSYTX W PT 45 MINUTES: CPT | Performed by: PSYCHOLOGIST

## 2023-09-27 NOTE — PROGRESS NOTES
Psychology Progress Note    Date: September 27, 2023    Time length and type of treatment: 43 minutes (3:05 PM to 3:48 PM), individual therapy -in person session    Location:  [facility] Neurology - Provider remote location     Necessity: This session is necessary to address the patient's Bipolar I Disorder, PTSD, Panic Disorder, Agoraphobia, OCD, and anxiety.  Today we focus on the patient's treatment plan, specifically processing grief.  The reader is invited to review the patient's full treatment plan in the Media section of the patient's Epic medical record.    Psychotherapeutic Technique: This writer utilized motivational interviewing, active listening, reassurance and support in the context of cognitive behavioral therapy to address the above.      Mental Status Evaluation:  Grooming: Within normal limits  Attire: Appropriate  Age: Appears Stated  Behavior Towards Examiner: Cooperative  Motor Activity: Within normal limits  Eye Contact: Appropriate  Mood: Anxious  Depressed   Affect: tearful  Speech/Language: Mildly pressured  Attention: Normal  Concentration: Sufficient  Thought Process: tangential  Thought Content: Does not seem to be responding to internal stimuli   Orientation: Appeared oriented to person, place, and time, though not formally established  Memory: No evidence of impairment.  Judgement: Adequate  Estimated Intelligence: Within normal limits  Demonstrated Insight: Adequate  Fund of Knowledge: Adequate    Intervention:   Patient discussed her continuing sadness over her daughter's death, her recognition that people are avoiding her because she talks about her daughter so much, and her anxiety about an upcoming court date. Patient explained that she is feeling very anxious about the expectation that she interact with her daughter's father.  Patient discussed many complex feelings, including fearing how painful it would be to see someone who looks so much like their daughter,  and coping with anger over his failure to be present in their daughter's life.  Patient also discussed feeling guilty that she felt emotionally closer to her  daughter than to her oldest daughter, and the struggle to cope with how lonely she feels without her daughter.  Patient feelings were acknowledged and normalized.  Patient requested a letter stating that it would not be in the best interest of her mental health to be forced to interact with TJ and given both patient obvious distress and their lengthy history of a very contentious relationship, I agreed to write this letter for the patient.     Progress:  Patient called modestly as the session progressed    Plan:   We will meet again in 2 weeks to address the patient's bipolar I disorder, PTSD, generalized anxiety disorder, panic disorder, agoraphobia, and obsessive-compulsive disorder. Estimated duration of treatment is ongoing due to multiple major mental health disorders in conjunction with a lengthy trauma history.  Individual therapy sessions ([id number]) will be at weekly intervals.     Diagnosis:  Bipolar I disorder, most recent episode depressed  Posttraumatic Stress Disorder  Generalized Anxiety Disorder  Panic Disorder  Agoraphobia  Obsessive-Compulsive Disorder, with poor insight

## 2023-10-04 ENCOUNTER — OFFICE VISIT (OUTPATIENT)
Dept: NEUROLOGY | Facility: CLINIC | Age: 48
End: 2023-10-04
Payer: MEDICARE

## 2023-10-04 DIAGNOSIS — F31.30 BIPOLAR I DISORDER, MOST RECENT EPISODE DEPRESSED (H): Primary | ICD-10-CM

## 2023-10-04 PROCEDURE — 90834 PSYTX W PT 45 MINUTES: CPT | Performed by: PSYCHOLOGIST

## 2023-10-04 NOTE — PROGRESS NOTES
Psychology Progress Note    Date: 2023    Time length and type of treatment: 47 minutes (3:02 PM to 3:49 PM), individual therapy -in person session    Location:  [facility]    Necessity: This session is necessary to address the patient's Bipolar I Disorder, Panic Disorder, PTSD, Agoraphobia, OCD, and anxiety.  Today we focus on the patient's treatment plan, specifically processing grief. The reader is invited to review the patient's full treatment plan in the Media section of the patient's Epic medical record.    Psychotherapeutic Technique: This writer utilized motivational interviewing, active listening, reassurance and support in the context of cognitive behavioral therapy to address the above.      Mental Status Evaluation:  Grooming: Within normal limits  Attire: Appropriate  Age: Appears Stated  Behavior Towards Examiner: Cooperative  Motor Activity: Agitated  Eye Contact: Appropriate  Mood: Anxious  Depressed   Affect: tearful  Speech/Language: Mildly pressured  Attention: Normal  Concentration: Sufficient  Thought Process: tangential  Thought Content: Clear    Orientation: Appeared oriented to person, place, and time, though not formally established  Memory: No evidence of impairment.  Judgement: Adequate  Estimated Intelligence: Within normal limits  Demonstrated Insight: Adequate  Fund of Knowledge: Adequate    Intervention:   Patient was provided supportive psychotherapy as she discussed the ongoing difficulty she is having with trying to get rid of any of her  daughter's clothing despite knowing that they will have to move soon and their landlord is wanting them to leave.  She also discussed her loneliness and the difficult relationship she is having with her oldest daughter as they each mourn Lia's death, and her awareness of an increase in OCD symptoms as she rudy with overwhelm. Patient experience was validated and normalized.    Progress:  Affect  lightened modestly as the session progressed    Plan:   We will meet again in 2 weeks to address the patient's Bipolar I Disorder, PTSD, Generalized Anxiety Disorder, Panic Disorder, Agoraphobia, and OCD.  Estimated duration of treatment is ongoing due to multiple major mental health disorders in conjunction with a lengthy trauma history. Individual therapy sessions ([id number]) will be at twice monthly intervals.     Diagnosis:  Bipolar I disorder, most recent episode depressed  Posttraumatic Stress Disorder  Generalized Anxiety Disorder  Panic Disorder  Agoraphobia  Obsessive-Compulsive Disorder, with poor insight

## 2023-11-22 ENCOUNTER — OFFICE VISIT (OUTPATIENT)
Dept: NEUROLOGY | Facility: CLINIC | Age: 48
End: 2023-11-22
Payer: MEDICARE

## 2023-11-22 DIAGNOSIS — F31.30 BIPOLAR I DISORDER, MOST RECENT EPISODE DEPRESSED (H): Primary | ICD-10-CM

## 2023-11-22 PROCEDURE — 90834 PSYTX W PT 45 MINUTES: CPT | Performed by: PSYCHOLOGIST
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
## 2023-12-17 ENCOUNTER — HEALTH MAINTENANCE LETTER (OUTPATIENT)
Age: 48
End: 2023-12-17

## 2023-12-20 ENCOUNTER — OFFICE VISIT (OUTPATIENT)
Dept: NEUROLOGY | Facility: CLINIC | Age: 48
End: 2023-12-20
Payer: MEDICARE

## 2023-12-20 DIAGNOSIS — F31.30 BIPOLAR I DISORDER, MOST RECENT EPISODE DEPRESSED (H): Primary | ICD-10-CM

## 2023-12-20 PROCEDURE — 90834 PSYTX W PT 45 MINUTES: CPT | Performed by: PSYCHOLOGIST

## 2023-12-20 NOTE — PROGRESS NOTES
Psychology Progress Note    Date: December 20, 2023    Time length and type of treatment: 47 minutes (3:03 PM to 3:50 PM), individual therapy - in person session    Location:  [facility]     Necessity: This session is necessary to address the patient's bipolar I disorder, panic disorder, PTSD, agoraphobia, OCD, and anxiety.  Today we focus on revising the patient's treatment plan.  The reader is invited to review the patient's full treatment plan in the Media section of the patient's Epic medical record.    Psychotherapeutic Technique: This writer utilized motivational interviewing, active listening, reassurance and support in the context of cognitive behavioral therapy to address the above.      Mental Status Evaluation:  Grooming: Within normal limits  Attire: Appropriate  Age: Appears Stated  Behavior Towards Examiner: Cooperative  Motor Activity: Within normal limits  Eye Contact: Ranged from avoidant to appropriate  Mood: Depressed   Affect: tearful  Speech/Language: normal  Attention: Normal  Concentration: Sufficient  Thought Process: tangential  Thought Content: Does not seem to be responding to internal stimuli   Orientation: Appeared oriented to person, place, and time, though not formally established  Memory: No evidence of impairment.  Judgement: Adequate  Estimated Intelligence: Within normal limits  Demonstrated Insight: Adequate  Fund of Knowledge: Adequate    Intervention:   Patient was readministered the PHQ-9, ZAKIA-7, and PCL-5 as part of revising her treatment plan.  Her score of 13 on the ZAKIA-7 is suggestive of moderate anxiety and is an improvement over her earlier severe score of 17.  Her score of 17 on the PHQ-9 is identical to her earlier score and remains suggestive of severe depression.  Her score of 61 on the PCL-5 represents an increase over her previous score of 55 and continues to suggest very significant PTSD symptoms.  Patient acknowledged very  significant grief over her daughter's death, especially with [place] in a few days and her daughter's birthday on January 16.  Treatment plan was jointly revised and and remaining time patient was provided supportive psychotherapy.    Progress:  Patient treatment plan was jointly revised.    Plan:   We will meet again in 2 weeks to address the patient's Bipolar I Disorder, Generalized Anxiety Disorder, PTSD, Panic Disorder, OCD, and agoraphobia.  Estimated duration of treatment is ongoing due to multiple major mental health disorders.  Individual therapy sessions ([id number]) will be at twice monthly intervals.     Diagnosis:  Bipolar I Disorder  Posttraumatic Stress Disorder (PTSD)  Generalized Anxiety Disorder  Panic Disorder  Obsessive-Compulsive Disorder  Agoraphobia

## 2024-01-16 ENCOUNTER — MEDICAL CORRESPONDENCE (OUTPATIENT)
Dept: HEALTH INFORMATION MANAGEMENT | Facility: CLINIC | Age: 49
End: 2024-01-16
Payer: MEDICARE

## 2024-01-23 ENCOUNTER — VIRTUAL VISIT (OUTPATIENT)
Dept: NEUROLOGY | Facility: CLINIC | Age: 49
End: 2024-01-23
Payer: MEDICARE

## 2024-01-23 DIAGNOSIS — G62.9 PERIPHERAL POLYNEUROPATHY: Primary | ICD-10-CM

## 2024-01-23 DIAGNOSIS — D32.9 MENINGIOMA (H): ICD-10-CM

## 2024-01-23 DIAGNOSIS — R29.6 FALLS FREQUENTLY: ICD-10-CM

## 2024-01-23 DIAGNOSIS — G43.009 MIGRAINE WITHOUT AURA AND WITHOUT STATUS MIGRAINOSUS, NOT INTRACTABLE: ICD-10-CM

## 2024-01-23 DIAGNOSIS — E07.9 THYROID DYSFUNCTION: ICD-10-CM

## 2024-01-23 PROCEDURE — 99215 OFFICE O/P EST HI 40 MIN: CPT | Mod: 95 | Performed by: PSYCHIATRY & NEUROLOGY

## 2024-01-23 NOTE — PROGRESS NOTES
ESTABLISHED PATIENT NEUROLOGY NOTE - Virtual Visit    DATE OF VISIT: 1/23/2024  MRN: [id number]  PATIENT NAME: Rohan Prince  YOB: 1975    Chief Complaint   Patient presents with    Video Visit     6 month follow-up - very depressed - in a lot of pain - foot is swelling up      SUBJECTIVE:                                                      HISTORY OF PRESENT ILLNESS:  Rohan is here for follow up regarding meningioma.  Case is complex.  She has had history of multiple neurologic and psychiatric symptoms.  She does follow with Dr. Astorga in mental health clinic for guidance on on that front.  She is history of headaches and chronic pain.  EMG was essentially normal when this was done I 11.2022, possible early neuropathy in the lower extremities.  Brain MRI was stable in September 2022.  She has had some problems that are more podiatric in etiology so I recommended she continue to work with orthopedics and PT.  Plan was to follow-up in 6 months.      Today's visit was switched to a virtual visit because I am home under COVID quarantine.    I met with Rohan via video visit today.  Rohan says that she continues to intermittent swelling in the feet. She says she thinks she had an interim bunionectomy on the Left ([place]), and some screws were placed. Left foot also bothers her at times. She has some problems with her knee and hip giving out.     She reminds me that she has fibromyalgia and this contributes to her pain. No longer follows with a pain specialist. She mentions possible prior diagnosis of Rheumatoid positivity.  She has not tried acupuncture in the past. Likes to try to avoid pills.     She missed her  appointment earlier this month because her youngest daughter passed away last year and the appt was on the day that would have been her birthday. She does have follow up scheduled with Dr. Astorga later this month.    Rohan tells me she had a very severe migraine a couple of months ago  requiring an ED visit (10.24.23, per chart). She has been having more frequent migraines. She takes Excedrin for these. Migraine cocktail did help in the ED. No head imaging at that time.     CURRENT MEDICATIONS:   CAFFEINE PO, Take 270 mg by mouth Hydroxycut two capsules a day. (Patient not taking: Reported on 1/16/2023)  phenazopyridine (AZO URINARY PAIN RELIEF) 95 MG tablet, Take 190 mg by mouth 3 times daily (Patient not taking: Reported on 1/16/2023)  pregabalin (LYRICA) 150 MG capsule, Take 1 capsule (150 mg) by mouth 2 times daily (Patient not taking: Reported on 1/16/2023)    ketamine 5%-gabapentin 8%-lidocaine 2.5% in PLO cream 0.5 g      RECENT DIAGNOSTIC STUDIES:   Labs: No results found for any visits on 01/23/24.    REVIEW OF SYSTEMS:                                                      10-point review of systems is negative except as mentioned above in HPI.    EXAM:                                                      Physical Exam:   Vitals: There were no vitals taken for this visit.  BMI= There is no height or weight on file to calculate BMI.  GENERAL: NAD.  HEENT: NC/AT.  PULM: Non-labored breathing.   Neurologic:  MENTAL STATUS: Alert, attentive. Speech is fluent. Normal comprehension. Normal concentration. Adequate fund of knowledge.   CRANIAL NERVES: Facial movement normal. EOM full. Hearing intact to conversation. Trapezius strength intact. Palate moves symmetrically. Tongue midline.  MOTOR: NO observed weakness in UEs.   Limited exam due to this being a video visit.    ASSESSMENT and PLAN:                                                      Assessment:    ICD-10-CM    1. Peripheral polyneuropathy  G62.9 Vitamin B12     Methylmalonic Acid     Folate     Vitamin B1 whole blood     Vitamin B6     Protein electrophoresis     Protein immunofixation urine     Protein Immunofixation Serum     SSA Ro LEIGH Antibody IgG     SSB La LEIGH Antibody IgG     Rheumatoid factor     Anti Nuclear Margaux IgG by IFA  with Reflex     CRP inflammation     ESR: Erythrocyte sedimentation rate     Acupuncture Referral      2. Meningioma (H)  D32.9       3. Falls frequently  R29.6 MR Brain w/o & w Contrast      4. Migraine without aura and without status migrainosus, not intractable  G43.009       5. Thyroid dysfunction  E07.9 TSH with free T4 reflex    rule out          Ms. Prince is followed in Neurology for multiple neurologic concerns and meningioma monitoring.     Plan:  -- Follow up with Ortho/Podiatry regarding the ankles.   -- Follow up with Dr. Jolanta Astorga later this month as planned.  -- Labs for neuropathy/neuropathic pain and inflammation. You can have these drawn at [facility] or at your local clinic at your convenience.  -- Updated brain MRI.  -- Referral for acupuncture.  -- Return to Neurology clinic in 6 months.    Total Time: 40 minutes were spent with the patient and in chart review/documentation (as described above in Assessment and Plan)/coordinating the care on date of service. Video call duration: 26 minutes.     Angélica Almendarez MD  Neurology    Dragon software used in the dictation of this note.

## 2024-01-23 NOTE — PROGRESS NOTES
Virtual Visit Details    Type of service:  Video Visit   ~27 minutes in duration    Originating Location (pt. Location): Home    Distant Location (provider location):  Off-site  Platform used for Video Visit: Edmundo

## 2024-01-23 NOTE — NURSING NOTE
Is the patient currently in the state of [place]? YES    Visit mode:VIDEO    If the visit is dropped, the patient can be reconnected by: TELEPHONE VISIT: Phone number:   Telephone Information:   Mobile [phone]       Will anyone else be joining the visit? NO  (If patient encounters technical issues they should call [phone])    How would you like to obtain your AVS? MyChart    Are changes needed to the allergy or medication list? Pt stated no med changes    Reason for visit: Video Visit (6 month follow-up - very depressed - in a lot of pain - foot is swelling up )    Glenis Silveira VVF    Pt asked to stop taking the phq because she takes them frequently with her psychologist. She knows she is depressed and has bi polar.   Glenis Silveira 1/23/24

## 2024-01-23 NOTE — LETTER
1/23/2024         RE: Rohan Prince  [address]  [facility] [address]        Dear Colleague,    Thank you for referring your patient, Rohan Prince, to the [facility] [place]. Please see a copy of my visit note below.    ESTABLISHED PATIENT NEUROLOGY NOTE - Virtual Visit    DATE OF VISIT: 1/23/2024  MRN: [id number]  PATIENT NAME: Rohan Prince  YOB: 1975    Chief Complaint   Patient presents with     Video Visit     6 month follow-up - very depressed - in a lot of pain - foot is swelling up      SUBJECTIVE:                                                      HISTORY OF PRESENT ILLNESS:  Rohan is here for follow up regarding meningioma.  Case is complex.  She has had history of multiple neurologic and psychiatric symptoms.  She does follow with Dr. Astorga in mental health clinic for guidance on on that front.  She is history of headaches and chronic pain.  EMG was essentially normal when this was done I 11.2022, possible early neuropathy in the lower extremities.  Brain MRI was stable in September 2022.  She has had some problems that are more podiatric in etiology so I recommended she continue to work with orthopedics and PT.  Plan was to follow-up in 6 months.      Today's visit was switched to a virtual visit because I am home under COVID quarantine.    I met with Rohan via video visit today.  Rohan says that she continues to intermittent swelling in the feet. She says she thinks she had an interim bunionectomy on the Left ([place]), and some screws were placed. Left foot also bothers her at times. She has some problems with her knee and hip giving out.     She reminds me that she has fibromyalgia and this contributes to her pain. No longer follows with a pain specialist. She mentions possible prior diagnosis of Rheumatoid positivity.  She has not tried acupuncture in the past. Likes to try to avoid pills.     She missed her  appointment earlier this month  because her youngest daughter passed away last year and the appt was on the day that would have been her birthday. She does have follow up scheduled with Dr. Astorga later this month.    Rohan tells me she had a very severe migraine a couple of months ago requiring an ED visit (10.24.23, per chart). She has been having more frequent migraines. She takes Excedrin for these. Migraine cocktail did help in the ED. No head imaging at that time.     CURRENT MEDICATIONS:   CAFFEINE PO, Take 270 mg by mouth Hydroxycut two capsules a day. (Patient not taking: Reported on 1/16/2023)  phenazopyridine (AZO URINARY PAIN RELIEF) 95 MG tablet, Take 190 mg by mouth 3 times daily (Patient not taking: Reported on 1/16/2023)  pregabalin (LYRICA) 150 MG capsule, Take 1 capsule (150 mg) by mouth 2 times daily (Patient not taking: Reported on 1/16/2023)    ketamine 5%-gabapentin 8%-lidocaine 2.5% in PLO cream 0.5 g      RECENT DIAGNOSTIC STUDIES:   Labs: No results found for any visits on 01/23/24.    REVIEW OF SYSTEMS:                                                      10-point review of systems is negative except as mentioned above in HPI.    EXAM:                                                      Physical Exam:   Vitals: There were no vitals taken for this visit.  BMI= There is no height or weight on file to calculate BMI.  GENERAL: NAD.  HEENT: NC/AT.  PULM: Non-labored breathing.   Neurologic:  MENTAL STATUS: Alert, attentive. Speech is fluent. Normal comprehension. Normal concentration. Adequate fund of knowledge.   CRANIAL NERVES: Facial movement normal. EOM full. Hearing intact to conversation. Trapezius strength intact. Palate moves symmetrically. Tongue midline.  MOTOR: NO observed weakness in UEs.   Limited exam due to this being a video visit.    ASSESSMENT and PLAN:                                                      Assessment:    ICD-10-CM    1. Peripheral polyneuropathy  G62.9 Vitamin B12     Methylmalonic Acid      Folate     Vitamin B1 whole blood     Vitamin B6     Protein electrophoresis     Protein immunofixation urine     Protein Immunofixation Serum     SSA Ro LEIGH Antibody IgG     SSB La LEIGH Antibody IgG     Rheumatoid factor     Anti Nuclear Margaux IgG by IFA with Reflex     CRP inflammation     ESR: Erythrocyte sedimentation rate     Acupuncture Referral      2. Meningioma (H)  D32.9       3. Falls frequently  R29.6 MR Brain w/o & w Contrast      4. Migraine without aura and without status migrainosus, not intractable  G43.009       5. Thyroid dysfunction  E07.9 TSH with free T4 reflex    rule out          MsEarnest Prince is followed in Neurology for multiple neurologic concerns and meningioma monitoring.     Plan:  -- Follow up with Ortho/Podiatry regarding the ankles.   -- Follow up with Dr. Jolanta Astorga later this month as planned.  -- Labs for neuropathy/neuropathic pain and inflammation. You can have these drawn at [facility] or at your local clinic at your convenience.  -- Updated brain MRI.  -- Referral for acupuncture.  -- Return to Neurology clinic in 6 months.    Total Time: 40 minutes were spent with the patient and in chart review/documentation (as described above in Assessment and Plan)/coordinating the care on date of service. Video call duration: 26 minutes.     Angélica Almendarez MD  Neurology    Dragon software used in the dictation of this note.                    Virtual Visit Details    Type of service:  Video Visit   ~27 minutes in duration    Originating Location (pt. Location): Home    Distant Location (provider location):  Off-site  Platform used for Video Visit: Edmundo      Again, thank you for allowing me to participate in the care of your patient.        Sincerely,        Angélica Almendarez MD

## 2024-01-23 NOTE — PATIENT INSTRUCTIONS
Plan:  -- Follow up with Ortho/Podiatry regarding the ankles.   -- Follow up with Dr. Jolanta Astorga later this month as planned.  -- Labs for neuropathy/neuropathic pain and inflammation. You can have these drawn at [facility] or at your local clinic at your convenience.  -- Updated brain MRI.  -- Referral for acupuncture.  -- Return to Neurology clinic in 6 months.

## 2024-03-12 ENCOUNTER — VIRTUAL VISIT (OUTPATIENT)
Dept: NEUROLOGY | Facility: CLINIC | Age: 49
End: 2024-03-12
Payer: MEDICARE

## 2024-03-12 DIAGNOSIS — F31.9 BIPOLAR I DISORDER (H): Primary | ICD-10-CM

## 2024-03-12 PROCEDURE — 90834 PSYTX W PT 45 MINUTES: CPT | Mod: 95 | Performed by: PSYCHOLOGIST

## 2024-03-12 NOTE — PROGRESS NOTES
Psychology Progress Note    Date: March 12, 2024    Time length and type of treatment: 39 minutes (3:15 PM to 3:54 PM), individual therapy    Telemedicine visit: The patient's condition can be safely assessed and treated via synchronous audio and visual telemedicine encounter via [employer]. Patient was informed that policies and procedures that govern in-person sessions would also apply to video sessions. Patient was in agreement with proceeding with a video session.     Patient Location: Patient home in [place], [place]  Provider Location:  [facility] - Provider remote location     Necessity: This session is necessary to address the patient's bipolar I disorder, PTSD, anxiety, panic disorder, OCD, and agoraphobia.  Today we focus on the patient's treatment plan, specifically processing grief.  The reader is invited to review the patient's full treatment plan in the Media section of the patient's Epic medical record.    Psychotherapeutic Technique: This writer utilized motivational interviewing, active listening, reassurance and support in the context of cognitive behavioral therapy to address the above.      Mental Status Evaluation:  Grooming: Within normal limits  Attire: Appropriate  Age: Appears Stated  Behavior Towards Examiner: Cooperative  Motor Activity: Within normal limits  Eye Contact: Appropriate  Mood: Sad   Affect: full range  Speech/Language: normal  Attention: Normal  Concentration: Sufficient  Thought Process: unremarkable  Thought Content: Does not seem to be responding to internal stimuli   Orientation: Appeared oriented to person, place, and time, though not formally established  Memory: No evidence of impairment.  Judgement: Adequate  Estimated Intelligence: Within normal limits  Demonstrated Insight: Adequate  Fund of Knowledge: Adequate    Intervention:   Patient has missed appointments and has not been seen since December, 2023.  Patient acknowledged feeling  overwhelmed and forgetful, noting that Darell and her daughter's birthday were very difficult.  Rohan attempted an in person visit today, but her car broke, requiring a switch to video.  Patient reported that in April, her daughter's school will be unveiling a memorial plaque for her daughter and the other girl who  in their accident and processed complex feelings around this event.  Patient is contemplating sharing some of her daughter's possessions with her daughter's classmates, which she hopes will make it easier to start letting go.     Progress:  Patient continues to struggle with grief related to her daughter's death.     Plan:   We will meet again in 2 weeks to address the patient's bipolar I disorder, anxiety, PTSD, panic disorder, OCD, and agoraphobia.  Estimated duration of treatment is ongoing due to multiple major mental health disorders.  Individual therapy sessions (71755) will be at twice monthly intervals.     Diagnosis:  Bipolar I Disorder  Posttraumatic Stress Disorder (PTSD)  Generalized Anxiety Disorder  Panic Disorder  Obsessive-Compulsive Disorder  Agoraphobia

## 2024-04-09 ENCOUNTER — VIRTUAL VISIT (OUTPATIENT)
Dept: NEUROLOGY | Facility: CLINIC | Age: 49
End: 2024-04-09
Payer: MEDICARE

## 2024-04-09 DIAGNOSIS — F31.9 BIPOLAR I DISORDER (H): Primary | ICD-10-CM

## 2024-04-09 PROCEDURE — 90834 PSYTX W PT 45 MINUTES: CPT | Mod: 95 | Performed by: PSYCHOLOGIST

## 2024-04-23 ENCOUNTER — OFFICE VISIT (OUTPATIENT)
Dept: NEUROLOGY | Facility: CLINIC | Age: 49
End: 2024-04-23
Payer: MEDICARE

## 2024-04-23 DIAGNOSIS — F31.9 BIPOLAR I DISORDER (H): Primary | ICD-10-CM

## 2024-04-23 PROCEDURE — 90834 PSYTX W PT 45 MINUTES: CPT | Performed by: PSYCHOLOGIST

## 2024-04-23 NOTE — PROGRESS NOTES
Psychology Progress Note    Date: April 23, 2024    Time length and type of treatment: 32 minutes (2:07 PM to 2:59 PM) carlos individual therapy    Location:  [facility]    Necessity: This session is necessary to address the patient's bipolar I disorder, anxiety, PTSD, panic disorder, OCD, and agoraphobia. Today we focus on the patient's treatment plan, specifically processing grief.  The reader is invited to review the patient's full treatment plan in the Media section of the patient's Epic medical record.    Psychotherapeutic Technique: This writer utilized motivational interviewing, active listening, reassurance and support in the context of cognitive behavioral therapy to address the above.      Mental Status Evaluation:  Grooming: Within normal limits  Attire: Appropriate  Age: Appears Stated  Behavior Towards Examiner: Cooperative  Motor Activity: Within normal limits  Eye Contact: Appropriate  Mood: Anxious   Affect: tearful  Speech/Language: pressured  Attention: Normal  Concentration: Sufficient  Thought Process: tangential  Thought Content: Does not seem to be responding to internal stimuli   Orientation: Appeared oriented to person, place, and time, though not formally established  Memory: No evidence of impairment.  Judgement: Adequate  Estimated Intelligence: Within normal limits  Demonstrated Insight: Adequate  Fund of Knowledge: Adequate    Intervention:   Patient discussed the 1 year anniversary of her daughter's death, and her distress that only 2 of her friends and 2 of her oldest daughter's friends reached out to her to see if she was okay.  Patient noted how much support she has provided to her friends over the years and discussed how her best friend called her but seemed to not realize the day, instead focusing on his distress over an event that had happened to him.  Patient also discussed her oldest daughter's intention to move out, noted she has been a mom since  she was 18-years-old, and questioned who she would be if she wasn't a mom.  We discussed patient identity and explored all the ways that patient parents her pets, her daughter's friends, and even her own friends.  Patient also discussed the dedication of benches to her daughter and the other girl who , her feeling that her daughter's death was overshadowed by the loss of the other girl, and questions related to why God would take the two people who loved her most in this world (her youngest daughter and her former fiance).      Progress:  Affect lightened modestly as the session progressed    Plan:   We will meet again in 2 weeks to address the patient's Bipolar I Disorder, anxiety, PTSD, Panic Disorder, Obsessive-Compulsive Disorder, and Agoraphobia.  Estimated duration of treatment is ongoing due to multiple major mental health disorders and a very significant trauma history.  Individual therapy sessions (27074) will be at twice monthly intervals.     Diagnosis:  Bipolar I Disorder  Generalized Anxiety Disorder  Posttraumatic Stress Disorder  Panic Disorder  Obsessive-Compulsive Disorder  Agoraphobia

## 2024-05-07 ENCOUNTER — OFFICE VISIT (OUTPATIENT)
Dept: NEUROLOGY | Facility: CLINIC | Age: 49
End: 2024-05-07
Payer: MEDICARE

## 2024-05-07 DIAGNOSIS — F43.10 PTSD (POST-TRAUMATIC STRESS DISORDER): Primary | ICD-10-CM

## 2024-05-07 PROCEDURE — 90834 PSYTX W PT 45 MINUTES: CPT | Performed by: PSYCHOLOGIST

## 2024-05-07 ASSESSMENT — PATIENT HEALTH QUESTIONNAIRE - PHQ9
3. TROUBLE FALLING OR STAYING ASLEEP OR SLEEPING TOO MUCH: MORE THAN HALF THE DAYS
8. MOVING OR SPEAKING SO SLOWLY THAT OTHER PEOPLE COULD HAVE NOTICED. OR THE OPPOSITE, BEING SO FIGETY OR RESTLESS THAT YOU HAVE BEEN MOVING AROUND A LOT MORE THAN USUAL: NEARLY EVERY DAY
10. IF YOU CHECKED OFF ANY PROBLEMS, HOW DIFFICULT HAVE THESE PROBLEMS MADE IT FOR YOU TO DO YOUR WORK, TAKE CARE OF THINGS AT HOME, OR GET ALONG WITH OTHER PEOPLE: VERY DIFFICULT
6. FEELING BAD ABOUT YOURSELF - OR THAT YOU ARE A FAILURE OR HAVE LET YOURSELF OR YOUR FAMILY DOWN: MORE THAN HALF THE DAYS
2. FEELING DOWN, DEPRESSED OR HOPELESS: NEARLY EVERY DAY
7. TROUBLE CONCENTRATING ON THINGS, SUCH AS READING THE NEWSPAPER OR WATCHING TELEVISION: NEARLY EVERY DAY
9. THOUGHTS THAT YOU WOULD BE BETTER OFF DEAD, OR OF HURTING YOURSELF: SEVERAL DAYS
5. POOR APPETITE OR OVEREATING: SEVERAL DAYS
1. LITTLE INTEREST OR PLEASURE IN DOING THINGS: MORE THAN HALF THE DAYS
SUM OF ALL RESPONSES TO PHQ QUESTIONS 1-9: 18
4. FEELING TIRED OR HAVING LITTLE ENERGY: SEVERAL DAYS
SUM OF ALL RESPONSES TO PHQ9 QUESTIONS 1 & 2: 5

## 2024-05-07 ASSESSMENT — ANXIETY QUESTIONNAIRES
3. WORRYING TOO MUCH ABOUT DIFFERENT THINGS: NEARLY EVERY DAY
4. TROUBLE RELAXING: SEVERAL DAYS
6. BECOMING EASILY ANNOYED OR IRRITABLE: NOT AT ALL
7. FEELING AFRAID AS IF SOMETHING AWFUL MIGHT HAPPEN: SEVERAL DAYS
5. BEING SO RESTLESS THAT IT IS HARD TO SIT STILL: NEARLY EVERY DAY
1. FEELING NERVOUS, ANXIOUS, OR ON EDGE: SEVERAL DAYS
IF YOU CHECKED OFF ANY PROBLEMS ON THIS QUESTIONNAIRE, HOW DIFFICULT HAVE THESE PROBLEMS MADE IT FOR YOU TO DO YOUR WORK, TAKE CARE OF THINGS AT HOME, OR GET ALONG WITH OTHER PEOPLE: SOMEWHAT DIFFICULT
GAD7 TOTAL SCORE: 12
2. NOT BEING ABLE TO STOP OR CONTROL WORRYING: NEARLY EVERY DAY

## 2024-05-07 NOTE — PROGRESS NOTES
Psychology Progress Note    Date: May 7, 2024    Time length and type of treatment: 48 minutes (2:02 PM to 2:50 PM), individual therapy    Location:  [facility]    Necessity: This session is necessary to address the patient's PTSD, Bipolar I Disorder, Generalized Anxiety Disorder, Panic Disorder, Obsessive-Compulsive Disorder, and Agoraphobia  Today we focus on updating the patient's treatment plan.  The reader is invited to review the patient's full treatment plan in the Media section of the patient's Epic medical record.    Psychotherapeutic Technique: This writer utilized motivational interviewing, active listening, reassurance and support in the context of cognitive behavioral therapy to address the above.      Mental Status Evaluation:  Grooming: Within normal limits  Attire: Appropriate  Age: Appears Stated  Behavior Towards Examiner: Cooperative  Motor Activity: Within normal limits  Eye Contact: Appropriate  Mood: Sad   Affect: tearful  Speech/Language: normal  Attention: Normal  Concentration: Sufficient  Thought Process: tangential  Thought Content: Clear    Orientation: Appeared oriented to person, place, and time, though not formally established  Memory: No evidence of impairment.  Judgement: Adequate  Estimated Intelligence: Within normal limits  Demonstrated Insight: Adequate  Fund of Knowledge: Adequate    Intervention:   Patient was readministered the PHQ-9, ZAKIA-7, and PCL-5 as part of revising her treatment plan.  Her score of 18 on the PHQ-9 is suggestive of severe depression and is similar to her previous score of 17.  Her score of 12 on the ZAKIA-7 is suggestive of moderate anxiety and is similar to her earlier score of 13.  Her score of 53 on the PCL-5 remains very elevated, but is an improvement over her earlier score of 61.  Treatment plan was jointly revised and and remaining time patient discussed her feelings of hurt and abandonment over her best friends  failure to provide support on the 1 year anniversary of her daughter's death.    Progress:  Patient treatment plan was jointly revised    Plan:   We will meet again in 2 weeks to address the patient's PTSD, Bipolar I Disorder, Generalized Anxiety Disorder, Panic Disorder, OCD, and Agoraphobia.  Estimated duration of treatment is ongoing due to multiple major mental health disorders and a lengthy trauma history.  Individual therapy sessions ([id number]) will be at twice monthly intervals.     Diagnosis:  Posttraumatic Stress Disorder  Bipolar I Disorder  Generalized Anxiety Disorder  Panic Disorder  Obsessive-Compulsive Disorder  Agoraphobia

## 2024-06-18 ENCOUNTER — OFFICE VISIT (OUTPATIENT)
Dept: NEUROLOGY | Facility: CLINIC | Age: 49
End: 2024-06-18
Payer: MEDICARE

## 2024-06-18 DIAGNOSIS — F41.1 GAD (GENERALIZED ANXIETY DISORDER): Primary | ICD-10-CM

## 2024-06-18 PROCEDURE — 90834 PSYTX W PT 45 MINUTES: CPT | Performed by: PSYCHOLOGIST

## 2024-06-18 NOTE — PROGRESS NOTES
Psychology Progress Note    Date: June 18, 2024    Time length and type of treatment: 43 minutes (2:04 PM to 2:47 PM), individual therapy    Location:  [facility]    Necessity: This session is necessary to address the patient's Bipolar I Disorder, PTSD, Generalized Anxiety Disorder, Panic Disorder, Obsessive-Compulsive Disorder, and Agoraphobia. Today we focus on the patient's treatment plan, specifically exploring processing grief.  The reader is invited to review the patient's full treatment plan in the Media section of the patient's Epic medical record.    Psychotherapeutic Technique: This writer utilized motivational interviewing, active listening, reassurance and support in the context of cognitive behavioral therapy to address the above.      Mental Status Evaluation:  Grooming: Within normal limits  Attire: Appropriate  Age: Appears Stated  Behavior Towards Examiner: Cooperative  Motor Activity: Within normal limits  Eye Contact: Appropriate  Mood: Anxious   Affect: full range  Speech/Language: normal  Attention: Normal  Concentration: Sufficient  Thought Process: tangential  Thought Content: Clear    Orientation: Appeared oriented to person, place, and time, though not formally established  Memory: No evidence of impairment.  Judgement: Adequate  Estimated Intelligence: Within normal limits  Demonstrated Insight: Adequate  Fund of Knowledge: Adequate    Intervention:   Patient reported that her stepdad, who is the only father she has ever known, passed away on May 21,; on May 25, which is her son's birthday, her daughter informed her that she and her boyfriend were moving to [place]; and then her landlord informed her that she needed to be moved out of her home by August 31.  Patient expressed frustration that her oldest friend has been neither helpful nor supportive, but noted that 2 other friends have offered her short-term housing.  Patient discussed complex feelings  related to both her stepdad's death and her daughter's move.  Metaphor was utilized to reinforce self care.      Progress:  Patient reported increased depression and anxiety secondary to significant new environmental stressors.       Plan:   We will meet again in 2 weeks to address the patient's Generalized Anxiety Disorder, Bipolar I Disorder, PTSD, Panic Disorder, OCD, and Agoraphobia.  Estimated duration of treatment is ongoing due to multiple major mental health disorders and a lengthy trauma history.  Individual therapy sessions ([id number]) will be at twice monthly intervals.     Diagnosis:  Generalized Anxiety Disorder  Bipolar I Disorder  Posttraumatic Stress Disorder  Obsessive-Compulsive Disorder  Panic Disorder  Agoraphobia

## 2024-07-14 ENCOUNTER — HEALTH MAINTENANCE LETTER (OUTPATIENT)
Age: 49
End: 2024-07-14

## 2024-07-30 ENCOUNTER — OFFICE VISIT (OUTPATIENT)
Dept: NEUROLOGY | Facility: CLINIC | Age: 49
End: 2024-07-30
Payer: MEDICARE

## 2024-07-30 DIAGNOSIS — F31.9 BIPOLAR I DISORDER (H): Primary | ICD-10-CM

## 2024-07-30 PROCEDURE — 90834 PSYTX W PT 45 MINUTES: CPT | Performed by: PSYCHOLOGIST

## 2024-07-30 NOTE — PROGRESS NOTES
Psychology Progress Note    Date: 2024    Time length and type of treatment: (2:07 PM - 2:54 PM), individual therapy     Location:  [facility]    Necessity: This session is necessary to address the patient's Bipolar I Disorder, PTSD, anxiety, Panic Disorder, OCD, and Agoraphobia. Today we focus on the patient's treatment plan, specifically exploring processing grief and thoughts and expectations of self and others.  The reader is invited to review the patient's full treatment plan in the Media section of the patient's Epic medical record.    Psychotherapeutic Technique: This writer utilized motivational interviewing, active listening, reassurance and support in the context of cognitive behavioral therapy to address the above.      Mental Status Evaluation:  Grooming: Within normal limits  Attire: Appropriate  Age: Appears Stated  Behavior Towards Examiner: Cooperative  Motor Activity: Within normal limits  Eye Contact: Appropriate  Mood: Anxious  Sad   Affect: full range  Speech/Language: Mildly pressured  Attention: Normal  Concentration: Sufficient  Thought Process: tangential  Thought Content: Clear    Orientation: Appeared oriented to person, place, and time, though not formally established  Memory: No evidence of impairment.  Judgement: Adequate  Estimated Intelligence: Within normal limits  Demonstrated Insight: Adequate  Fund of Knowledge: Adequate    Intervention:   Patient reported there have finally been manslaughter charges lodged against the dad of the family watching her daughter when she .  She expressed disappointment that the mom, whom patient believes is more culpable, wasn't also charged but discussed her relief that at least there is some public acknowledgement of what happened.  Patient discussed the change in her expectations for her future, noting she expected to be raising her daughter and taking care of her stepdad, but now that they're both  dead, she is contemplating buying an RV and traveling. Patient noted that she is available to her adult daughter if needed, but now that her daughter is renting an apartment with her boyfriend, patient wonders if it would be selfish to take this time to travel.  We discussed what parenting adult children looks like, balancing patient desires and values, and self care.      Progress:  Affect lightened as the session progressed    Plan:   We will meet again in 2 weeks to address the patient's Bipolar I Disorder, anxiety, PTSD, Panic Disorder, OCD, and Agoraphobia.  Estimated duration of treatment is ongoing due to multiple major mental health disorders. Individual therapy sessions (39937) will be at twice monthly intervals.     Diagnosis:  Bipolar I Disorder  Posttraumatic Stress Disorder (PTSD)  Generalized Anxiety Disorder  Panic Disorder  Obsessive-Compulsive Disorder  Agoraphobia

## 2024-09-26 ENCOUNTER — VIRTUAL VISIT (OUTPATIENT)
Dept: NEUROLOGY | Facility: CLINIC | Age: 49
End: 2024-09-26
Payer: MEDICARE

## 2024-09-26 DIAGNOSIS — F43.10 PTSD (POST-TRAUMATIC STRESS DISORDER): Primary | ICD-10-CM

## 2024-09-26 PROCEDURE — 99207 PR NO CHARGE LOS: CPT | Mod: 95 | Performed by: PSYCHOLOGIST

## 2024-09-26 NOTE — PROGRESS NOTES
Connectivity problems disrupted efforts for video visit and patient declined telephone visit, stating she would prefer to wait for her next in person appointment.

## 2024-09-29 NOTE — PROGRESS NOTES
NEUROSURGERY CONSULTATION NOTE    8/8/2019     Rohan Prince is a 43 y.o. female who is sent to us in consultation by Rosenstein, Benjamin E for evaluation of meningioma.         CONSULTATION ASSESSMENT AND PLAN:    44 yo female with a h/o of 2 meningoma as well as s/p C3-7 ACDF in 2016 for myelopathy who presents for routine f/u. Her new MRI brain was reviewed at tumor board and showed her right frontal convexity had slightly increased in size again whereas her left middle fossa meningioma was stable in size but increased from years previous. Recommended treatment at this time. Discussed surgical versus cyber knife options. Patient is interested in cyber knife and a consult has been placed. She has migraines and what she calls a freezing episode recently. There is no flair change around these meningoma or significant mass effect which would suggest a seizure to be less likely but would like her to see neurology for their opinion. Also says she has a h/o stroke but per record says transient ischemic attack. She seems to be easily confused and has difficulty processing. Will have neurology evaluate. Discussed given her multilevel cervical fusion she is at risk of adjacent level disease in the cervical spine. She does have a spastic gait on exam which after discussion seems to be her longstanding baseline. Discussed if she has new or worsening neurological symptoms she should return to clinic.     I spent more than 45 minutes in this apt, examining the pt, reviewing the scans, reviewing notes from chart, discussing treatment options with risks and benefits and coordinating care. >50 % clinic time was spent in face to face counseling and coordinating care    Sandy Luna       HPI:  44 yo female with a h/o of 2 meningoma as well as s/p C3-7 ACDF in 2016 for myelopathy who presents for routine f/u. Her meningoma in the past were followed by Dr Santamaira. Last committee review in June of 2018 was there was a slight  increase in size since previous but should follow with another short term imaging. She again followed in June of 2018 and thought to have stable imaging. She now f/u for her next short term follow up.    She has baseline memory loss. Migrainous headaches for last year.They are worse in evenings, frontal, and associated with photophobia. She had an episode of freezing recently where she wasn't responsive to her daughter but without LOC. No further episodes since then. Blurred vision. Wears glasses and thinks she needs them adjusted. No nausea or emesis.    Past Medical History:   Diagnosis Date     Cancer (H)     cervical cancer     Chronic pain disorder      DJD (degenerative joint disease)      Endometriosis      Fibromyalgia      Kidney infection      Meningioma, recurrent of brain (H)      Migraine      TIA (transient ischemic attack)      Past Surgical History:   Procedure Laterality Date     CERVICAL FUSION      2016 C3-C7      HYSTERECTOMY       KNEE SURGERY       MASTECTOMY Right     partial     partial masetectomy       MI TOTAL KNEE ARTHROPLASTY Right 1/17/2019    Procedure: RIGHT TOTAL KNEE ARTHROPLASTY;  Surgeon: Pedro Moss DO;  Location: [facility];  Service: Orthopedics     RIGHT OOPHORECTOMY         REVIEW OF SYSTEMS:  ROS reviewed with pt as documented on pt health form of 8/8/2019.    Negative cardiac, pulmonary, hematological with exception of neurological as per HPI   .  No family hx of anesthetic reactions  .  No family hx of hypercoagulability .       MEDICATIONS:  Current Outpatient Medications   Medication Sig Dispense Refill     hydrOXYzine pamoate (VISTARIL) 25 MG capsule Take 1-2 capsules (25-50 mg total) by mouth every 6 (six) hours as needed for anxiety or other (pain). 60 capsule 0     pregabalin (LYRICA) 75 MG capsule Take 150 mg by mouth 2 (two) times a day. x2 capsules             No current facility-administered medications for this visit.        ALLERGIES/SENSITIVITIES:    "  Allergies   Allergen Reactions     Chlorhexidine Rash     PERTINENT SOCIAL HISTORY:   Social History     Socioeconomic History     Marital status: Single     Spouse name: None     Number of children: None     Years of education: None     Highest education level: None   Occupational History     None   Social Needs     Financial resource strain: None     Food insecurity:     Worry: None     Inability: None     Transportation needs:     Medical: None     Non-medical: None   Tobacco Use     Smoking status: Never Smoker     Smokeless tobacco: Never Used   Substance and Sexual Activity     Alcohol use: No     Drug use: No     Sexual activity: None   Lifestyle     Physical activity:     Days per week: None     Minutes per session: None     Stress: None   Relationships     Social connections:     Talks on phone: None     Gets together: None     Attends Anabaptism service: None     Active member of club or organization: None     Attends meetings of clubs or organizations: None     Relationship status: None     Intimate partner violence:     Fear of current or ex partner: None     Emotionally abused: None     Physically abused: None     Forced sexual activity: None   Other Topics Concern     None   Social History Narrative     None         FAMILY HISTORY:  Family History   Problem Relation Age of Onset     Cancer Mother      Stroke Mother      Other Mother         swelling        PHYSICAL EXAM:   Constitutional: /59   Pulse 69   Ht 5' 10\" (1.778 m)   Wt 192 lb (87.1 kg)   LMP 12/24/2016 (Exact Date)   SpO2 99%   BMI 27.55 kg/m       Mental Status: A & O in no acute distress.  Affect is appropriate.  Speech is fluent.  Recent and remote memory are intact.  Attention span and concentration are normal.     Cranial Nerves: CN1: grossly intact per patient recall. CN2: No funduscopic exam performed. CN3,4 & 6: Pupillary light response, lateral and vertical gaze normal.  No nystagmus.  Visual fields are full to " confrontation. CN5: Intact to touch CN7: R facial asymmetry which appears to be intact with movement. CN8: Intact to spoken voice. R hearing worse then left. CN9&10: Gag reflex, uvula midline, palate rises with phonation. CN11: Shoulder shrug 5/5 intact bilaterally. CN12: Tongue midline and moves freely from side to side.     Motor: Normal bulk and tone all muscle groups of upper and lower extremities.      Sensory: Sensation intact bilaterally to light touch.      Coordination: spastic gait. Unable to tandem.      Reflexes; supinator, biceps, triceps, knee/ ankle jerk intact.    Tremor in right hand    IMAGING: I personally reviewed all radiographic images          Cc:   Rosenstein, Benjamin E, MD  [address] [facility] [address]   N/V, hematuria

## 2024-10-25 ENCOUNTER — OFFICE VISIT (OUTPATIENT)
Dept: NEUROLOGY | Facility: CLINIC | Age: 49
End: 2024-10-25
Payer: MEDICARE

## 2024-10-25 DIAGNOSIS — F41.1 GAD (GENERALIZED ANXIETY DISORDER): Primary | ICD-10-CM

## 2024-10-25 PROCEDURE — 90834 PSYTX W PT 45 MINUTES: CPT | Performed by: PSYCHOLOGIST

## 2024-10-25 NOTE — PROGRESS NOTES
Psychology Progress Note    Date: 2024    Time length and type of treatment: 40 minutes (2:08 PM to 2:48 PM), individual therapy    Location:  [facility]    Necessity: This session is necessary to address the patient's Bipolar I Disorder, PTSD, Panic Disorder, OCD, Agoraphobia, and anxiety. Today we focus on the patient's treatment plan, specifically processing grief.  The reader is invited to review the patient's full treatment plan in the Media section of the patient's Epic medical record.    Psychotherapeutic Technique: This writer utilized motivational interviewing, active listening, reassurance and support in the context of cognitive behavioral therapy to address the above.      Mental Status Evaluation:  Grooming: Within normal limits  Attire: Appropriate  Age: Appears Stated  Behavior Towards Examiner: Cooperative  Motor Activity: Within normal limits  Eye Contact: Appropriate  Mood: Anxious   Affect: full range  Speech/Language: normal  Attention: Normal  Concentration: Sufficient  Thought Process: unremarkable  Thought Content: Clear    Orientation: Appeared oriented to person, place, and time, though not formally established  Memory: No evidence of impairment.  Judgement: Adequate  Estimated Intelligence: Within normal limits  Demonstrated Insight: Adequate  Fund of Knowledge: Adequate    Intervention:   Patient has not been seen since July and a clinical decision was made to focus on reestablishing rapport.  Patient reported life has been very chaotic since she was forced to move.  She has purchased an old RV, but has not found a permanent place to stay.  Patient reported she briefly stayed with a friend, but found it painful to be around her friend's children, who are the same age as patient's  daughter.  A friend helped her put some of her possessions into storage, but she later learned this friend discarded some mementos that were meaningful to the  patient. Patient is also struggling because the criminal court case for the man patient holds responsible for her daughter's death has been progressing very slowly.  Patient has tried to focus on her oldest daughter, but noted her daughter continues to live with a man whom patient dislikes and believes is manipulating her daughter, which makes frequent contact difficult.  We discussed patient finally having time to take care of herself, what this means for her, and the challenge of imagining a life without her daughter.     Progress:  Patient demonstrated increased insight into intrapersonal dynamics.    Plan:   We will meet again in 2 weeks to address the patient's Bipolar I Disorder, PTSD, Panic Disorder, OCD, Agoraphobia, and anxiety.  Estimated duration of treatment is ongoing due to multiple major mental health disorders and a very significant trauma history.  Individual therapy sessions ([id number]) will be at twice monthly intervals.     Diagnosis:  Generalized Anxiety Disorder  Bipolar I Disorder  Posttraumatic Stress Disorder (PTSD)  Panic Disorder  Obsessive-Compulsive Disorder  Agoraphobia

## 2024-12-01 ENCOUNTER — HEALTH MAINTENANCE LETTER (OUTPATIENT)
Age: 49
End: 2024-12-01

## 2024-12-11 ENCOUNTER — VIRTUAL VISIT (OUTPATIENT)
Dept: NEUROLOGY | Facility: CLINIC | Age: 49
End: 2024-12-11
Payer: MEDICARE

## 2024-12-11 DIAGNOSIS — F43.10 PTSD (POST-TRAUMATIC STRESS DISORDER): Primary | ICD-10-CM

## 2024-12-11 PROCEDURE — 90834 PSYTX W PT 45 MINUTES: CPT | Mod: 95 | Performed by: PSYCHOLOGIST

## 2024-12-11 ASSESSMENT — ANXIETY QUESTIONNAIRES
5. BEING SO RESTLESS THAT IT IS HARD TO SIT STILL: MORE THAN HALF THE DAYS
3. WORRYING TOO MUCH ABOUT DIFFERENT THINGS: NEARLY EVERY DAY
IF YOU CHECKED OFF ANY PROBLEMS ON THIS QUESTIONNAIRE, HOW DIFFICULT HAVE THESE PROBLEMS MADE IT FOR YOU TO DO YOUR WORK, TAKE CARE OF THINGS AT HOME, OR GET ALONG WITH OTHER PEOPLE: SOMEWHAT DIFFICULT
4. TROUBLE RELAXING: MORE THAN HALF THE DAYS
7. FEELING AFRAID AS IF SOMETHING AWFUL MIGHT HAPPEN: MORE THAN HALF THE DAYS
1. FEELING NERVOUS, ANXIOUS, OR ON EDGE: MORE THAN HALF THE DAYS
GAD7 TOTAL SCORE: 15
2. NOT BEING ABLE TO STOP OR CONTROL WORRYING: NEARLY EVERY DAY
6. BECOMING EASILY ANNOYED OR IRRITABLE: SEVERAL DAYS

## 2024-12-11 ASSESSMENT — PATIENT HEALTH QUESTIONNAIRE - PHQ9
6. FEELING BAD ABOUT YOURSELF - OR THAT YOU ARE A FAILURE OR HAVE LET YOURSELF OR YOUR FAMILY DOWN: MORE THAN HALF THE DAYS
5. POOR APPETITE OR OVEREATING: SEVERAL DAYS
9. THOUGHTS THAT YOU WOULD BE BETTER OFF DEAD, OR OF HURTING YOURSELF: NOT AT ALL
7. TROUBLE CONCENTRATING ON THINGS, SUCH AS READING THE NEWSPAPER OR WATCHING TELEVISION: MORE THAN HALF THE DAYS
8. MOVING OR SPEAKING SO SLOWLY THAT OTHER PEOPLE COULD HAVE NOTICED. OR THE OPPOSITE, BEING SO FIGETY OR RESTLESS THAT YOU HAVE BEEN MOVING AROUND A LOT MORE THAN USUAL: MORE THAN HALF THE DAYS
4. FEELING TIRED OR HAVING LITTLE ENERGY: SEVERAL DAYS
2. FEELING DOWN, DEPRESSED OR HOPELESS: MORE THAN HALF THE DAYS
3. TROUBLE FALLING OR STAYING ASLEEP OR SLEEPING TOO MUCH: NEARLY EVERY DAY
1. LITTLE INTEREST OR PLEASURE IN DOING THINGS: SEVERAL DAYS
SUM OF ALL RESPONSES TO PHQ QUESTIONS 1-9: 14
SUM OF ALL RESPONSES TO PHQ9 QUESTIONS 1 & 2: 3
10. IF YOU CHECKED OFF ANY PROBLEMS, HOW DIFFICULT HAVE THESE PROBLEMS MADE IT FOR YOU TO DO YOUR WORK, TAKE CARE OF THINGS AT HOME, OR GET ALONG WITH OTHER PEOPLE: SOMEWHAT DIFFICULT

## 2024-12-11 NOTE — PROGRESS NOTES
Psychology Progress Note    Date: December 11, 2024    Time length and type of treatment: 50 minutes (1:00 PM to 1:50 PM), individual therapy    Telemedicine visit: The patient's condition can be safely assessed and treated via synchronous audio and visual telemedicine encounter via [employer]. Patient was informed that policies and procedures that govern in-person sessions would also apply to video sessions. Patient was in agreement with proceeding with a video session.     Patient Location: [place], [place]  Provider Location:  [facility] - Provider remote location     Necessity: This session is necessary to address the patient's PTSD, Bipolar I Disorder, Generalized Anxiety Disorder, Panic Disorder, Obsessive-Compulsive Disorder, and Agoraphobia. Today we focus on updating the patient's treatment plan. The reader is invited to review the patient's full treatment plan in the Media section of the patient's Epic medical record.    Psychotherapeutic Technique: This writer utilized motivational interviewing, active listening, reassurance and support in the context of cognitive behavioral therapy to address the above.      Mental Status Evaluation:  Grooming: Within normal limits  Attire: Appropriate  Age: Appears Stated  Behavior Towards Examiner: Cooperative  Motor Activity: Within normal limits  Eye Contact: Appropriate  Mood: Sad   Affect: tearful  Speech/Language: normal  Attention: Normal  Concentration: Sufficient  Thought Process: unremarkable  Thought Content: Does not seem to be responding to internal stimuli   Orientation: Appeared oriented to person, place, and time, though not formally established  Memory: No evidence of impairment.  Judgement: Adequate  Estimated Intelligence: Within normal limits  Demonstrated Insight: Adequate  Fund of Knowledge: Adequate    Intervention:   Patient was readministered the PHQ-9, ZAKIA-7, and PCL-5 of updating her treatment plan.  Her score of 14 on  the PHQ-9 is suggestive of moderate depression and is an improvement over her earlier severe score of 18.  Her score of 15 on the ZAKIA-7 is suggestive of moderate anxiety and represents a modest worsening relative to her earlier moderate score of 12.  Her score of 53 on the PCL-5 is unchanged from her earlier score and continues to support significant PTSD symptomology.  Patient agreed this was accurate and treatment plan was jointly revised.  In remaining time we processed grief, with patient discussing her sadness over the death of her  daughter's hamster and the challenge of facing her first holidays without her daughter.    Progress:  Patient treatment plan was jointly revised    Plan:   We will meet again in 2 weeks to address the patient's PTSD, bipolar I disorder, anxiety, panic disorder, OCD, and agoraphobia..  Estimated duration of treatment is ongoing due to multiple major mental health disorders and a lengthy trauma history. Individual therapy sessions ([id number]) will be at twice monthly intervals.     Diagnosis:  Posttraumatic Stress Disorder  Bipolar I Disorder  Generalized Anxiety Disorder  Panic Disorder  Obsessive-Compulsive Disorder  Agoraphobia

## 2025-01-06 NOTE — PROGRESS NOTES
"    Chronic Pain Follow-Up Visit            Rohan Prince is a 42 year old year old who is  here for evaluation and treatment of chronic pain.     Rohan is here for evaluation and to develop a multifaceted chronic pain plan that may or may not include chronic opiate therapy.      Previously been on a pain contract? No  Previous pain diagnosis:No    Pain location: Lower back is where it is worst; Neck pain as well  Has history of L5-S1 foraminal narrowing  Has history of L5-S1 degenerative disc disease  Has history of modrate thoracic spondylosis   Has  History of C4-C5 fusion and C5-C6 fusion and narrow spinal canal   Has history of C3-C7 ACDF  Pain onset: Upper and lower back pain since age of 17  Pain is described as Burning, Numb, Sharp and Stabbing   Aggravating factors include: Prolonged household activities - cleaning, doing dishes; prolonged immobilization in one position   Relieving factors include: Physical therapy, meditation, Details     Previous medication treatments:  Opiates - none, oxycodone /acetaminophen (Percocet), oxycodone ER (Oxycontin) and tramadol ER (Ultram ER)  Antiepileptics - Neurontin (Gabapentin)  Antidepressants - Amitryptyline   NSAIDs - ibuprofen (Motrin) and naproxen (Anaprox, Naprosyn, Naprelan)  Muscle relaxants - none, baclofen and cyclobenzaprine (Flexeril)  Topicals - none     Past pain Treatments  Pain Clinic:  No  Physical Therapy:  Yes - last year was attempted  Psychologist:  Saw a psychologist when she was younger - is currently seeing a counselor at her [facility]  Relaxation techniques/biofeedback:  Yes - music, meditation   Chiropractor:  No  Acupuncture:  No  TENs Unit:  No  Injections:  Yes - medial branch block   Formal self-care education:  No    Current Exercise: Activity \"steps\" 3 times a day, tries to stay active everyday,  6-7 days/week for an average of less than 15 minutes       Substance Use History    reports that she has never smoked. She has never used smokeless " No neurosurgical intervention warranted at this time    Recommend to follow up with endocrinologist and ophthalmology as scheduled   Repeat  Brain MRI with and without the contrast  in 6 months (..5/2025) . Please call Central Scheduling [phone] to schedule the MRI. Draw blood at [facility] 2 weeks  before the MRI.    Neurosurgery office will call you or contact you via [employer] portal application with results and further recommendations.      "tobacco.  Alcohol Use:Never used / No history  History of chemical dependency:  No   Current use of recreational substances N/A  Any substance abuse in household? No     Family History:Substance use  Family History of alcohol abuse? No   Family History of Illicit substance use? No   Family History of prescription medication  abuse? No      Social History  Who lives in your household? 14 year old daughter, 5 year old daughter   Recent family or social stressors:  No recent changes - single mom supporting both daughters has been chronic stress  Who is in your support network? [place] - 1 block from patient's house     Are you currently working ? No, on disability   What do you or did you do? 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 Poor   How many hours do you need? 6-7 How many do you get? 2-3     Mental Health  Diagnosis of Depression : History of depression   Other MH Diagnosis?:  No   History of Preadolescent Sexual Abuse No          Legal Issues   Are you currently involved in litigation related to your pain complaint? No  Have you ever been arrested or had other legal problems? No  Have you filed a Workers' Compensation/SSI/MVA claim related to your pain complaint?  YES - is on SSI/disability income      --TOYA/CareEverywhere signed for other providers,  Yes  --[place] Prescriber s Database reviewed:Yes     What are you hoping to do when your pain is more controlled?   - \"I am hoping that I can sleep better\"  - \"I am hoping I can do more activities with the girls\"  - \"I am hoping I can not struggle to do the dishes\"              Opioid Use Evaluation Tools     Functional Assessment  Questionnaire -5    Self-care ability assessment (bathing, toilet, dressing, moving and eating)  2. Requires frequent assistance   2   Family and social ability assessment (chores, " hobbies, driving, sex and social activities)  2. Able to perform some   2   Movement ability assessment (Walk climb stairs)  2. Able to get up and walk independently, able to climb one flight of stairs   2   Lifting ability assessment  1. Able to lift up to 10 lbs. occasionally   1   Work ability assessment  2. Able to work part-time and with physical limitations   2                                                                                                                                                                                                                                                                                               Total    9   Physical Functional Ability (FAQ5) Score    2005 Gabriel Gusman MD.                                                                                                                                                                                                                  Total  X 5 =     45/100       DIRE Score:  - completed at initial intake   Score 14-21: May be a candidate for long-term opioid analgesia    Source: [facility]   2005    Opioid Risk Tool Score:  Completed at initial intake  Total Score Risk Category  0 - 3 = Low Risk  of future problems with Opioid     Functional Assessment  Questionnaire -5  45     Problem, Medication and Allergy Lists were reviewed and updated if needed..    Patient is an established patient of this clinic..         Review of Systems:   CONSTITUTIONAL: no fatigue, no unexpected change in weight  SKIN: no worrisome rashes, no worrisome moles, no worrisome lesions  EYES: no acute vision problems or changes  ENT: no ear problems, no mouth problems, no throat problems  RESP: no significant cough, no shortness of breath  CV: no chest pain, no palpitations, no new or worsening peripheral edema  GI: no nausea, no vomiting, no constipation, no diarrhea         Physical Exam:  "    Vitals:    07/25/18 1015   BP: 104/72   Pulse: 62   Temp: 97.5  F (36.4  C)   TempSrc: Oral   SpO2: 98%   Weight: 208 lb 9.6 oz (94.6 kg)   Height: 5' 10.5\" (179.1 cm)     Body mass index is 29.51 kg/(m^2).  Vitals were reviewed and were normal  GENERAL: healthy, alert, well nourished, well hydrated, no distress  RESP: lungs clear to auscultation - no rales, no rhonchi, no wheezes  CV: regular rates and rhythm, normal S1 S2, no S3 or S4 and no murmur, no click or rub -  ABDOMEN: soft, no tenderness, no  hepatosplenomegaly, no masses, normal bowel sounds        Results:     Results from last visit:  Office Visit on 07/11/2018   Component Date Value Ref Range Status     Phencyclidine 07/11/2018 NEGATIVE  NEGATIVE Final     Propoxyphene 07/11/2018 NEGATIVE  NEGATIVE Final     Tricyclic Antidepressants 07/11/2018 NEGATIVE  NEGATIVE Final     Amphetamines Qual 07/11/2018 NEGATIVE  NEGATIVE Final     Barbiturates Qual Urine 07/11/2018 NEGATIVE  NEGATIVE Final     Buprenorphine Qual Urine 07/11/2018 NEGATIVE  NEGATIVE Final     Benzodiazepine Qual Urine 07/11/2018 NEGATIVE  NEGATIVE Final     Cocaine Qual Urine 07/11/2018 NEGATIVE  NEGATIVE Final     Cannabinoids Qual Urine 07/11/2018 NEGATIVE  NEGATIVE Final     Methamphetamine Qual 07/11/2018 NEGATIVE  NEGATIVE Final     Methadone Qual 07/11/2018 NEGATIVE  NEGATIVE Final     Morphine Qual 07/11/2018 NEGATIVE  NEGATIVE Final     Oxycodone Qual 07/11/2018 NEGATIVE  NEGATIVE Final     Temperature of Urine was Between 9* 07/11/2018 NO* YES Final    Comment: This is a preliminary screening test that detects drugs-of-abuse in urine at   specified detection levels.  To confirm preliminary results, a more specific   method such as Gas Chromatography/Mass Spectrometry (GC/MS) must be used.          Phencyclidine 07/11/2018 NEGATIVE  NEGATIVE Final     Propoxyphene 07/11/2018 NEGATIVE  NEGATIVE Final     Tricyclic Antidepressants 07/11/2018 NEGATIVE  NEGATIVE Final     " Amphetamines Qual 07/11/2018 POSITIVE* NEGATIVE Final     Barbiturates Qual Urine 07/11/2018 NEGATIVE  NEGATIVE Final     Buprenorphine Qual Urine 07/11/2018 NEGATIVE  NEGATIVE Final     Benzodiazepine Qual Urine 07/11/2018 NEGATIVE  NEGATIVE Final     Cocaine Qual Urine 07/11/2018 NEGATIVE  NEGATIVE Final     Cannabinoids Qual Urine 07/11/2018 NEGATIVE  NEGATIVE Final     Methamphetamine Qual 07/11/2018 POSITIVE* NEGATIVE Final     Methadone Qual 07/11/2018 NEGATIVE  NEGATIVE Final     Morphine Qual 07/11/2018 NEGATIVE  NEGATIVE Final     Oxycodone Qual 07/11/2018 NEGATIVE  NEGATIVE Final     Temperature of Urine was Between 9* 07/11/2018 YES  YES Final    Comment: This is a preliminary screening test that detects drugs-of-abuse in urine at   specified detection levels.  To confirm preliminary results, a more specific   method such as Gas Chromatography/Mass Spectrometry (GC/MS) must be used.             Rapid urine drug screen obtained today: Yes    Patient just took sudafed which tests positive for amphetamines    Assessment and Plan    Rohan Prince is here for follow up of chronic pain caused by lumbar foraminal canal stenosis and degenerative disc disease.  Pt currently has a functional status FAQ 5  of 45.  Patient is being prescribed 150 mg of tramadol IR per day. 15 mg MEDD   Naloxone WILL NOT be prescribed.  Opioid medical management: 50 mg tramadol, TID PRN    Exercise discussed and patient     If opioids prescribed patient was asked to bring pill bottle to each appointment and was informed that refills would only be provided at office visits.   Asked patient to F/U in 1 week for with same provider for 20 minute visit to complete chronic pain in take.     Patient did have a positive amphetamine UDS today - likely from sudafed. Advised will only provide limited supply for today and if further UDS is positive will not be able to prescribe     Misbah Y. Palla, MD     Exercise discussed and patient      I  explained that the assessment process may take up to 3 visits.   Expectations for CPM were also copied into the patient instructions.    Goals of therapy:     Increase function     Decrease suffering     May not relieve pain  1) Non-medical management: Continue counseling and [facility]   2) Non-opioid, medical management: Patient in touch with neurosurgery   3) Opioid medical management: tramadol, 50 mg q8h PRN pain    Opioid Treatment Agreement completed No    4) If opioids prescribed  patient was asked to bring pill bottle to each appointment and was informed that refills would only be provided at office visits.   5) Asked patient to follow-up in one week with same provider for 20 min CPM #3 visit.  6) Follow clinic process to add patient to Clinic Chronic Pain Registry.       Options for treatment and follow-up care were reviewed with the patient. Rohan Prince was engaged and actively involved in the decision making process and verbalized understanding of the options discussed and was satisfied with the final plan.

## 2025-01-29 ENCOUNTER — VIRTUAL VISIT (OUTPATIENT)
Dept: NEUROLOGY | Facility: CLINIC | Age: 50
End: 2025-01-29
Payer: MEDICARE

## 2025-01-29 DIAGNOSIS — F31.9 BIPOLAR I DISORDER (H): Primary | ICD-10-CM

## 2025-01-29 PROCEDURE — 90834 PSYTX W PT 45 MINUTES: CPT | Mod: 95 | Performed by: PSYCHOLOGIST
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
## 2025-02-11 ENCOUNTER — OFFICE VISIT (OUTPATIENT)
Dept: NEUROLOGY | Facility: CLINIC | Age: 50
End: 2025-02-11
Payer: MEDICARE

## 2025-02-11 DIAGNOSIS — F31.9 BIPOLAR I DISORDER (H): Primary | ICD-10-CM

## 2025-02-11 PROCEDURE — 90834 PSYTX W PT 45 MINUTES: CPT | Performed by: PSYCHOLOGIST

## 2025-02-11 NOTE — PROGRESS NOTES
Psychology Progress Note    Date: February 11, 2025    Time length and type of treatment: 41 minutes (2:16 PM to 2:57 PM), individual therapy    Location:  [facility]    Necessity: This session is necessary to address the patient's Generalized Anxiety Disorder, Panic Disorder, Bipolar I Disorder, PTSD, Obsessive-Compulsive Disorder, and Agoraphobia. Today we focus on the patient's treatment plan, specifically exploring thoughts and expectations of self and others.  The reader is invited to review the patient's full treatment plan in the Media section of the patient's Epic medical record.    Psychotherapeutic Technique: This writer utilized motivational interviewing, active listening, reassurance and support in the context of cognitive behavioral therapy to address the above.      Mental Status Evaluation:  Grooming: Within normal limits  Attire: Appropriate  Age: Appears Stated  Behavior Towards Examiner: Cooperative  Motor Activity: Within normal limits  Eye Contact: Appropriate  Mood: Euthymic  Affect: full range  Speech/Language: Mildly pressured  Attention: Normal  Concentration: Sufficient  Thought Process: unremarkable  Thought Content: Does not seem to be responding to internal stimuli   Orientation: Appeared oriented to person, place, and time, though not formally established  Memory: No evidence of impairment.  Judgement: Adequate  Estimated Intelligence: Within normal limits  Demonstrated Insight: Adequate  Fund of Knowledge: Adequate    Intervention:   Patient discussed her surprise at learning others perceive her as confident, noting this often does not feel true for her.  We discussed how patient's Bipolar I Disorder impacts patient confidence, with her feeling confident when she is hypomanic, overconfident when she is manic, and lacking in confidence both when she is depressed and when she feels relatively stable.  We also discussed how denise has impacted patient's  decision making around romantic relationships, and how decisions she regrets now have her very reluctant to consider a romantic relationship.    Progress:  Patient demonstrated increased insight into intrapersonal dynamics    Plan:   We will meet again in 2 weeks to address the patient's Bipolar I Disorder, PTSD, anxiety, Panic Disorder, OCD, and Agoraphobia.  Estimated duration of treatment is ongoing due to multiple major mental health disorders and a lengthy trauma history.  Individual therapy sessions (20721) will be at weekly intervals.      Diagnosis:  Bipolar I Disorder  Generalized Anxiety Disorder  Posttraumatic Stress Disorder  Panic Disorder  Obsessive Compulsive Disorder  Agoraphobia

## 2025-03-12 ENCOUNTER — VIRTUAL VISIT (OUTPATIENT)
Dept: NEUROLOGY | Facility: CLINIC | Age: 50
End: 2025-03-12
Payer: MEDICARE

## 2025-03-12 DIAGNOSIS — F43.10 PTSD (POST-TRAUMATIC STRESS DISORDER): Primary | ICD-10-CM

## 2025-03-12 PROCEDURE — 90834 PSYTX W PT 45 MINUTES: CPT | Mod: 95 | Performed by: PSYCHOLOGIST
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
